# Patient Record
Sex: MALE | Race: WHITE | NOT HISPANIC OR LATINO | Employment: FULL TIME | URBAN - METROPOLITAN AREA
[De-identification: names, ages, dates, MRNs, and addresses within clinical notes are randomized per-mention and may not be internally consistent; named-entity substitution may affect disease eponyms.]

---

## 2017-03-15 DIAGNOSIS — K50.80 REGIONAL ENTERITIS OF SMALL INTESTINE WITH LARGE INTESTINE (H): ICD-10-CM

## 2017-03-15 LAB
ALBUMIN SERPL-MCNC: 4.4 G/DL (ref 3.4–5)
ALP SERPL-CCNC: 43 U/L (ref 40–150)
ALT SERPL W P-5'-P-CCNC: 33 U/L (ref 0–70)
AST SERPL W P-5'-P-CCNC: 19 U/L (ref 0–45)
BASOPHILS # BLD AUTO: 0 10E9/L (ref 0–0.2)
BASOPHILS NFR BLD AUTO: 0.3 %
BILIRUB DIRECT SERPL-MCNC: 0.3 MG/DL (ref 0–0.2)
BILIRUB SERPL-MCNC: 2 MG/DL (ref 0.2–1.3)
DIFFERENTIAL METHOD BLD: NORMAL
EOSINOPHIL # BLD AUTO: 0.2 10E9/L (ref 0–0.7)
EOSINOPHIL NFR BLD AUTO: 3.9 %
ERYTHROCYTE [DISTWIDTH] IN BLOOD BY AUTOMATED COUNT: 13 % (ref 10–15)
HCT VFR BLD AUTO: 48.4 % (ref 40–53)
HGB BLD-MCNC: 16.4 G/DL (ref 13.3–17.7)
IMM GRANULOCYTES # BLD: 0 10E9/L (ref 0–0.4)
IMM GRANULOCYTES NFR BLD: 0.2 %
LYMPHOCYTES # BLD AUTO: 2.2 10E9/L (ref 0.8–5.3)
LYMPHOCYTES NFR BLD AUTO: 36.6 %
MCH RBC QN AUTO: 33 PG (ref 26.5–33)
MCHC RBC AUTO-ENTMCNC: 33.9 G/DL (ref 31.5–36.5)
MCV RBC AUTO: 97 FL (ref 78–100)
MONOCYTES # BLD AUTO: 0.4 10E9/L (ref 0–1.3)
MONOCYTES NFR BLD AUTO: 6.5 %
NEUTROPHILS # BLD AUTO: 3.1 10E9/L (ref 1.6–8.3)
NEUTROPHILS NFR BLD AUTO: 52.5 %
NRBC # BLD AUTO: 0 10*3/UL
NRBC BLD AUTO-RTO: 0 /100
PLATELET # BLD AUTO: 295 10E9/L (ref 150–450)
PROT SERPL-MCNC: 7.8 G/DL (ref 6.8–8.8)
RBC # BLD AUTO: 4.97 10E12/L (ref 4.4–5.9)
WBC # BLD AUTO: 5.9 10E9/L (ref 4–11)

## 2017-03-15 PROCEDURE — 36415 COLL VENOUS BLD VENIPUNCTURE: CPT

## 2017-03-15 PROCEDURE — 80076 HEPATIC FUNCTION PANEL: CPT

## 2017-03-15 PROCEDURE — 85025 COMPLETE CBC W/AUTO DIFF WBC: CPT

## 2017-03-23 ENCOUNTER — TRANSFERRED RECORDS (OUTPATIENT)
Dept: HEALTH INFORMATION MANAGEMENT | Facility: CLINIC | Age: 37
End: 2017-03-23

## 2017-05-18 DIAGNOSIS — K50.80 REGIONAL ENTERITIS OF SMALL INTESTINE WITH LARGE INTESTINE (H): ICD-10-CM

## 2017-05-18 LAB
ALBUMIN SERPL-MCNC: 4.2 G/DL (ref 3.4–5)
ALP SERPL-CCNC: 46 U/L (ref 40–150)
ALT SERPL W P-5'-P-CCNC: 34 U/L (ref 0–70)
AST SERPL W P-5'-P-CCNC: 19 U/L (ref 0–45)
BASOPHILS # BLD AUTO: 0 10E9/L (ref 0–0.2)
BASOPHILS NFR BLD AUTO: 0.2 %
BILIRUB DIRECT SERPL-MCNC: 0.2 MG/DL (ref 0–0.2)
BILIRUB SERPL-MCNC: 3.9 MG/DL (ref 0.2–1.3)
DIFFERENTIAL METHOD BLD: ABNORMAL
EOSINOPHIL # BLD AUTO: 0.1 10E9/L (ref 0–0.7)
EOSINOPHIL NFR BLD AUTO: 2.6 %
ERYTHROCYTE [DISTWIDTH] IN BLOOD BY AUTOMATED COUNT: 13.6 % (ref 10–15)
HCT VFR BLD AUTO: 45.9 % (ref 40–53)
HGB BLD-MCNC: 15.7 G/DL (ref 13.3–17.7)
IMM GRANULOCYTES # BLD: 0 10E9/L (ref 0–0.4)
IMM GRANULOCYTES NFR BLD: 0.2 %
LYMPHOCYTES # BLD AUTO: 1.8 10E9/L (ref 0.8–5.3)
LYMPHOCYTES NFR BLD AUTO: 34.8 %
MCH RBC QN AUTO: 33.1 PG (ref 26.5–33)
MCHC RBC AUTO-ENTMCNC: 34.2 G/DL (ref 31.5–36.5)
MCV RBC AUTO: 97 FL (ref 78–100)
MONOCYTES # BLD AUTO: 0.3 10E9/L (ref 0–1.3)
MONOCYTES NFR BLD AUTO: 6.5 %
NEUTROPHILS # BLD AUTO: 2.8 10E9/L (ref 1.6–8.3)
NEUTROPHILS NFR BLD AUTO: 55.7 %
NRBC # BLD AUTO: 0 10*3/UL
NRBC BLD AUTO-RTO: 0 /100
PLATELET # BLD AUTO: 304 10E9/L (ref 150–450)
PROT SERPL-MCNC: 7.9 G/DL (ref 6.8–8.8)
RBC # BLD AUTO: 4.75 10E12/L (ref 4.4–5.9)
WBC # BLD AUTO: 5.1 10E9/L (ref 4–11)

## 2017-05-18 PROCEDURE — 36415 COLL VENOUS BLD VENIPUNCTURE: CPT | Performed by: INTERNAL MEDICINE

## 2017-05-18 PROCEDURE — 85025 COMPLETE CBC W/AUTO DIFF WBC: CPT | Performed by: INTERNAL MEDICINE

## 2017-05-18 PROCEDURE — 80076 HEPATIC FUNCTION PANEL: CPT | Performed by: INTERNAL MEDICINE

## 2017-06-06 ENCOUNTER — TRANSFERRED RECORDS (OUTPATIENT)
Dept: HEALTH INFORMATION MANAGEMENT | Facility: CLINIC | Age: 37
End: 2017-06-06

## 2017-08-09 ENCOUNTER — TRANSFERRED RECORDS (OUTPATIENT)
Dept: HEALTH INFORMATION MANAGEMENT | Facility: CLINIC | Age: 37
End: 2017-08-09

## 2017-10-23 ENCOUNTER — ALLIED HEALTH/NURSE VISIT (OUTPATIENT)
Dept: NURSING | Facility: CLINIC | Age: 37
End: 2017-10-23
Payer: COMMERCIAL

## 2017-10-23 DIAGNOSIS — Z23 NEED FOR PROPHYLACTIC VACCINATION AND INOCULATION AGAINST INFLUENZA: Primary | ICD-10-CM

## 2017-10-23 PROCEDURE — 90471 IMMUNIZATION ADMIN: CPT

## 2017-10-23 PROCEDURE — 90686 IIV4 VACC NO PRSV 0.5 ML IM: CPT

## 2017-10-23 PROCEDURE — 99207 ZZC NO CHARGE NURSE ONLY: CPT

## 2017-10-23 NOTE — MR AVS SNAPSHOT
After Visit Summary   10/23/2017    Leodan Collins    MRN: [id number]           Patient Information     Date Of Birth          1980        Visit Information        Provider Department      10/23/2017 6:25 PM [facility]        Today's Diagnoses     Need for prophylactic vaccination and inoculation against influenza    -  1       Follow-ups after your visit        Your next 10 appointments already scheduled     Oct 30, 2017  9:30 AM CDT   [facility] with Blane Worrell MD   [facility] ([facility])    [address] [place] [id number] [phone]              Who to contact     If you have questions or need follow up information about today's clinic visit or your schedule please contact [facility] directly at [phone].  Normal or non-critical lab and imaging results will be communicated to you by Shakahart, letter or phone within 4 business days after the clinic has received the results. If you do not hear from us within 7 days, please contact the clinic through Shakahart or phone. If you have a critical or abnormal lab result, we will notify you by phone as soon as possible.  Submit refill requests through 3seventy or call your pharmacy and they will forward the refill request to us. Please allow 3 business days for your refill to be completed.          Additional Information About Your Visit        MyChart Information     3seventy gives you secure access to your electronic health record. If you see a primary care provider, you can also send messages to your care team and make appointments. If you have questions, please call your primary care clinic.  If you do not have a primary care provider, please call [phone] and they will assist you.        Care EveryWhere ID     This is your Care EveryWhere ID. This could be used by other organizations  to access your [facility] medical records  [id number]         Blood Pressure from Last 3 Encounters:   07/08/16 130/81   05/27/16 120/72   05/17/16 108/62    Weight from Last 3 Encounters:   07/08/16 140 lb (63.5 kg)   05/27/16 140 lb (63.5 kg)   05/17/16 138 lb 3.7 oz (62.7 kg)              We Performed the Following     FLU VAC, SPLIT VIRUS IM > 3 YO (QUADRIVALENT) [31002]     Vaccine Administration, Initial [66634]        Primary Care Provider Office Phone # Fax #    Blane Adolfo Worrell [phone]       [address] [facility] [address]  [facility] [place] [address]        Equal Access to Services     DELVIN ANGEL : Hadii kaity cooneyo Soconstantino, waaxda luqadaha, qaybta kaalmada adeegyada, keron mendez. So [facility] [phone].    ATENCIÓN: Si habla español, tiene a santiago disposición servicios gratuitos de asistencia lingüística. Llame al [phone].    We comply with applicable federal civil rights laws and [place] laws. We do not discriminate on the basis of race, color, national origin, age, disability, sex, sexual orientation, or gender identity.            Thank you!     Thank you for choosing [facility]  for your care. Our goal is always to provide you with excellent care. Hearing back from our patients is one way we can continue to improve our services. Please take a few minutes to complete the written survey that you may receive in the mail after your visit with us. Thank you!             Your Updated Medication List - Protect others around you: Learn how to safely use, store and throw away your medicines at www.disposemymeds.org.          This list is accurate as of: 10/23/17  7:03 PM.  Always use your most recent med list.                   Brand Name Dispense Instructions for use Diagnosis    benzonatate 200 MG capsule    TESSALON          * HUMIRA PEN 40 MG/0.8ML pen kit   Generic drug:  adalimumab           * HUMIRA PEN SC      Inject 40 mg  Subcutaneous every 14 days    Calculus of kidney       mercaptopurine 50 MG tablet CHEMO    PURINETHOL     Take 100 mg by mouth daily    Calculus of kidney       MULTI-DAY PLUS IRON PO      Take 1 tablet by mouth daily        ondansetron 4 MG tablet    ZOFRAN    18 tablet    Take 1 tablet (4 mg) by mouth every 8 hours as needed for nausea    Kidney stone       oxybutynin 5 MG tablet    DITROPAN    10 tablet    Take 1 tablet (5 mg) by mouth daily as needed for bladder spasms Only take as needed. May cause dry mouth, dry eyes or urinary retention (inability to pee)    Kidney stone       * oxyCODONE 5 MG IR tablet    ROXICODONE    30 tablet    Take 1-2 tablets (5-10 mg) by mouth every 6 hours as needed for moderate to severe pain Do NOT drive or use additional narcotics while on this medication    Kidney stone       * oxyCODONE 5 MG IR tablet    ROXICODONE    30 tablet    Take 1 tablet (5 mg) by mouth every 4 hours as needed for moderate to severe pain    Pain due to ureteral stent (H)       phenazopyridine 100 MG tablet    PYRIDIUM    20 tablet    Take 2 tablets (200 mg) by mouth 3 times daily as needed for urinary tract discomfort May turn your urine orange    Kidney stone       senna-docusate 8.6-50 MG per tablet    SENOKOT-S;PERICOLACE    60 tablet    Take 2 tablets by mouth 2 times daily to prevent constipation with narcotic pain medication. Hold if you have loose stools.    Kidney stone       tamsulosin 0.4 MG capsule    FLOMAX    30 capsule    Take 1 capsule (0.4 mg) by mouth daily    Kidney stone       * Notice:  This list has 4 medication(s) that are the same as other medications prescribed for you. Read the directions carefully, and ask your doctor or other care provider to review them with you.

## 2017-10-30 ENCOUNTER — OFFICE VISIT (OUTPATIENT)
Dept: FAMILY MEDICINE | Facility: CLINIC | Age: 37
End: 2017-10-30
Payer: COMMERCIAL

## 2017-10-30 VITALS
BODY MASS INDEX: 22.62 KG/M2 | HEIGHT: 67 IN | TEMPERATURE: 97 F | HEART RATE: 52 BPM | DIASTOLIC BLOOD PRESSURE: 66 MMHG | OXYGEN SATURATION: 100 % | WEIGHT: 144.1 LBS | SYSTOLIC BLOOD PRESSURE: 113 MMHG

## 2017-10-30 DIAGNOSIS — Z00.00 ROUTINE GENERAL MEDICAL EXAMINATION AT A HEALTH CARE FACILITY: Primary | ICD-10-CM

## 2017-10-30 DIAGNOSIS — K50.019 CROHN'S DISEASE OF SMALL INTESTINE WITH COMPLICATION (H): ICD-10-CM

## 2017-10-30 DIAGNOSIS — H61.23 BILATERAL IMPACTED CERUMEN: ICD-10-CM

## 2017-10-30 DIAGNOSIS — D22.9 ATYPICAL MOLE: ICD-10-CM

## 2017-10-30 LAB
ALBUMIN SERPL-MCNC: 4.2 G/DL (ref 3.4–5)
ALP SERPL-CCNC: 47 U/L (ref 40–150)
ALT SERPL W P-5'-P-CCNC: 33 U/L (ref 0–70)
ANION GAP SERPL CALCULATED.3IONS-SCNC: 8 MMOL/L (ref 3–14)
AST SERPL W P-5'-P-CCNC: 21 U/L (ref 0–45)
BILIRUB SERPL-MCNC: 1.3 MG/DL (ref 0.2–1.3)
BUN SERPL-MCNC: 13 MG/DL (ref 7–30)
CALCIUM SERPL-MCNC: 9.6 MG/DL (ref 8.5–10.1)
CHLORIDE SERPL-SCNC: 106 MMOL/L (ref 94–109)
CHOLEST SERPL-MCNC: 172 MG/DL
CO2 SERPL-SCNC: 26 MMOL/L (ref 20–32)
CREAT SERPL-MCNC: 0.8 MG/DL (ref 0.66–1.25)
ERYTHROCYTE [DISTWIDTH] IN BLOOD BY AUTOMATED COUNT: 13.8 % (ref 10–15)
GFR SERPL CREATININE-BSD FRML MDRD: >90 ML/MIN/1.7M2
GLUCOSE SERPL-MCNC: 85 MG/DL (ref 70–99)
HCT VFR BLD AUTO: 46 % (ref 40–53)
HDLC SERPL-MCNC: 52 MG/DL
HGB BLD-MCNC: 16 G/DL (ref 13.3–17.7)
LDLC SERPL CALC-MCNC: 91 MG/DL
MCH RBC QN AUTO: 33.4 PG (ref 26.5–33)
MCHC RBC AUTO-ENTMCNC: 34.8 G/DL (ref 31.5–36.5)
MCV RBC AUTO: 96 FL (ref 78–100)
NONHDLC SERPL-MCNC: 120 MG/DL
PLATELET # BLD AUTO: 282 10E9/L (ref 150–450)
POTASSIUM SERPL-SCNC: 3.9 MMOL/L (ref 3.4–5.3)
PROT SERPL-MCNC: 7.9 G/DL (ref 6.8–8.8)
RBC # BLD AUTO: 4.79 10E12/L (ref 4.4–5.9)
SODIUM SERPL-SCNC: 140 MMOL/L (ref 133–144)
TRIGL SERPL-MCNC: 147 MG/DL
TSH SERPL DL<=0.005 MIU/L-ACNC: 0.67 MU/L (ref 0.4–4)
WBC # BLD AUTO: 5.3 10E9/L (ref 4–11)

## 2017-10-30 PROCEDURE — 84443 ASSAY THYROID STIM HORMONE: CPT | Performed by: FAMILY MEDICINE

## 2017-10-30 PROCEDURE — 80061 LIPID PANEL: CPT | Performed by: FAMILY MEDICINE

## 2017-10-30 PROCEDURE — 85027 COMPLETE CBC AUTOMATED: CPT | Performed by: FAMILY MEDICINE

## 2017-10-30 PROCEDURE — 36415 COLL VENOUS BLD VENIPUNCTURE: CPT | Performed by: FAMILY MEDICINE

## 2017-10-30 PROCEDURE — 80053 COMPREHEN METABOLIC PANEL: CPT | Performed by: FAMILY MEDICINE

## 2017-10-30 PROCEDURE — 99395 PREV VISIT EST AGE 18-39: CPT | Performed by: FAMILY MEDICINE

## 2017-10-30 NOTE — MR AVS SNAPSHOT
After Visit Summary   10/30/2017    Leodan Collins    MRN: [id number]           Patient Information     Date Of Birth          1980        Visit Information        Provider Department      10/30/2017 9:30 AM Blane Worrell MD [facility]        Today's Diagnoses     Routine general medical examination at a health care facility    -  1    Atypical mole          Care Instructions      Preventive Health Recommendations  Male Ages 26 - 39    Yearly exam:             See your health care provider every year in order to  o   Review health changes.   o   Discuss preventive care.    o   Review your medicines if your doctor has prescribed any.    You should be tested each year for STDs (sexually transmitted diseases), if you re at risk.     After age 35, talk to your provider about cholesterol testing. If you are at risk for heart disease, have your cholesterol tested at least every 5 years.     If you are at risk for diabetes, you should have a diabetes test (fasting glucose).  Shots: Get a flu shot each year. Get a tetanus shot every 10 years.     Nutrition:    Eat at least 5 servings of fruits and vegetables daily.     Eat whole-grain bread, whole-wheat pasta and brown rice instead of white grains and rice.     Talk to your provider about Calcium and Vitamin D.     Lifestyle    Exercise for at least 150 minutes a week (30 minutes a day, 5 days a week). This will help you control your weight and prevent disease.     Limit alcohol to one drink per day.     No smoking.     Wear sunscreen to prevent skin cancer.     See your dentist every six months for an exam and cleaning.             Follow-ups after your visit        Additional Services     DERMATOLOGY REFERRAL       Your provider has referred you to: [facility] [phone]   [email or web address]    Please be aware that coverage of these services is subject to the terms  "and limitations of your health insurance plan.  Call member services at your health plan with any benefit or coverage questions.      Please bring the following with you to your appointment:    (1) Any X-Rays, CTs or MRIs which have been performed.  Contact the facility where they were done to arrange for  prior to your scheduled appointment.    (2) List of current medications  (3) This referral request   (4) Any documents/labs given to you for this referral                  Who to contact     If you have questions or need follow up information about today's clinic visit or your schedule please contact [facility] directly at [phone].  Normal or non-critical lab and imaging results will be communicated to you by MyChart, letter or phone within 4 business days after the clinic has received the results. If you do not hear from us within 7 days, please contact the clinic through Cloud Engineshart or phone. If you have a critical or abnormal lab result, we will notify you by phone as soon as possible.  Submit refill requests through [employer] or call your pharmacy and they will forward the refill request to us. Please allow 3 business days for your refill to be completed.          Additional Information About Your Visit        Cloud EnginesharFanBoom Information     [employer] gives you secure access to your electronic health record. If you see a primary care provider, you can also send messages to your care team and make appointments. If you have questions, please call your primary care clinic.  If you do not have a primary care provider, please call [phone] and they will assist you.        Care EveryWhere ID     This is your Care EveryWhere ID. This could be used by other organizations to access your [facility] medical records  [id number]        Your Vitals Were     Pulse Temperature Height Pulse Oximetry BMI (Body Mass Index)       52 97  F (36.1  C) (Oral) 5' 7\" (1.702 m) 100% 22.57 kg/m2        Blood Pressure from " Last 3 Encounters:   10/30/17 113/66   07/08/16 130/81   05/27/16 120/72    Weight from Last 3 Encounters:   10/30/17 144 lb 1.6 oz (65.4 kg)   07/08/16 140 lb (63.5 kg)   05/27/16 140 lb (63.5 kg)              We Performed the Following     CBC with platelets     Comprehensive metabolic panel     DERMATOLOGY REFERRAL     Lipid panel reflex to direct LDL Fasting     TSH with free T4 reflex          Today's Medication Changes          These changes are accurate as of: 10/30/17 10:02 AM.  If you have any questions, ask your nurse or doctor.               Stop taking these medicines if you haven't already. Please contact your care team if you have questions.     benzonatate 200 MG capsule   Commonly known as:  TESSALON   Stopped by:  Blane Worrell MD           ondansetron 4 MG tablet   Commonly known as:  ZOFRAN   Stopped by:  Blane Worrell MD           oxybutynin 5 MG tablet   Commonly known as:  DITROPAN   Stopped by:  Blane Worrell MD           oxyCODONE 5 MG IR tablet   Commonly known as:  ROXICODONE   Stopped by:  Blane Worrell MD           phenazopyridine 100 MG tablet   Commonly known as:  PYRIDIUM   Stopped by:  Blane Worrell MD           senna-docusate 8.6-50 MG per tablet   Commonly known as:  SENOKOT-S;PERICOLACE   Stopped by:  Blane Worrell MD           tamsulosin 0.4 MG capsule   Commonly known as:  FLOMAX   Stopped by:  Blane Worrell MD                    Primary Care Provider Office Phone # Fax #    Blane Worrell [phone]       [address] [facility] [address]  [facility] [address]        Equal Access to Services     [facility] Hospital[facility]: Hadii kaity townsend Soconstantino, waaxda luqadaha, qaybta kaalmada adeegyada, keron mendez. So [facility] [phone].    ATENCIÓN: Si habla español, tiene a santiago disposición servicios gratuitos de asistencia lingüística. Llame al [phone].    We  comply with applicable federal civil rights laws and [place] laws. We do not discriminate on the basis of race, color, national origin, age, disability, sex, sexual orientation, or gender identity.            Thank you!     Thank you for choosing [facility]  for your care. Our goal is always to provide you with excellent care. Hearing back from our patients is one way we can continue to improve our services. Please take a few minutes to complete the written survey that you may receive in the mail after your visit with us. Thank you!             Your Updated Medication List - Protect others around you: Learn how to safely use, store and throw away your medicines at www.disposemymeds.org.          This list is accurate as of: 10/30/17 10:02 AM.  Always use your most recent med list.                   Brand Name Dispense Instructions for use Diagnosis    * HUMIRA PEN 40 MG/0.8ML pen kit   Generic drug:  adalimumab           * HUMIRA PEN SC      Inject 40 mg Subcutaneous every 14 days    Calculus of kidney       mercaptopurine 50 MG tablet CHEMO    PURINETHOL     Take 100 mg by mouth daily    Calculus of kidney       MULTI-DAY PLUS IRON PO      Take 1 tablet by mouth daily        * Notice:  This list has 2 medication(s) that are the same as other medications prescribed for you. Read the directions carefully, and ask your doctor or other care provider to review them with you.

## 2017-10-30 NOTE — NURSING NOTE
"Chief Complaint   Patient presents with     Physical       Initial /66 (BP Location: Left arm, Patient Position: Chair, Cuff Size: Adult Regular)  Pulse 52  Temp 97  F (36.1  C) (Oral)  Ht 5' 7\" (1.702 m)  Wt 144 lb 1.6 oz (65.4 kg)  SpO2 100%  BMI 22.57 kg/m2 Estimated body mass index is 22.57 kg/(m^2) as calculated from the following:    Height as of this encounter: 5' 7\" (1.702 m).    Weight as of this encounter: 144 lb 1.6 oz (65.4 kg).  Medication Reconciliation: complete     Kusum Eid CMA    "

## 2017-10-30 NOTE — LETTER
October 31, 2017      Leodan Collins  [address]  [facility] [address]        Dear ,    We are writing to inform you of your test results.    Your test results fall within the expected range(s) or remain unchanged from previous results.  Please continue with current treatment plan.    Resulted Orders   Lipid panel reflex to direct LDL Fasting   Result Value Ref Range    Cholesterol 172 <200 mg/dL    Triglycerides 147 <150 mg/dL      Comment:      Non Fasting    HDL Cholesterol 52 >39 mg/dL    LDL Cholesterol Calculated 91 <100 mg/dL      Comment:      Desirable:       <100 mg/dl    Non HDL Cholesterol 120 <130 mg/dL   Comprehensive metabolic panel   Result Value Ref Range    Sodium 140 133 - 144 mmol/L    Potassium 3.9 3.4 - 5.3 mmol/L    Chloride 106 94 - 109 mmol/L    Carbon Dioxide 26 20 - 32 mmol/L    Anion Gap 8 3 - 14 mmol/L    Glucose 85 70 - 99 mg/dL      Comment:      Non Fasting    Urea Nitrogen 13 7 - 30 mg/dL    Creatinine 0.80 0.66 - 1.25 mg/dL    GFR Estimate >90 >60 mL/min/1.7m2      Comment:      Non  GFR Calc    GFR Estimate If Black >90 >60 mL/min/1.7m2      Comment:       GFR Calc    Calcium 9.6 8.5 - 10.1 mg/dL    Bilirubin Total 1.3 0.2 - 1.3 mg/dL    Albumin 4.2 3.4 - 5.0 g/dL    Protein Total 7.9 6.8 - 8.8 g/dL    Alkaline Phosphatase 47 40 - 150 U/L    ALT 33 0 - 70 U/L    AST 21 0 - 45 U/L   CBC with platelets   Result Value Ref Range    WBC 5.3 4.0 - 11.0 10e9/L    RBC Count 4.79 4.4 - 5.9 10e12/L    Hemoglobin 16.0 13.3 - 17.7 g/dL    Hematocrit 46.0 40.0 - 53.0 %    MCV 96 78 - 100 fl    MCH 33.4 (H) 26.5 - 33.0 pg    MCHC 34.8 31.5 - 36.5 g/dL    RDW 13.8 10.0 - 15.0 %    Platelet Count 282 150 - 450 10e9/L   TSH with free T4 reflex   Result Value Ref Range    TSH 0.67 0.40 - 4.00 mU/L       If you have any questions or concerns, please call the clinic at the number listed above.       Sincerely,        Blane Worrell,  MD

## 2017-10-30 NOTE — PROGRESS NOTES
SUBJECTIVE:   CC: Leodan Collins is an 37 year old male who presents for preventative health visit.     Healthy Habits:    Do you get at least three servings of calcium containing foods daily (dairy, green leafy vegetables, etc.)? yes    Amount of exercise or daily activities, outside of work: 0 day(s) per week    Problems taking medications regularly No    Medication side effects: No    Have you had an eye exam in the past two years? yes    Do you see a dentist twice per year? yes    Do you have sleep apnea, excessive snoring or daytime drowsiness?no          Encounter Diagnoses   Name Primary?     Routine general medical examination at a health care facility Yes     Atypical mole      Bilateral impacted cerumen, recurrent         Today's PHQ-2 Score: PHQ-2 ( 1999 Pf[employer]) 10/30/2017 5/13/2016   Q1: Little interest or pleasure in doing things 0 0   Q2: Feeling down, depressed or hopeless 0 0   PHQ-2 Score 0 0       Abuse: Current or Past(Physical, Sexual or Emotional)- No  Do you feel safe in your environment - Yes    Social History   Substance Use Topics     Smoking status: Never Smoker     Smokeless tobacco: Never Used     Alcohol use 0.0 oz/week     0 Standard drinks or equivalent per week      Comment: x2 a week     The patient does not drink >3 drinks per day nor >7 drinks per week.    Last PSA: No results found for: PSA    Reviewed orders with patient. Reviewed health maintenance and updated orders accordingly - Yes  Labs reviewed in EPIC    Reviewed and updated as needed this visit by clinical staff  Tobacco  Allergies  Meds  Med Hx  Surg Hx  Fam Hx  Soc Hx        Reviewed and updated as needed this visit by Provider              ROS:  C: NEGATIVE for fever, chills, change in weight  I: NEGATIVE for worrisome rashes, moles or lesions  E: NEGATIVE for vision changes or irritation  ENT: NEGATIVE for ear, mouth and throat problems  R: NEGATIVE for significant cough or SOB  CV: NEGATIVE for chest pain,  "palpitations or peripheral edema   male: negative for dysuria, hematuria, decreased urinary stream, erectile dysfunction, urethral discharge  M: NEGATIVE for significant arthralgias or myalgia  N: NEGATIVE for weakness, dizziness or paresthesias  P: NEGATIVE for changes in mood or affect    OBJECTIVE:   /66 (BP Location: Left arm, Patient Position: Chair, Cuff Size: Adult Regular)  Pulse 52  Temp 97  F (36.1  C) (Oral)  Ht 5' 7\" (1.702 m)  Wt 144 lb 1.6 oz (65.4 kg)  SpO2 100%  BMI 22.57 kg/m2  EXAM:  GENERAL: healthy, alert and no distress  EYES: Eyes grossly normal to inspection, PERRL and conjunctivae and sclerae normal  HENT:Cerumenosis is noted.  Wax is removed by syringing   Instructions for home care to prevent wax buildup are given. ear canals and TM's normal, nose and mouth without ulcers or lesions  NECK: no adenopathy, no asymmetry, masses, or scars and thyroid normal to palpation  RESP: lungs clear to auscultation - no rales, rhonchi or wheezes  CV: regular rate and rhythm, normal S1 S2, no S3 or S4, no murmur, click or rub, no peripheral edema and peripheral pulses strong  ABDOMEN: soft, nontender and colostomy noted   (male): normal male genitalia without lesions or urethral discharge, no hernia  MS: no gross musculoskeletal defects noted, no edema  SKIN: several atypical mole - generalized  NEURO: Normal strength and tone, mentation intact and speech normal  PSYCH: mentation appears normal, affect normal/bright  LYMPH: no cervical, supraclavicular, axillary, or inguinal adenopathy    ASSESSMENT/PLAN:   1. Routine general medical examination at a health care facility  Overall in good health  - Lipid panel reflex to direct LDL Fasting  - Comprehensive metabolic panel  - CBC with platelets  - TSH with free T4 reflex    2. Atypical mole  Needs eval  - DERMATOLOGY REFERRAL    3. Bilateral impacted cerumen  resolved  - REMOVE IMPACTED CERUMEN  4. crohns dieseae, stable  Follow up with " "consultant as planned.   COUNSELING:  Reviewed preventive health counseling, as reflected in patient instructions       Regular exercise       Healthy diet/nutrition       Vision screening       Hearing screening       Family planning       Safe sex practices/STD prevention       Prostate cancer screening         reports that he has never smoked. He has never used smokeless tobacco.      Estimated body mass index is 22.57 kg/(m^2) as calculated from the following:    Height as of this encounter: 5' 7\" (1.702 m).    Weight as of this encounter: 144 lb 1.6 oz (65.4 kg).         Counseling Resources:  ATP IV Guidelines  Pooled Cohorts Equation Calculator  FRAX Risk Assessment  ICSI Preventive Guidelines  Dietary Guidelines for Americans, 2010  USDA's MyPlate  ASA Prophylaxis  Lung CA Screening    Blane Worrell MD  [facility]  "

## 2017-11-24 ENCOUNTER — APPOINTMENT (OUTPATIENT)
Dept: LAB | Facility: CLINIC | Age: 37
End: 2017-11-24
Attending: INTERNAL MEDICINE
Payer: COMMERCIAL

## 2017-11-27 ENCOUNTER — TRANSFERRED RECORDS (OUTPATIENT)
Dept: HEALTH INFORMATION MANAGEMENT | Facility: CLINIC | Age: 37
End: 2017-11-27

## 2017-11-27 DIAGNOSIS — K50.80 CROHN'S DISEASE OF BOTH SMALL AND LG INT W/O COMPLICATIONS (H): Primary | ICD-10-CM

## 2017-11-27 LAB
ALBUMIN SERPL-MCNC: 3.9 G/DL (ref 3.4–5)
ALP SERPL-CCNC: 50 U/L (ref 40–150)
ALT SERPL W P-5'-P-CCNC: 33 U/L (ref 0–70)
AST SERPL W P-5'-P-CCNC: 22 U/L (ref 0–45)
BASOPHILS # BLD AUTO: 0 10E9/L (ref 0–0.2)
BASOPHILS NFR BLD AUTO: 0.3 %
BILIRUB DIRECT SERPL-MCNC: 0.3 MG/DL (ref 0–0.2)
BILIRUB SERPL-MCNC: 2.5 MG/DL (ref 0.2–1.3)
DIFFERENTIAL METHOD BLD: NORMAL
EOSINOPHIL # BLD AUTO: 0.2 10E9/L (ref 0–0.7)
EOSINOPHIL NFR BLD AUTO: 2.5 %
ERYTHROCYTE [DISTWIDTH] IN BLOOD BY AUTOMATED COUNT: 13.1 % (ref 10–15)
HCT VFR BLD AUTO: 47.7 % (ref 40–53)
HGB BLD-MCNC: 16.3 G/DL (ref 13.3–17.7)
IMM GRANULOCYTES # BLD: 0 10E9/L (ref 0–0.4)
IMM GRANULOCYTES NFR BLD: 0.2 %
LYMPHOCYTES # BLD AUTO: 2.8 10E9/L (ref 0.8–5.3)
LYMPHOCYTES NFR BLD AUTO: 45.4 %
MCH RBC QN AUTO: 32.8 PG (ref 26.5–33)
MCHC RBC AUTO-ENTMCNC: 34.2 G/DL (ref 31.5–36.5)
MCV RBC AUTO: 96 FL (ref 78–100)
MONOCYTES # BLD AUTO: 0.5 10E9/L (ref 0–1.3)
MONOCYTES NFR BLD AUTO: 8.4 %
NEUTROPHILS # BLD AUTO: 2.6 10E9/L (ref 1.6–8.3)
NEUTROPHILS NFR BLD AUTO: 43.2 %
NRBC # BLD AUTO: 0 10*3/UL
NRBC BLD AUTO-RTO: 0 /100
PLATELET # BLD AUTO: 306 10E9/L (ref 150–450)
PROT SERPL-MCNC: 7.6 G/DL (ref 6.8–8.8)
RBC # BLD AUTO: 4.97 10E12/L (ref 4.4–5.9)
WBC # BLD AUTO: 6.1 10E9/L (ref 4–11)

## 2017-11-27 PROCEDURE — 85025 COMPLETE CBC W/AUTO DIFF WBC: CPT | Performed by: INTERNAL MEDICINE

## 2017-11-27 PROCEDURE — 36415 COLL VENOUS BLD VENIPUNCTURE: CPT | Performed by: INTERNAL MEDICINE

## 2017-11-27 PROCEDURE — 80076 HEPATIC FUNCTION PANEL: CPT | Performed by: INTERNAL MEDICINE

## 2018-02-11 ENCOUNTER — HOSPITAL ENCOUNTER (EMERGENCY)
Facility: CLINIC | Age: 38
Discharge: HOME | End: 2018-02-11
Attending: INTERNAL MEDICINE | Admitting: INTERNAL MEDICINE
Payer: COMMERCIAL

## 2018-02-11 ENCOUNTER — APPOINTMENT (OUTPATIENT)
Dept: CT IMAGING | Facility: CLINIC | Age: 38
End: 2018-02-11
Attending: INTERNAL MEDICINE
Payer: COMMERCIAL

## 2018-02-11 VITALS
RESPIRATION RATE: 18 BRPM | OXYGEN SATURATION: 95 % | DIASTOLIC BLOOD PRESSURE: 71 MMHG | WEIGHT: 145 LBS | HEART RATE: 58 BPM | SYSTOLIC BLOOD PRESSURE: 123 MMHG | BODY MASS INDEX: 22.71 KG/M2 | TEMPERATURE: 97.4 F

## 2018-02-11 DIAGNOSIS — N20.1 URETERAL CALCULUS: ICD-10-CM

## 2018-02-11 LAB
ALBUMIN UR-MCNC: 30 MG/DL
ANION GAP SERPL CALCULATED.3IONS-SCNC: 7 MMOL/L (ref 3–14)
APPEARANCE UR: ABNORMAL
BASOPHILS # BLD AUTO: 0 10E9/L (ref 0–0.2)
BASOPHILS NFR BLD AUTO: 0.2 %
BILIRUB UR QL STRIP: NEGATIVE
BUN SERPL-MCNC: 15 MG/DL (ref 7–30)
CALCIUM SERPL-MCNC: 9.1 MG/DL (ref 8.5–10.1)
CAOX CRY #/AREA URNS HPF: ABNORMAL /HPF
CHLORIDE SERPL-SCNC: 107 MMOL/L (ref 94–109)
CO2 SERPL-SCNC: 28 MMOL/L (ref 20–32)
COLOR UR AUTO: ABNORMAL
CREAT SERPL-MCNC: 0.86 MG/DL (ref 0.66–1.25)
DIFFERENTIAL METHOD BLD: NORMAL
EOSINOPHIL # BLD AUTO: 0.1 10E9/L (ref 0–0.7)
EOSINOPHIL NFR BLD AUTO: 0.6 %
ERYTHROCYTE [DISTWIDTH] IN BLOOD BY AUTOMATED COUNT: 12.9 % (ref 10–15)
GFR SERPL CREATININE-BSD FRML MDRD: >90 ML/MIN/1.7M2
GLUCOSE SERPL-MCNC: 95 MG/DL (ref 70–99)
GLUCOSE UR STRIP-MCNC: NEGATIVE MG/DL
HCT VFR BLD AUTO: 45 % (ref 40–53)
HGB BLD-MCNC: 15.2 G/DL (ref 13.3–17.7)
HGB UR QL STRIP: ABNORMAL
IMM GRANULOCYTES # BLD: 0 10E9/L (ref 0–0.4)
IMM GRANULOCYTES NFR BLD: 0.2 %
KETONES UR STRIP-MCNC: NEGATIVE MG/DL
LEUKOCYTE ESTERASE UR QL STRIP: ABNORMAL
LYMPHOCYTES # BLD AUTO: 1.4 10E9/L (ref 0.8–5.3)
LYMPHOCYTES NFR BLD AUTO: 14.1 %
MCH RBC QN AUTO: 32.5 PG (ref 26.5–33)
MCHC RBC AUTO-ENTMCNC: 33.8 G/DL (ref 31.5–36.5)
MCV RBC AUTO: 96 FL (ref 78–100)
MONOCYTES # BLD AUTO: 0.6 10E9/L (ref 0–1.3)
MONOCYTES NFR BLD AUTO: 6 %
MUCOUS THREADS #/AREA URNS LPF: PRESENT /LPF
NEUTROPHILS # BLD AUTO: 8.1 10E9/L (ref 1.6–8.3)
NEUTROPHILS NFR BLD AUTO: 78.9 %
NITRATE UR QL: NEGATIVE
NRBC # BLD AUTO: 0 10*3/UL
NRBC BLD AUTO-RTO: 0 /100
PH UR STRIP: 5.5 PH (ref 5–7)
PLATELET # BLD AUTO: 274 10E9/L (ref 150–450)
POTASSIUM SERPL-SCNC: 3.8 MMOL/L (ref 3.4–5.3)
RBC # BLD AUTO: 4.68 10E12/L (ref 4.4–5.9)
RBC #/AREA URNS AUTO: >182 /HPF (ref 0–2)
SODIUM SERPL-SCNC: 142 MMOL/L (ref 133–144)
SOURCE: ABNORMAL
SP GR UR STRIP: 1.02 (ref 1–1.03)
SQUAMOUS #/AREA URNS AUTO: <1 /HPF (ref 0–1)
UROBILINOGEN UR STRIP-MCNC: NORMAL MG/DL (ref 0–2)
WBC # BLD AUTO: 10.2 10E9/L (ref 4–11)
WBC #/AREA URNS AUTO: 22 /HPF (ref 0–2)

## 2018-02-11 PROCEDURE — 76775 US EXAM ABDO BACK WALL LIM: CPT | Performed by: INTERNAL MEDICINE

## 2018-02-11 PROCEDURE — 80048 BASIC METABOLIC PNL TOTAL CA: CPT | Performed by: INTERNAL MEDICINE

## 2018-02-11 PROCEDURE — 85025 COMPLETE CBC W/AUTO DIFF WBC: CPT | Performed by: INTERNAL MEDICINE

## 2018-02-11 PROCEDURE — 99285 EMERGENCY DEPT VISIT HI MDM: CPT | Mod: 25 | Performed by: INTERNAL MEDICINE

## 2018-02-11 PROCEDURE — 76775 US EXAM ABDO BACK WALL LIM: CPT | Mod: 26 | Performed by: INTERNAL MEDICINE

## 2018-02-11 PROCEDURE — 96361 HYDRATE IV INFUSION ADD-ON: CPT | Performed by: INTERNAL MEDICINE

## 2018-02-11 PROCEDURE — 81001 URINALYSIS AUTO W/SCOPE: CPT | Performed by: INTERNAL MEDICINE

## 2018-02-11 PROCEDURE — 74176 CT ABD & PELVIS W/O CONTRAST: CPT

## 2018-02-11 PROCEDURE — 96374 THER/PROPH/DIAG INJ IV PUSH: CPT | Performed by: INTERNAL MEDICINE

## 2018-02-11 PROCEDURE — 25000128 H RX IP 250 OP 636: Performed by: INTERNAL MEDICINE

## 2018-02-11 RX ORDER — KETOROLAC TROMETHAMINE 30 MG/ML
30 INJECTION, SOLUTION INTRAMUSCULAR; INTRAVENOUS ONCE
Status: COMPLETED | OUTPATIENT
Start: 2018-02-11 | End: 2018-02-11

## 2018-02-11 RX ORDER — ONDANSETRON 4 MG/1
4 TABLET, ORALLY DISINTEGRATING ORAL EVERY 8 HOURS PRN
Qty: 10 TABLET | Refills: 0 | Status: SHIPPED | OUTPATIENT
Start: 2018-02-11 | End: 2018-02-14

## 2018-02-11 RX ORDER — TAMSULOSIN HYDROCHLORIDE 0.4 MG/1
0.4 CAPSULE ORAL DAILY
Qty: 10 CAPSULE | Refills: 0 | Status: SHIPPED | OUTPATIENT
Start: 2018-02-11 | End: 2018-02-21

## 2018-02-11 RX ORDER — OXYCODONE AND ACETAMINOPHEN 5; 325 MG/1; MG/1
1 TABLET ORAL EVERY 4 HOURS PRN
COMMUNITY
End: 2018-02-11

## 2018-02-11 RX ORDER — OXYCODONE AND ACETAMINOPHEN 5; 325 MG/1; MG/1
1-2 TABLET ORAL EVERY 6 HOURS PRN
Qty: 20 TABLET | Refills: 0 | Status: SHIPPED | OUTPATIENT
Start: 2018-02-11 | End: 2018-06-22

## 2018-02-11 RX ORDER — IBUPROFEN 200 MG
600 TABLET ORAL EVERY 8 HOURS PRN
Qty: 30 TABLET | Refills: 0 | Status: SHIPPED | OUTPATIENT
Start: 2018-02-11 | End: 2018-06-22

## 2018-02-11 RX ADMIN — KETOROLAC TROMETHAMINE 30 MG: 30 INJECTION, SOLUTION INTRAMUSCULAR at 11:11

## 2018-02-11 RX ADMIN — SODIUM CHLORIDE 1000 ML: 9 INJECTION, SOLUTION INTRAVENOUS at 11:12

## 2018-02-11 ASSESSMENT — ENCOUNTER SYMPTOMS
FLANK PAIN: 1
BACK PAIN: 1
HEMATURIA: 0

## 2018-02-11 NOTE — DISCHARGE INSTRUCTIONS
Please make an appointment to follow up with Your Urologist Dr Melvin at [facility] (phone: [phone]) as soon as possible even if entirely better.

## 2018-02-11 NOTE — ED NOTES
Flank pain on left side. testicular discomfort.  The pain feels like when he had kidney stones about two years.   Had a procedure to remove them.   States he did pass a kidney stone one year ago.

## 2018-02-11 NOTE — ED AVS SNAPSHOT
[facility], [place], Emergency Department    [address]    [facility] [address]    Phone:  [phone]    Fax:  [phone]                                       Leodan Collins   MRN: [id number]    Department:  [facility], Emergency Department   Date of Visit:  2/11/2018           After Visit Summary Signature Page     I have received my discharge instructions, and my questions have been answered. I have discussed any challenges I see with this plan with the nurse or doctor.    ..........................................................................................................................................  Patient/Patient Representative Signature      ..........................................................................................................................................  Patient Representative Print Name and Relationship to Patient    ..................................................               ................................................  Date                                            Time    ..........................................................................................................................................  Reviewed by Signature/Title    ...................................................              ..............................................  Date                                                            Time

## 2018-02-11 NOTE — ED AVS SNAPSHOT
[facility], Emergency Department    [address]    [facility] [address]    Phone:  [phone]    Fax:  [phone]                                       Leodan Collins   MRN: [id number]    Department:  [facility], Emergency Department   Date of Visit:  2/11/2018           Patient Information     Date Of Birth          1980        Your diagnoses for this visit were:     Ureteral calculus        You were seen by Muriel Sellers MD.        Discharge Instructions       Please make an appointment to follow up with Your Urologist Dr [facility]vin [facility] (phone: [phone]) as soon as possible even if entirely better.     Discharge References/Attachments     KIDNEY STONES, TREATING: EXPECTANT THERAPY (ENGLISH)      24 Hour Appointment Hotline       To make an appointment at any [facility] clinic, call [phone] ([phone]). If you don't have a family doctor or clinic, we will help you find one. [facility] clinics are conveniently located to serve the needs of you and your family.             Review of your medicines      START taking        Dose / Directions Last dose taken    ibuprofen 200 MG tablet   Commonly known as:  ADVIL/MOTRIN   Dose:  600 mg   Quantity:  30 tablet        Take 3 tablets (600 mg) by mouth every 8 hours as needed for mild pain   Refills:  0        ondansetron 4 MG ODT tab   Commonly known as:  ZOFRAN ODT   Dose:  4 mg   Quantity:  10 tablet        Take 1 tablet (4 mg) by mouth every 8 hours as needed for nausea   Refills:  0        tamsulosin 0.4 MG capsule   Commonly known as:  FLOMAX   Dose:  0.4 mg   Quantity:  10 capsule        Take 1 capsule (0.4 mg) by mouth daily for 10 doses   Refills:  0          CONTINUE these medicines which may have CHANGED, or have new prescriptions. If we are uncertain of the size of tablets/capsules you have at home, strength may be listed as something that might have changed.        Dose / Directions Last dose taken     oxyCODONE-acetaminophen 5-325 MG per tablet   Commonly known as:  PERCOCET   Dose:  1-2 tablet   What changed:    - how much to take  - when to take this  - additional instructions   Quantity:  20 tablet        Take 1-2 tablets by mouth every 6 hours as needed for moderate to severe pain   Refills:  0          Our records show that you are taking the medicines listed below. If these are incorrect, please call your family doctor or clinic.        Dose / Directions Last dose taken    * HUMIRA PEN 40 MG/0.8ML pen kit   Generic drug:  adalimumab        Refills:  0        * HUMIRA PEN SC   Dose:  40 mg        Inject 40 mg Subcutaneous every 14 days   Refills:  0        mercaptopurine 50 MG tablet CHEMO   Commonly known as:  PURINETHOL   Dose:  100 mg        Take 100 mg by mouth daily   Refills:  0        MULTI-DAY PLUS IRON PO   Dose:  1 tablet        Take 1 tablet by mouth daily   Refills:  0        * Notice:  This list has 2 medication(s) that are the same as other medications prescribed for you. Read the directions carefully, and ask your doctor or other care provider to review them with you.            Prescriptions were sent or printed at these locations (4 Prescriptions)                   Other Prescriptions                Printed at Department/Unit printer (4 of 4)         tamsulosin (FLOMAX) 0.4 MG capsule               oxyCODONE-acetaminophen (PERCOCET) 5-325 MG per tablet               ibuprofen (ADVIL/MOTRIN) 200 MG tablet               ondansetron (ZOFRAN ODT) 4 MG ODT tab                Procedures and tests performed during your visit     Basic metabolic panel    CBC with platelets differential    CT Abdomen Pelvis w/o Contrast    POC US RETROPERITONEAL LIMITED    UA with Microscopic      Orders Needing Specimen Collection     None      Pending Results     No orders found from 2/9/2018 to 2/12/2018.            Pending Culture Results     No orders found from 2/9/2018 to 2/12/2018.            Pending Results  Instructions     If you had any lab results that were not finalized at the time of your Discharge, you can call the ED Lab Result RN at [phone]. You will be contacted by this team for any positive Lab results or changes in treatment. The nurses are available 7 days a week from 10A to 6:30P.  You can leave a message 24 hours per day and they will return your call.        Thank you for choosing [facility]       Thank you for choosing [facility] for your care. Our goal is always to provide you with excellent care. Hearing back from our patients is one way we can continue to improve our services. Please take a few minutes to complete the written survey that you may receive in the mail after you visit with us. Thank you!        [employer] Information     [facility] gives you secure access to your electronic health record. If you see a primary care provider, you can also send messages to your care team and make appointments. If you have questions, please call your primary care clinic.  If you do not have a primary care provider, please call [phone] and they will assist you.        Care EveryWhere ID     This is your Care EveryWhere ID. This could be used by other organizations to access your [facility] medical records  [id number]        Equal Access to Services     DELVIN ANGEL : Hadjak Betancourt, martin greene, marty celestin, keron mendez. So [facility] [phone].    ATENCIÓN: Si habla español, tiene a santiago disposición servicios gratuitos de asistencia lingüística. Llame al [phone].    We comply with applicable federal civil rights laws and [place] laws. We do not discriminate on the basis of race, color, national origin, age, disability, sex, sexual orientation, or gender identity.            After Visit Summary       This is your record. Keep this with you and show to your community pharmacist(s) and doctor(s) at your next visit.

## 2018-02-11 NOTE — ED PROVIDER NOTES
[facility] EMERGENCY DEPARTMENT ([facility])    2/11/18       History     Chief Complaint   Patient presents with     Flank Pain     pain on left side. testicular discomfort      HPI  Leodan Collins is a 37 year old male with a medical history significant for kidney stones and Crohn's disease who presents to the Emergency Department for evaluation of left-sided back pain, left flank pain and testicular pain.  Patient reports that the pain started this morning at approximately 8 AM and has been waxing and waning since it started.  He states that the pain currently is somewhat improved, but he notes that at 9 AM this morning he was in significant pain.  He reports that the pain currently is a dull type pain.  He denies any blood in his urine.  Patient reports that this is the same pain that he has had in the past with his kidney stones.    I have reviewed the Medications, Allergies, Past Medical and Surgical History, and Social History in the [facility] system.    Past Medical History:   Diagnosis Date     Asthma     child     Crohn's disease (H)      History of blood transfusion      Kidney stones        Past Surgical History:   Procedure Laterality Date     COLOSTOMY  2007     COMBINED CYSTOSCOPY, RETROGRADES, URETEROSCOPY, INSERT STENT Right 5/3/2016    Procedure: COMBINED CYSTOSCOPY, RETROGRADES, URETEROSCOPY, INSERT STENT;  Surgeon: Azael Melvin MD;  Location: [facility] OR     EXTRACORPOREAL SHOCK WAVE LITHOTRIPSY (ESWL)  6/27/2013    Procedure: EXTRACORPOREAL SHOCK WAVE LITHOTRIPSY (ESWL);  Left Extracorporeal Shock Wave Lithotripsy Kidney And Ureter;  Surgeon: Azael Melvin MD;  Location: [facility] OR     ILEOSTOMY  2009     LAPAROTOMY EXPLORATORY  2009    extensive lysis of adhesions, revision of coloenteric fistula, repair of small bowel fistula, takedown of current colostomy, debridement of chronic abscess, resection of residual left colon, right hemicolectomy, Louise ileostomy     LASER HOLMIUM  LITHOTRIPSY URETER(S), INSERT STENT, COMBINED  4/9/2014    Procedure: COMBINED CYSTOSCOPY, URETEROSCOPY, LASER HOLMIUM LITHOTRIPSY URETER(S), INSERT STENT;  Ureteroscopy, Cystoscopy, holmium laser,Right Uretral stent placement;  Surgeon: Azael Melvin MD;  Location: [facility] OR     LASER HOLMIUM LITHOTRIPSY URETER(S), INSERT STENT, COMBINED Bilateral 5/17/2016    Procedure: COMBINED CYSTOSCOPY, URETEROSCOPY, LASER HOLMIUM LITHOTRIPSY URETER(S), INSERT STENT;  Surgeon: Azael Melvin MD;  Location: [facility] OR       Family History   Problem Relation Age of Onset     CEREBROVASCULAR DISEASE Maternal Grandmother      Connective Tissue Disorder Sister        Social History   Substance Use Topics     Smoking status: Never Smoker     Smokeless tobacco: Never Used     Alcohol use 0.0 oz/week     0 Standard drinks or equivalent per week      Comment: x2 a week       No current facility-administered medications for this encounter.      Current Outpatient Prescriptions   Medication     tamsulosin (FLOMAX) 0.4 MG capsule     oxyCODONE-acetaminophen (PERCOCET) 5-325 MG per tablet     ibuprofen (ADVIL/MOTRIN) 200 MG tablet     ondansetron (ZOFRAN ODT) 4 MG ODT tab     Multiple Vitamins-Iron (MULTI-DAY PLUS IRON PO)     mercaptopurine (PURINETHOL) 50 MG tablet     Adalimumab (HUMIRA PEN SC)     HUMIRA PEN 40 MG/0.8ML pen kit      No Known Allergies      Review of Systems   Genitourinary: Positive for flank pain (left side) and testicular pain. Negative for hematuria.   Musculoskeletal: Positive for back pain (left sided).   All other systems reviewed and are negative.      Physical Exam   BP: 123/71  Pulse: 58  Temp: 97.4  F (36.3  C)  Resp: 18  Weight: 65.8 kg (145 lb)  SpO2: 95 %      Physical Exam   Constitutional: He is oriented to person, place, and time. No distress.   HENT:   Head: Atraumatic.   Mouth/Throat: Oropharynx is clear and moist. No oropharyngeal exudate.   Eyes: Pupils are equal, round, and reactive to light.  No scleral icterus.   Neck: Neck supple. No JVD present.   Cardiovascular: Normal rate, normal heart sounds and intact distal pulses.  Exam reveals no gallop and no friction rub.    No murmur heard.  Pulmonary/Chest: Effort normal and breath sounds normal. No respiratory distress. He has no wheezes. He has no rales. He exhibits no tenderness.   Abdominal: Soft. Bowel sounds are normal. He exhibits no distension and no mass. There is no hepatosplenomegaly. There is no tenderness. There is CVA tenderness (lreft). There is no rebound and no guarding. No hernia.   Musculoskeletal: He exhibits no edema or tenderness.   Neurological: He is alert and oriented to person, place, and time. No cranial nerve deficit. Coordination normal.   Skin: Skin is warm. No rash noted. He is not diaphoretic.       ED Course   10:44 AM  The patient was seen and examined by Muriel Sellers MD in Room ED07.     ED Course     Procedures  Results for orders placed during the hospital encounter of 02/11/18   POC US RETROPERITONEAL LIMITED    Impression Limited Bedside Renal Ultrasound, performed and interpreted by me.    Indication: Flank pain left  Images of the the right and left kidney were acquired in short and long axis, evaluating for hydronephrosis. A short and long axis of the bladder was evaluated for bladder stones. Image quality was satisfactory..     Findings:  Moderate hydronephrosis of the right kidney, left kidney with no hydronephrosis.    No urinary bladder stones seen.         IMPRESSION: Right hydronephrosis moderate as noted above.           Results for orders placed or performed during the hospital encounter of 02/11/18 (from the past 24 hour(s))   POC US RETROPERITONEAL LIMITED     Status: None    Collection Time: 02/11/18 10:47 AM    Impression    Limited Bedside Renal Ultrasound, performed and interpreted by me.    Indication: Flank pain left  Images of the the right and left kidney were acquired in short and long axis,  evaluating for hydronephrosis. A short and long axis of the bladder was evaluated for bladder stones. Image quality was satisfactory..     Findings:  Moderate hydronephrosis of the right kidney, left kidney with no hydronephrosis.    No urinary bladder stones seen.         IMPRESSION: Right hydronephrosis moderate as noted above.      CBC with platelets differential     Status: None    Collection Time: 02/11/18 10:56 AM   Result Value Ref Range    WBC 10.2 4.0 - 11.0 10e9/L    RBC Count 4.68 4.4 - 5.9 10e12/L    Hemoglobin 15.2 13.3 - 17.7 g/dL    Hematocrit 45.0 40.0 - 53.0 %    MCV 96 78 - 100 fl    MCH 32.5 26.5 - 33.0 pg    MCHC 33.8 31.5 - 36.5 g/dL    RDW 12.9 10.0 - 15.0 %    Platelet Count 274 150 - 450 10e9/L    Diff Method Automated Method     % Neutrophils 78.9 %    % Lymphocytes 14.1 %    % Monocytes 6.0 %    % Eosinophils 0.6 %    % Basophils 0.2 %    % Immature Granulocytes 0.2 %    Nucleated RBCs 0 0 /100    Absolute Neutrophil 8.1 1.6 - 8.3 10e9/L    Absolute Lymphocytes 1.4 0.8 - 5.3 10e9/L    Absolute Monocytes 0.6 0.0 - 1.3 10e9/L    Absolute Eosinophils 0.1 0.0 - 0.7 10e9/L    Absolute Basophils 0.0 0.0 - 0.2 10e9/L    Abs Immature Granulocytes 0.0 0 - 0.4 10e9/L    Absolute Nucleated RBC 0.0    Basic metabolic panel     Status: None    Collection Time: 02/11/18 10:56 AM   Result Value Ref Range    Sodium 142 133 - 144 mmol/L    Potassium 3.8 3.4 - 5.3 mmol/L    Chloride 107 94 - 109 mmol/L    Carbon Dioxide 28 20 - 32 mmol/L    Anion Gap 7 3 - 14 mmol/L    Glucose 95 70 - 99 mg/dL    Urea Nitrogen 15 7 - 30 mg/dL    Creatinine 0.86 0.66 - 1.25 mg/dL    GFR Estimate >90 >60 mL/min/1.7m2    GFR Estimate If Black >90 >60 mL/min/1.7m2    Calcium 9.1 8.5 - 10.1 mg/dL   UA with Microscopic     Status: Abnormal    Collection Time: 02/11/18 10:56 AM   Result Value Ref Range    Color Urine Light Red     Appearance Urine Slightly Cloudy     Glucose Urine Negative NEG^Negative mg/dL    Bilirubin Urine  Negative NEG^Negative    Ketones Urine Negative NEG^Negative mg/dL    Specific Gravity Urine 1.017 1.003 - 1.035    Blood Urine Large (A) NEG^Negative    pH Urine 5.5 5.0 - 7.0 pH    Protein Albumin Urine 30 (A) NEG^Negative mg/dL    Urobilinogen mg/dL Normal 0.0 - 2.0 mg/dL    Nitrite Urine Negative NEG^Negative    Leukocyte Esterase Urine Moderate (A) NEG^Negative    Source Midstream Urine     WBC Urine 22 (H) 0 - 2 /HPF    RBC Urine >182 (H) 0 - 2 /HPF    Squamous Epithelial /HPF Urine <1 0 - 1 /HPF    Mucous Urine Present (A) NEG^Negative /LPF    Calcium Oxalate Few (A) NEG^Negative /HPF   CT Abdomen Pelvis w/o Contrast     Status: None    Collection Time: 02/11/18 11:57 AM    Narrative    CT ABDOMEN AND PELVIS WITHOUT CONTRAST 2/11/2018 11:57 AM     HISTORY: Left flank pain.     COMPARISON: 4/15/2016.    TECHNIQUE: Axial CT images of the abdomen and pelvis from the  diaphragm to the symphysis pubis were acquired without IV contrast.  Radiation dose for this scan was reduced using automated exposure  control, adjustment of the mA and/or kV according to patient size, or  iterative reconstruction technique.    FINDINGS: 9 mm stone at the ureteropelvic junction on the right with  moderate proximal hydronephrosis. There is also a 6 mm stone in the  distal right ureter immediately adjacent to the right ureterovesicular  junction. No left ureteral stones. Two nonobstructing stones are seen  on the right at least six nonobstructing stones are seen in the left  kidney, although some are somewhat linear and could be papillary  calcifications. There is mild right perinephric fat stranding. Kidneys  are normal in size and configuration.  Left lower quadrant ostomy  appears unremarkable. Apparent total colectomy changes. No free air or  free fluid. Liver grossly negative for focal lesion.  No splenomegaly.   No definite adrenal nodules.  Pancreas grossly unremarkable. The  remainder of the visualized abdomen is unremarkable  on this  noncontrast scan. Survey of the visualized bony structures  demonstrates no destructive bony lesions.   The visualized lung bases  are unremarkable.       Impression    IMPRESSION:   1. 9 mm proximal ureteral stone at the ureteropelvic junction on the  right.  2. 6 mm distal right ureteral stone near the UVJ.  3. Multiple bilateral nonobstructing stones. Overall stone burden  increased from previous.    WAN DIA MD           Labs Ordered and Resulted from Time of ED Arrival Up to the Time of Departure from the ED   ROUTINE UA WITH MICROSCOPIC - Abnormal; Notable for the following:        Result Value    Blood Urine Large (*)     Protein Albumin Urine 30 (*)     Leukocyte Esterase Urine Moderate (*)     WBC Urine 22 (*)     RBC Urine >182 (*)     Mucous Urine Present (*)     Calcium Oxalate Few (*)     All other components within normal limits   CBC WITH PLATELETS DIFFERENTIAL   BASIC METABOLIC PANEL            Assessments & Plan (with Medical Decision Making)  Ureteral stones, interestingly having left flank and testicular pain but right side moderate hydro and stones 6 and 9 mm on POCUS and CT, UA no UTI but just blood, labs ok, will DC with pain meds, flomax, zofran prn then follow up with his Urology this week.       I have reviewed the nursing notes.    I have reviewed the findings, diagnosis, plan and need for follow up with the patient.    Discharge Medication List as of 2/11/2018  1:19 PM      START taking these medications    Details   tamsulosin (FLOMAX) 0.4 MG capsule Take 1 capsule (0.4 mg) by mouth daily for 10 doses, Disp-10 capsule, R-0, Local Print      ibuprofen (ADVIL/MOTRIN) 200 MG tablet Take 3 tablets (600 mg) by mouth every 8 hours as needed for mild pain, Disp-30 tablet, R-0, Local Print      ondansetron (ZOFRAN ODT) 4 MG ODT tab Take 1 tablet (4 mg) by mouth every 8 hours as needed for nausea, Disp-10 tablet, R-0, Local Print             Final diagnoses:   Ureteral calculus      I, Ruddy Rahsid, am serving as a trained medical scribe to document services personally performed by Muriel Sellers MD, based on the provider's statements to me.   I, Muriel Sellers MD, was physically present and have reviewed and verified the accuracy of this note documented by Ruddy Rashid.    2/11/2018   [facility], [place], EMERGENCY DEPARTMENT     Muriel Sellers MD  02/11/18 [id number]

## 2018-02-26 DIAGNOSIS — K50.80 CROHN'S DISEASE OF BOTH SMALL AND LG INT W/O COMPLICATIONS (H): ICD-10-CM

## 2018-02-26 LAB
ALBUMIN SERPL-MCNC: 4.1 G/DL (ref 3.4–5)
ALP SERPL-CCNC: 44 U/L (ref 40–150)
ALT SERPL W P-5'-P-CCNC: 45 U/L (ref 0–70)
AST SERPL W P-5'-P-CCNC: 25 U/L (ref 0–45)
BASOPHILS # BLD AUTO: 0 10E9/L (ref 0–0.2)
BASOPHILS NFR BLD AUTO: 0.4 %
BILIRUB DIRECT SERPL-MCNC: 0.3 MG/DL (ref 0–0.2)
BILIRUB SERPL-MCNC: 2.2 MG/DL (ref 0.2–1.3)
DIFFERENTIAL METHOD BLD: NORMAL
EOSINOPHIL # BLD AUTO: 0.1 10E9/L (ref 0–0.7)
EOSINOPHIL NFR BLD AUTO: 2.6 %
ERYTHROCYTE [DISTWIDTH] IN BLOOD BY AUTOMATED COUNT: 12.9 % (ref 10–15)
HCT VFR BLD AUTO: 46.8 % (ref 40–53)
HGB BLD-MCNC: 15.6 G/DL (ref 13.3–17.7)
IMM GRANULOCYTES # BLD: 0 10E9/L (ref 0–0.4)
IMM GRANULOCYTES NFR BLD: 0.2 %
LYMPHOCYTES # BLD AUTO: 2.2 10E9/L (ref 0.8–5.3)
LYMPHOCYTES NFR BLD AUTO: 41.8 %
MCH RBC QN AUTO: 32 PG (ref 26.5–33)
MCHC RBC AUTO-ENTMCNC: 33.3 G/DL (ref 31.5–36.5)
MCV RBC AUTO: 96 FL (ref 78–100)
MONOCYTES # BLD AUTO: 0.4 10E9/L (ref 0–1.3)
MONOCYTES NFR BLD AUTO: 7.5 %
NEUTROPHILS # BLD AUTO: 2.6 10E9/L (ref 1.6–8.3)
NEUTROPHILS NFR BLD AUTO: 47.5 %
NRBC # BLD AUTO: 0 10*3/UL
NRBC BLD AUTO-RTO: 0 /100
PLATELET # BLD AUTO: 301 10E9/L (ref 150–450)
PROT SERPL-MCNC: 7.7 G/DL (ref 6.8–8.8)
RBC # BLD AUTO: 4.87 10E12/L (ref 4.4–5.9)
WBC # BLD AUTO: 5.4 10E9/L (ref 4–11)

## 2018-02-26 PROCEDURE — 36415 COLL VENOUS BLD VENIPUNCTURE: CPT | Performed by: INTERNAL MEDICINE

## 2018-02-26 PROCEDURE — 85025 COMPLETE CBC W/AUTO DIFF WBC: CPT | Performed by: INTERNAL MEDICINE

## 2018-02-26 PROCEDURE — 80076 HEPATIC FUNCTION PANEL: CPT | Performed by: INTERNAL MEDICINE

## 2018-03-05 ENCOUNTER — TRANSFERRED RECORDS (OUTPATIENT)
Dept: HEALTH INFORMATION MANAGEMENT | Facility: CLINIC | Age: 38
End: 2018-03-05

## 2018-03-21 ENCOUNTER — APPOINTMENT (OUTPATIENT)
Dept: CT IMAGING | Facility: CLINIC | Age: 38
End: 2018-03-21
Attending: EMERGENCY MEDICINE
Payer: COMMERCIAL

## 2018-03-21 ENCOUNTER — HOSPITAL ENCOUNTER (EMERGENCY)
Facility: CLINIC | Age: 38
Discharge: HOME | End: 2018-03-21
Attending: EMERGENCY MEDICINE | Admitting: EMERGENCY MEDICINE
Payer: COMMERCIAL

## 2018-03-21 VITALS
HEART RATE: 79 BPM | OXYGEN SATURATION: 100 % | TEMPERATURE: 95.8 F | DIASTOLIC BLOOD PRESSURE: 97 MMHG | RESPIRATION RATE: 16 BRPM | SYSTOLIC BLOOD PRESSURE: 143 MMHG

## 2018-03-21 DIAGNOSIS — N20.1 URETERAL CALCULUS: ICD-10-CM

## 2018-03-21 LAB
ALBUMIN UR-MCNC: 100 MG/DL
ANION GAP SERPL CALCULATED.3IONS-SCNC: 8 MMOL/L (ref 3–14)
APPEARANCE UR: ABNORMAL
BASOPHILS # BLD AUTO: 0 10E9/L (ref 0–0.2)
BASOPHILS NFR BLD AUTO: 0.2 %
BILIRUB UR QL STRIP: NEGATIVE
BUN SERPL-MCNC: 18 MG/DL (ref 7–30)
CALCIUM SERPL-MCNC: 9.5 MG/DL (ref 8.5–10.1)
CAOX CRY #/AREA URNS HPF: ABNORMAL /HPF
CHLORIDE SERPL-SCNC: 106 MMOL/L (ref 94–109)
CO2 SERPL-SCNC: 25 MMOL/L (ref 20–32)
COLOR UR AUTO: YELLOW
CREAT SERPL-MCNC: 0.97 MG/DL (ref 0.66–1.25)
DIFFERENTIAL METHOD BLD: NORMAL
EOSINOPHIL # BLD AUTO: 0 10E9/L (ref 0–0.7)
EOSINOPHIL NFR BLD AUTO: 0.2 %
ERYTHROCYTE [DISTWIDTH] IN BLOOD BY AUTOMATED COUNT: 12.5 % (ref 10–15)
GFR SERPL CREATININE-BSD FRML MDRD: 87 ML/MIN/1.7M2
GLUCOSE SERPL-MCNC: 127 MG/DL (ref 70–99)
GLUCOSE UR STRIP-MCNC: NEGATIVE MG/DL
HCT VFR BLD AUTO: 44.1 % (ref 40–53)
HGB BLD-MCNC: 15.5 G/DL (ref 13.3–17.7)
HGB UR QL STRIP: ABNORMAL
IMM GRANULOCYTES # BLD: 0 10E9/L (ref 0–0.4)
IMM GRANULOCYTES NFR BLD: 0.2 %
KETONES UR STRIP-MCNC: 10 MG/DL
LEUKOCYTE ESTERASE UR QL STRIP: ABNORMAL
LYMPHOCYTES # BLD AUTO: 2.9 10E9/L (ref 0.8–5.3)
LYMPHOCYTES NFR BLD AUTO: 26.8 %
MCH RBC QN AUTO: 32.6 PG (ref 26.5–33)
MCHC RBC AUTO-ENTMCNC: 35.1 G/DL (ref 31.5–36.5)
MCV RBC AUTO: 93 FL (ref 78–100)
MONOCYTES # BLD AUTO: 0.6 10E9/L (ref 0–1.3)
MONOCYTES NFR BLD AUTO: 5.4 %
MUCOUS THREADS #/AREA URNS LPF: PRESENT /LPF
NEUTROPHILS # BLD AUTO: 7.3 10E9/L (ref 1.6–8.3)
NEUTROPHILS NFR BLD AUTO: 67.2 %
NITRATE UR QL: NEGATIVE
NRBC # BLD AUTO: 0 10*3/UL
NRBC BLD AUTO-RTO: 0 /100
PH UR STRIP: 6 PH (ref 5–7)
PLATELET # BLD AUTO: 291 10E9/L (ref 150–450)
POTASSIUM SERPL-SCNC: 3.2 MMOL/L (ref 3.4–5.3)
RBC # BLD AUTO: 4.76 10E12/L (ref 4.4–5.9)
RBC #/AREA URNS AUTO: 93 /HPF (ref 0–2)
SODIUM SERPL-SCNC: 139 MMOL/L (ref 133–144)
SOURCE: ABNORMAL
SP GR UR STRIP: 1.02 (ref 1–1.03)
UROBILINOGEN UR STRIP-MCNC: NORMAL MG/DL (ref 0–2)
WBC # BLD AUTO: 10.8 10E9/L (ref 4–11)
WBC #/AREA URNS AUTO: 19 /HPF (ref 0–5)

## 2018-03-21 PROCEDURE — 96375 TX/PRO/DX INJ NEW DRUG ADDON: CPT | Performed by: EMERGENCY MEDICINE

## 2018-03-21 PROCEDURE — 74176 CT ABD & PELVIS W/O CONTRAST: CPT

## 2018-03-21 PROCEDURE — 96374 THER/PROPH/DIAG INJ IV PUSH: CPT | Performed by: EMERGENCY MEDICINE

## 2018-03-21 PROCEDURE — 96376 TX/PRO/DX INJ SAME DRUG ADON: CPT | Performed by: EMERGENCY MEDICINE

## 2018-03-21 PROCEDURE — 85025 COMPLETE CBC W/AUTO DIFF WBC: CPT | Performed by: EMERGENCY MEDICINE

## 2018-03-21 PROCEDURE — 25000128 H RX IP 250 OP 636: Performed by: EMERGENCY MEDICINE

## 2018-03-21 PROCEDURE — 87086 URINE CULTURE/COLONY COUNT: CPT | Performed by: EMERGENCY MEDICINE

## 2018-03-21 PROCEDURE — 80048 BASIC METABOLIC PNL TOTAL CA: CPT | Performed by: EMERGENCY MEDICINE

## 2018-03-21 PROCEDURE — 81001 URINALYSIS AUTO W/SCOPE: CPT | Performed by: EMERGENCY MEDICINE

## 2018-03-21 PROCEDURE — 99285 EMERGENCY DEPT VISIT HI MDM: CPT | Mod: 25 | Performed by: EMERGENCY MEDICINE

## 2018-03-21 PROCEDURE — 25000132 ZZH RX MED GY IP 250 OP 250 PS 637: Performed by: EMERGENCY MEDICINE

## 2018-03-21 PROCEDURE — 99285 EMERGENCY DEPT VISIT HI MDM: CPT | Mod: Z6 | Performed by: EMERGENCY MEDICINE

## 2018-03-21 RX ORDER — ONDANSETRON 2 MG/ML
4 INJECTION INTRAMUSCULAR; INTRAVENOUS ONCE
Status: COMPLETED | OUTPATIENT
Start: 2018-03-21 | End: 2018-03-21

## 2018-03-21 RX ORDER — HYDROMORPHONE HYDROCHLORIDE 1 MG/ML
0.5 INJECTION, SOLUTION INTRAMUSCULAR; INTRAVENOUS; SUBCUTANEOUS ONCE
Status: COMPLETED | OUTPATIENT
Start: 2018-03-21 | End: 2018-03-21

## 2018-03-21 RX ORDER — OXYCODONE HYDROCHLORIDE 5 MG/1
5 TABLET ORAL ONCE
Status: COMPLETED | OUTPATIENT
Start: 2018-03-21 | End: 2018-03-21

## 2018-03-21 RX ORDER — OXYCODONE HYDROCHLORIDE 5 MG/1
TABLET ORAL
Qty: 20 TABLET | Refills: 0 | Status: SHIPPED | OUTPATIENT
Start: 2018-03-21 | End: 2018-06-22

## 2018-03-21 RX ORDER — ONDANSETRON 4 MG/1
4 TABLET, ORALLY DISINTEGRATING ORAL EVERY 8 HOURS PRN
Qty: 5 TABLET | Refills: 0 | Status: SHIPPED | OUTPATIENT
Start: 2018-03-21 | End: 2018-06-22

## 2018-03-21 RX ORDER — KETOROLAC TROMETHAMINE 30 MG/ML
30 INJECTION, SOLUTION INTRAMUSCULAR; INTRAVENOUS ONCE
Status: COMPLETED | OUTPATIENT
Start: 2018-03-21 | End: 2018-03-21

## 2018-03-21 RX ORDER — TAMSULOSIN HYDROCHLORIDE 0.4 MG/1
0.4 CAPSULE ORAL DAILY
Qty: 10 CAPSULE | Refills: 0 | Status: SHIPPED | OUTPATIENT
Start: 2018-03-21 | End: 2018-03-31

## 2018-03-21 RX ORDER — ACETAMINOPHEN 500 MG
1000 TABLET ORAL ONCE
Status: COMPLETED | OUTPATIENT
Start: 2018-03-21 | End: 2018-03-21

## 2018-03-21 RX ADMIN — HYDROMORPHONE HYDROCHLORIDE 0.5 MG: 1 INJECTION, SOLUTION INTRAMUSCULAR; INTRAVENOUS; SUBCUTANEOUS at 18:51

## 2018-03-21 RX ADMIN — ONDANSETRON 4 MG: 2 INJECTION INTRAMUSCULAR; INTRAVENOUS at 16:13

## 2018-03-21 RX ADMIN — ACETAMINOPHEN 1000 MG: 500 TABLET ORAL at 18:51

## 2018-03-21 RX ADMIN — OXYCODONE HYDROCHLORIDE 5 MG: 5 TABLET ORAL at 18:44

## 2018-03-21 RX ADMIN — KETOROLAC TROMETHAMINE 30 MG: 30 INJECTION, SOLUTION INTRAMUSCULAR at 16:14

## 2018-03-21 RX ADMIN — HYDROMORPHONE HYDROCHLORIDE 0.5 MG: 1 INJECTION, SOLUTION INTRAMUSCULAR; INTRAVENOUS; SUBCUTANEOUS at 16:13

## 2018-03-21 NOTE — ED AVS SNAPSHOT
[facility], [place], Emergency Department    [address]    [facility] [address]    Phone:  [phone]    Fax:  [phone]                                       Leodan Collins   MRN: [id number]    Department:  [facility], Emergency Department   Date of Visit:  3/21/2018           After Visit Summary Signature Page     I have received my discharge instructions, and my questions have been answered. I have discussed any challenges I see with this plan with the nurse or doctor.    ..........................................................................................................................................  Patient/Patient Representative Signature      ..........................................................................................................................................  Patient Representative Print Name and Relationship to Patient    ..................................................               ................................................  Date                                            Time    ..........................................................................................................................................  Reviewed by Signature/Title    ...................................................              ..............................................  Date                                                            Time

## 2018-03-21 NOTE — ED NOTES
Pt to d/c home with urine strainer with plans to follow up with urology. Pain medication given and IV removed. Discussed AVS and home rx. All questions answered at this time.

## 2018-03-21 NOTE — ED AVS SNAPSHOT
[facility], Emergency Department    [address]    [facility] [address]    Phone:  [phone]    Fax:  [phone]                                       Leodan Collins   MRN: [id number]    Department:  [facility], Emergency Department   Date of Visit:  3/21/2018           Patient Information     Date Of Birth          1980        Your diagnoses for this visit were:     Ureteral calculus        You were seen by Jim Nugent MD.        Discharge Instructions       Please make an appointment to follow up with Urology Clinic (phone: [phone]) as soon as possible.  400mg Motrin every 8 hrs  1000mg of Tylenol every 8 hrs  1-2 tabs of Roxicodone every 4-6 hrs      Discharge References/Attachments     KIDNEY STONE W/ COLIC (ENGLISH)      24 Hour Appointment Hotline       To make an appointment at any [facility] clinic, call [phone] ([phone]). If you don't have a family doctor or clinic, we will help you find one. [facility] clinics are conveniently located to serve the needs of you and your family.             Review of your medicines      START taking        Dose / Directions Last dose taken    ondansetron 4 MG ODT tab   Commonly known as:  ZOFRAN-ODT   Dose:  4 mg   Quantity:  5 tablet        Take 1 tablet (4 mg) by mouth every 8 hours as needed for nausea   Refills:  0        oxyCODONE IR 5 MG tablet   Commonly known as:  ROXICODONE   Quantity:  20 tablet        1 or 2 tabs every 4-6 hours for pain   Refills:  0        tamsulosin 0.4 MG capsule   Commonly known as:  FLOMAX   Dose:  0.4 mg   Quantity:  10 capsule        Take 1 capsule (0.4 mg) by mouth daily for 10 doses   Refills:  0          Our records show that you are taking the medicines listed below. If these are incorrect, please call your family doctor or clinic.        Dose / Directions Last dose taken    * HUMIRA PEN 40 MG/0.8ML pen kit   Generic drug:  adalimumab        Refills:  0        * HUMIRA PEN SC   Dose:   40 mg        Inject 40 mg Subcutaneous every 14 days   Refills:  0        ibuprofen 200 MG tablet   Commonly known as:  ADVIL/MOTRIN   Dose:  600 mg   Quantity:  30 tablet        Take 3 tablets (600 mg) by mouth every 8 hours as needed for mild pain   Refills:  0        mercaptopurine 50 MG tablet CHEMO   Commonly known as:  PURINETHOL   Dose:  100 mg        Take 100 mg by mouth daily   Refills:  0        MULTI-DAY PLUS IRON PO   Dose:  1 tablet        Take 1 tablet by mouth daily   Refills:  0        oxyCODONE-acetaminophen 5-325 MG per tablet   Commonly known as:  PERCOCET   Dose:  1-2 tablet   Quantity:  20 tablet        Take 1-2 tablets by mouth every 6 hours as needed for moderate to severe pain   Refills:  0        * Notice:  This list has 2 medication(s) that are the same as other medications prescribed for you. Read the directions carefully, and ask your doctor or other care provider to review them with you.            Prescriptions were sent or printed at these locations (3 Prescriptions)                   Other Prescriptions                Printed at Department/Unit printer (3 of 3)         tamsulosin (FLOMAX) 0.4 MG capsule               oxyCODONE IR (ROXICODONE) 5 MG tablet               ondansetron (ZOFRAN-ODT) 4 MG ODT tab                Procedures and tests performed during your visit     Abd/pelvis CT no contrast - Stone Protocol    Basic metabolic panel    CBC with platelets differential    UA with Microscopic reflex to Culture    Urine Culture Aerobic Bacterial      Orders Needing Specimen Collection     None      Pending Results     Date and Time Order Name Status Description    3/21/2018 1756 Urine Culture Aerobic Bacterial In process     3/21/2018 1539 Abd/pelvis CT no contrast - Stone Protocol Preliminary             Pending Culture Results     Date and Time Order Name Status Description    3/21/2018 1756 Urine Culture Aerobic Bacterial In process             Pending Results Instructions     If  you had any lab results that were not finalized at the time of your Discharge, you can call the ED Lab Result RN at [phone]. You will be contacted by this team for any positive Lab results or changes in treatment. The nurses are available 7 days a week from 10A to 6:30P.  You can leave a message 24 hours per day and they will return your call.        Thank you for choosing [facility]       Thank you for choosing [facility] for your care. Our goal is always to provide you with excellent care. Hearing back from our patients is one way we can continue to improve our services. Please take a few minutes to complete the written survey that you may receive in the mail after you visit with us. Thank you!        [employer] Information     [employer] gives you secure access to your electronic health record. If you see a primary care provider, you can also send messages to your care team and make appointments. If you have questions, please call your primary care clinic.  If you do not have a primary care provider, please call [phone] and they will assist you.        Care EveryWhere ID     This is your Care EveryWhere ID. This could be used by other organizations to access your [facility] medical records  [id number]        Equal Access to Services     DELVIN ANGEL : Donte Betancourt, martin greene, keron henry. So [facility] [phone].    ATENCIÓN: Si habla español, tiene a santiago disposición servicios gratuitos de asistencia lingüística. Llame al [phone].    We comply with applicable federal civil rights laws and [place] laws. We do not discriminate on the basis of race, color, national origin, age, disability, sex, sexual orientation, or gender identity.            After Visit Summary       This is your record. Keep this with you and show to your community pharmacist(s) and doctor(s) at your next visit.

## 2018-03-21 NOTE — DISCHARGE INSTRUCTIONS
Please make an appointment to follow up with Urology Clinic (phone: [phone]) as soon as possible.  400mg Motrin every 8 hrs  1000mg of Tylenol every 8 hrs  1-2 tabs of Roxicodone every 4-6 hrs

## 2018-03-21 NOTE — ED PROVIDER NOTES
History     Chief Complaint   Patient presents with     Flank Pain     severe R flank pain that radiates to groin, started 3.5 hours ago; +emesis x1, +hx kidney stones     HPI  Leodan Collins is a 37 year old male who presents emergency department with complaints of severe right flank pain that started about 3/2 hours ago prior to coming to the emergency room.  He states that the pain starts in his right flank and radiates down to his groin.  He has had some emesis due to the discomfort.  He has a history of stones with one being last month.  He has never had any serious complications and has never been septic due to stone.  He denies all other complaints at this time.  He does admit to a history of chronic disease and has a colostomy.    I have reviewed the Medications, Allergies, Past Medical and Surgical History, and Social History in the Epic system.    Review of Systems   All other systems reviewed and are negative.      Physical Exam   BP: 168/90  Pulse: 82  Temp: 95.8  F (35.4  C)  Resp: 20  SpO2: 100 %      Physical Exam   Constitutional: He is oriented to person, place, and time. He appears well-developed and well-nourished. He appears distressed (obvious discomfort).   HENT:   Head: Normocephalic and atraumatic.   Eyes: No scleral icterus.   Neck: Normal range of motion. Neck supple.   Cardiovascular: Normal rate.    Abdominal: Soft. There is no tenderness.   colostomy   Neurological: He is alert and oriented to person, place, and time.   Skin: Skin is warm and dry. No rash noted. He is not diaphoretic. No erythema. No pallor.   Vitals reviewed.      ED Course     ED Course     Procedures             Critical Care time:  none             Labs Ordered and Resulted from Time of ED Arrival Up to the Time of Departure from the ED   BASIC METABOLIC PANEL - Abnormal; Notable for the following:        Result Value    Potassium 3.2 (*)     Glucose 127 (*)     All other components within normal limits   ROUTINE  UA WITH MICROSCOPIC REFLEX TO CULTURE - Abnormal; Notable for the following:     Ketones Urine 10 (*)     Blood Urine Moderate (*)     Protein Albumin Urine 100 (*)     Leukocyte Esterase Urine Moderate (*)     WBC Urine 19 (*)     RBC Urine 93 (*)     Mucous Urine Present (*)     Calcium Oxalate Few (*)     All other components within normal limits   CBC WITH PLATELETS DIFFERENTIAL     Results for orders placed or performed during the hospital encounter of 03/21/18   Abd/pelvis CT no contrast - Stone Protocol    Narrative    CT ABDOMEN AND PELVIS WITHOUT CONTRAST   3/21/2018 4:50 PM     HISTORY: Severe flank pain.    TECHNIQUE: No IV contrast material. Radiation dose for this scan was  reduced using automated exposure control, adjustment of the mA and/or  kV according to patient size, or iterative reconstruction technique.    COMPARISON: 2/11/2018 abdomen and pelvis CT.    FINDINGS: 3 mm lung nodule right middle lobe unchanged. Lung bases are  otherwise clear.    The noncontrast appearance of the liver, gallbladder, spleen, and  pancreas are normal. No adrenal lesions.    There is a 0.5 cm right distal ureteral stone at the ureterovesical  junction resulting in moderate to prominent right hydronephrosis  similar to the prior study. This is likely the same stones seen near  the right UV junction on the comparison study and it is progressed a  few cm down to the UV junction. Several small bilateral kidney stones  identified.    Previous colectomy with right ileostomy.    No free fluid. No free air. No evidence of bowel obstruction or bowel  dilatation.      Impression    IMPRESSION:  1. 0.5 cm right distal ureteral stone has progressed slightly more  distally than on the comparison study. The proximal right ureteral  stone seen on the prior study is no longer visualized.  2. Previous colectomy with right ileostomy.  3. 3 mm nodule right middle lobe unchanged since 2/11/2018.  Recommendations for one or multiple  incidental lung nodules < 6mm :    Low risk patients: No routine follow-up.    High risk patients: Optional follow-up CT at 12 months; if  unchanged, no further follow-up.    *Low Risk: Minimal or absent history of smoking or other known risk  factors.  *Nonsolid (ground glass) or partly solid nodules may require longer  follow-up to exclude indolent adenocarcinoma.  *Recommendations based on Guidelines for the Management of Incidental  Pulmonary Nodules Detected at CT: From the Fleischner Society 2017,  Radiology 2017.    JOSIE WATKINS MD   Basic metabolic panel   Result Value Ref Range    Sodium 139 133 - 144 mmol/L    Potassium 3.2 (L) 3.4 - 5.3 mmol/L    Chloride 106 94 - 109 mmol/L    Carbon Dioxide 25 20 - 32 mmol/L    Anion Gap 8 3 - 14 mmol/L    Glucose 127 (H) 70 - 99 mg/dL    Urea Nitrogen 18 7 - 30 mg/dL    Creatinine 0.97 0.66 - 1.25 mg/dL    GFR Estimate 87 >60 mL/min/1.7m2    GFR Estimate If Black >90 >60 mL/min/1.7m2    Calcium 9.5 8.5 - 10.1 mg/dL   CBC with platelets differential   Result Value Ref Range    WBC 10.8 4.0 - 11.0 10e9/L    RBC Count 4.76 4.4 - 5.9 10e12/L    Hemoglobin 15.5 13.3 - 17.7 g/dL    Hematocrit 44.1 40.0 - 53.0 %    MCV 93 78 - 100 fl    MCH 32.6 26.5 - 33.0 pg    MCHC 35.1 31.5 - 36.5 g/dL    RDW 12.5 10.0 - 15.0 %    Platelet Count 291 150 - 450 10e9/L    Diff Method Automated Method     % Neutrophils 67.2 %    % Lymphocytes 26.8 %    % Monocytes 5.4 %    % Eosinophils 0.2 %    % Basophils 0.2 %    % Immature Granulocytes 0.2 %    Nucleated RBCs 0 0 /100    Absolute Neutrophil 7.3 1.6 - 8.3 10e9/L    Absolute Lymphocytes 2.9 0.8 - 5.3 10e9/L    Absolute Monocytes 0.6 0.0 - 1.3 10e9/L    Absolute Eosinophils 0.0 0.0 - 0.7 10e9/L    Absolute Basophils 0.0 0.0 - 0.2 10e9/L    Abs Immature Granulocytes 0.0 0 - 0.4 10e9/L    Absolute Nucleated RBC 0.0    UA with Microscopic reflex to Culture   Result Value Ref Range    Color Urine Yellow     Appearance Urine Slightly Cloudy      Glucose Urine Negative NEG^Negative mg/dL    Bilirubin Urine Negative NEG^Negative    Ketones Urine 10 (A) NEG^Negative mg/dL    Specific Gravity Urine 1.025 1.003 - 1.035    Blood Urine Moderate (A) NEG^Negative    pH Urine 6.0 5.0 - 7.0 pH    Protein Albumin Urine 100 (A) NEG^Negative mg/dL    Urobilinogen mg/dL Normal 0.0 - 2.0 mg/dL    Nitrite Urine Negative NEG^Negative    Leukocyte Esterase Urine Moderate (A) NEG^Negative    Source Midstream Urine     WBC Urine 19 (H) 0 - 5 /HPF    RBC Urine 93 (H) 0 - 2 /HPF    Mucous Urine Present (A) NEG^Negative /LPF    Calcium Oxalate Few (A) NEG^Negative /HPF   Urine Culture Aerobic Bacterial   Result Value Ref Range    Specimen Description Midstream Urine     Special Requests Specimen received in preservative     Culture Micro <10,000 colonies/mL  urogenital yoshi       Culture Micro Susceptibility testing not routinely done      Medications   ketorolac (TORADOL) injection 30 mg (30 mg Intravenous Given 3/21/18 1614)   HYDROmorphone (PF) (DILAUDID) injection 0.5 mg (0.5 mg Intravenous Given 3/21/18 1613)   ondansetron (ZOFRAN) injection 4 mg (4 mg Intravenous Given 3/21/18 1613)   oxyCODONE IR (ROXICODONE) tablet 5 mg (5 mg Oral Given 3/21/18 1844)   HYDROmorphone (PF) (DILAUDID) injection 0.5 mg (0.5 mg Intravenous Given 3/21/18 1851)   acetaminophen (TYLENOL) tablet 1,000 mg (1,000 mg Oral Given 3/21/18 1851)              Assessments & Plan (with Medical Decision Making)   This is a 37-year-old male coming into emergency room with complaints of severe right flank and groin pain.  Patient has a history of kidney stones.  He does not appear septic at this time but does appear obviously uncomfortable.  He is provided with a dose of Toradol, Dilaudid and Zofran.  CT was performed which shows a 5 mm stone at the right UVJ.  This appears to be a similar stone to his previous imaging done in February.  Here in the emergency room he had good pain control with medications that  were provided.  The imaging results were discussed with him.  At this time I will discharge him with a prescription for Flomax, Roxicodone and Zofran.  I highly recommend that he continue taking Tylenol at home in addition to Roxicodone for discomfort.  He is provided with aftercare instructions for reasons to return to the emergency room.  He is also provided with aftercare instructions to follow-up with urology for further care management.    I have reviewed the nursing notes.    I have reviewed the findings, diagnosis, plan and need for follow up with the patient.    Discharge Medication List as of 3/21/2018  6:51 PM      START taking these medications    Details   tamsulosin (FLOMAX) 0.4 MG capsule Take 1 capsule (0.4 mg) by mouth daily for 10 doses, Disp-10 capsule, R-0, Local Print      oxyCODONE IR (ROXICODONE) 5 MG tablet 1 or 2 tabs every 4-6 hours for pain, Disp-20 tablet, R-0, Local Print      ondansetron (ZOFRAN-ODT) 4 MG ODT tab Take 1 tablet (4 mg) by mouth every 8 hours as needed for nausea, Disp-5 tablet, R-0, Local Print             Final diagnoses:   Ureteral calculus       3/21/2018   [facility], [place], EMERGENCY DEPARTMENT     Jim Nugent MD  03/24/18 2037

## 2018-03-22 LAB
BACTERIA SPEC CULT: NORMAL
BACTERIA SPEC CULT: NORMAL
Lab: NORMAL
SPECIMEN SOURCE: NORMAL

## 2018-03-28 ENCOUNTER — TELEPHONE (OUTPATIENT)
Dept: UROLOGY | Facility: CLINIC | Age: 38
End: 2018-03-28

## 2018-03-28 NOTE — TELEPHONE ENCOUNTER
Offered patient sooner appointment he stated doing fine and passed his stone will wait for June  He also stated he will call us if symptoms come back again. Karolina Bellamy LPN Staff Nurse

## 2018-03-28 NOTE — TELEPHONE ENCOUNTER
----- Message from Lisa Baeza sent at 3/28/2018 12:14 PM CDT -----  Regarding: Pt needing sooner appt per AVS  Contact: [phone]  Pt was seen in ED on 3/21 for kidney stones. Pt states the stone has passed but his AVS states to consult in urology as soon as possible. Pt agreed to take first available appt on 6/22 with Dr. Melvin.  Pt can be reached at [phone] if he can be fit in sooner.    Thank you!  ~Lisa    Please DO NOT send this message and/or reply back to sender. Call Center Representatives DO NOT respond to messages.

## 2018-06-18 DIAGNOSIS — K50.80 CROHN'S DISEASE OF BOTH SMALL AND LG INT W/O COMPLICATIONS (H): ICD-10-CM

## 2018-06-18 LAB
ALBUMIN SERPL-MCNC: 4.1 G/DL (ref 3.4–5)
ALP SERPL-CCNC: 41 U/L (ref 40–150)
ALT SERPL W P-5'-P-CCNC: 25 U/L (ref 0–70)
AST SERPL W P-5'-P-CCNC: 18 U/L (ref 0–45)
BASOPHILS # BLD AUTO: 0 10E9/L (ref 0–0.2)
BASOPHILS NFR BLD AUTO: 0.4 %
BILIRUB DIRECT SERPL-MCNC: 0.3 MG/DL (ref 0–0.2)
BILIRUB SERPL-MCNC: 1.7 MG/DL (ref 0.2–1.3)
DIFFERENTIAL METHOD BLD: NORMAL
EOSINOPHIL # BLD AUTO: 0.1 10E9/L (ref 0–0.7)
EOSINOPHIL NFR BLD AUTO: 2.3 %
ERYTHROCYTE [DISTWIDTH] IN BLOOD BY AUTOMATED COUNT: 12.5 % (ref 10–15)
HCT VFR BLD AUTO: 44.7 % (ref 40–53)
HGB BLD-MCNC: 15.4 G/DL (ref 13.3–17.7)
IMM GRANULOCYTES # BLD: 0 10E9/L (ref 0–0.4)
IMM GRANULOCYTES NFR BLD: 0.2 %
LYMPHOCYTES # BLD AUTO: 1.7 10E9/L (ref 0.8–5.3)
LYMPHOCYTES NFR BLD AUTO: 29.7 %
MCH RBC QN AUTO: 31.9 PG (ref 26.5–33)
MCHC RBC AUTO-ENTMCNC: 34.5 G/DL (ref 31.5–36.5)
MCV RBC AUTO: 93 FL (ref 78–100)
MONOCYTES # BLD AUTO: 0.5 10E9/L (ref 0–1.3)
MONOCYTES NFR BLD AUTO: 8.3 %
NEUTROPHILS # BLD AUTO: 3.3 10E9/L (ref 1.6–8.3)
NEUTROPHILS NFR BLD AUTO: 59.1 %
NRBC # BLD AUTO: 0 10*3/UL
NRBC BLD AUTO-RTO: 0 /100
PLATELET # BLD AUTO: 276 10E9/L (ref 150–450)
PROT SERPL-MCNC: 7.7 G/DL (ref 6.8–8.8)
RBC # BLD AUTO: 4.83 10E12/L (ref 4.4–5.9)
WBC # BLD AUTO: 5.7 10E9/L (ref 4–11)

## 2018-06-18 PROCEDURE — 36415 COLL VENOUS BLD VENIPUNCTURE: CPT | Performed by: INTERNAL MEDICINE

## 2018-06-18 PROCEDURE — 80076 HEPATIC FUNCTION PANEL: CPT | Performed by: INTERNAL MEDICINE

## 2018-06-18 PROCEDURE — 85025 COMPLETE CBC W/AUTO DIFF WBC: CPT | Performed by: INTERNAL MEDICINE

## 2018-06-19 ENCOUNTER — TRANSFERRED RECORDS (OUTPATIENT)
Dept: HEALTH INFORMATION MANAGEMENT | Facility: CLINIC | Age: 38
End: 2018-06-19

## 2018-06-19 ENCOUNTER — PRE VISIT (OUTPATIENT)
Dept: UROLOGY | Facility: CLINIC | Age: 38
End: 2018-06-19

## 2018-06-22 ENCOUNTER — OFFICE VISIT (OUTPATIENT)
Dept: UROLOGY | Facility: CLINIC | Age: 38
End: 2018-06-22
Payer: COMMERCIAL

## 2018-06-22 VITALS
HEART RATE: 57 BPM | DIASTOLIC BLOOD PRESSURE: 72 MMHG | HEIGHT: 67 IN | SYSTOLIC BLOOD PRESSURE: 120 MMHG | WEIGHT: 140 LBS | BODY MASS INDEX: 21.97 KG/M2

## 2018-06-22 DIAGNOSIS — N20.1 URETERAL CALCULUS: Primary | ICD-10-CM

## 2018-06-22 ASSESSMENT — PAIN SCALES - GENERAL: PAINLEVEL: NO PAIN (0)

## 2018-06-22 NOTE — LETTER
6/22/2018       RE: Leodan Collins  [address]  [facility] [address]     Dear Colleague,    Thank you for referring your patient, Leodan Collins, to the [facility] UROLOGY AND INST FOR PROSTATE AND UROLOGIC CANCERS at [facility]. Please see a copy of my visit note below.    ASSESSMENT AND PLAN  Recurrent calcium oxalate kidney stone in the setting of Crohns Disease.  March 2018 ct showed a left ureteral stone.    -Renal ultrasound in the next few weeks  -24 hr urine and follow-up with kidney stone clinic.  -follow-up with me in 1 year with a kub.    3mm lung nodule.  No risk factors.  -He will follow-up with his primary care physician for this.  ________________________________________________________________________    CHIEF COMPLAINT  It was my pleasure to see Leodan Collins who is a 38 year old male who is here for follow-up for kidney stones.    HPI  He is doing today and denies flank pain since he passed his 5mm stone in March.    RADIOLOGIC IMAGING  CT ABDOMEN AND PELVIS WITHOUT CONTRAST   3/21/2018 4:50 PM      HISTORY: Severe flank pain.     TECHNIQUE: No IV contrast material. Radiation dose for this scan was  reduced using automated exposure control, adjustment of the mA and/or  kV according to patient size, or iterative reconstruction technique.     COMPARISON: 2/11/2018 abdomen and pelvis CT.     FINDINGS: 3 mm lung nodule right middle lobe unchanged. Lung bases are  otherwise clear.     The noncontrast appearance of the liver, gallbladder, spleen, and  pancreas are normal. No adrenal lesions.     There is a 0.5 cm right distal ureteral stone at the ureterovesical  junction resulting in moderate to prominent right hydronephrosis  similar to the prior study. This is likely the same stones seen near  the right UV junction on the comparison study and it is progressed a  few cm down to the UV junction. Several small bilateral kidney stones  identified.     Previous  colectomy with right ileostomy.     No free fluid. No free air. No evidence of bowel obstruction or bowel  dilatation.         IMPRESSION:  1. 0.5 cm right distal ureteral stone has progressed slightly more  distally than on the comparison study. The proximal right ureteral  stone seen on the prior study is no longer visualized.  2. Previous colectomy with right ileostomy.  3. 3 mm nodule right middle lobe unchanged since 2/11/2018.  Recommendations for one or multiple incidental lung nodules < 6mm :    Low risk patients: No routine follow-up.    High risk patients: Optional follow-up CT at 12 months; if  unchanged, no further follow-up.    I reviewed the recent radiologic imaging and reports described above.    Patient Active Problem List    Diagnosis Date Noted     Ureteral calculus 05/03/2016     Priority: Medium     Hydronephrosis with renal and ureteral calculous obstruction 04/16/2016     Priority: Medium     CARDIOVASCULAR SCREENING; LDL GOAL LESS THAN 160 02/08/2013     Priority: Medium     Kidney stones      Priority: Medium     STONE SURGERY HISTORY  6/30/13 Left ESWL.  Dr Je Melvin, [facility].  Stone analysis: No stones collected.  4/10/14 Right Ureteroscopy.  Dr Je Melvin, [facility].  Stone analysis: 90% calcium oxalate dihydrate, and 10% calcium phosphate  5/17/16 Bilateral ureteroscopy with laser lithotripsy. Dr Je Melvin, [facility].       Crohn's disease (H)      Priority: Medium     Past Medical History:   Diagnosis Date     Asthma     child     Crohn's disease (H)      History of blood transfusion      Kidney stones      Past Surgical History:   Procedure Laterality Date     COLOSTOMY  2007     COMBINED CYSTOSCOPY, RETROGRADES, URETEROSCOPY, INSERT STENT Right 5/3/2016    Procedure: COMBINED CYSTOSCOPY, RETROGRADES, URETEROSCOPY, INSERT STENT;  Surgeon: Azael Melvin MD;  Location: [facility] OR      "EXTRACORPOREAL SHOCK WAVE LITHOTRIPSY (ESWL)  6/27/2013    Procedure: EXTRACORPOREAL SHOCK WAVE LITHOTRIPSY (ESWL);  Left Extracorporeal Shock Wave Lithotripsy Kidney And Ureter;  Surgeon: Azael Melvin MD;  Location: [facility] OR     ILEOSTOMY  2009     LAPAROTOMY EXPLORATORY  2009    extensive lysis of adhesions, revision of coloenteric fistula, repair of small bowel fistula, takedown of current colostomy, debridement of chronic abscess, resection of residual left colon, right hemicolectomy, Louise ileostomy     LASER HOLMIUM LITHOTRIPSY URETER(S), INSERT STENT, COMBINED  4/9/2014    Procedure: COMBINED CYSTOSCOPY, URETEROSCOPY, LASER HOLMIUM LITHOTRIPSY URETER(S), INSERT STENT;  Ureteroscopy, Cystoscopy, holmium laser,Right Uretral stent placement;  Surgeon: Azael Melvin MD;  Location: [facility] OR     LASER HOLMIUM LITHOTRIPSY URETER(S), INSERT STENT, COMBINED Bilateral 5/17/2016    Procedure: COMBINED CYSTOSCOPY, URETEROSCOPY, LASER HOLMIUM LITHOTRIPSY URETER(S), INSERT STENT;  Surgeon: Azael Melvin MD;  Location: [facility] OR     Current Outpatient Prescriptions   Medication Sig Dispense Refill     Adalimumab (HUMIRA PEN SC) Inject 40 mg Subcutaneous every 14 days        HUMIRA PEN 40 MG/0.8ML pen kit        mercaptopurine (PURINETHOL) 50 MG tablet Take 100 mg by mouth daily        Multiple Vitamins-Iron (MULTI-DAY PLUS IRON PO) Take 1 tablet by mouth daily        SOCIAL HISTORY: He  reports that he has never smoked. He has never used smokeless tobacco. He reports that he drinks alcohol. He reports that he does not use illicit drugs.    PHYSICAL EXAM  /72  Pulse 57  Ht 1.702 m (5' 7\")  Wt 63.5 kg (140 lb)  BMI 21.93 kg/m2  Constitutional: Alert, no acute distress  Psychiatric: Normal mood and affect    LAURA      9.8   5/3/2016  LAURA      9.5   4/17/2016  LAURA      8.3   4/16/2016    Recent Labs   Lab Test  05/17/16   0743  05/03/16   0651  04/17/16   1106  04/16/16   0755  04/15/16   1041   NA   -- "   140  141  142  140   POTASSIUM   --   3.6  3.4  4.1  3.6   CHLORIDE   --   106  108  112*  105   CO2   --   24  22  21  24   ANIONGAP   --   9  10  8  11   GLC   --   93  95  82  116*   BUN   --   16  11  11  16   CR  0.86  0.83  0.92  0.76  0.93   GFRESTIMATED  >90  Non  GFR Calc    >90  Non  GFR Calc    >90  Non  GFR Calc    >90  Non  GFR Calc    >90  Non  GFR Calc     GFRESTBLACK  >90   GFR Calc    >90   GFR Calc    >90   GFR Calc    >90   GFR Calc    >90   GFR Calc     LAURA   --   9.8  9.5  8.3*  10.4*        UA RESULTS:   Recent Labs   Lab Test  05/03/16   1522  05/03/16   0816  04/29/16   1328   SG  1.017  1.022  1.005   URINEPH  5.5  5.5  6.0   NITRITE  Negative  Negative  Negative   RBCU  >182*  65*  1   WBCU  12*  25*  2       I spent over 15 minutes with the patient.  Over half this time was spent on counseling on kidney stones.    Again, thank you for allowing me to participate in the care of your patient.      Sincerely,    Azael Melvin MD      CC  Patient Care Team:  Blane Worrell MD as PCP - General (Family Practice)  Amy Wills, RN as Registered Nurse (Urology)    Copy to patient  GEOVANNA LEONARD  [address]  [facility] [address]

## 2018-06-22 NOTE — PROGRESS NOTES
ASSESSMENT AND PLAN  Recurrent calcium oxalate kidney stone in the setting of Crohns Disease.  March 2018 ct showed a left ureteral stone.    -Renal ultrasound in the next few weeks  -24 hr urine and follow-up with kidney stone clinic.  -follow-up with me in 1 year with a kub.    3mm lung nodule.  No risk factors.  -He will follow-up with his primary care physician for this.  ________________________________________________________________________    CHIEF COMPLAINT  It was my pleasure to see Leodan Collins who is a 38 year old male who is here for follow-up for kidney stones.    HPI  He is doing today and denies flank pain since he passed his 5mm stone in March.    RADIOLOGIC IMAGING  CT ABDOMEN AND PELVIS WITHOUT CONTRAST   3/21/2018 4:50 PM      HISTORY: Severe flank pain.     TECHNIQUE: No IV contrast material. Radiation dose for this scan was  reduced using automated exposure control, adjustment of the mA and/or  kV according to patient size, or iterative reconstruction technique.     COMPARISON: 2/11/2018 abdomen and pelvis CT.     FINDINGS: 3 mm lung nodule right middle lobe unchanged. Lung bases are  otherwise clear.     The noncontrast appearance of the liver, gallbladder, spleen, and  pancreas are normal. No adrenal lesions.     There is a 0.5 cm right distal ureteral stone at the ureterovesical  junction resulting in moderate to prominent right hydronephrosis  similar to the prior study. This is likely the same stones seen near  the right UV junction on the comparison study and it is progressed a  few cm down to the UV junction. Several small bilateral kidney stones  identified.     Previous colectomy with right ileostomy.     No free fluid. No free air. No evidence of bowel obstruction or bowel  dilatation.         IMPRESSION:  1. 0.5 cm right distal ureteral stone has progressed slightly more  distally than on the comparison study. The proximal right ureteral  stone seen on the prior study is no longer  visualized.  2. Previous colectomy with right ileostomy.  3. 3 mm nodule right middle lobe unchanged since 2/11/2018.  Recommendations for one or multiple incidental lung nodules < 6mm :    Low risk patients: No routine follow-up.    High risk patients: Optional follow-up CT at 12 months; if  unchanged, no further follow-up.    I reviewed the recent radiologic imaging and reports described above.    Patient Active Problem List    Diagnosis Date Noted     Ureteral calculus 05/03/2016     Priority: Medium     Hydronephrosis with renal and ureteral calculous obstruction 04/16/2016     Priority: Medium     CARDIOVASCULAR SCREENING; LDL GOAL LESS THAN 160 02/08/2013     Priority: Medium     Kidney stones      Priority: Medium     STONE SURGERY HISTORY  6/30/13 Left ESWL.  Dr Je Melvin, [facility].  Stone analysis: No stones collected.  4/10/14 Right Ureteroscopy.  Dr Je Melvin, [facility].  Stone analysis: 90% calcium oxalate dihydrate, and 10% calcium phosphate  5/17/16 Bilateral ureteroscopy with laser lithotripsy. Dr Je Melvin, [facility].       Crohn's disease (H)      Priority: Medium     Past Medical History:   Diagnosis Date     Asthma     child     Crohn's disease (H)      History of blood transfusion      Kidney stones      Past Surgical History:   Procedure Laterality Date     COLOSTOMY  2007     COMBINED CYSTOSCOPY, RETROGRADES, URETEROSCOPY, INSERT STENT Right 5/3/2016    Procedure: COMBINED CYSTOSCOPY, RETROGRADES, URETEROSCOPY, INSERT STENT;  Surgeon: Azael Melvin MD;  [facility]     EXTRACORPOREAL SHOCK WAVE LITHOTRIPSY (ESWL)  6/27/2013    Procedure: EXTRACORPOREAL SHOCK WAVE LITHOTRIPSY (ESWL);  Left Extracorporeal Shock Wave Lithotripsy Kidney And Ureter;  Surgeon: Azael Melvin MD;  [facility]     ILEOSTOMY  2009     LAPAROTOMY EXPLORATORY  2009    extensive lysis of adhesions,  "revision of coloenteric fistula, repair of small bowel fistula, takedown of current colostomy, debridement of chronic abscess, resection of residual left colon, right hemicolectomy, Louise ileostomy     LASER HOLMIUM LITHOTRIPSY URETER(S), INSERT STENT, COMBINED  4/9/2014    Procedure: COMBINED CYSTOSCOPY, URETEROSCOPY, LASER HOLMIUM LITHOTRIPSY URETER(S), INSERT STENT;  Ureteroscopy, Cystoscopy, holmium laser,Right Uretral stent placement;  Surgeon: Azael Melvin MD;  [facility]     LASER HOLMIUM LITHOTRIPSY URETER(S), INSERT STENT, COMBINED Bilateral 5/17/2016    Procedure: COMBINED CYSTOSCOPY, URETEROSCOPY, LASER HOLMIUM LITHOTRIPSY URETER(S), INSERT STENT;  Surgeon: Azael Melvin MD;  [facility]     Current Outpatient Prescriptions   Medication Sig Dispense Refill     Adalimumab (HUMIRA PEN SC) Inject 40 mg Subcutaneous every 14 days        HUMIRA PEN 40 MG/0.8ML pen kit        mercaptopurine (PURINETHOL) 50 MG tablet Take 100 mg by mouth daily        Multiple Vitamins-Iron (MULTI-DAY PLUS IRON PO) Take 1 tablet by mouth daily        SOCIAL HISTORY: He  reports that he has never smoked. He has never used smokeless tobacco. He reports that he drinks alcohol. He reports that he does not use illicit drugs.    PHYSICAL EXAM  /72  Pulse 57  Ht 1.702 m (5' 7\")  Wt 63.5 kg (140 lb)  BMI 21.93 kg/m2  Constitutional: Alert, no acute distress  Psychiatric: Normal mood and affect    LAURA      9.8   5/3/2016  LAURA      9.5   4/17/2016  LAURA      8.3   4/16/2016    Recent Labs   Lab Test  05/17/16   0743  05/03/16   0651  04/17/16   1106  04/16/16   0755  04/15/16   1041   NA   --   140  141  142  140   POTASSIUM   --   3.6  3.4  4.1  3.6   CHLORIDE   --   106  108  112*  105   CO2   --   24  22  21  24   ANIONGAP   --   9  10  8  11   GLC   --   93  95  82  116*   BUN   --   16  11  11  16   CR  0.86  0.83  0.92  0.76  0.93   GFRESTIMATED  >90  Non  GFR Calc    >90  Non "  GFR Calc    >90  Non  GFR Calc    >90  Non  GFR Calc    >90  Non  GFR Calc     GFRESTBLACK  >90   GFR Calc    >90   GFR Calc    >90   GFR Calc    >90   GFR Calc    >90   GFR Calc     LAURA   --   9.8  9.5  8.3*  10.4*        UA RESULTS:   Recent Labs   Lab Test  05/03/16   1522  05/03/16   0816  04/29/16   1328   SG  1.017  1.022  1.005   URINEPH  5.5  5.5  6.0   NITRITE  Negative  Negative  Negative   RBCU  >182*  65*  1   WBCU  12*  25*  2       I spent over 15 minutes with the patient.  Over half this time was spent on counseling on kidney stones.    CC  Patient Care Team:  Blane Worrell MD as PCP - General (Family Practice)  Blane Worrell MD as Referring Physician (Family Practice)  Azael Melvin MD as MD (Urology)  Amy Wills, RN as Registered Nurse (Urology)  SELF, REFERRED    Copy to patient  GEOVANNA YO AISHA  [address]  [facility] [address]

## 2018-06-22 NOTE — MR AVS SNAPSHOT
After Visit Summary   6/22/2018    Leodan Collins    MRN: [id number]           Patient Information     Date Of Birth          1980        Visit Information        Provider Department      6/22/2018 3:40 PM Azael Melvin MD [facility]        Today's Diagnoses     Ureteral calculus    -  1      Care Instructions    Schedule imaging appointment in one week.    Schedule appointment with Dr. Ghotra in the Stone [facility].  Make sure you complete the 24 hour urine test PRIOR to this appointment.    Follow up with Dr. Melvin in one year with KUB x-ray.    It was a pleasure meeting with you today.  Thank you for allowing me and my team the privilege of caring for you today.  YOU are the reason we are here, and I truly hope we provided you with the excellent service you deserve.  Please let us know if there is anything else we can do for you so that we can be sure you are leaving completely satisfied with your care experience.      MIKE Elizondo          Follow-ups after your visit        Your next 10 appointments already scheduled     Jun 27, 2018 10:30 AM CDT   US RENAL COMPLETE with UCUS2   [facility] ([facility])    [address] [place] [id number] [phone]           Please bring a list of your medicines (including vitamins, minerals and over-the-counter drugs). Also, tell your doctor about any allergies you may have. Wear comfortable clothes and leave your valuables at home.  You do not need to do anything special to prepare for your exam.  Please call the Imaging Department at your exam site with any questions.            Nov 13, 2018  1:20 PM CST   (Arrive by 1:05 PM)   New Patient Visit with Kayla Ghotra MD   [facility] ([facility])    [address] [place] [address]  [id number] [phone]              Future tests that were ordered for you today     Open Future Orders        Priority Expected Expires Ordered    US Renal Complete Routine 6/23/2018 6/21/2019 6/22/2018    Alyssa: Clinic Lab Order Routine  6/21/2019 6/22/2018    XR KUB Routine 6/22/2018 6/21/2019 6/22/2018            Who to contact     Please call your clinic at [phone] to:    Ask questions about your health    Make or cancel appointments    Discuss your medicines    Learn about your test results    Speak to your doctor            Additional Information About Your Visit        edulioharSensee Information     SnapDash gives you secure access to your electronic health record. If you see a primary care provider, you can also send messages to your care team and make appointments. If you have questions, please call your primary care clinic.  If you do not have a primary care provider, please call [phone] and they will assist you.      SnapDash is an electronic gateway that provides easy, online access to your medical records. With [employer], you can request a clinic appointment, read your test results, renew a prescription or communicate with your care team.     To access your existing account, please contact your [facility] or call [phone] for assistance.        Care EveryWhere ID     This is your Care EveryWhere ID. This could be used by other organizations to access your [facility] medical records  [id number]        Your Vitals Were     Pulse Height BMI (Body Mass Index)             57 1.702 m (5' 7\") 21.93 kg/m2          Blood Pressure from Last 3 Encounters:   06/22/18 120/72   03/21/18 (!) 143/97   02/11/18 123/71    Weight from Last 3 Encounters:   06/22/18 63.5 kg (140 lb)   02/11/18 65.8 kg (145 lb)   10/30/17 65.4 kg (144 lb 1.6 oz)               Primary Care Provider Office Phone # Fax #    Blane Worrell [phone]       [address] [place] [address]  [facility] [address]        Equal Access to Services     IBIS JEANNE : Hadii aad ku hadserenagayathri Elianeconstantino, wafaraztorey greene, melanyelba alvaradomakeron tony. So [facility] [phone].    ATENCIÓN: Si habla español, tiene a santiago disposición servicios gratuitos de asistencia lingüística. Viniame al [phone].    We comply with applicable federal civil rights laws and [place] laws. We do not discriminate on the basis of race, color, national origin, age, disability, sex, sexual orientation, or gender identity.            Thank you!     Thank you for choosing [facility] AND [facility]  for your care. Our goal is always to provide you with excellent care. Hearing back from our patients is one way we can continue to improve our services. Please take a few minutes to complete the written survey that you may receive in the mail after your visit with us. Thank you!             Your Updated Medication List - Protect others around you: Learn how to safely use, store and throw away your medicines at www.disposemymeds.org.          This list is accurate as of 6/22/18  5:09 PM.  Always use your most recent med list.                   Brand Name Dispense Instructions for use Diagnosis    * HUMIRA PEN 40 MG/0.8ML pen kit   Generic drug:  adalimumab           * HUMIRA PEN SC      Inject 40 mg Subcutaneous every 14 days    Calculus of kidney       mercaptopurine 50 MG tablet CHEMO    PURINETHOL     Take 100 mg by mouth daily    Calculus of kidney       MULTI-DAY PLUS IRON PO      Take 1 tablet by mouth daily        * Notice:  This list has 2 medication(s) that are the same as other medications prescribed for you. Read the directions carefully, and ask your doctor or other care provider to review them with you.

## 2018-06-22 NOTE — PATIENT INSTRUCTIONS
Schedule imaging appointment in one week.    Schedule appointment with Dr. Ghotra in the [facility] Clinic.  Make sure you complete the 24 hour urine test PRIOR to this appointment.    Follow up with Dr. Melvin in one year with KUB x-ray.    It was a pleasure meeting with you today.  Thank you for allowing me and my team the privilege of caring for you today.  YOU are the reason we are here, and I truly hope we provided you with the excellent service you deserve.  Please let us know if there is anything else we can do for you so that we can be sure you are leaving completely satisfied with your care experience.      MIKE Elizondo

## 2018-06-22 NOTE — NURSING NOTE
"Chief Complaint   Patient presents with     Follow Up For     kidney stones       Blood pressure 120/72, pulse 57, height 1.702 m (5' 7\"), weight 63.5 kg (140 lb). Body mass index is 21.93 kg/(m^2).    Patient Active Problem List   Diagnosis     Kidney stones     Crohn's disease (H)     CARDIOVASCULAR SCREENING; LDL GOAL LESS THAN 160     Hydronephrosis with renal and ureteral calculous obstruction     Ureteral calculus       No Known Allergies    Current Outpatient Prescriptions   Medication Sig Dispense Refill     Adalimumab (HUMIRA PEN SC) Inject 40 mg Subcutaneous every 14 days        HUMIRA PEN 40 MG/0.8ML pen kit        mercaptopurine (PURINETHOL) 50 MG tablet Take 100 mg by mouth daily        Multiple Vitamins-Iron (MULTI-DAY PLUS IRON PO) Take 1 tablet by mouth daily         Social History   Substance Use Topics     Smoking status: Never Smoker     Smokeless tobacco: Never Used     Alcohol use 0.0 oz/week     0 Standard drinks or equivalent per week      Comment: x2 a week       Judith Barajas LPN  6/22/2018  4:14 PM    "

## 2018-06-24 ENCOUNTER — MYC MEDICAL ADVICE (OUTPATIENT)
Dept: FAMILY MEDICINE | Facility: CLINIC | Age: 38
End: 2018-06-24

## 2018-06-25 NOTE — TELEPHONE ENCOUNTER
Dr. Worrell    See pt mychart    Advise    Barbara Johnson, RN   [facility]

## 2018-06-27 ENCOUNTER — RADIANT APPOINTMENT (OUTPATIENT)
Dept: ULTRASOUND IMAGING | Facility: CLINIC | Age: 38
End: 2018-06-27
Attending: UROLOGY
Payer: COMMERCIAL

## 2018-06-27 DIAGNOSIS — N20.1 URETERAL CALCULUS: ICD-10-CM

## 2018-07-06 NOTE — PROGRESS NOTES
MrPetra Collins,  Your renal ultrasound shows that the dilation of your kidneys has resolved.  There are likely very small stones in the bottom of both kidneys.  Thank you, Je Melvin.

## 2018-08-11 ENCOUNTER — APPOINTMENT (OUTPATIENT)
Dept: GENERAL RADIOLOGY | Facility: CLINIC | Age: 38
End: 2018-08-11
Attending: FAMILY MEDICINE
Payer: COMMERCIAL

## 2018-08-11 ENCOUNTER — APPOINTMENT (OUTPATIENT)
Dept: CT IMAGING | Facility: CLINIC | Age: 38
End: 2018-08-11
Attending: FAMILY MEDICINE
Payer: COMMERCIAL

## 2018-08-11 ENCOUNTER — HOSPITAL ENCOUNTER (EMERGENCY)
Facility: CLINIC | Age: 38
Discharge: HOME | End: 2018-08-11
Attending: FAMILY MEDICINE | Admitting: FAMILY MEDICINE
Payer: COMMERCIAL

## 2018-08-11 VITALS
TEMPERATURE: 98.3 F | RESPIRATION RATE: 18 BRPM | DIASTOLIC BLOOD PRESSURE: 75 MMHG | OXYGEN SATURATION: 95 % | HEART RATE: 83 BPM | SYSTOLIC BLOOD PRESSURE: 136 MMHG

## 2018-08-11 DIAGNOSIS — N20.2 CALCULUS OF KIDNEY WITH CALCULUS OF URETER: ICD-10-CM

## 2018-08-11 LAB
ALBUMIN SERPL-MCNC: 4.3 G/DL (ref 3.4–5)
ALBUMIN UR-MCNC: 30 MG/DL
ALP SERPL-CCNC: 40 U/L (ref 40–150)
ALT SERPL W P-5'-P-CCNC: 41 U/L (ref 0–70)
ANION GAP SERPL CALCULATED.3IONS-SCNC: 15 MMOL/L (ref 3–14)
APPEARANCE UR: CLEAR
AST SERPL W P-5'-P-CCNC: 36 U/L (ref 0–45)
BASOPHILS # BLD AUTO: 0 10E9/L (ref 0–0.2)
BASOPHILS NFR BLD AUTO: 0.2 %
BILIRUB SERPL-MCNC: 3 MG/DL (ref 0.2–1.3)
BILIRUB UR QL STRIP: NEGATIVE
BUN SERPL-MCNC: 18 MG/DL (ref 7–30)
CALCIUM SERPL-MCNC: 9.2 MG/DL (ref 8.5–10.1)
CAOX CRY #/AREA URNS HPF: ABNORMAL /HPF
CHLORIDE SERPL-SCNC: 107 MMOL/L (ref 94–109)
CO2 SERPL-SCNC: 18 MMOL/L (ref 20–32)
COLOR UR AUTO: YELLOW
CREAT SERPL-MCNC: 0.84 MG/DL (ref 0.66–1.25)
DIFFERENTIAL METHOD BLD: NORMAL
EOSINOPHIL # BLD AUTO: 0.2 10E9/L (ref 0–0.7)
EOSINOPHIL NFR BLD AUTO: 1.4 %
ERYTHROCYTE [DISTWIDTH] IN BLOOD BY AUTOMATED COUNT: 12.8 % (ref 10–15)
GFR SERPL CREATININE-BSD FRML MDRD: >90 ML/MIN/1.7M2
GLUCOSE SERPL-MCNC: 118 MG/DL (ref 70–99)
GLUCOSE UR STRIP-MCNC: NEGATIVE MG/DL
HCT VFR BLD AUTO: 46 % (ref 40–53)
HGB BLD-MCNC: 15.9 G/DL (ref 13.3–17.7)
HGB UR QL STRIP: ABNORMAL
IMM GRANULOCYTES # BLD: 0 10E9/L (ref 0–0.4)
IMM GRANULOCYTES NFR BLD: 0.3 %
KETONES UR STRIP-MCNC: 10 MG/DL
LEUKOCYTE ESTERASE UR QL STRIP: ABNORMAL
LYMPHOCYTES # BLD AUTO: 3.2 10E9/L (ref 0.8–5.3)
LYMPHOCYTES NFR BLD AUTO: 30.5 %
MCH RBC QN AUTO: 31.9 PG (ref 26.5–33)
MCHC RBC AUTO-ENTMCNC: 34.6 G/DL (ref 31.5–36.5)
MCV RBC AUTO: 92 FL (ref 78–100)
MONOCYTES # BLD AUTO: 0.6 10E9/L (ref 0–1.3)
MONOCYTES NFR BLD AUTO: 5.4 %
MUCOUS THREADS #/AREA URNS LPF: PRESENT /LPF
NEUTROPHILS # BLD AUTO: 6.6 10E9/L (ref 1.6–8.3)
NEUTROPHILS NFR BLD AUTO: 62.2 %
NITRATE UR QL: NEGATIVE
NRBC # BLD AUTO: 0 10*3/UL
NRBC BLD AUTO-RTO: 0 /100
PH UR STRIP: 6.5 PH (ref 5–7)
PLATELET # BLD AUTO: 342 10E9/L (ref 150–450)
POTASSIUM SERPL-SCNC: 4.6 MMOL/L (ref 3.4–5.3)
PROT SERPL-MCNC: 8.2 G/DL (ref 6.8–8.8)
RBC # BLD AUTO: 4.99 10E12/L (ref 4.4–5.9)
RBC #/AREA URNS AUTO: 34 /HPF (ref 0–2)
SODIUM SERPL-SCNC: 140 MMOL/L (ref 133–144)
SOURCE: ABNORMAL
SP GR UR STRIP: 1.01 (ref 1–1.03)
UROBILINOGEN UR STRIP-MCNC: NORMAL MG/DL (ref 0–2)
WBC # BLD AUTO: 10.6 10E9/L (ref 4–11)
WBC #/AREA URNS AUTO: 11 /HPF (ref 0–5)

## 2018-08-11 PROCEDURE — 96374 THER/PROPH/DIAG INJ IV PUSH: CPT | Performed by: FAMILY MEDICINE

## 2018-08-11 PROCEDURE — 87086 URINE CULTURE/COLONY COUNT: CPT | Performed by: FAMILY MEDICINE

## 2018-08-11 PROCEDURE — 85025 COMPLETE CBC W/AUTO DIFF WBC: CPT | Performed by: FAMILY MEDICINE

## 2018-08-11 PROCEDURE — 80053 COMPREHEN METABOLIC PANEL: CPT | Performed by: FAMILY MEDICINE

## 2018-08-11 PROCEDURE — 74019 RADEX ABDOMEN 2 VIEWS: CPT

## 2018-08-11 PROCEDURE — 74176 CT ABD & PELVIS W/O CONTRAST: CPT

## 2018-08-11 PROCEDURE — 81001 URINALYSIS AUTO W/SCOPE: CPT | Performed by: FAMILY MEDICINE

## 2018-08-11 PROCEDURE — 96361 HYDRATE IV INFUSION ADD-ON: CPT | Performed by: FAMILY MEDICINE

## 2018-08-11 PROCEDURE — 25000128 H RX IP 250 OP 636: Performed by: FAMILY MEDICINE

## 2018-08-11 PROCEDURE — 99285 EMERGENCY DEPT VISIT HI MDM: CPT | Mod: 25 | Performed by: FAMILY MEDICINE

## 2018-08-11 PROCEDURE — 96376 TX/PRO/DX INJ SAME DRUG ADON: CPT | Performed by: FAMILY MEDICINE

## 2018-08-11 PROCEDURE — 99285 EMERGENCY DEPT VISIT HI MDM: CPT | Mod: Z6 | Performed by: FAMILY MEDICINE

## 2018-08-11 PROCEDURE — 96375 TX/PRO/DX INJ NEW DRUG ADDON: CPT | Performed by: FAMILY MEDICINE

## 2018-08-11 RX ORDER — OXYCODONE AND ACETAMINOPHEN 5; 325 MG/1; MG/1
1 TABLET ORAL EVERY 6 HOURS PRN
COMMUNITY
End: 2018-09-20

## 2018-08-11 RX ORDER — HYDROMORPHONE HYDROCHLORIDE 1 MG/ML
0.5 INJECTION, SOLUTION INTRAMUSCULAR; INTRAVENOUS; SUBCUTANEOUS ONCE
Status: COMPLETED | OUTPATIENT
Start: 2018-08-11 | End: 2018-08-11

## 2018-08-11 RX ORDER — SODIUM CHLORIDE 9 MG/ML
INJECTION, SOLUTION INTRAVENOUS CONTINUOUS
Status: DISCONTINUED | OUTPATIENT
Start: 2018-08-11 | End: 2018-08-11 | Stop reason: HOSPADM

## 2018-08-11 RX ORDER — ONDANSETRON 2 MG/ML
8 INJECTION INTRAMUSCULAR; INTRAVENOUS ONCE
Status: COMPLETED | OUTPATIENT
Start: 2018-08-11 | End: 2018-08-11

## 2018-08-11 RX ORDER — HYDROMORPHONE HYDROCHLORIDE 1 MG/ML
0.5 INJECTION, SOLUTION INTRAMUSCULAR; INTRAVENOUS; SUBCUTANEOUS
Status: DISCONTINUED | OUTPATIENT
Start: 2018-08-11 | End: 2018-08-11 | Stop reason: HOSPADM

## 2018-08-11 RX ORDER — KETOROLAC TROMETHAMINE 15 MG/ML
15 INJECTION, SOLUTION INTRAMUSCULAR; INTRAVENOUS ONCE
Status: COMPLETED | OUTPATIENT
Start: 2018-08-11 | End: 2018-08-11

## 2018-08-11 RX ADMIN — Medication 0.5 MG: at 14:01

## 2018-08-11 RX ADMIN — SODIUM CHLORIDE 1000 ML: 9 INJECTION, SOLUTION INTRAVENOUS at 14:07

## 2018-08-11 RX ADMIN — KETOROLAC TROMETHAMINE 15 MG: 15 INJECTION, SOLUTION INTRAMUSCULAR; INTRAVENOUS at 14:07

## 2018-08-11 RX ADMIN — ONDANSETRON 8 MG: 2 INJECTION INTRAMUSCULAR; INTRAVENOUS at 14:02

## 2018-08-11 RX ADMIN — Medication 0.5 MG: at 17:41

## 2018-08-11 RX ADMIN — Medication 0.5 MG: at 15:49

## 2018-08-11 NOTE — ED NOTES
Pt is sleeping, easily arrouseable. Pt desating when sleeping on RA, O2 placed on pt via NC. O2 sat 100% NC 1.5L

## 2018-08-11 NOTE — ED PROVIDER NOTES
History     Chief Complaint   Patient presents with     Rule out Kidney Stone     sudden onset severe R side flank pain that radiates to groin; vomiting. Hx mx kidney stones, stents, etc.     HPI  Leodan Collins is a 38-year-old male who presents to the Emergency Department due to right-sided lower back pain. Patient reports that he had a sudden onset of severe right-sided lower back pain that radiates to the right groin. He states that he has had many kidney stones as a result of his Crohn's disease and notes that his current symptoms are consistent with this. Patient denies hematuria, fevers or chills. He does not have any known allergies.      I have reviewed the Medications, Allergies, Past Medical and Surgical History, and Social History in the Epic system.    No current facility-administered medications for this encounter.      Current Outpatient Prescriptions   Medication     Adalimumab (HUMIRA PEN SC)     mercaptopurine (PURINETHOL) 50 MG tablet     Multiple Vitamins-Iron (MULTI-DAY PLUS IRON PO)     oxyCODONE-acetaminophen (PERCOCET) 5-325 MG per tablet     HUMIRA PEN 40 MG/0.8ML pen kit     Past Medical History:   Diagnosis Date     Asthma     child     Crohn's disease (H)      History of blood transfusion      Kidney stones        Past Surgical History:   Procedure Laterality Date     COLOSTOMY  2007     COMBINED CYSTOSCOPY, RETROGRADES, URETEROSCOPY, INSERT STENT Right 5/3/2016    Procedure: COMBINED CYSTOSCOPY, RETROGRADES, URETEROSCOPY, INSERT STENT;  Surgeon: Azael Melvin MD;  [facility]     EXTRACORPOREAL SHOCK WAVE LITHOTRIPSY (ESWL)  6/27/2013    Procedure: EXTRACORPOREAL SHOCK WAVE LITHOTRIPSY (ESWL);  Left Extracorporeal Shock Wave Lithotripsy Kidney And Ureter;  Surgeon: Azael Melvin MD;  [facility]     ILEOSTOMY  2009     LAPAROTOMY EXPLORATORY  2009    extensive lysis of adhesions, revision of coloenteric fistula, repair of small bowel fistula, takedown of current  colostomy, debridement of chronic abscess, resection of residual left colon, right hemicolectomy, Louise ileostomy     LASER HOLMIUM LITHOTRIPSY URETER(S), INSERT STENT, COMBINED  4/9/2014    Procedure: COMBINED CYSTOSCOPY, URETEROSCOPY, LASER HOLMIUM LITHOTRIPSY URETER(S), INSERT STENT;  Ureteroscopy, Cystoscopy, holmium laser,Right Uretral stent placement;  Surgeon: Azael Melvin MD;  [facility]     LASER HOLMIUM LITHOTRIPSY URETER(S), INSERT STENT, COMBINED Bilateral 5/17/2016    Procedure: COMBINED CYSTOSCOPY, URETEROSCOPY, LASER HOLMIUM LITHOTRIPSY URETER(S), INSERT STENT;  Surgeon: Azael Melvin MD;  [facility]       Family History   Problem Relation Age of Onset     Cerebrovascular Disease Maternal Grandmother      Connective Tissue Disorder Sister        Social History   Substance Use Topics     Smoking status: Never Smoker     Smokeless tobacco: Never Used     Alcohol use 0.0 oz/week     0 Standard drinks or equivalent per week      Comment: x2 a week     No Known Allergies    Review of Systems  ROS: 14 point ROS neg other than the symptoms noted above in the HPI.    Physical Exam   BP: 132/84  Pulse: 84  Temp: 98.1  F (36.7  C)  Resp: 20  SpO2: 100 %      Physical Exam   Constitutional: He is oriented to person, place, and time. He appears well-developed and well-nourished. He appears distressed (Uncomfortable, writhing on the cart).   HENT:   Head: Normocephalic and atraumatic.   Mouth/Throat: Oropharynx is clear and moist.   Eyes: EOM are normal. Pupils are equal, round, and reactive to light.   Neck: Normal range of motion. Neck supple. No tracheal deviation present. No thyromegaly present.   Cardiovascular: Normal rate, regular rhythm, normal heart sounds and intact distal pulses.  Exam reveals no gallop and no friction rub.    No murmur heard.  Pulmonary/Chest: Effort normal and breath sounds normal. He exhibits no tenderness.   Abdominal: Soft. Bowel sounds are normal. He  exhibits no distension and no mass. There is no tenderness. There is CVA tenderness (Right).   Ileostomy site appears intact and normal   Genitourinary: Testes normal and penis normal. Circumcised.   Musculoskeletal: He exhibits no edema or tenderness.   Neurological: He is alert and oriented to person, place, and time. No cranial nerve deficit. Coordination normal.   Skin: Skin is warm and dry. No rash noted.   Psychiatric: He has a normal mood and affect. His behavior is normal.   Nursing note and vitals reviewed.      ED Course   1:49 PM  The patient was seen and examined by Nathen Field MD in Room 4.   ED Course     Procedures             Critical Care time:  none             Labs Ordered and Resulted from Time of ED Arrival Up to the Time of Departure from the ED   ROUTINE UA WITH MICROSCOPIC REFLEX TO CULTURE - Abnormal; Notable for the following:        Result Value    Ketones Urine 10 (*)     Blood Urine Moderate (*)     Protein Albumin Urine 30 (*)     Leukocyte Esterase Urine Trace (*)     WBC Urine 11 (*)     RBC Urine 34 (*)     Mucous Urine Present (*)     Calcium Oxalate Few (*)     All other components within normal limits   COMPREHENSIVE METABOLIC PANEL - Abnormal; Notable for the following:     Carbon Dioxide 18 (*)     Anion Gap 15 (*)     Glucose 118 (*)     Bilirubin Total 3.0 (*)     All other components within normal limits   CBC WITH PLATELETS DIFFERENTIAL            Assessments & Plan (with Medical Decision Making)   Patient with a history of Crohn's disease and recurrent ureterolithiasis, presenting with the sudden onset of severe crampy sharp right flank pain approximately 1 hour prior to presentation.  Initial differential diagnosis included but was not restricted to exacerbation of Crohn's disease, AAA, pancreatitis, ischemic bowel, diverticulitis, ureterolithiasis,small bowel obstruction, pyelonephritis, abdominal compartment syndrome, referred pain,  ovarian torsion, ovarian cyst,  tubal ovarian abscess, PID, ectopic pregnancy, and other life threatening as well as nonlife threatening causes of right lower abdominal pain.  On exam he is pale, uncomfortable, writhing, but his vital signs are unremarkable.  He has an intact ileostomy bag, and right CVA tenderness but otherwise benign abdominal exam.  The remainder of his physical exam including genitourinary exam unremarkable.  Labs reveal some signs of mild metabolic acidosis which is likely due to his underlying Crohn's disease and nausea and vomiting.  His white blood cell count is normal.  His urinalysis does have both red and white cells trace of leukocyte esterase calcium oxalate stones.  CT confirms a 1 cm stone at the right UV junction with hydronephrosis.  His pain and nausea have been difficult to manage he is now more comfortable after several doses of Dilaudid.  Creatinine normal.  Urology was consulted and a reviewing the case.  Patient is n.p.o. since sometime before 11 AM.  Patient reportedly passed the stone into the bladder.  He is now asymptomatic.  Based on the clinical findings and the entire clinical scenario, the patient appears stable at this time to be treated symptomatically, and to follow-up as an outpatient for any further evaluation and treatment.  Discussed expected course, need for follow up, and indications for return with the patient.  See discharge instructions.      I have reviewed the nursing notes.    I have reviewed the findings, diagnosis, plan and need for follow up with the patient.    Discharge Medication List as of 8/11/2018  7:53 PM          Final diagnoses:   Calculus of kidney with calculus of ureter   IElise, am serving as a trained medical scribe to document services personally performed by Nathen Field MD, based on the provider's statements to me.   INathen MD, was physically present and have reviewed and verified the accuracy of this note documented by Elise MYERS  Nikolay.    8/11/2018   [facility], [place], EMERGENCY DEPARTMENT     Nathen Field MD  08/12/18 0347

## 2018-08-11 NOTE — ED AVS SNAPSHOT
[facility], [place], Emergency Department    [address]    [facility] [address]    Phone:  [phone]    Fax:  [phone]                                       Leodan Collins   MRN: [id number]    Department:  [facility], Emergency Department   Date of Visit:  8/11/2018           After Visit Summary Signature Page     I have received my discharge instructions, and my questions have been answered. I have discussed any challenges I see with this plan with the nurse or doctor.    ..........................................................................................................................................  Patient/Patient Representative Signature      ..........................................................................................................................................  Patient Representative Print Name and Relationship to Patient    ..................................................               ................................................  Date                                            Time    ..........................................................................................................................................  Reviewed by Signature/Title    ...................................................              ..............................................  Date                                                            Time

## 2018-08-11 NOTE — ED AVS SNAPSHOT
[facility], Emergency Department    [address]    [facility] [address]    Phone:  [phone]    Fax:  [phone]                                       Leodan Collins   MRN: [id number]    Department:  [facility], Emergency Department   Date of Visit:  8/11/2018           Patient Information     Date Of Birth          1980        Your diagnoses for this visit were:     Calculus of kidney with calculus of ureter        You were seen by Nathen Field MD.      Follow-up Information     Follow up with Azael Melvin MD.    Specialty:  Urology    Why:  As needed    Contact information:    [facility] [place] [address] [phone]          Discharge Instructions         Kidney Stone, Passed    A kidney stone (nephrolithiasis) starts as tiny crystals that form inside the kidney where urine is made. Most kidney stones enlarge to about 1/8 to 1/4 inch in size before leaving the kidney and moving toward the bladder. The sharp, cramping pain and nausea/vomiting you had was the stone moving through the ureter (the narrow tube joining the kidney to the bladder). Once the stone reaches your bladder, the pain stops. Pain may start again as the stone passes through the bladder and out through the urethra. There are 4 types of kidney stones. Eighty percent are calcium stones--mostly calcium oxalate but also some with calcium phosphate. The other 3 types include uric acid stones, struvite stones (from a preceding infection), and rarely, cystine stones.  Home care  The following guidelines will help you care for yourself at home:    Drink plenty of fluids. This increases urine flow and reduces the chance that a new stone will form. Healthy adults (no heart/liver/kidney disease) who have had a kidney stone should drink 12, 8-ounce glasses of fluids per day. Most of this should be water. The goal is to produce 1.5 to 2 quarts of almost colorless urine per 24 hours.    Unless another NSAID  (non-steroidal anti-inflammatory drug) was given, you may take ibuprofen or naproxen in addition to any narcotic pain medicine your healthcare provider prescribes. If you have chronic liver or kidney disease or ever had a stomach ulcer or GI bleeding, talk with your healthcare provider before using these medicines.    Collecting the stone. If you were given a strainer, urinate into a jar then pour the urine through the strainer and into the toilet. Keep doing this for 24 hours after your pain stops. Save any stone that you find in the strainer and bring it to your healthcare provider for analysis. If you do not collect a stone, a 24-hour urine specimen can be done at a later time by your healthcare provider to figure out the cause of your stone.  Prevention  Each year, there is a chance that a new stone will form (50% chance over the next 5 to 7 years). The risk is highest if you have a family history of kidney stones or have certain chronic illnesses such as hypertension, obesity, or diabetes. However, there are lifestyle and dietary changes that you can make to reduce the risk of getting another kidney stone.  Most kidney stones are made of calcium. The following advice can help you prevent calcium stones. If you don t know the type of stone you have, follow this advice until the healthcare provider determines the cause of your stone.  Things that help:    The most important thing you can do is to drink plenty of fluids each day, as described above.    Certain foods, such as wheat, rice, rye, barley, and beans, contain phytate. Phytate is a compound that may lower the risk of recurrence of any type of stone.    Eat more fruits and vegetables, especially those high in potassium.    Eat foods high in natural citrate like fruit and fruit juices (using low sugar).    Low calcium contributes to calcium type kidney stones. Eat a normal calcium diet and talk with your healthcare provider if you are taking calcium  supplements. It may be detrimental to lower your calcium intake. New research shows that eating calcium-rich and oxalate-rich foods together lowers your risk of stones. This is because eating these foods together binds the minerals in the stomach and intestines before they can reach the kidneys.    Limit salt intake to 2 grams (1 teaspoon) per day. Use limited amounts when cooking, and don t add salt at the table. Processed and canned foods are usually high in salt.    Spinach, rhubarb, peanuts, cashews, almonds, grapefruit, and grapefruit juice are all high oxalate foods and should be reduced, or eaten with calcium-rich foods. These foods include dairy, dark leafy greens, soy products, calcium-enriched foods, and others.    Reduce the amount of animal meat and high protein foods in your diet. This may lower your risk of uric acid stones.    Don't have excess sugar (sucrose) and fructose (sweetener in many soft drinks) in your diet.    If you take vitamin C as a supplement, don't take more than 1,000 milligrams (mg) per day.    A dietitian or your healthcare provider can give you ideas on how to change your diet to prevent more kidney stones.  Follow-up care  Follow up with your healthcare provider, or as advised. Even if you don't collect the kidney stone, it is possible to analyze a 24-hour urine collection for the cause of this stone. Discuss this with your healthcare provider.  If you had an X-ray, CT scan, or other diagnostic test, you will be told of any findings that may affect your care.  Call 911  Call 911 if you have any of these:    Weakness, dizziness, or fainting  When to seek medical advice  Call your healthcare provider right away if any of the these occur:    Severe pain that returns and not relieved by pain medicines    Repeated vomiting or unable to keep down fluids    Fever of 100.4 F (38 C) or higher, or as directed by your healthcare provider    Blood clots in urine    Foul smelling or cloudy  urine    Unable to pass urine for 8 hours or increasing bladder pressure  Date Last Reviewed: 10/1/2016    9983-4887 [employer]. [address] [place] [address], [place] [address]. All rights reserved. This information is not intended as a substitute for professional medical care. Always follow your healthcare professional's instructions.          Your next 10 appointments already scheduled     Nov 13, 2018  1:20 PM CST   (Arrive by 1:05 PM)   New Patient Visit with Kayla Ghotra MD   [facility] ([facility])    [address] [place] [address]  4th [facility] [address] [phone]              24 Hour Appointment Hotline       To make an appointment at any [facility], call [phone] ([phone]). If you don't have a family doctor or clinic, we will help you find one. [facility] clinics are conveniently located to serve the needs of you and your family.             Review of your medicines      Our records show that you are taking the medicines listed below. If these are incorrect, please call your family doctor or clinic.        Dose / Directions Last dose taken    * HUMIRA PEN 40 MG/0.8ML pen kit   Generic drug:  adalimumab        Refills:  0        * HUMIRA PEN SC   Dose:  40 mg        Inject 40 mg Subcutaneous every 14 days   Refills:  0        mercaptopurine 50 MG tablet CHEMO   Commonly known as:  PURINETHOL   Dose:  100 mg        Take 100 mg by mouth daily   Refills:  0        MULTI-DAY PLUS IRON PO   Dose:  1 tablet        Take 1 tablet by mouth daily   Refills:  0        oxyCODONE-acetaminophen 5-325 MG per tablet   Commonly known as:  PERCOCET   Dose:  1 tablet        Take 1 tablet by mouth every 6 hours as needed for pain   Refills:  0        * Notice:  This list has 2 medication(s) that are the same as other medications prescribed for you. Read the directions carefully, and ask your doctor or other care  provider to review them with you.            Information about OPIOIDS     PRESCRIPTION OPIOIDS: WHAT YOU NEED TO KNOW   We gave you an opioid (narcotic) pain medicine. It is important to manage your pain, but opioids are not always the best choice. You should first try all the other options your care team gave you. Take this medicine for as short a time (and as few doses) as possible.    Some activities can increase your pain, such as bandage changes or therapy sessions. It may help to take your pain medicine 30 to 60 minutes before these activities. Reduce your stress by getting enough sleep, working on hobbies you enjoy and practicing relaxation or meditation. Talk to your care team about ways to manage your pain beyond prescription opioids.    These medicines have risks:    DO NOT drive when on new or higher doses of pain medicine. These medicines can affect your alertness and reaction times, and you could be arrested for driving under the influence (DUI). If you need to use opioids long-term, talk to your care team about driving.    DO NOT operate heavy machinery    DO NOT do any other dangerous activities while taking these medicines.    DO NOT drink any alcohol while taking these medicines.     If the opioid prescribed includes acetaminophen, DO NOT take with any other medicines that contain acetaminophen. Read all labels carefully. Look for the word  acetaminophen  or  Tylenol.  Ask your pharmacist if you have questions or are unsure.    You can get addicted to pain medicines, especially if you have a history of addiction (chemical, alcohol or substance dependence). Talk to your care team about ways to reduce this risk.    All opioids tend to cause constipation. Drink plenty of water and eat foods that have a lot of fiber, such as fruits, vegetables, prune juice, apple juice and high-fiber cereal. Take a laxative (Miralax, milk of magnesia, Colace, Senna) if you don t move your bowels at least every other  day. Other side effects include upset stomach, sleepiness, dizziness, throwing up, tolerance (needing more of the medicine to have the same effect), physical dependence and slowed breathing.    Store your pills in a secure place, locked if possible. We will not replace any lost or stolen medicine. If you don t finish your medicine, please throw away (dispose) as directed by your pharmacist. The [place] [employer] has more information about safe disposal: https://www.pca.[facility].us/living-green/managing-unwanted-medications        Procedures and tests performed during your visit     Abd/pelvis CT no contrast - Stone Protocol    CBC with platelets differential    Comprehensive metabolic panel    KUB XR    UA with Microscopic reflex to Culture    Urine Culture Aerobic Bacterial      Orders Needing Specimen Collection     None      Pending Results     Date and Time Order Name Status Description    8/11/2018 1859 KUB XR Preliminary     8/11/2018 1553 Urine Culture Aerobic Bacterial In process             Pending Culture Results     Date and Time Order Name Status Description    8/11/2018 1553 Urine Culture Aerobic Bacterial In process             Pending Results Instructions     If you had any lab results that were not finalized at the time of your Discharge, you can call the ED Lab Result RN at [phone]. You will be contacted by this team for any positive Lab results or changes in treatment. The nurses are available 7 days a week from 10A to 6:30P.  You can leave a message 24 hours per day and they will return your call.        Thank you for choosing [facility]       Thank you for choosing [facility] for your care. Our goal is always to provide you with excellent care. Hearing back from our patients is one way we can continue to improve our services. Please take a few minutes to complete the written survey that you may receive in the mail after you visit with us. Thank you!        MyChart Information      [facility] gives you secure access to your electronic health record. If you see a primary care provider, you can also send messages to your care team and make appointments. If you have questions, please call your primary care clinic.  If you do not have a primary care provider, please call [phone] and they will assist you.        Care EveryWhere ID     This is your Care EveryWhere ID. This could be used by other organizations to access your [facility] medical records  [id number]        Equal Access to Services     DELVIN ANGEL : Hadjak cooneyo Soconstantino, waaxda luqadaha, qaybta kaalmada sabi, keron mendez. So [facility] [phone].    ATENCIÓN: Si habla español, tiene a santiago disposición servicios gratuitos de asistencia lingüística. Viniame al [phone].    We comply with applicable federal civil rights laws and [place] laws. We do not discriminate on the basis of race, color, national origin, age, disability, sex, sexual orientation, or gender identity.            After Visit Summary       This is your record. Keep this with you and show to your community pharmacist(s) and doctor(s) at your next visit.

## 2018-08-12 LAB
BACTERIA SPEC CULT: NO GROWTH
Lab: NORMAL
SPECIMEN SOURCE: NORMAL

## 2018-08-12 NOTE — DISCHARGE INSTRUCTIONS
Kidney Stone, Passed    A kidney stone (nephrolithiasis) starts as tiny crystals that form inside the kidney where urine is made. Most kidney stones enlarge to about 1/8 to 1/4 inch in size before leaving the kidney and moving toward the bladder. The sharp, cramping pain and nausea/vomiting you had was the stone moving through the ureter (the narrow tube joining the kidney to the bladder). Once the stone reaches your bladder, the pain stops. Pain may start again as the stone passes through the bladder and out through the urethra. There are 4 types of kidney stones. Eighty percent are calcium stones--mostly calcium oxalate but also some with calcium phosphate. The other 3 types include uric acid stones, struvite stones (from a preceding infection), and rarely, cystine stones.  Home care  The following guidelines will help you care for yourself at home:    Drink plenty of fluids. This increases urine flow and reduces the chance that a new stone will form. Healthy adults (no heart/liver/kidney disease) who have had a kidney stone should drink 12, 8-ounce glasses of fluids per day. Most of this should be water. The goal is to produce 1.5 to 2 quarts of almost colorless urine per 24 hours.    Unless another NSAID (non-steroidal anti-inflammatory drug) was given, you may take ibuprofen or naproxen in addition to any narcotic pain medicine your healthcare provider prescribes. If you have chronic liver or kidney disease or ever had a stomach ulcer or GI bleeding, talk with your healthcare provider before using these medicines.    Collecting the stone. If you were given a strainer, urinate into a jar then pour the urine through the strainer and into the toilet. Keep doing this for 24 hours after your pain stops. Save any stone that you find in the strainer and bring it to your healthcare provider for analysis. If you do not collect a stone, a 24-hour urine specimen can be done at a later time by your healthcare provider to  figure out the cause of your stone.  Prevention  Each year, there is a chance that a new stone will form (50% chance over the next 5 to 7 years). The risk is highest if you have a family history of kidney stones or have certain chronic illnesses such as hypertension, obesity, or diabetes. However, there are lifestyle and dietary changes that you can make to reduce the risk of getting another kidney stone.  Most kidney stones are made of calcium. The following advice can help you prevent calcium stones. If you don t know the type of stone you have, follow this advice until the healthcare provider determines the cause of your stone.  Things that help:    The most important thing you can do is to drink plenty of fluids each day, as described above.    Certain foods, such as wheat, rice, rye, barley, and beans, contain phytate. Phytate is a compound that may lower the risk of recurrence of any type of stone.    Eat more fruits and vegetables, especially those high in potassium.    Eat foods high in natural citrate like fruit and fruit juices (using low sugar).    Low calcium contributes to calcium type kidney stones. Eat a normal calcium diet and talk with your healthcare provider if you are taking calcium supplements. It may be detrimental to lower your calcium intake. New research shows that eating calcium-rich and oxalate-rich foods together lowers your risk of stones. This is because eating these foods together binds the minerals in the stomach and intestines before they can reach the kidneys.    Limit salt intake to 2 grams (1 teaspoon) per day. Use limited amounts when cooking, and don t add salt at the table. Processed and canned foods are usually high in salt.    Spinach, rhubarb, peanuts, cashews, almonds, grapefruit, and grapefruit juice are all high oxalate foods and should be reduced, or eaten with calcium-rich foods. These foods include dairy, dark leafy greens, soy products, calcium-enriched foods, and  others.    Reduce the amount of animal meat and high protein foods in your diet. This may lower your risk of uric acid stones.    Don't have excess sugar (sucrose) and fructose (sweetener in many soft drinks) in your diet.    If you take vitamin C as a supplement, don't take more than 1,000 milligrams (mg) per day.    A dietitian or your healthcare provider can give you ideas on how to change your diet to prevent more kidney stones.  Follow-up care  Follow up with your healthcare provider, or as advised. Even if you don't collect the kidney stone, it is possible to analyze a 24-hour urine collection for the cause of this stone. Discuss this with your healthcare provider.  If you had an X-ray, CT scan, or other diagnostic test, you will be told of any findings that may affect your care.  Call 911  Call 911 if you have any of these:    Weakness, dizziness, or fainting  When to seek medical advice  Call your healthcare provider right away if any of the these occur:    Severe pain that returns and not relieved by pain medicines    Repeated vomiting or unable to keep down fluids    Fever of 100.4 F (38 C) or higher, or as directed by your healthcare provider    Blood clots in urine    Foul smelling or cloudy urine    Unable to pass urine for 8 hours or increasing bladder pressure  Date Last Reviewed: 10/1/2016    6350-1506 [employer]. [address] [place] [address], [place] [address]. All rights reserved. This information is not intended as a substitute for professional medical care. Always follow your healthcare professional's instructions.

## 2018-09-20 ENCOUNTER — OFFICE VISIT (OUTPATIENT)
Dept: FAMILY MEDICINE | Facility: CLINIC | Age: 38
End: 2018-09-20
Payer: COMMERCIAL

## 2018-09-20 ENCOUNTER — TELEPHONE (OUTPATIENT)
Dept: FAMILY MEDICINE | Facility: CLINIC | Age: 38
End: 2018-09-20

## 2018-09-20 VITALS
DIASTOLIC BLOOD PRESSURE: 78 MMHG | SYSTOLIC BLOOD PRESSURE: 138 MMHG | BODY MASS INDEX: 22.85 KG/M2 | HEART RATE: 55 BPM | OXYGEN SATURATION: 99 % | WEIGHT: 145.9 LBS | TEMPERATURE: 98.6 F

## 2018-09-20 DIAGNOSIS — Z23 NEED FOR PROPHYLACTIC VACCINATION AND INOCULATION AGAINST INFLUENZA: ICD-10-CM

## 2018-09-20 DIAGNOSIS — R07.89 CHEST DISCOMFORT: Primary | ICD-10-CM

## 2018-09-20 DIAGNOSIS — R23.9 SKIN CHANGE: ICD-10-CM

## 2018-09-20 LAB — TROPONIN I SERPL-MCNC: <0.015 UG/L (ref 0–0.04)

## 2018-09-20 PROCEDURE — 36415 COLL VENOUS BLD VENIPUNCTURE: CPT | Performed by: FAMILY MEDICINE

## 2018-09-20 PROCEDURE — 90686 IIV4 VACC NO PRSV 0.5 ML IM: CPT | Performed by: FAMILY MEDICINE

## 2018-09-20 PROCEDURE — 99213 OFFICE O/P EST LOW 20 MIN: CPT | Mod: 25 | Performed by: FAMILY MEDICINE

## 2018-09-20 PROCEDURE — 90471 IMMUNIZATION ADMIN: CPT | Performed by: FAMILY MEDICINE

## 2018-09-20 PROCEDURE — 84484 ASSAY OF TROPONIN QUANT: CPT | Performed by: FAMILY MEDICINE

## 2018-09-20 PROCEDURE — 93000 ELECTROCARDIOGRAM COMPLETE: CPT | Performed by: FAMILY MEDICINE

## 2018-09-20 NOTE — MR AVS SNAPSHOT
After Visit Summary   9/20/2018    Leodan Collins    MRN: [id number]           Patient Information     Date Of Birth          1980        Visit Information        Provider Department      9/20/2018 10:30 AM Blane Worrell MD [facility] [place], [place]        Today's Diagnoses     Chest discomfort    -  1    Skin change           Follow-ups after your visit        Additional Services     DERMATOLOGY REFERRAL       Your provider has referred you to: N: [facility]  [place] [phone]   [email or web address]    Please be aware that coverage of these services is subject to the terms and limitations of your health insurance plan.  Call member services at your health plan with any benefit or coverage questions.      Please bring the following to your appointment:  Any x-rays, CTs or MRIs which have been performed.  Contact the facility where they were done to arrange for  prior to your scheduled appointment.  Any new CT, MRI or other procedures ordered by your specialist must be performed at a [facility] facility or coordinated by your clinic's referral office.    List of current medications   This referral request   Any documents/labs given to you for this referral                  Your next 10 appointments already scheduled     Nov 13, 2018  1:20 PM CST   (Arrive by 1:05 PM)   New Patient Visit with Kayla Ghotra MD   [facility] ([facility])    [address] [place] [id number] [phone]              Who to contact     If you have questions or need follow up information about today's clinic visit or your schedule please contact [facility] [place] at [phone].  Normal or non-critical lab and imaging results will be communicated to you by MyChart, letter or phone within 4 business days after the clinic has received the results. If  you do not hear from us within 7 days, please contact the clinic through [employer] or phone. If you have a critical or abnormal lab result, we will notify you by phone as soon as possible.  Submit refill requests through [employer] or call your pharmacy and they will forward the refill request to us. Please allow 3 business days for your refill to be completed.          Additional Information About Your Visit        [employer] Information     [facility] [employer] [facility] [employer] gives you secure access to your electronic health record. If you see a primary care provider, you can also send messages to your care team and make appointments. If you have questions, please call your primary care clinic.  If you do not have a primary care provider, please call [phone] and they will assist you.        Care EveryWhere ID     This is your Care EveryWhere ID. This could be used by other organizations to access your [facility] medical records  [id number]        Your Vitals Were     Pulse Temperature Pulse Oximetry BMI (Body Mass Index)          55 98.6  F (37  C) (Oral) 99% 22.85 kg/m2         Blood Pressure from Last 3 Encounters:   09/20/18 138/78   08/11/18 136/75   06/22/18 120/72    Weight from Last 3 Encounters:   09/20/18 145 lb 14.4 oz (66.2 kg)   06/22/18 140 lb (63.5 kg)   02/11/18 145 lb (65.8 kg)              We Performed the Following     DERMATOLOGY REFERRAL     EKG 12-lead complete w/read - Clinics     Troponin I          Today's Medication Changes          These changes are accurate as of 9/20/18 10:55 AM.  If you have any questions, ask your nurse or doctor.               These medicines have changed or have updated prescriptions.        Dose/Directions    HUMIRA PEN SC   This may have changed:  Another medication with the same name was removed. Continue taking this medication, and follow the directions you see here.   Used for:  Calculus of kidney   Changed by:  Blane Worrell MD        Dose:  40 mg   Inject 40 mg Subcutaneous  every 14 days   Refills:  0         Stop taking these medicines if you haven't already. Please contact your care team if you have questions.     oxyCODONE-acetaminophen 5-325 MG per tablet   Commonly known as:  PERCOCET   Stopped by:  Blane Worrell MD                    Primary Care Provider Office Phone # Fax #    Blane Worrell [phone]       [address] [facility] [address]  [facility] [address]        Equal Access to Services     [facility]: Hadii aad ku hadasho Soomaali, waaxda luqadaha, qaybta kaalmada adeegyada, waxay idiin hayaan adeeg kharash la'aan . So [facility] [phone].    ATENCIÓN: Si habla español, tiene a santiago disposición servicios gratuitos de asistencia lingüística. [facility] [phone].    We comply with applicable federal civil rights laws and [place] laws. We do not discriminate on the basis of race, color, national origin, age, disability, sex, sexual orientation, or gender identity.            Thank you!     Thank you for choosing [facility] [place] [place]  for your care. Our goal is always to provide you with excellent care. Hearing back from our patients is one way we can continue to improve our services. Please take a few minutes to complete the written survey that you may receive in the mail after your visit with us. Thank you!             Your Updated Medication List - Protect others around you: Learn how to safely use, store and throw away your medicines at www.disposemymeds.org.          This list is accurate as of 9/20/18 10:55 AM.  Always use your most recent med list.                   Brand Name Dispense Instructions for use Diagnosis    HUMIRA PEN SC      Inject 40 mg Subcutaneous every 14 days    Calculus of kidney       mercaptopurine 50 MG tablet CHEMO    PURINETHOL     Take 100 mg by mouth daily    Calculus of kidney       MULTI-DAY PLUS IRON PO      Take 1 tablet by mouth daily

## 2018-09-20 NOTE — PROGRESS NOTES
SUBJECTIVE:   Leodan Collins is a 38 year old male who presents to clinic today for the following health issues:    CHEST PAIN     Onset: Monday or Tuesday     Description:   Location:  left side  Character: achey and tight  Radiation: Left shoulder/arm soreness   Duration: Has been going on for an hour so far today     Intensity: mild    Progression of Symptoms:  worsening    Accompanying Signs & Symptoms:  Shortness of breath: no  Sweating: no  Nausea/vomiting: no  Lightheadedness: no  Palpitations: no  Fever/Chills: no  Cough: YES- has a cold   Heartburn: no    History:   Family history of heart disease no  Tobacco use: no    Precipitating factors:   Worse with exertion: unknown   Worse with deep breaths :  no  Related to food: no    Alleviating factors:  Nothing        Therapies Tried and outcome: Nothing tried       No cough, SHORTNESS OF BREATH, night time awakening or other symptoms   Had a URI recently, resolving    Problem list and histories reviewed & adjusted, as indicated.  Additional history: as documented    Labs reviewed in EPIC    Reviewed and updated as needed this visit by clinical staff       Reviewed and updated as needed this visit by Provider         ROS:  Constitutional, HEENT, cardiovascular, pulmonary, gi and gu systems are negative, except as otherwise noted.    OBJECTIVE:     /78 (BP Location: Left arm, Patient Position: Sitting, Cuff Size: Adult Large)  Pulse 55  Temp 98.6  F (37  C) (Oral)  Wt 145 lb 14.4 oz (66.2 kg)  SpO2 99%  BMI 22.85 kg/m2  Body mass index is 22.85 kg/(m^2).  GENERAL: healthy, alert and no distress  EYES: Eyes grossly normal to inspection, PERRL and conjunctivae and sclerae normal  HENT: ear canals and TM's normal, nose and mouth without ulcers or lesions  NECK: no adenopathy, no asymmetry, masses, or scars and thyroid normal to palpation  RESP: lungs clear to auscultation - no rales, rhonchi or wheezes  CV: regular rate and rhythm, normal S1 S2, no S3  or S4, no murmur, click or rub, no peripheral edema and peripheral pulses strong  ABDOMEN: soft, nontender, no hepatosplenomegaly, no masses and bowel sounds normal  MS: no gross musculoskeletal defects noted, no edema  SKIN: no suspicious lesions or rashes  NEURO: Normal strength and tone, mentation intact and speech normal  PSYCH: mentation appears normal, affect normal/bright  LYMPH: no cervical, supraclavicular, axillary, or inguinal adenopathy   EKG normal   troponin sent    ASSESSMENT/PLAN:             1. Chest discomfort  Likely MS   try ibuprofen  - EKG 12-lead complete w/read - Clinics  - Troponin I    2. Skin change  Wants derm refferal  - DERMATOLOGY REFERRAL    3. Need for prophylactic vaccination and inoculation against influenza  done  - FLU VACCINE, SPLIT VIRUS, IM (QUADRIVALENT) [55231]- >3 YRS  - Vaccine Administration, Initial [45293]    Regular exercise  See Patient Instructions    Blane Worrell MD  [facility]      Injectable Influenza Immunization Documentation    1.  Is the person to be vaccinated sick today?   No    2. Does the person to be vaccinated have an allergy to a component   of the vaccine?   No  Egg Allergy Algorithm Link    3. Has the person to be vaccinated ever had a serious reaction   to influenza vaccine in the past?   No    4. Has the person to be vaccinated ever had Guillain-Barré syndrome?   No    Form completed by Kusum Eid CMA

## 2018-09-20 NOTE — TELEPHONE ENCOUNTER
Triage call to patient: Pt schedule appt with provider via [employer] and reported tightness/discomfort in chest area since Monday 09/17/18.    Spoke with patient who stated that the tightness started on Monday. Feels like tightness on left side upper chest area. It comes and goes. Feels like a pulled muscle feeling-- has not been working out recently though. Right now he has soreness in upper arm and it goes away for hours during day, then he feels like the soreness is present mostly in the morning and at night. Soreness is located on left side back of his arm. Does not feel like pressure. Soreness is more of an achy feeling.     SOB: no, he currently has a cold right now, no breathing difficulty.  No N/V, no pain in neck, jaw, back, down arms. Denies heart palpitations/fast/racing heartbeat. Denies hx of heartburn. Reports that he does have some anxiety, mainly related to everyday life/stressors/kids. Pt does not sound to be in distress.     Advised that if he has persistent unrelieved chest pain/pressure, SOB, N/V, pain in jaw, neck, back or shoulder radiating down arm, or racing heart then he needs to go to the ER to be seen immediately.    Magnolia Christopher RN  [facility]

## 2018-10-22 DIAGNOSIS — K50.80 CROHN'S DISEASE OF BOTH SMALL AND LG INT W/O COMPLICATIONS (H): ICD-10-CM

## 2018-10-22 LAB
ALBUMIN SERPL-MCNC: 4.2 G/DL (ref 3.4–5)
ALP SERPL-CCNC: 56 U/L (ref 40–150)
ALT SERPL W P-5'-P-CCNC: 36 U/L (ref 0–70)
AST SERPL W P-5'-P-CCNC: 19 U/L (ref 0–45)
BASOPHILS # BLD AUTO: 0 10E9/L (ref 0–0.2)
BASOPHILS NFR BLD AUTO: 0.6 %
BILIRUB DIRECT SERPL-MCNC: 0.3 MG/DL (ref 0–0.2)
BILIRUB SERPL-MCNC: 1.7 MG/DL (ref 0.2–1.3)
DIFFERENTIAL METHOD BLD: NORMAL
EOSINOPHIL # BLD AUTO: 0.2 10E9/L (ref 0–0.7)
EOSINOPHIL NFR BLD AUTO: 2.9 %
ERYTHROCYTE [DISTWIDTH] IN BLOOD BY AUTOMATED COUNT: 12.6 % (ref 10–15)
HCT VFR BLD AUTO: 48.2 % (ref 40–53)
HGB BLD-MCNC: 16.1 G/DL (ref 13.3–17.7)
IMM GRANULOCYTES # BLD: 0 10E9/L (ref 0–0.4)
IMM GRANULOCYTES NFR BLD: 0.2 %
LYMPHOCYTES # BLD AUTO: 2 10E9/L (ref 0.8–5.3)
LYMPHOCYTES NFR BLD AUTO: 38.7 %
MCH RBC QN AUTO: 31.8 PG (ref 26.5–33)
MCHC RBC AUTO-ENTMCNC: 33.4 G/DL (ref 31.5–36.5)
MCV RBC AUTO: 95 FL (ref 78–100)
MONOCYTES # BLD AUTO: 0.5 10E9/L (ref 0–1.3)
MONOCYTES NFR BLD AUTO: 9.8 %
NEUTROPHILS # BLD AUTO: 2.5 10E9/L (ref 1.6–8.3)
NEUTROPHILS NFR BLD AUTO: 47.8 %
NRBC # BLD AUTO: 0 10*3/UL
NRBC BLD AUTO-RTO: 0 /100
PLATELET # BLD AUTO: 288 10E9/L (ref 150–450)
PROT SERPL-MCNC: 8.1 G/DL (ref 6.8–8.8)
RBC # BLD AUTO: 5.06 10E12/L (ref 4.4–5.9)
WBC # BLD AUTO: 5.1 10E9/L (ref 4–11)

## 2018-10-22 PROCEDURE — 80076 HEPATIC FUNCTION PANEL: CPT | Performed by: INTERNAL MEDICINE

## 2018-10-22 PROCEDURE — 85025 COMPLETE CBC W/AUTO DIFF WBC: CPT | Performed by: INTERNAL MEDICINE

## 2018-10-22 PROCEDURE — 36415 COLL VENOUS BLD VENIPUNCTURE: CPT | Performed by: INTERNAL MEDICINE

## 2018-10-23 ENCOUNTER — TRANSFERRED RECORDS (OUTPATIENT)
Dept: HEALTH INFORMATION MANAGEMENT | Facility: CLINIC | Age: 38
End: 2018-10-23

## 2018-11-07 ENCOUNTER — PRE VISIT (OUTPATIENT)
Dept: UROLOGY | Facility: CLINIC | Age: 38
End: 2018-11-07

## 2018-11-07 NOTE — TELEPHONE ENCOUNTER
Patient with history of kidney stones coming in for kidney stone prevention discussion with Dr. Ghotra. Alyssa available. Patient chart reviewed, no need for call, all records available and ready for appointment.

## 2018-11-13 ENCOUNTER — OFFICE VISIT (OUTPATIENT)
Dept: NEPHROLOGY | Facility: CLINIC | Age: 38
End: 2018-11-13
Payer: COMMERCIAL

## 2018-11-13 VITALS
HEART RATE: 63 BPM | HEIGHT: 67 IN | SYSTOLIC BLOOD PRESSURE: 127 MMHG | DIASTOLIC BLOOD PRESSURE: 78 MMHG | BODY MASS INDEX: 23.01 KG/M2 | WEIGHT: 146.6 LBS

## 2018-11-13 DIAGNOSIS — N20.0 RECURRENT KIDNEY STONES: Primary | ICD-10-CM

## 2018-11-13 DIAGNOSIS — E87.8 LOW BICARBONATE: ICD-10-CM

## 2018-11-13 ASSESSMENT — PAIN SCALES - GENERAL: PAINLEVEL: NO PAIN (0)

## 2018-11-13 NOTE — PATIENT INSTRUCTIONS
Dear Leodan Mack were seen in the [facility].  Basic advice to avoid kidney stones  - Drink 100 ounces of fluid daily (urine volume should be 80 ounces per day)  - low sodium diet (less than 2400 mg sodium per day- 500-700 mg per meal)  - minimize processed foods  - three servings daily of food/beverage high in calcium.  Please have one high calcium food three times a day with meals - can be either 8 oz milk, 6 oz yogurt or 2 oz low sodium cheese or 8 oz calcium fortified OJ/dairy alternative)   ---- Total should be at least 1000 mg calcium a day from food  - Protein (meat) in moderation  - add lemon or lime to foods and beverages.  Consider 2-4 oz lemon or lime juice diluted in water.  I advise against lemonade since it is high in sugar and low in actual lemon juice.     [email or web address]    Today we discussed   1 - increase fluid intake to 100 ounces per day or more (goal of 2.5 liters of urine per day)  2 - increase dietary calcium - 8 ounces milk or 6 oz yogurt or 2 oz cheese with meals  3 - increase citrate by adding 1/2 cup (4 oz) of lemon juice per day      Please get the following tests done:  Blood test at your convenience  24 hour urine May 2019    Please set up appointment with:  Kayla Ghotra MD in June 2019    It was a pleasure meeting with you today. Thank you for allowing me and my team the privilege of caring for you today. Please let us know if there is anything else we can do for you.    Take care!  Kayla Ghotra MD  Department of Medicine  Division of Renal Diseases and Hypertension  [facility]    Email: [email or web address]  You may reach a nurse by calling the urology clinic at [phone]

## 2018-11-13 NOTE — LETTER
11/13/2018       RE: Leodan Collins  [address]  [facility] [address]     Dear Colleague,    Thank you for referring your patient, Leodan Collins, to the [facility] UROLOGY AND INST FOR PROSTATE AND UROLOGIC CANCERS at [facility]. Please see a copy of my visit note below.        AgAttestation:  This patient has been seen and evaluated by me, Kayla Ghotra MD on 11/13/18 .  Discussed with the fellow or resident and agree with the findings and plan in this note.  I have reviewed Medications, Vital Signs and Labs as well as provider notes.    Kayla Ghotra MD  [facility]  Department of Medicine  Division of Renal Disease and Hypertension  [phone]       [facility]  11/13/2018     Leodan Collins MRN:[id number] YOB: 1980  Primary care provider: Blane Worrell  Requesting physician: Azael Melvin     ASSESSMENT AND RECOMMENDATIONS:   Leodan Collins is a 38 year old male presenting for nephrolithiasis.  1. Recurrent kidney stones: Bilateral, 90% calcium oxalate, 10% calcium phosphate. His recent 24hr urine show low urine volume, low urine citrate. Risk factors include: Crohn's disease causing low citrate levels, low fluid intake. Will plan to minimize stone formation by increasing dietary citrate levels and calcium in diet. Given history of Crohn's will hold off potassium citrate as this can cause diarrhea.       -recommend increase fluid intake      -recommend increase dietary citrate in form of lemon/lime juice (1/2 cup)      -recommend increase dietary calcium in theform of milk, yoghurt or cheese to be consumed with foods.     2. AG Metabolic acidosis (likely resolved): Labs from 8/11/2018 showed low bicarb, high AG with ketones on UA. Likely secondary to passing the kidney stone then.        -will repeat renal panel labs      RTC in June, 2019 with litholink in May,  2019 and no imaging    Seen and discussed with Dr. Ghotra.    VILLA Foster  Internal Medicine, PGY-1  P: [phone]     Attestation:  This patient has been seen and evaluated by me, Kayla Ghotra MD on 11/13/18 .  Discussed with the fellow or resident and agree with the findings and plan in this note.  I have reviewed  Medications, Vital Signs and Labs as well as provider notes.    Kayla Ghotra MD  [facility]  Division of Renal Disease and Hypertension  [phone]       REASON FOR CONSULT: nephrolithiasis    HISTORY OF PRESENT ILLNESS:  Leodan Collins is a 38 year old with a history of Crohn's disease s/p total colectomy with ileostomy, recurrent kidney stones who is presenting to the stone clinic for nephrolithiasis management.     He has a long history of recurrent bilateral kidney stones and has been followed by urology. Last seen by Dr. Wills in April, 2014. Last stone surgery was in May, 2018 with right-sided stents and removal of left kidney stones. In March, 2018, CT showed a 5mm right-sided stone which he passed. Between March-August, he formed a 1.0cm stone which he passed subsequently and was visualized in the bladder via KUB. He has remained asymptomatic since then. He denies hematuria, abdomina pain/flank pain, urinary/bladder symptoms.     Last imaging: CT 8/11/2018 and KUB 8/11/2018.   Last Surgery: May, 2016 (I reviewed op report)  Stone Free (yes or no) Right no, date 8/11/2018   Left no, date 8/11/2018    Leodan Collins's diet consists of 3 meals per day.    1st meal orange juice glass, toast peanut butter  2nd meal turkey sandwich (cheese/tomotoes), apple/banana (fruits), chips/pretzel, yogurt  3rd meal 80-90% with meat (salt w/ chicken), rice w/ chicken/beef valero.   Snacks apples/fruits     Fluid intake includes ~32 ounces water + 8-10 ounces juice, 8-10 ounces juices/lemonade    Sodium intake is moderate    Calcium intake:  Multi-vitamin, but no other supplements  I reviewed 24 hour urine chemstries:   Date 10/18/16 Date 10/23/18    Volume 2.54 0.66    Calcium mg/d 202 155    Sodium mmol/d 68 35    Protein catabolic rate 1.0 0.8    Oxalate mg/d 24 15    Citrate mg/d 206 137    pH 5.6 5.4    Uric acid mg/d 469 357    Potassium mmol/d 57 33    Magnesium mg/d 52 51      Super-saturation of calcium oxalate 10.70   Super-saturation calcium phosphate 0.85  Super-saturation uric acid 2.79    Family history is postive for kidney stones in father with celiac dz.     PAST MEDICAL HISTORY:  Reviewed  Past Medical History:   Diagnosis Date     Asthma     child     Crohn's disease (H)      History of blood transfusion      Kidney stones        PSH:  Reviewed  Past Surgical History:   Procedure Laterality Date     COLOSTOMY  2007     COMBINED CYSTOSCOPY, RETROGRADES, URETEROSCOPY, INSERT STENT Right 5/3/2016    Procedure: COMBINED CYSTOSCOPY, RETROGRADES, URETEROSCOPY, INSERT STENT;  Surgeon: Azael Melvin MD;  [facility]     EXTRACORPOREAL SHOCK WAVE LITHOTRIPSY (ESWL)  6/27/2013    Procedure: EXTRACORPOREAL SHOCK WAVE LITHOTRIPSY (ESWL);  Left Extracorporeal Shock Wave Lithotripsy Kidney And Ureter;  Surgeon: Azael Melvin MD;  Location: [facility] OR     ILEOSTOMY  2009     LAPAROTOMY EXPLORATORY  2009    extensive lysis of adhesions, revision of coloenteric fistula, repair of small bowel fistula, takedown of current colostomy, debridement of chronic abscess, resection of residual left colon, right hemicolectomy, Louise ileostomy     LASER HOLMIUM LITHOTRIPSY URETER(S), INSERT STENT, COMBINED  4/9/2014    Procedure: COMBINED CYSTOSCOPY, URETEROSCOPY, LASER HOLMIUM LITHOTRIPSY URETER(S), INSERT STENT;  Ureteroscopy, Cystoscopy, holmium laser,Right Uretral stent placement;  Surgeon: Azael Melvin MD;  Location: [facility] OR     LASER HOLMIUM LITHOTRIPSY URETER(S), INSERT STENT, COMBINED Bilateral 5/17/2016    Procedure: COMBINED  "CYSTOSCOPY, URETEROSCOPY, LASER HOLMIUM LITHOTRIPSY URETER(S), INSERT STENT;  Surgeon: Azael Melvin MD;  Location: [facility] OR        MEDICATIONS:  Current Outpatient Prescriptions   Medication Sig Dispense Refill     Adalimumab (HUMIRA PEN SC) Inject 40 mg Subcutaneous every 14 days        mercaptopurine (PURINETHOL) 50 MG tablet Take 100 mg by mouth daily        Multiple Vitamins-Iron (MULTI-DAY PLUS IRON PO) Take 1 tablet by mouth daily          ALLERGIES:    No Known Allergies    REVIEW OF SYSTEMS:  A 10 point review of systems was negative except as noted above.    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     Social History     Social History     Marital status:      Spouse name: N/A     Number of children: N/A     Years of education: N/A     Occupational History     Not on file.     Social History Main Topics     Smoking status: Never Smoker     Smokeless tobacco: Never Used     Alcohol use 0.0 oz/week      Comment: x2 a week     Drug use: No     Sexual activity: Yes     Partners: Female     Birth control/ protection: Pull-out method, Condom, IUD     Other Topics Concern     Parent/Sibling W/ Cabg, Mi Or Angioplasty Before 65f 55m? No     Social History Narrative       FAMILY MEDICAL HISTORY:   Family History   Problem Relation Age of Onset     Cerebrovascular Disease Maternal Grandmother      Connective Tissue Disorder Sister      Nephrolithiasis Father        PHYSICAL EXAM:   /78 (BP Location: Right arm, Patient Position: Sitting, Cuff Size: Adult Regular)  Pulse 63  Ht 1.702 m (5' 7\")  Wt 66.5 kg (146 lb 9.6 oz)  BMI 22.96 kg/m2     GENERAL APPEARANCE: alert and no distress  EYES: no scleral icterus, gaze conjugate  HENT: mouth without ulcers or lesions  Endocrine: no moon facies, no goiter  RESP: normal work of breathing, no cyanosis or clubbing   CV: no edema, warm and well perfused  : no Stevenson, not done-CVA tenderness  SKIN: no rash, warm, " dry  NEURO: face symmetric, mentation intact and speech normal  Psych: well groomed, affect full, pleasant, normal mental status  Musculoskeletal: grossly normal ROM of arms and legs without obvious joint abnormalities    LABS:   I reviewed:  Electrolytes/Renal -   Recent Labs   Lab Test  08/11/18   1358  03/21/18   1625  02/11/18   1056   04/02/14   1438   06/19/11   1415   NA  140  139  142   < >  142   < >  137   POTASSIUM  4.6  3.2*  3.8   < >  3.7   < >  5.1   CHLORIDE  107  106  107   < >  101   < >  94   CO2  18*  25  28   < >  30   < >  20   BUN  18  18  15   < >  17   < >  42*   CR  0.84  0.97  0.86   < >  0.95   < >  5.23*   GLC  118*  127*  95   < >  76   < >  134*   LAURA  9.2  9.5  9.1   < >  10.0   < >  11.1*   MAG   --    --    --    --    --    --   2.4*   PHOS   --    --    --    --   4.1   --    --     < > = values in this interval not displayed.       CBC -   Recent Labs   Lab Test  10/22/18   1030  08/11/18   1358  06/18/18   1642   WBC  5.1  10.6  5.7   HGB  16.1  15.9  15.4   PLT  288  342  276       LFTs -   Recent Labs   Lab Test  10/22/18   1030  08/11/18   1358  06/18/18   1642   ALKPHOS  56  40  41   BILITOTAL  1.7*  3.0*  1.7*   ALT  36  41  25   AST  19  36  18   PROTTOTAL  8.1  8.2  7.7   ALBUMIN  4.2  4.3  4.1       Coags -   Recent Labs   Lab Test  06/19/11   1415   INR  1.05   PTT  34       Iron Panel - No lab results found.    Endocrine -   Recent Labs   Lab Test  10/30/17   1013   TSH  0.67       IMAGING:  CT Abd/Pel on 8/11/2018:   IMPRESSION:  1. 1.0 cm stone at the right ureterovesical junction with moderate  right hydronephrosis and ureterectasis.  2. Additional punctate nonobstructing subcentimeter stones in the  bilateral kidneys.    VILLA Foster, thank you for allowing me to participate in the care of your patient.      Sincerely,    Kayla Ghotra MD

## 2018-11-13 NOTE — NURSING NOTE
"Chief Complaint   Patient presents with     Consult     kidney stone prevention discussion       Blood pressure 127/78, pulse 63, height 1.702 m (5' 7\"), weight 66.5 kg (146 lb 9.6 oz). Body mass index is 22.96 kg/(m^2).    Patient Active Problem List   Diagnosis     Kidney stones     Crohn's disease (H)     CARDIOVASCULAR SCREENING; LDL GOAL LESS THAN 160     Hydronephrosis with renal and ureteral calculous obstruction     Ureteral calculus       No Known Allergies    Current Outpatient Prescriptions   Medication Sig Dispense Refill     Adalimumab (HUMIRA PEN SC) Inject 40 mg Subcutaneous every 14 days        mercaptopurine (PURINETHOL) 50 MG tablet Take 100 mg by mouth daily        Multiple Vitamins-Iron (MULTI-DAY PLUS IRON PO) Take 1 tablet by mouth daily         Social History   Substance Use Topics     Smoking status: Never Smoker     Smokeless tobacco: Never Used     Alcohol use 0.0 oz/week      Comment: x2 a week       Judith Barajas LPN  11/13/2018  1:53 PM    "

## 2018-11-13 NOTE — PROGRESS NOTES
Attestation:  This patient has been seen and evaluated by me, Kayla Ghotra MD on 11/13/18 .  Discussed with the fellow or resident and agree with the findings and plan in this note.  I have reviewed Medications, Vital Signs and Labs as well as provider notes.    Kayla Ghotra MD  [facility]  Department of Medicine  Division of Renal Disease and Hypertension  [phone]

## 2018-11-13 NOTE — MR AVS SNAPSHOT
After Visit Summary   11/13/2018    Leodan Collins    MRN: [id number]           Patient Information     Date Of Birth          1980        Visit Information        Provider Department      11/13/2018 1:20 PM Kayla Ghotra MD [facility] and [facility] for Prostate and Urologic Cancers        Today's Diagnoses     Recurrent kidney stones    -  1    Low bicarbonate          Care Instructions    Dear Leodan Collins      Your were seen in the [facility].  Basic advice to avoid kidney stones  - Drink 100 ounces of fluid daily (urine volume should be 80 ounces per day)  - low sodium diet (less than 2400 mg sodium per day- 500-700 mg per meal)  - minimize processed foods  - three servings daily of food/beverage high in calcium.  Please have one high calcium food three times a day with meals - can be either 8 oz milk, 6 oz yogurt or 2 oz low sodium cheese or 8 oz calcium fortified OJ/dairy alternative)   ---- Total should be at least 1000 mg calcium a day from food  - Protein (meat) in moderation  - add lemon or lime to foods and beverages.  Consider 2-4 oz lemon or lime juice diluted in water.  I advise against lemonade since it is high in sugar and low in actual lemon juice.     [email or web address]    Today we discussed   1 - increase fluid intake to 100 ounces per day or more (goal of 2.5 liters of urine per day)  2 - increase dietary calcium - 8 ounces milk or 6 oz yogurt or 2 oz cheese with meals  3 - increase citrate by adding 1/2 cup (4 oz) of lemon juice per day      Please get the following tests done:  Blood test at your convenience  24 hour urine May 2019    Please set up appointment with:  Kayla Ghotra MD in June 2019    It was a pleasure meeting with you today. Thank you for allowing me and my team the privilege of caring for you today. Please let us know if there is anything else we can do for you.    Take care!  Kayla  "Brandin FISHER  Department of Medicine  Division of Renal Diseases and Hypertension  [facility]    Email: [email or web address]  You may reach a nurse by calling the urology clinic at [phone]                Follow-ups after your visit        Follow-up notes from your care team     Return in about 6 months (around 5/13/2019).      Future tests that were ordered for you today     Open Future Orders        Priority Expected Expires Ordered    Renal panel Routine  11/13/2019 11/13/2018            Who to contact     Please call your clinic at [phone] to:    Ask questions about your health    Make or cancel appointments    Discuss your medicines    Learn about your test results    Speak to your doctor            Additional Information About Your Visit        [employer] Information     [employer] gives you secure access to your electronic health record. If you see a primary care provider, you can also send messages to your care team and make appointments. If you have questions, please call your primary care clinic.  If you do not have a primary care provider, please call [phone] and they will assist you.      [employer] is an electronic gateway that provides easy, online access to your medical records. With [employer], you can request a clinic appointment, read your test results, renew a prescription or communicate with your care team.     To access your existing account, please contact your [facility] or call [phone] for assistance.        Care EveryWhere ID     This is your Care EveryWhere ID. This could be used by other organizations to access your [place] medical records  [id number]        Your Vitals Were     Pulse Height BMI (Body Mass Index)             63 1.702 m (5' 7\") 22.96 kg/m2          Blood Pressure from Last 3 Encounters:   11/13/18 127/78   09/20/18 138/78   08/11/18 136/75    Weight from Last 3 Encounters:   11/13/18 66.5 kg (146 lb 9.6 oz)   09/20/18 66.2 kg " (145 lb 14.4 oz)   06/22/18 63.5 kg (140 lb)               Primary Care Provider Office Phone # Fax #    Blane Worrell [phone]       [address] [facility] [address]  [facility] [address]        Equal Access to Services     [facility] : Hadii aad ku hadasho Soomaali, waaxda luqadaha, qaybta kaalmada adeegyada, waxay idiin hayaan adeeg kharash la'aan ah. So [facility] [phone].    ATENCIÓN: Si habla español, tiene a santiago disposición servicios gratuitos de asistencia lingüística. [facility] [phone].    We comply with applicable federal civil rights laws and [place] laws. We do not discriminate on the basis of race, color, national origin, age, disability, sex, sexual orientation, or gender identity.            Thank you!     Thank you for choosing [facility] AND [facility] FOR PROSTATE AND UROLOGIC CANCERS  for your care. Our goal is always to provide you with excellent care. Hearing back from our patients is one way we can continue to improve our services. Please take a few minutes to complete the written survey that you may receive in the mail after your visit with us. Thank you!             Your Updated Medication List - Protect others around you: Learn how to safely use, store and throw away your medicines at www.disposemymeds.org.          This list is accurate as of 11/13/18  2:46 PM.  Always use your most recent med list.                   Brand Name Dispense Instructions for use Diagnosis    HUMIRA PEN SC      Inject 40 mg Subcutaneous every 14 days    Calculus of kidney       mercaptopurine 50 MG tablet CHEMO    PURINETHOL     Take 100 mg by mouth daily    Calculus of kidney       MULTI-DAY PLUS IRON PO      Take 1 tablet by mouth daily

## 2018-11-28 ENCOUNTER — TRANSFERRED RECORDS (OUTPATIENT)
Dept: HEALTH INFORMATION MANAGEMENT | Facility: CLINIC | Age: 38
End: 2018-11-28

## 2018-11-28 ENCOUNTER — MEDICAL CORRESPONDENCE (OUTPATIENT)
Dept: HEALTH INFORMATION MANAGEMENT | Facility: CLINIC | Age: 38
End: 2018-11-28

## 2018-12-10 DIAGNOSIS — K50.80 REGIONAL ENTERITIS OF SMALL INTESTINE WITH LARGE INTESTINE (H): Primary | ICD-10-CM

## 2018-12-12 ENCOUNTER — TRANSFERRED RECORDS (OUTPATIENT)
Dept: HEALTH INFORMATION MANAGEMENT | Facility: CLINIC | Age: 38
End: 2018-12-12

## 2018-12-12 LAB
GAMMA INTERFERON BACKGROUND BLD IA-ACNC: 0.16 IU/ML
M TB IFN-G BLD-IMP: NEGATIVE
M TB IFN-G CD4+ BCKGRND COR BLD-ACNC: >10 IU/ML
MITOGEN IGNF BCKGRD COR BLD-ACNC: 0.01 IU/ML
MITOGEN IGNF BCKGRD COR BLD-ACNC: 0.02 IU/ML

## 2019-01-09 ENCOUNTER — OFFICE VISIT (OUTPATIENT)
Dept: FAMILY MEDICINE | Facility: CLINIC | Age: 39
End: 2019-01-09
Payer: COMMERCIAL

## 2019-01-09 VITALS
TEMPERATURE: 99.1 F | DIASTOLIC BLOOD PRESSURE: 84 MMHG | WEIGHT: 152 LBS | OXYGEN SATURATION: 97 % | HEART RATE: 80 BPM | BODY MASS INDEX: 23.81 KG/M2 | RESPIRATION RATE: 18 BRPM | SYSTOLIC BLOOD PRESSURE: 120 MMHG

## 2019-01-09 DIAGNOSIS — R07.9 CHEST PAIN, UNSPECIFIED TYPE: Primary | ICD-10-CM

## 2019-01-09 PROCEDURE — 99213 OFFICE O/P EST LOW 20 MIN: CPT | Performed by: NURSE PRACTITIONER

## 2019-01-09 NOTE — PATIENT INSTRUCTIONS
If not improving in 1-2 weeks please call and I would put in the order for the Chest Xray.     Here is more info on pleuricy   Patient Education     Pleurisy    If you have pleurisy, the lining around your lungs is inflamed. This is most often due to a viral infection or pneumonia. It usually lasts for 10 to 14 days. It may cause sharp pain with breathing, coughing, sneezing, and movement. Antibiotics are usually not prescribed for this condition unless bacterial pneumonia is also present.  The following tips will help you care for your condition at home:    If symptoms are severe, rest at home for the first 2 to 3 days. When you resume activity, don't let yourself get too tired.    Don't smoke. Also stay away from secondhand smoke.    You may use over-the-counter medicines to control pain, unless another pain medicine was prescribed. (Note: If you have chronic liver or kidney disease or have ever had a stomach ulcer or gastrointestinal bleeding, talk with your healthcare provider before using these medicines. Also talk to your provider if you are taking medicine to prevent blood clots.) Aspirin should never be given to anyone younger than 18 years of age who is ill with a viral infection or fever. It may cause severe liver or brain damage.  Follow-up care  Follow up with your healthcare provider, or as advised.  When to seek medical advice  Call your healthcare provider right away if any of these occur:    Fever of 100.4 F (38 C) or higher, or as directed by your healthcare provider    Coughing up lots of colored sputum (mucus) or light, blood-tinged sputum    Redness, pain, or swelling of the leg  Call 911  Call 911 if any of these occur:    Increasing shortness of breath    Increasing chest pain, or pain that spreads to the neck, arm, or back    Coughing up blood   Date Last Reviewed: 6/1/2018 2000-2018 [employer]. [address] [place] [address], [place] [address]. All rights reserved. This information  is not intended as a substitute for professional medical care. Always follow your healthcare professional's instructions.

## 2019-01-09 NOTE — PROGRESS NOTES
SUBJECTIVE:   Leodan Collins is a 38 year old male who presents to clinic today for the following health issues:    Musculoskeletal problem/pain      Duration: Since November    Description  Location: Left chest muscles wrapping around to the side ribs, does not go into the back but can go up into the collar bone    Intensity:  moderate    Accompanying signs and symptoms: none    History  Previous similar problem: no   Previous evaluation:  none    Precipitating or alleviating factors:  Trauma or overuse: no   Aggravating factors include: Nothing seems to make it happen, it comes out of the blue often times.     Therapies tried and outcome: acetaminophen    Sharp pain intermittent no related to movement or activity, no strong emotions  But has been stressed with little kids ages 3 and 1, very little sleep as was caring for sick kids and maybe self care lacking  Last time this did happen with an illness but this time doesn't feel ill   No cough or illness symptoms, everyone in family has colds   Nonsmoker   History of asthma as a child , no CAD or HNT  Renal stones, chrons has been different kind of pain and those have been stable   EKG last Sept was normal, jacey     Problem list and histories reviewed & adjusted, as indicated.  Additional history: as documented    Patient Active Problem List   Diagnosis     Kidney stones     Crohn's disease (H)     CARDIOVASCULAR SCREENING; LDL GOAL LESS THAN 160     Hydronephrosis with renal and ureteral calculous obstruction     Ureteral calculus     Past Surgical History:   Procedure Laterality Date     COLOSTOMY  2007     COMBINED CYSTOSCOPY, RETROGRADES, URETEROSCOPY, INSERT STENT Right 5/3/2016    Procedure: COMBINED CYSTOSCOPY, RETROGRADES, URETEROSCOPY, INSERT STENT;  Surgeon: Azael Melvin MD;  Location: [facility] OR     EXTRACORPOREAL SHOCK WAVE LITHOTRIPSY (ESWL)  6/27/2013    Procedure: EXTRACORPOREAL SHOCK WAVE LITHOTRIPSY (ESWL);  Left Extracorporeal Shock Wave  Lithotripsy Kidney And Ureter;  Surgeon: Azael Melvin MD;  Location: [facility] OR     ILEOSTOMY  2009     LAPAROTOMY EXPLORATORY  2009    extensive lysis of adhesions, revision of coloenteric fistula, repair of small bowel fistula, takedown of current colostomy, debridement of chronic abscess, resection of residual left colon, right hemicolectomy, Louise ileostomy     LASER HOLMIUM LITHOTRIPSY URETER(S), INSERT STENT, COMBINED  4/9/2014    Procedure: COMBINED CYSTOSCOPY, URETEROSCOPY, LASER HOLMIUM LITHOTRIPSY URETER(S), INSERT STENT;  Ureteroscopy, Cystoscopy, holmium laser,Right Uretral stent placement;  Surgeon: Azael Melvin MD;  Location: [facility] OR     LASER HOLMIUM LITHOTRIPSY URETER(S), INSERT STENT, COMBINED Bilateral 5/17/2016    Procedure: COMBINED CYSTOSCOPY, URETEROSCOPY, LASER HOLMIUM LITHOTRIPSY URETER(S), INSERT STENT;  Surgeon: Azael Melvin MD;  Location: [facility] OR       Social History     Tobacco Use     Smoking status: Never Smoker     Smokeless tobacco: Never Used   Substance Use Topics     Alcohol use: Yes     Alcohol/week: 0.0 oz     Comment: x2 a week     Family History   Problem Relation Age of Onset     Cerebrovascular Disease Maternal Grandmother      Connective Tissue Disorder Sister      Nephrolithiasis Father          Current Outpatient Medications   Medication Sig Dispense Refill     Adalimumab (HUMIRA PEN SC) Inject 40 mg Subcutaneous every 14 days        mercaptopurine (PURINETHOL) 50 MG tablet Take 100 mg by mouth daily        Multiple Vitamins-Iron (MULTI-DAY PLUS IRON PO) Take 1 tablet by mouth daily       No Known Allergies  Recent Labs   Lab Test 10/22/18  1030 08/11/18  1358 06/18/18  1642 03/21/18  1625  10/30/17  1013   LDL  --   --   --   --   --  91   HDL  --   --   --   --   --  52   TRIG  --   --   --   --   --  147   ALT 36 41 25  --    < > 33   CR  --  0.84  --  0.97   < > 0.80   GFRESTIMATED  --  >90  --  87   < > >90   GFRESTBLACK  --  >90  --  >90   < > >90    POTASSIUM  --  4.6  --  3.2*   < > 3.9   TSH  --   --   --   --   --  0.67    < > = values in this interval not displayed.      BP Readings from Last 3 Encounters:   01/09/19 120/84   11/13/18 127/78   09/20/18 138/78    Wt Readings from Last 3 Encounters:   01/09/19 68.9 kg (152 lb)   11/13/18 66.5 kg (146 lb 9.6 oz)   09/20/18 66.2 kg (145 lb 14.4 oz)                  Labs reviewed in EPIC    Reviewed and updated as needed this visit by clinical staff       Reviewed and updated as needed this visit by Provider         ROS:  Constitutional, HEENT, cardiovascular, pulmonary, GI, , musculoskeletal, neuro, skin, endocrine and psych systems are negative, except as otherwise noted.    OBJECTIVE:     /84 (BP Location: Right arm, Patient Position: Sitting, Cuff Size: Adult Regular)   Pulse 80   Temp 99.1  F (37.3  C) (Temporal)   Resp 18   Wt 68.9 kg (152 lb)   SpO2 97%   BMI 23.81 kg/m    Body mass index is 23.81 kg/m .  GENERAL: healthy, alert and no distress  EYES: Eyes grossly normal to inspection, PERRL and conjunctivae and sclerae normal  HENT: ear canals and TM's normal, nose and mouth without ulcers or lesions  NECK: bilateral anterior cervical adenopathy, no asymmetry, masses, or scars and thyroid normal to palpation  RESP: lungs clear to auscultation - no rales, rhonchi or wheezes  CV: regular rate and rhythm, normal S1 S2, no S3 or S4, no murmur, click or rub, no peripheral edema and peripheral pulses strong  ABDOMEN: soft, nontender, no hepatosplenomegaly, no masses and bowel sounds normal  MS: no gross musculoskeletal defects noted, no edema, no chest pain on palpation   SKIN: no suspicious lesions or rashes  NEURO: Normal strength and tone, mentation intact and speech normal  PSYCH: mentation appears normal, affect normal/bright and mildly anxious appearing at times    Diagnostic Test Results:  none     ASSESSMENT/PLAN:         ICD-10-CM    1. Chest pain, unspecified type R07.9    no  associated symptoms, no dyspnea or palpitations, and exam normal other than adenopathy and pt does report this type of pain with an illness in the past, family sick and sleep deprived from caring for his two toddlers who are ill. Had EKG last fall was normal. Likely viral illness, perhaps anxiety or musculoskeletal, advised monitor symptoms and if any severe or new symptoms/red flags 911/ ER in off hours.   Push fluids and gargle salt water, if pleurisy not resolving recommend CXR to rule out cancer or pneumonia     Patient Instructions     If not improving in 1-2 weeks please call and I would put in the order for the Chest Xray.     Here is more info on pleuricy   Patient Education     Pleurisy    If you have pleurisy, the lining around your lungs is inflamed. This is most often due to a viral infection or pneumonia. It usually lasts for 10 to 14 days. It may cause sharp pain with breathing, coughing, sneezing, and movement. Antibiotics are usually not prescribed for this condition unless bacterial pneumonia is also present.  The following tips will help you care for your condition at home:    If symptoms are severe, rest at home for the first 2 to 3 days. When you resume activity, don't let yourself get too tired.    Don't smoke. Also stay away from secondhand smoke.    You may use over-the-counter medicines to control pain, unless another pain medicine was prescribed. (Note: If you have chronic liver or kidney disease or have ever had a stomach ulcer or gastrointestinal bleeding, talk with your healthcare provider before using these medicines. Also talk to your provider if you are taking medicine to prevent blood clots.) Aspirin should never be given to anyone younger than 18 years of age who is ill with a viral infection or fever. It may cause severe liver or brain damage.  Follow-up care  Follow up with your healthcare provider, or as advised.  When to seek medical advice  Call your healthcare provider right  away if any of these occur:    Fever of 100.4 F (38 C) or higher, or as directed by your healthcare provider    Coughing up lots of colored sputum (mucus) or light, blood-tinged sputum    Redness, pain, or swelling of the leg  Call 911  Call 911 if any of these occur:    Increasing shortness of breath    Increasing chest pain, or pain that spreads to the neck, arm, or back    Coughing up blood   Date Last Reviewed: 6/1/2018 2000-2018 [employer]. [address] [place] [address]. All rights reserved. This information is not intended as a substitute for professional medical care. Always follow your healthcare professional's instructions.               ARNIE Maria [facility]

## 2019-01-23 ENCOUNTER — TELEPHONE (OUTPATIENT)
Dept: UROLOGY | Facility: CLINIC | Age: 39
End: 2019-01-23

## 2019-01-24 ENCOUNTER — HOSPITAL ENCOUNTER (EMERGENCY)
Facility: CLINIC | Age: 39
Discharge: HOME | End: 2019-01-24
Attending: EMERGENCY MEDICINE | Admitting: EMERGENCY MEDICINE
Payer: COMMERCIAL

## 2019-01-24 ENCOUNTER — APPOINTMENT (OUTPATIENT)
Dept: CT IMAGING | Facility: CLINIC | Age: 39
End: 2019-01-24
Payer: COMMERCIAL

## 2019-01-24 VITALS
HEART RATE: 94 BPM | WEIGHT: 145.5 LBS | DIASTOLIC BLOOD PRESSURE: 89 MMHG | RESPIRATION RATE: 18 BRPM | TEMPERATURE: 98.1 F | OXYGEN SATURATION: 99 % | BODY MASS INDEX: 22.79 KG/M2 | SYSTOLIC BLOOD PRESSURE: 134 MMHG

## 2019-01-24 DIAGNOSIS — N20.1 URETEROLITHIASIS: ICD-10-CM

## 2019-01-24 LAB
ALBUMIN SERPL-MCNC: 4.2 G/DL (ref 3.4–5)
ALBUMIN UR-MCNC: 100 MG/DL
ALP SERPL-CCNC: 44 U/L (ref 40–150)
ALT SERPL W P-5'-P-CCNC: 29 U/L (ref 0–70)
ANION GAP SERPL CALCULATED.3IONS-SCNC: 11 MMOL/L (ref 3–14)
APPEARANCE UR: CLEAR
AST SERPL W P-5'-P-CCNC: 23 U/L (ref 0–45)
BACTERIA #/AREA URNS HPF: ABNORMAL /HPF
BASOPHILS # BLD AUTO: 0 10E9/L (ref 0–0.2)
BASOPHILS NFR BLD AUTO: 0.1 %
BILIRUB SERPL-MCNC: 4.1 MG/DL (ref 0.2–1.3)
BILIRUB UR QL STRIP: NEGATIVE
BUN SERPL-MCNC: 16 MG/DL (ref 7–30)
CALCIUM SERPL-MCNC: 9.5 MG/DL (ref 8.5–10.1)
CAOX CRY #/AREA URNS HPF: ABNORMAL /HPF
CHLORIDE SERPL-SCNC: 105 MMOL/L (ref 94–109)
CO2 SERPL-SCNC: 22 MMOL/L (ref 20–32)
COLOR UR AUTO: YELLOW
CREAT SERPL-MCNC: 1.05 MG/DL (ref 0.66–1.25)
DIFFERENTIAL METHOD BLD: NORMAL
EOSINOPHIL # BLD AUTO: 0 10E9/L (ref 0–0.7)
EOSINOPHIL NFR BLD AUTO: 0.2 %
ERYTHROCYTE [DISTWIDTH] IN BLOOD BY AUTOMATED COUNT: 13.1 % (ref 10–15)
GFR SERPL CREATININE-BSD FRML MDRD: 90 ML/MIN/{1.73_M2}
GLUCOSE SERPL-MCNC: 113 MG/DL (ref 70–99)
GLUCOSE UR STRIP-MCNC: NEGATIVE MG/DL
HCT VFR BLD AUTO: 46.4 % (ref 40–53)
HGB BLD-MCNC: 15.9 G/DL (ref 13.3–17.7)
HGB UR QL STRIP: ABNORMAL
IMM GRANULOCYTES # BLD: 0 10E9/L (ref 0–0.4)
IMM GRANULOCYTES NFR BLD: 0.2 %
KETONES UR STRIP-MCNC: 40 MG/DL
LACTATE BLD-SCNC: 1.9 MMOL/L (ref 0.7–2)
LEUKOCYTE ESTERASE UR QL STRIP: ABNORMAL
LIPASE SERPL-CCNC: 125 U/L (ref 73–393)
LYMPHOCYTES # BLD AUTO: 2.3 10E9/L (ref 0.8–5.3)
LYMPHOCYTES NFR BLD AUTO: 21.2 %
MCH RBC QN AUTO: 31.9 PG (ref 26.5–33)
MCHC RBC AUTO-ENTMCNC: 34.3 G/DL (ref 31.5–36.5)
MCV RBC AUTO: 93 FL (ref 78–100)
MONOCYTES # BLD AUTO: 0.5 10E9/L (ref 0–1.3)
MONOCYTES NFR BLD AUTO: 4.6 %
MUCOUS THREADS #/AREA URNS LPF: PRESENT /LPF
NEUTROPHILS # BLD AUTO: 8.1 10E9/L (ref 1.6–8.3)
NEUTROPHILS NFR BLD AUTO: 73.7 %
NITRATE UR QL: NEGATIVE
NRBC # BLD AUTO: 0 10*3/UL
NRBC BLD AUTO-RTO: 0 /100
PH UR STRIP: 6 PH (ref 5–7)
PLATELET # BLD AUTO: 348 10E9/L (ref 150–450)
POTASSIUM SERPL-SCNC: 3.7 MMOL/L (ref 3.4–5.3)
PROT SERPL-MCNC: 7.7 G/DL (ref 6.8–8.8)
RBC # BLD AUTO: 4.98 10E12/L (ref 4.4–5.9)
RBC #/AREA URNS AUTO: >182 /HPF (ref 0–2)
SODIUM SERPL-SCNC: 138 MMOL/L (ref 133–144)
SOURCE: ABNORMAL
SP GR UR STRIP: 1.02 (ref 1–1.03)
UROBILINOGEN UR STRIP-MCNC: NORMAL MG/DL (ref 0–2)
WBC # BLD AUTO: 11 10E9/L (ref 4–11)
WBC #/AREA URNS AUTO: 11 /HPF (ref 0–5)

## 2019-01-24 PROCEDURE — 99285 EMERGENCY DEPT VISIT HI MDM: CPT | Mod: 25 | Performed by: EMERGENCY MEDICINE

## 2019-01-24 PROCEDURE — 87086 URINE CULTURE/COLONY COUNT: CPT | Performed by: EMERGENCY MEDICINE

## 2019-01-24 PROCEDURE — 99285 EMERGENCY DEPT VISIT HI MDM: CPT | Mod: Z6 | Performed by: EMERGENCY MEDICINE

## 2019-01-24 PROCEDURE — 96374 THER/PROPH/DIAG INJ IV PUSH: CPT | Performed by: EMERGENCY MEDICINE

## 2019-01-24 PROCEDURE — 80053 COMPREHEN METABOLIC PANEL: CPT | Performed by: EMERGENCY MEDICINE

## 2019-01-24 PROCEDURE — 74176 CT ABD & PELVIS W/O CONTRAST: CPT

## 2019-01-24 PROCEDURE — 83690 ASSAY OF LIPASE: CPT | Performed by: EMERGENCY MEDICINE

## 2019-01-24 PROCEDURE — 96376 TX/PRO/DX INJ SAME DRUG ADON: CPT | Performed by: EMERGENCY MEDICINE

## 2019-01-24 PROCEDURE — 83605 ASSAY OF LACTIC ACID: CPT | Performed by: EMERGENCY MEDICINE

## 2019-01-24 PROCEDURE — 96375 TX/PRO/DX INJ NEW DRUG ADDON: CPT | Performed by: EMERGENCY MEDICINE

## 2019-01-24 PROCEDURE — 25000128 H RX IP 250 OP 636: Performed by: EMERGENCY MEDICINE

## 2019-01-24 PROCEDURE — 81001 URINALYSIS AUTO W/SCOPE: CPT | Performed by: EMERGENCY MEDICINE

## 2019-01-24 PROCEDURE — 85025 COMPLETE CBC W/AUTO DIFF WBC: CPT | Performed by: EMERGENCY MEDICINE

## 2019-01-24 PROCEDURE — 96361 HYDRATE IV INFUSION ADD-ON: CPT | Performed by: EMERGENCY MEDICINE

## 2019-01-24 RX ORDER — HYDROMORPHONE HYDROCHLORIDE 1 MG/ML
0.5 INJECTION, SOLUTION INTRAMUSCULAR; INTRAVENOUS; SUBCUTANEOUS
Status: DISCONTINUED | OUTPATIENT
Start: 2019-01-24 | End: 2019-01-24 | Stop reason: HOSPADM

## 2019-01-24 RX ORDER — ONDANSETRON 2 MG/ML
4 INJECTION INTRAMUSCULAR; INTRAVENOUS ONCE
Status: COMPLETED | OUTPATIENT
Start: 2019-01-24 | End: 2019-01-24

## 2019-01-24 RX ORDER — ONDANSETRON 4 MG/1
4 TABLET, ORALLY DISINTEGRATING ORAL EVERY 8 HOURS PRN
Qty: 10 TABLET | Refills: 0 | Status: SHIPPED | OUTPATIENT
Start: 2019-01-24 | End: 2019-06-20

## 2019-01-24 RX ORDER — KETOROLAC TROMETHAMINE 15 MG/ML
15 INJECTION, SOLUTION INTRAMUSCULAR; INTRAVENOUS ONCE
Status: COMPLETED | OUTPATIENT
Start: 2019-01-24 | End: 2019-01-24

## 2019-01-24 RX ORDER — TAMSULOSIN HYDROCHLORIDE 0.4 MG/1
0.4 CAPSULE ORAL DAILY
Qty: 10 CAPSULE | Refills: 0 | Status: SHIPPED | OUTPATIENT
Start: 2019-01-24 | End: 2019-06-20

## 2019-01-24 RX ORDER — OXYCODONE HYDROCHLORIDE 5 MG/1
5-10 TABLET ORAL EVERY 6 HOURS PRN
Qty: 24 TABLET | Refills: 0 | Status: SHIPPED | OUTPATIENT
Start: 2019-01-24 | End: 2019-06-20

## 2019-01-24 RX ADMIN — SODIUM CHLORIDE 1000 ML: 9 INJECTION, SOLUTION INTRAVENOUS at 09:01

## 2019-01-24 RX ADMIN — Medication 0.5 MG: at 09:00

## 2019-01-24 RX ADMIN — Medication 0.5 MG: at 10:49

## 2019-01-24 RX ADMIN — KETOROLAC TROMETHAMINE 15 MG: 15 INJECTION, SOLUTION INTRAMUSCULAR; INTRAVENOUS at 09:37

## 2019-01-24 RX ADMIN — ONDANSETRON 4 MG: 2 INJECTION INTRAMUSCULAR; INTRAVENOUS at 09:00

## 2019-01-24 ASSESSMENT — ENCOUNTER SYMPTOMS
NECK STIFFNESS: 0
DIFFICULTY URINATING: 0
ARTHRALGIAS: 0
HEADACHES: 0
COLOR CHANGE: 0
FEVER: 0
CONFUSION: 0
EYE REDNESS: 0
ABDOMINAL PAIN: 1
SHORTNESS OF BREATH: 0

## 2019-01-24 NOTE — CONSULTS
Urology Consult    Name: Leodan Collins    MRN: [id number]   YOB: 1980               Chief Complaint:   Right flank pain    History is obtained from the patient          History of Present Illness:   Leodan Collins is a 38 year old male with a past medical history notable for Crohn's disease s/p total colectomy and ileostomy creation, currently managed with Humira, along with a urologic history of recurrent urolithiasis in recent years who presented to the ER with one day of right flank pain.  The patient reports initial onset of abdominal discomfort yesterday afternoon.  As pain persisted the focus of discomfort shifted to the right flank and was accompanied by an episodes of emesis.  Mr Collins denies associated fevers, chills, dysuria and changes in urinary frequency and urgency.  In light of the severe and persistent nature of his flank pain Mr Collins presented to the ER for further evaluation.  On presentation he was found to be afebrile and HDS without leukocytosis or TRANG.  UA was found to be essentially unremarkable aside from the presence of 11 WBC.  A CT was obtained revealing a 9 mm right UPJ stone.  Urology is now consulted for further management.      On bedside evaluation Mr Collins reports near complete resolution of pain since receiving medication in the ER.  He denies ongoing nausea and vomiting.      Of note, Mr Collins has undergone multiple stone procedures in the past and has also passed stones independently.  Review of imaging reveals passage of a nearly 1 cm stone this fall.          Past Medical History:     Past Medical History:   Diagnosis Date     Asthma     child     Crohn's disease (H)      History of blood transfusion      Kidney stones             Past Surgical History:     Past Surgical History:   Procedure Laterality Date     COLOSTOMY  2007     COMBINED CYSTOSCOPY, RETROGRADES, URETEROSCOPY, INSERT STENT Right 5/3/2016    Procedure: COMBINED CYSTOSCOPY, RETROGRADES, URETEROSCOPY,  INSERT STENT;  Surgeon: Azael Melvin MD;  Location: [facility]     EXTRACORPOREAL SHOCK WAVE LITHOTRIPSY (ESWL)  6/27/2013    Procedure: EXTRACORPOREAL SHOCK WAVE LITHOTRIPSY (ESWL);  Left Extracorporeal Shock Wave Lithotripsy Kidney And Ureter;  Surgeon: Azael Melvin MD;  Location: [facility] OR     ILEOSTOMY  2009     LAPAROTOMY EXPLORATORY  2009    extensive lysis of adhesions, revision of coloenteric fistula, repair of small bowel fistula, takedown of current colostomy, debridement of chronic abscess, resection of residual left colon, right hemicolectomy, Louise ileostomy     LASER HOLMIUM LITHOTRIPSY URETER(S), INSERT STENT, COMBINED  4/9/2014    Procedure: COMBINED CYSTOSCOPY, URETEROSCOPY, LASER HOLMIUM LITHOTRIPSY URETER(S), INSERT STENT;  Ureteroscopy, Cystoscopy, holmium laser,Right Uretral stent placement;  Surgeon: Azael Melvin MD;  Location: [facility]     LASER HOLMIUM LITHOTRIPSY URETER(S), INSERT STENT, COMBINED Bilateral 5/17/2016    Procedure: COMBINED CYSTOSCOPY, URETEROSCOPY, LASER HOLMIUM LITHOTRIPSY URETER(S), INSERT STENT;  Surgeon: Azael Melvin MD;  Location: [facility] OR            Social History:     Social History     Tobacco Use     Smoking status: Never Smoker     Smokeless tobacco: Never Used   Substance Use Topics     Alcohol use: Yes     Alcohol/week: 0.0 oz     Comment: x2 a week            Family History:     Family History   Problem Relation Age of Onset     Cerebrovascular Disease Maternal Grandmother      Connective Tissue Disorder Sister      Nephrolithiasis Father             Allergies:   No Known Allergies         Medications:     Current Facility-Administered Medications   Medication     HYDROmorphone (PF) (DILAUDID) injection 0.5 mg     Current Outpatient Medications   Medication Sig     Adalimumab (HUMIRA PEN SC) Inject 40 mg Subcutaneous every 14 days      mercaptopurine (PURINETHOL) 50 MG tablet Take 100 mg by mouth daily      Multiple Vitamins-Iron (MULTI-DAY  PLUS IRON PO) Take 1 tablet by mouth daily     OXYCODONE HCL PO              Review of Systems:    ROS: 10 point ROS neg other than the symptoms noted above in the HPI           Physical Exam:   VS:  T: 97.1    HR: 71    BP: 136/98    RR: 18   GEN:  AOx3.  NAD.    CV:  RRR  LUNGS: Non-labored breathing.   BACK:  No midline or CVA tenderness.  ABD:  Soft.  NT.  ND.  No rebound or guarding. RLQ ileostomy.    EXT:  Warm, well perfused.   SKIN:  Warm.  Dry.  No rashes.  NEURO:  CN grossly intact.            Data:   All laboratory data reviewed:    Recent Labs   Lab 01/24/19  0857   WBC 11.0   HGB 15.9        Recent Labs   Lab 01/24/19  0857      POTASSIUM 3.7   CHLORIDE 105   CO2 22   BUN 16   CR 1.05   *   LAURA 9.5     Recent Labs   Lab 01/24/19  1043   COLOR Yellow   APPEARANCE Clear   URINEGLC Negative   URINEBILI Negative   URINEKETONE 40*   SG 1.020   URINEPH 6.0   PROTEIN 100*   NITRITE Negative   LEUKEST Trace*   RBCU >182*   WBCU 11*       All pertinent imaging reviewed:    Results for orders placed or performed during the hospital encounter of 01/24/19   CT Abdomen Pelvis w/o Contrast    Narrative    CT ABDOMEN AND PELVIS WITHOUT CONTRAST 1/24/2019 9:22 AM     HISTORY: Flank pain, stone disease suspected.     COMPARISON: CT abdomen and pelvis 11/20/2018.    TECHNIQUE: Axial images are obtained from the lung bases to the  symphysis without oral or IV contrast. Coronal reformatted images are  also generated. Radiation dose for this scan was reduced using  automated exposure control, adjustment of the mA and/or kV according  to patient size, or iterative reconstruction technique.    FINDINGS: The lung bases are clear.    Abdomen: Three nonobstructing right renal collecting system stones are  present measuring up to 0.5 cm. There is marked right hydronephrosis  with a large right UPJ stone measuring 0.9 cm on series 3, image 30.  No other right ureteral calculi or bladder stones. Two  nonobstructing  left renal collecting system stones are present measuring up to 0.5 cm  on image 27. These appear slightly larger than on the prior exam. No  left hydronephrosis or hydroureter.    Upper abdominal organs are otherwise within normal limits allowing for  the noncontrast technique including the liver, spleen, gallbladder,  pancreas and adrenal glands. No renal cyst or mass. No enlarged  abdominal lymph nodes. The bowel is normal in caliber without  obstruction. Right lower quadrant ileostomy is noted. Patient is  status post colectomy.    Pelvis: The bladder and prostate are unremarkable. Rectum has been  surgically removed. No enlarged pelvic lymph nodes or free fluid. Bone  window examination is unremarkable.      Impression    IMPRESSION:  1. Obstructing right UPJ stone measuring 0.9 cm. Follow-up with  urology as needed for further assessment and possible stent placement.  Significant right hydronephrosis is present.  2. Nonobstructing renal collecting system stones are noted  bilaterally.  3. Postop changes from colectomy and right lower quadrant ileostomy.    ELISA OSUNA MD            Impression and Plan:   Impression:   Leodan Collins is a 38 year old male who presents with a large proximal right ureteral stone.  He remains afebrile without leukocytosis, TRANG or evidence of UTI.  While his immunosuppressed status encourages close surveillance, there is no indication for emergent intervention at this time, especially in light of his poor tolerance of stents in the past.  Considering prior passage of very large stones, MET may allow the patient to avoid surgical intervention altogether.  After an in depth conversation the patient felt comfortable with discharge to home with a plan for follow up with Dr Felix.      Plan:  -Discharge to home on MET (encourage fluids, urinary strainer, tamsulosin, scheduled acetaminophen and ibuprofen, oxycodone for breakthrough pain)  -Follow up with   Elvira      This patient's exam findings, labs, and imaging discussed with urology staff surgeon Dr. Felix, who developed the treatment plan.    Sonido Fox MD  Urology Resident

## 2019-01-24 NOTE — DISCHARGE INSTRUCTIONS
Take acetaminophen and ibuprofen as needed for pain.  Take Flomax as directed.  Take ondansetron as needed for pain.  Take oxycodone as needed for pain not controlled by acetaminophen and ibuprofen.  Urology will contact you to arrange outpatient follow-up.    Please make an appointment to follow up with [facility] (phone: [phone]).    Return to emergency department if fever, vomiting, worsening symptoms, or other concerns.

## 2019-01-24 NOTE — ED PROVIDER NOTES
History     Chief Complaint   Patient presents with     Abdominal Pain     pain across lower abd with radiation into his back, started last night     HPI  Leodan Collins is a 38 year old male who since the emergency department for evaluation of lower abdominal pain.  Patient states he developed lower abdominal pain last evening that is worsened overnight and this morning.  He states that the pain is crampy and radiates to his right testicle.  He has had associated nausea and did vomit once.  He reports normal ostomy output.  He denies any fever.  Patient denies any chest pain or dyspnea.  He denies any dysuria, urgency, or frequency.  No hematuria.  The patient has a history of ureterolithiasis and reports that his symptoms are similar although typically he has more flank pain.    I have reviewed the Medications, Allergies, Past Medical and Surgical History, and Social History in the Epic system.    Review of Systems   Constitutional: Negative for fever.   HENT: Negative for congestion.    Eyes: Negative for redness.   Respiratory: Negative for shortness of breath.    Cardiovascular: Negative for chest pain.   Gastrointestinal: Positive for abdominal pain.   Genitourinary: Positive for testicular pain. Negative for difficulty urinating and scrotal swelling.   Musculoskeletal: Negative for arthralgias and neck stiffness.   Skin: Negative for color change.   Neurological: Negative for headaches.   Psychiatric/Behavioral: Negative for confusion.   All other systems reviewed and are negative.      Physical Exam   BP: (!) 136/98  Pulse: 71  Temp: 97.1  F (36.2  C)  Resp: 18  Weight: 66 kg (145 lb 8 oz)  SpO2: 100 %      Physical Exam   Constitutional: He appears well-developed and well-nourished. He appears distressed.   HENT:   Head: Normocephalic and atraumatic.   Mouth/Throat: Oropharynx is clear and moist.   Eyes: Pupils are equal, round, and reactive to light. No scleral icterus.   Cardiovascular: Normal rate,  regular rhythm, normal heart sounds and intact distal pulses.   Pulmonary/Chest: Effort normal and breath sounds normal. No respiratory distress.   Abdominal: Soft. Bowel sounds are normal. There is tenderness.       Musculoskeletal: Normal range of motion. He exhibits no edema or tenderness.   Skin: Skin is warm. No rash noted. He is not diaphoretic.   Nursing note and vitals reviewed.      ED Course        Procedures            Critical Care time:    Results for orders placed or performed during the hospital encounter of 01/24/19   CT Abdomen Pelvis w/o Contrast    Narrative    CT ABDOMEN AND PELVIS WITHOUT CONTRAST 1/24/2019 9:22 AM     HISTORY: Flank pain, stone disease suspected.     COMPARISON: CT abdomen and pelvis 11/20/2018.    TECHNIQUE: Axial images are obtained from the lung bases to the  symphysis without oral or IV contrast. Coronal reformatted images are  also generated. Radiation dose for this scan was reduced using  automated exposure control, adjustment of the mA and/or kV according  to patient size, or iterative reconstruction technique.    FINDINGS: The lung bases are clear.    Abdomen: Three nonobstructing right renal collecting system stones are  present measuring up to 0.5 cm. There is marked right hydronephrosis  with a large right UPJ stone measuring 0.9 cm on series 3, image 30.  No other right ureteral calculi or bladder stones. Two nonobstructing  left renal collecting system stones are present measuring up to 0.5 cm  on image 27. These appear slightly larger than on the prior exam. No  left hydronephrosis or hydroureter.    Upper abdominal organs are otherwise within normal limits allowing for  the noncontrast technique including the liver, spleen, gallbladder,  pancreas and adrenal glands. No renal cyst or mass. No enlarged  abdominal lymph nodes. The bowel is normal in caliber without  obstruction. Right lower quadrant ileostomy is noted. Patient is  status post colectomy.    Pelvis:  The bladder and prostate are unremarkable. Rectum has been  surgically removed. No enlarged pelvic lymph nodes or free fluid. Bone  window examination is unremarkable.      Impression    IMPRESSION:  1. Obstructing right UPJ stone measuring 0.9 cm. Follow-up with  urology as needed for further assessment and possible stent placement.  Significant right hydronephrosis is present.  2. Nonobstructing renal collecting system stones are noted  bilaterally.  3. Postop changes from colectomy and right lower quadrant ileostomy.    ELISA OSUNA MD   CBC with platelets differential   Result Value Ref Range    WBC 11.0 4.0 - 11.0 10e9/L    RBC Count 4.98 4.4 - 5.9 10e12/L    Hemoglobin 15.9 13.3 - 17.7 g/dL    Hematocrit 46.4 40.0 - 53.0 %    MCV 93 78 - 100 fl    MCH 31.9 26.5 - 33.0 pg    MCHC 34.3 31.5 - 36.5 g/dL    RDW 13.1 10.0 - 15.0 %    Platelet Count 348 150 - 450 10e9/L    Diff Method Automated Method     % Neutrophils 73.7 %    % Lymphocytes 21.2 %    % Monocytes 4.6 %    % Eosinophils 0.2 %    % Basophils 0.1 %    % Immature Granulocytes 0.2 %    Nucleated RBCs 0 0 /100    Absolute Neutrophil 8.1 1.6 - 8.3 10e9/L    Absolute Lymphocytes 2.3 0.8 - 5.3 10e9/L    Absolute Monocytes 0.5 0.0 - 1.3 10e9/L    Absolute Eosinophils 0.0 0.0 - 0.7 10e9/L    Absolute Basophils 0.0 0.0 - 0.2 10e9/L    Abs Immature Granulocytes 0.0 0 - 0.4 10e9/L    Absolute Nucleated RBC 0.0    Comprehensive metabolic panel   Result Value Ref Range    Sodium 138 133 - 144 mmol/L    Potassium 3.7 3.4 - 5.3 mmol/L    Chloride 105 94 - 109 mmol/L    Carbon Dioxide 22 20 - 32 mmol/L    Anion Gap 11 3 - 14 mmol/L    Glucose 113 (H) 70 - 99 mg/dL    Urea Nitrogen 16 7 - 30 mg/dL    Creatinine 1.05 0.66 - 1.25 mg/dL    GFR Estimate 90 >60 mL/min/[1.73_m2]    GFR Estimate If Black >90 >60 mL/min/[1.73_m2]    Calcium 9.5 8.5 - 10.1 mg/dL    Bilirubin Total 4.1 (H) 0.2 - 1.3 mg/dL    Albumin 4.2 3.4 - 5.0 g/dL    Protein Total 7.7 6.8 - 8.8 g/dL     Alkaline Phosphatase 44 40 - 150 U/L    ALT 29 0 - 70 U/L    AST 23 0 - 45 U/L   Lipase   Result Value Ref Range    Lipase 125 73 - 393 U/L   Lactic acid whole blood   Result Value Ref Range    Lactic Acid 1.9 0.7 - 2.0 mmol/L   UA with Microscopic   Result Value Ref Range    Color Urine Yellow     Appearance Urine Clear     Glucose Urine Negative NEG^Negative mg/dL    Bilirubin Urine Negative NEG^Negative    Ketones Urine 40 (A) NEG^Negative mg/dL    Specific Gravity Urine 1.020 1.003 - 1.035    Blood Urine Moderate (A) NEG^Negative    pH Urine 6.0 5.0 - 7.0 pH    Protein Albumin Urine 100 (A) NEG^Negative mg/dL    Urobilinogen mg/dL Normal 0.0 - 2.0 mg/dL    Nitrite Urine Negative NEG^Negative    Leukocyte Esterase Urine Trace (A) NEG^Negative    Source Midstream Urine     WBC Urine 11 (H) 0 - 5 /HPF    RBC Urine >182 (H) 0 - 2 /HPF    Bacteria Urine Few (A) NEG^Negative /HPF    Mucous Urine Present (A) NEG^Negative /LPF    Calcium Oxalate Few (A) NEG^Negative /HPF   Urine Culture   Result Value Ref Range    Specimen Description Midstream Urine     Special Requests Specimen received in preservative     Culture Micro PENDING       Medications   HYDROmorphone (PF) (DILAUDID) injection 0.5 mg (0.5 mg Intravenous Given 1/24/19 1049)   0.9% sodium chloride BOLUS (0 mLs Intravenous Stopped 1/24/19 0938)   ondansetron (ZOFRAN) injection 4 mg (4 mg Intravenous Given 1/24/19 0900)   ketorolac (TORADOL) injection 15 mg (15 mg Intravenous Given 1/24/19 0937)                   Assessments & Plan (with Medical Decision Making)   38 year old male to the emergency part the right-sided abdominal pain radiating to the right testicle.  He has a 9 mm ureteral stone on exam at the UPJ level with hydronephrosis.  His renal function is mildly elevated above baseline but still within the normal limits.  He does not have leukocytosis or fever.  He does have a few white blood cells and bacteria in his urine but has never had a positive  urine culture with similar appearing urinalyses in the past.  He does not have any signs or symptoms concerning for urinary tract infection.  His symptoms were well controlled with the above medications.  He was reluctant to go home due to the size of the stone.  Urology did see him here in the emergency department and he ultimately did agree to discharged home for attempt at trial of passage.  He was discharged home with Flomax, ondansetron, and oxycodone.  He will utilize acetaminophen and ibuprofen as well for pain.  The urology clinic will contact him to arrange close follow-up.  Patient was asked to return emergency department if pain is uncontrolled, fever, vomiting, or other concerns.    I have reviewed the nursing notes.    I have reviewed the findings, diagnosis, plan and need for follow up with the patient.       Medication List      Started    ondansetron 4 MG ODT tab  Commonly known as:  ZOFRAN ODT  4 mg, Oral, EVERY 8 HOURS PRN     tamsulosin 0.4 MG capsule  Commonly known as:  FLOMAX  0.4 mg, Oral, DAILY        Modified    oxyCODONE 5 MG tablet  Commonly known as:  ROXICODONE  5-10 mg, Oral, EVERY 6 HOURS PRN  What changed:      medication strength    how much to take    when to take this    reasons to take this            Final diagnoses:   Ureterolithiasis       1/24/2019   [facility], [place], EMERGENCY DEPARTMENT     Hong Nolasco MD  01/24/19 1526

## 2019-01-24 NOTE — ED AVS SNAPSHOT
[facility], [place], Emergency Department  [address]  [facility] [address]  Phone:  [phone]  Fax:  [phone]                                    Leodan Collins   MRN: [id number]    Department:  [facility], Emergency Department   Date of Visit:  1/24/2019           After Visit Summary Signature Page    I have received my discharge instructions, and my questions have been answered. I have discussed any challenges I see with this plan with the nurse or doctor.    ..........................................................................................................................................  Patient/Patient Representative Signature      ..........................................................................................................................................  Patient Representative Print Name and Relationship to Patient    ..................................................               ................................................  Date                                   Time    ..........................................................................................................................................  Reviewed by Signature/Title    ...................................................              ..............................................  Date                                               Time          22EPIC Rev 08/18

## 2019-01-25 LAB
BACTERIA SPEC CULT: NO GROWTH
Lab: NORMAL
SPECIMEN SOURCE: NORMAL

## 2019-03-18 DIAGNOSIS — N20.0 RECURRENT KIDNEY STONES: ICD-10-CM

## 2019-03-18 DIAGNOSIS — E87.8 LOW BICARBONATE: ICD-10-CM

## 2019-03-18 DIAGNOSIS — K50.80 CROHN'S DISEASE OF BOTH SMALL AND LG INT W/O COMPLICATIONS (H): ICD-10-CM

## 2019-03-18 LAB
ALBUMIN SERPL-MCNC: 4.1 G/DL (ref 3.4–5)
ALP SERPL-CCNC: 44 U/L (ref 40–150)
ALT SERPL W P-5'-P-CCNC: 28 U/L (ref 0–70)
ANION GAP SERPL CALCULATED.3IONS-SCNC: 7 MMOL/L (ref 3–14)
AST SERPL W P-5'-P-CCNC: 19 U/L (ref 0–45)
BASOPHILS # BLD AUTO: 0 10E9/L (ref 0–0.2)
BASOPHILS NFR BLD AUTO: 0.3 %
BILIRUB DIRECT SERPL-MCNC: 0.3 MG/DL (ref 0–0.2)
BILIRUB SERPL-MCNC: 1.9 MG/DL (ref 0.2–1.3)
BUN SERPL-MCNC: 17 MG/DL (ref 7–30)
CALCIUM SERPL-MCNC: 9.1 MG/DL (ref 8.5–10.1)
CHLORIDE SERPL-SCNC: 106 MMOL/L (ref 94–109)
CO2 SERPL-SCNC: 27 MMOL/L (ref 20–32)
CREAT SERPL-MCNC: 0.91 MG/DL (ref 0.66–1.25)
DIFFERENTIAL METHOD BLD: NORMAL
EOSINOPHIL # BLD AUTO: 0.2 10E9/L (ref 0–0.7)
EOSINOPHIL NFR BLD AUTO: 2.8 %
ERYTHROCYTE [DISTWIDTH] IN BLOOD BY AUTOMATED COUNT: 12.9 % (ref 10–15)
GFR SERPL CREATININE-BSD FRML MDRD: >90 ML/MIN/{1.73_M2}
GLUCOSE SERPL-MCNC: 97 MG/DL (ref 70–99)
HCT VFR BLD AUTO: 46.9 % (ref 40–53)
HGB BLD-MCNC: 15.6 G/DL (ref 13.3–17.7)
IMM GRANULOCYTES # BLD: 0 10E9/L (ref 0–0.4)
IMM GRANULOCYTES NFR BLD: 0.2 %
LYMPHOCYTES # BLD AUTO: 2.1 10E9/L (ref 0.8–5.3)
LYMPHOCYTES NFR BLD AUTO: 33.8 %
MCH RBC QN AUTO: 31.6 PG (ref 26.5–33)
MCHC RBC AUTO-ENTMCNC: 33.3 G/DL (ref 31.5–36.5)
MCV RBC AUTO: 95 FL (ref 78–100)
MONOCYTES # BLD AUTO: 0.6 10E9/L (ref 0–1.3)
MONOCYTES NFR BLD AUTO: 10.2 %
NEUTROPHILS # BLD AUTO: 3.2 10E9/L (ref 1.6–8.3)
NEUTROPHILS NFR BLD AUTO: 52.7 %
NRBC # BLD AUTO: 0 10*3/UL
NRBC BLD AUTO-RTO: 0 /100
PHOSPHATE SERPL-MCNC: 3.8 MG/DL (ref 2.5–4.5)
PLATELET # BLD AUTO: 307 10E9/L (ref 150–450)
POTASSIUM SERPL-SCNC: 4 MMOL/L (ref 3.4–5.3)
PROT SERPL-MCNC: 7.7 G/DL (ref 6.8–8.8)
RBC # BLD AUTO: 4.94 10E12/L (ref 4.4–5.9)
SODIUM SERPL-SCNC: 140 MMOL/L (ref 133–144)
WBC # BLD AUTO: 6.1 10E9/L (ref 4–11)

## 2019-03-18 PROCEDURE — 85025 COMPLETE CBC W/AUTO DIFF WBC: CPT | Performed by: INTERNAL MEDICINE

## 2019-03-18 PROCEDURE — 84460 ALANINE AMINO (ALT) (SGPT): CPT | Performed by: INTERNAL MEDICINE

## 2019-03-18 PROCEDURE — 84450 TRANSFERASE (AST) (SGOT): CPT | Performed by: INTERNAL MEDICINE

## 2019-03-18 PROCEDURE — 36415 COLL VENOUS BLD VENIPUNCTURE: CPT | Performed by: INTERNAL MEDICINE

## 2019-03-18 PROCEDURE — 84155 ASSAY OF PROTEIN SERUM: CPT | Performed by: INTERNAL MEDICINE

## 2019-03-18 PROCEDURE — 84075 ASSAY ALKALINE PHOSPHATASE: CPT | Performed by: INTERNAL MEDICINE

## 2019-03-18 PROCEDURE — 80069 RENAL FUNCTION PANEL: CPT | Performed by: INTERNAL MEDICINE

## 2019-03-18 PROCEDURE — 82248 BILIRUBIN DIRECT: CPT | Performed by: INTERNAL MEDICINE

## 2019-03-18 PROCEDURE — 82247 BILIRUBIN TOTAL: CPT | Performed by: INTERNAL MEDICINE

## 2019-04-04 ENCOUNTER — TRANSFERRED RECORDS (OUTPATIENT)
Dept: HEALTH INFORMATION MANAGEMENT | Facility: CLINIC | Age: 39
End: 2019-04-04

## 2019-06-20 ENCOUNTER — HOSPITAL ENCOUNTER (OUTPATIENT)
Dept: GENERAL RADIOLOGY | Facility: CLINIC | Age: 39
Discharge: HOME | End: 2019-06-20
Attending: FAMILY MEDICINE | Admitting: FAMILY MEDICINE
Payer: COMMERCIAL

## 2019-06-20 ENCOUNTER — OFFICE VISIT (OUTPATIENT)
Dept: FAMILY MEDICINE | Facility: CLINIC | Age: 39
End: 2019-06-20
Payer: COMMERCIAL

## 2019-06-20 VITALS
HEART RATE: 71 BPM | RESPIRATION RATE: 16 BRPM | HEIGHT: 67 IN | OXYGEN SATURATION: 99 % | SYSTOLIC BLOOD PRESSURE: 114 MMHG | WEIGHT: 143.4 LBS | DIASTOLIC BLOOD PRESSURE: 72 MMHG | BODY MASS INDEX: 22.51 KG/M2 | TEMPERATURE: 97.7 F

## 2019-06-20 DIAGNOSIS — K29.00 OTHER ACUTE GASTRITIS WITHOUT HEMORRHAGE: Primary | ICD-10-CM

## 2019-06-20 DIAGNOSIS — R10.13 ABDOMINAL PAIN, EPIGASTRIC: ICD-10-CM

## 2019-06-20 LAB
ALBUMIN SERPL-MCNC: 4.3 G/DL (ref 3.4–5)
ALBUMIN UR-MCNC: NEGATIVE MG/DL
ALP SERPL-CCNC: 56 U/L (ref 40–150)
ALT SERPL W P-5'-P-CCNC: 30 U/L (ref 0–70)
ANION GAP SERPL CALCULATED.3IONS-SCNC: 8 MMOL/L (ref 3–14)
APPEARANCE UR: CLEAR
AST SERPL W P-5'-P-CCNC: 20 U/L (ref 0–45)
BACTERIA #/AREA URNS HPF: ABNORMAL /HPF
BILIRUB SERPL-MCNC: 1.6 MG/DL (ref 0.2–1.3)
BILIRUB UR QL STRIP: NEGATIVE
BUN SERPL-MCNC: 18 MG/DL (ref 7–30)
CALCIUM SERPL-MCNC: 9.4 MG/DL (ref 8.5–10.1)
CHLORIDE SERPL-SCNC: 107 MMOL/L (ref 94–109)
CO2 SERPL-SCNC: 26 MMOL/L (ref 20–32)
COLOR UR AUTO: YELLOW
CREAT SERPL-MCNC: 0.92 MG/DL (ref 0.66–1.25)
GFR SERPL CREATININE-BSD FRML MDRD: >90 ML/MIN/{1.73_M2}
GLUCOSE SERPL-MCNC: 78 MG/DL (ref 70–99)
GLUCOSE UR STRIP-MCNC: NEGATIVE MG/DL
HGB UR QL STRIP: ABNORMAL
KETONES UR STRIP-MCNC: NEGATIVE MG/DL
LEUKOCYTE ESTERASE UR QL STRIP: ABNORMAL
NITRATE UR QL: NEGATIVE
NON-SQ EPI CELLS #/AREA URNS LPF: ABNORMAL /LPF
PH UR STRIP: 5 PH (ref 5–7)
POTASSIUM SERPL-SCNC: 3.7 MMOL/L (ref 3.4–5.3)
PROT SERPL-MCNC: 8.1 G/DL (ref 6.8–8.8)
RBC #/AREA URNS AUTO: ABNORMAL /HPF
SODIUM SERPL-SCNC: 141 MMOL/L (ref 133–144)
SOURCE: ABNORMAL
SP GR UR STRIP: >1.03 (ref 1–1.03)
UROBILINOGEN UR STRIP-ACNC: 0.2 EU/DL (ref 0.2–1)
WBC #/AREA URNS AUTO: ABNORMAL /HPF

## 2019-06-20 PROCEDURE — 99214 OFFICE O/P EST MOD 30 MIN: CPT | Performed by: FAMILY MEDICINE

## 2019-06-20 PROCEDURE — 87086 URINE CULTURE/COLONY COUNT: CPT | Performed by: FAMILY MEDICINE

## 2019-06-20 PROCEDURE — 74019 RADEX ABDOMEN 2 VIEWS: CPT

## 2019-06-20 PROCEDURE — 80053 COMPREHEN METABOLIC PANEL: CPT | Performed by: FAMILY MEDICINE

## 2019-06-20 PROCEDURE — 36415 COLL VENOUS BLD VENIPUNCTURE: CPT | Performed by: FAMILY MEDICINE

## 2019-06-20 PROCEDURE — 81001 URINALYSIS AUTO W/SCOPE: CPT | Performed by: FAMILY MEDICINE

## 2019-06-20 ASSESSMENT — MIFFLIN-ST. JEOR: SCORE: 1529.09

## 2019-06-20 NOTE — PROGRESS NOTES
Subjective     Leodan Collins is a 39 year old male who presents to clinic today for the following health issues:    HPI   ABDOMINAL   PAIN     Onset: 2 days ago    Description:   Character: Dull ache  Location: middle of abdomen, below rib cage  Radiation: Sometimes to the left    Intensity: moderate    Progression of Symptoms:  intermittent    Accompanying Signs & Symptoms:  Fever/Chills?: no   Gas/Bloating: no   Nausea: no   Vomitting: no   Diarrhea?: no   Constipation: Possibly, things are coming out but not at a good rate  Dysuria or Hematuria: no            History:   Trauma: no   Previous similar pain: Has an illiostomy   Previous tests done: not recently, but yes in the past     Precipitating factors:   Does the pain change with:     Food: no      BM: Mildly better, hard to say    Urination: no     Alleviating factors:  nothing    Therapies Tried and outcome: probiotics         Patient Active Problem List   Diagnosis     Kidney stones     Crohn's disease (H)     CARDIOVASCULAR SCREENING; LDL GOAL LESS THAN 160     Hydronephrosis with renal and ureteral calculous obstruction     Ureteral calculus     Anal fistula     Past Surgical History:   Procedure Laterality Date     COLOSTOMY  2007     COMBINED CYSTOSCOPY, RETROGRADES, URETEROSCOPY, INSERT STENT Right 5/3/2016    Procedure: COMBINED CYSTOSCOPY, RETROGRADES, URETEROSCOPY, INSERT STENT;  Surgeon: Azael Melvin MD;  Location: [facility]     EXTRACORPOREAL SHOCK WAVE LITHOTRIPSY (ESWL)  6/27/2013    Procedure: EXTRACORPOREAL SHOCK WAVE LITHOTRIPSY (ESWL);  Left Extracorporeal Shock Wave Lithotripsy Kidney And Ureter;  Surgeon: Azael Melvin MD;  Location: [facility]     ILEOSTOMY  2009     LAPAROTOMY EXPLORATORY  2009    extensive lysis of adhesions, revision of coloenteric fistula, repair of small bowel fistula, takedown of current colostomy, debridement of chronic abscess, resection of residual left colon, right hemicolectomy, Louise ileostomy      "LASER HOLMIUM LITHOTRIPSY URETER(S), INSERT STENT, COMBINED  4/9/2014    Procedure: COMBINED CYSTOSCOPY, URETEROSCOPY, LASER HOLMIUM LITHOTRIPSY URETER(S), INSERT STENT;  Ureteroscopy, Cystoscopy, holmium laser,Right Uretral stent placement;  Surgeon: Azael Melvin MD;  Location: [facility]     LASER HOLMIUM LITHOTRIPSY URETER(S), INSERT STENT, COMBINED Bilateral 5/17/2016    Procedure: COMBINED CYSTOSCOPY, URETEROSCOPY, LASER HOLMIUM LITHOTRIPSY URETER(S), INSERT STENT;  Surgeon: Azael Melvin MD;  Location: [facility]       Social History     Tobacco Use     Smoking status: Never Smoker     Smokeless tobacco: Never Used   Substance Use Topics     Alcohol use: Yes     Alcohol/week: 0.0 oz     Comment: x2 a week     Family History   Problem Relation Age of Onset     Cerebrovascular Disease Maternal Grandmother      Connective Tissue Disorder Sister      Nephrolithiasis Father            No black tarry stools, normal appetite, no GERD  Reviewed and updated as needed this visit by Provider         Review of Systems   ROS COMP: Constitutional, HEENT, cardiovascular, pulmonary, gi and gu systems are negative, except as otherwise noted.      Objective    /72   Pulse 71   Temp 97.7  F (36.5  C) (Oral)   Resp 16   Ht 1.702 m (5' 7\")   Wt 65 kg (143 lb 6.4 oz)   SpO2 99%   BMI 22.46 kg/m    Body mass index is 22.46 kg/m .  Physical Exam   GENERAL: alert and no distress  NECK: no adenopathy, no asymmetry, masses, or scars and thyroid normal to palpation  RESP: lungs clear to auscultation - no rales, rhonchi or wheezes  CV: regular rate and rhythm, normal S1 S2, no S3 or S4, no murmur, click or rub, no peripheral edema and peripheral pulses strong  ABDOMEN: soft,   without hepatosplenomegaly or masses, tenderness epigastric and bowel sounds normal, colostomy site looks good  MS: no gross musculoskeletal defects noted, no edema  SKIN: no suspicious lesions or rashes  NEURO: Normal strength and tone, " mentation intact and speech normal    Diagnostic Test Results:  Labs reviewed in Epic  Results for orders placed or performed in visit on 06/20/19 (from the past 24 hour(s))   *UA reflex to Microscopic   Result Value Ref Range    Color Urine Yellow     Appearance Urine Clear     Glucose Urine Negative NEG^Negative mg/dL    Bilirubin Urine Negative NEG^Negative    Ketones Urine Negative NEG^Negative mg/dL    Specific Gravity Urine >1.030 1.003 - 1.035    Blood Urine Moderate (A) NEG^Negative    pH Urine 5.0 5.0 - 7.0 pH    Protein Albumin Urine Negative NEG^Negative mg/dL    Urobilinogen Urine 0.2 0.2 - 1.0 EU/dL    Nitrite Urine Negative NEG^Negative    Leukocyte Esterase Urine Small (A) NEG^Negative    Source Midstream Urine    Urine Microscopic   Result Value Ref Range    WBC Urine 5-10 (A) OTO5^0 - 5 /HPF    RBC Urine 5-10 (A) OTO2^O - 2 /HPF    Squamous Epithelial /LPF Urine Few FEW^Few /LPF    Bacteria Urine Few (A) NEG^Negative /HPF           Assessment & Plan     1. Other acute gastritis without hemorrhage  Call if worse  - Comprehensive metabolic panel  - *UA reflex to Microscopic  - ranitidine (ZANTAC) 300 MG tablet; Take 1 tablet (300 mg) by mouth At Bedtime  Dispense: 30 tablet; Refill: 1  - Urine Microscopic    2. Abdominal pain, epigastric  If worse see GI  - XR Abdomen 2 Views; Future  - Comprehensive metabolic panel  - *UA reflex to Microscopic       Regular exercise  See Patient Instructions    No follow-ups on file.    Blane Worrell MD  [facility] [place]

## 2019-06-21 LAB
BACTERIA SPEC CULT: NO GROWTH
SPECIMEN SOURCE: NORMAL

## 2019-07-15 DIAGNOSIS — K50.80 CROHN'S DISEASE OF BOTH SMALL AND LG INT W/O COMPLICATIONS (H): ICD-10-CM

## 2019-07-15 LAB
ALBUMIN SERPL-MCNC: 4.2 G/DL (ref 3.4–5)
ALP SERPL-CCNC: 54 U/L (ref 40–150)
ALT SERPL W P-5'-P-CCNC: 36 U/L (ref 0–70)
AST SERPL W P-5'-P-CCNC: 19 U/L (ref 0–45)
BASOPHILS # BLD AUTO: 0 10E9/L (ref 0–0.2)
BASOPHILS NFR BLD AUTO: 0.3 %
BILIRUB DIRECT SERPL-MCNC: 0.3 MG/DL (ref 0–0.2)
BILIRUB SERPL-MCNC: 3 MG/DL (ref 0.2–1.3)
DIFFERENTIAL METHOD BLD: NORMAL
EOSINOPHIL # BLD AUTO: 0.2 10E9/L (ref 0–0.7)
EOSINOPHIL NFR BLD AUTO: 2.3 %
ERYTHROCYTE [DISTWIDTH] IN BLOOD BY AUTOMATED COUNT: 13.4 % (ref 10–15)
HCT VFR BLD AUTO: 44.3 % (ref 40–53)
HGB BLD-MCNC: 14.8 G/DL (ref 13.3–17.7)
IMM GRANULOCYTES # BLD: 0 10E9/L (ref 0–0.4)
IMM GRANULOCYTES NFR BLD: 0.2 %
LYMPHOCYTES # BLD AUTO: 2.1 10E9/L (ref 0.8–5.3)
LYMPHOCYTES NFR BLD AUTO: 23.7 %
MCH RBC QN AUTO: 31.6 PG (ref 26.5–33)
MCHC RBC AUTO-ENTMCNC: 33.4 G/DL (ref 31.5–36.5)
MCV RBC AUTO: 95 FL (ref 78–100)
MONOCYTES # BLD AUTO: 0.6 10E9/L (ref 0–1.3)
MONOCYTES NFR BLD AUTO: 6.5 %
NEUTROPHILS # BLD AUTO: 5.9 10E9/L (ref 1.6–8.3)
NEUTROPHILS NFR BLD AUTO: 67 %
NRBC # BLD AUTO: 0 10*3/UL
NRBC BLD AUTO-RTO: 0 /100
PLATELET # BLD AUTO: 290 10E9/L (ref 150–450)
PROT SERPL-MCNC: 8.3 G/DL (ref 6.8–8.8)
RBC # BLD AUTO: 4.68 10E12/L (ref 4.4–5.9)
WBC # BLD AUTO: 8.9 10E9/L (ref 4–11)

## 2019-07-15 PROCEDURE — 36415 COLL VENOUS BLD VENIPUNCTURE: CPT | Performed by: INTERNAL MEDICINE

## 2019-07-15 PROCEDURE — 80076 HEPATIC FUNCTION PANEL: CPT | Performed by: INTERNAL MEDICINE

## 2019-07-15 PROCEDURE — 85025 COMPLETE CBC W/AUTO DIFF WBC: CPT | Performed by: INTERNAL MEDICINE

## 2019-08-08 ENCOUNTER — TRANSFERRED RECORDS (OUTPATIENT)
Dept: HEALTH INFORMATION MANAGEMENT | Facility: CLINIC | Age: 39
End: 2019-08-08

## 2019-09-28 ENCOUNTER — HEALTH MAINTENANCE LETTER (OUTPATIENT)
Age: 39
End: 2019-09-28

## 2019-11-04 DIAGNOSIS — K50.80 CROHN'S DISEASE OF BOTH SMALL AND LG INT W/O COMPLICATIONS (H): ICD-10-CM

## 2019-11-04 LAB
ALBUMIN SERPL-MCNC: 4.1 G/DL (ref 3.4–5)
ALP SERPL-CCNC: 57 U/L (ref 40–150)
ALT SERPL W P-5'-P-CCNC: 35 U/L (ref 0–70)
AST SERPL W P-5'-P-CCNC: 19 U/L (ref 0–45)
BASOPHILS # BLD AUTO: 0 10E9/L (ref 0–0.2)
BASOPHILS NFR BLD AUTO: 0.5 %
BILIRUB DIRECT SERPL-MCNC: 0.2 MG/DL (ref 0–0.2)
BILIRUB SERPL-MCNC: 1 MG/DL (ref 0.2–1.3)
DIFFERENTIAL METHOD BLD: NORMAL
EOSINOPHIL # BLD AUTO: 0.2 10E9/L (ref 0–0.7)
EOSINOPHIL NFR BLD AUTO: 2.9 %
ERYTHROCYTE [DISTWIDTH] IN BLOOD BY AUTOMATED COUNT: 13.5 % (ref 10–15)
HCT VFR BLD AUTO: 47.6 % (ref 40–53)
HGB BLD-MCNC: 16 G/DL (ref 13.3–17.7)
IMM GRANULOCYTES # BLD: 0 10E9/L (ref 0–0.4)
IMM GRANULOCYTES NFR BLD: 0.1 %
LYMPHOCYTES # BLD AUTO: 2.1 10E9/L (ref 0.8–5.3)
LYMPHOCYTES NFR BLD AUTO: 28 %
MCH RBC QN AUTO: 32.3 PG (ref 26.5–33)
MCHC RBC AUTO-ENTMCNC: 33.6 G/DL (ref 31.5–36.5)
MCV RBC AUTO: 96 FL (ref 78–100)
MONOCYTES # BLD AUTO: 0.6 10E9/L (ref 0–1.3)
MONOCYTES NFR BLD AUTO: 8.3 %
NEUTROPHILS # BLD AUTO: 4.6 10E9/L (ref 1.6–8.3)
NEUTROPHILS NFR BLD AUTO: 60.2 %
NRBC # BLD AUTO: 0 10*3/UL
NRBC BLD AUTO-RTO: 0 /100
PLATELET # BLD AUTO: 312 10E9/L (ref 150–450)
PROT SERPL-MCNC: 7.9 G/DL (ref 6.8–8.8)
RBC # BLD AUTO: 4.96 10E12/L (ref 4.4–5.9)
WBC # BLD AUTO: 7.6 10E9/L (ref 4–11)

## 2019-11-04 PROCEDURE — 36415 COLL VENOUS BLD VENIPUNCTURE: CPT | Performed by: INTERNAL MEDICINE

## 2019-11-04 PROCEDURE — 85025 COMPLETE CBC W/AUTO DIFF WBC: CPT | Performed by: INTERNAL MEDICINE

## 2019-11-04 PROCEDURE — 80076 HEPATIC FUNCTION PANEL: CPT | Performed by: INTERNAL MEDICINE

## 2019-11-11 ENCOUNTER — OFFICE VISIT (OUTPATIENT)
Dept: FAMILY MEDICINE | Facility: CLINIC | Age: 39
End: 2019-11-11
Payer: COMMERCIAL

## 2019-11-11 VITALS
WEIGHT: 146.6 LBS | HEART RATE: 74 BPM | SYSTOLIC BLOOD PRESSURE: 132 MMHG | HEIGHT: 67 IN | OXYGEN SATURATION: 94 % | BODY MASS INDEX: 23.01 KG/M2 | DIASTOLIC BLOOD PRESSURE: 82 MMHG | TEMPERATURE: 97.2 F

## 2019-11-11 DIAGNOSIS — K50.019 CROHN'S DISEASE OF SMALL INTESTINE WITH COMPLICATION (H): ICD-10-CM

## 2019-11-11 DIAGNOSIS — F43.0 ACUTE REACTION TO STRESS: ICD-10-CM

## 2019-11-11 DIAGNOSIS — Z00.00 ROUTINE GENERAL MEDICAL EXAMINATION AT A HEALTH CARE FACILITY: Primary | ICD-10-CM

## 2019-11-11 DIAGNOSIS — Z13.6 CARDIOVASCULAR SCREENING; LDL GOAL LESS THAN 160: ICD-10-CM

## 2019-11-11 PROCEDURE — 99395 PREV VISIT EST AGE 18-39: CPT | Mod: 25 | Performed by: FAMILY MEDICINE

## 2019-11-11 PROCEDURE — 84443 ASSAY THYROID STIM HORMONE: CPT | Performed by: FAMILY MEDICINE

## 2019-11-11 PROCEDURE — 80061 LIPID PANEL: CPT | Performed by: FAMILY MEDICINE

## 2019-11-11 PROCEDURE — 90471 IMMUNIZATION ADMIN: CPT | Performed by: FAMILY MEDICINE

## 2019-11-11 PROCEDURE — 90686 IIV4 VACC NO PRSV 0.5 ML IM: CPT | Performed by: FAMILY MEDICINE

## 2019-11-11 PROCEDURE — 80048 BASIC METABOLIC PNL TOTAL CA: CPT | Performed by: FAMILY MEDICINE

## 2019-11-11 PROCEDURE — 36415 COLL VENOUS BLD VENIPUNCTURE: CPT | Performed by: FAMILY MEDICINE

## 2019-11-11 ASSESSMENT — PATIENT HEALTH QUESTIONNAIRE - PHQ9
SUM OF ALL RESPONSES TO PHQ QUESTIONS 1-9: 5
5. POOR APPETITE OR OVEREATING: MORE THAN HALF THE DAYS

## 2019-11-11 ASSESSMENT — ANXIETY QUESTIONNAIRES
2. NOT BEING ABLE TO STOP OR CONTROL WORRYING: SEVERAL DAYS
1. FEELING NERVOUS, ANXIOUS, OR ON EDGE: NEARLY EVERY DAY
7. FEELING AFRAID AS IF SOMETHING AWFUL MIGHT HAPPEN: NOT AT ALL
6. BECOMING EASILY ANNOYED OR IRRITABLE: MORE THAN HALF THE DAYS
IF YOU CHECKED OFF ANY PROBLEMS ON THIS QUESTIONNAIRE, HOW DIFFICULT HAVE THESE PROBLEMS MADE IT FOR YOU TO DO YOUR WORK, TAKE CARE OF THINGS AT HOME, OR GET ALONG WITH OTHER PEOPLE: NOT DIFFICULT AT ALL
3. WORRYING TOO MUCH ABOUT DIFFERENT THINGS: MORE THAN HALF THE DAYS
5. BEING SO RESTLESS THAT IT IS HARD TO SIT STILL: NOT AT ALL
GAD7 TOTAL SCORE: 10

## 2019-11-11 ASSESSMENT — MIFFLIN-ST. JEOR: SCORE: 1543.6

## 2019-11-11 NOTE — PROGRESS NOTES
3  SUBJECTIVE:   CC: Leodan Collins is an 39 year old male who presents for preventive health visit.     Healthy Habits:    Do you get at least three servings of calcium containing foods daily (dairy, green leafy vegetables, etc.)? yes    Amount of exercise or daily activities, outside of work: 0 day(s) per week    Problems taking medications regularly No    Medication side effects: No    Have you had an eye exam in the past two years? yes    Do you see a dentist twice per year? yes    Do you have sleep apnea, excessive snoring or daytime drowsiness?no      Encounter Diagnoses   Name Primary?     Routine general medical examination at a health care facility Yes     Acute reaction to stress with poor sleep/ exercise due to childrens sleep issues  Little time to exercise      Crohn's disease of small intestine with complication (H)      CARDIOVASCULAR SCREENING; LDL GOAL LESS THAN 160          Today's PHQ-2 Score:   PHQ-2 ( 1999 [employer]) 6/20/2019 10/30/2017   Q1: Little interest or pleasure in doing things 0 0   Q2: Feeling down, depressed or hopeless 0 0   PHQ-2 Score 0 0       Abuse: Current or Past(Physical, Sexual or Emotional)- no  Do you feel safe in your environment? Yes        Social History     Tobacco Use     Smoking status: Never Smoker     Smokeless tobacco: Never Used   Substance Use Topics     Alcohol use: Yes     Alcohol/week: 0.0 standard drinks     Comment: x2 a week     If you drink alcohol do you typically have >3 drinks per day or >7 drinks per week? No                      Last PSA: No results found for: PSA    Reviewed orders with patient. Reviewed health maintenance and updated orders accordingly - Yes  Lab work is in process    Reviewed and updated as needed this visit by clinical staff  Tobacco  Allergies  Meds         Reviewed and updated as needed this visit by Provider        Past Medical History:   Diagnosis Date     Asthma     child     Crohn's disease (H)      History of blood  "transfusion      Kidney stones         ROS:  CONSTITUTIONAL: NEGATIVE for fever, chills, change in weight  INTEGUMENTARY/SKIN: NEGATIVE for worrisome rashes, moles or lesions  EYES: NEGATIVE for vision changes or irritation  ENT: NEGATIVE for ear, mouth and throat problems  RESP: NEGATIVE for significant cough or SOB  CV: NEGATIVE for chest pain, palpitations or peripheral edema  GI: NEGATIVE for nausea, abdominal pain, heartburn, or change in bowel habits   male: negative for dysuria, hematuria, decreased urinary stream, erectile dysfunction, urethral discharge  MUSCULOSKELETAL: NEGATIVE for significant arthralgias or myalgia  NEURO: NEGATIVE for weakness, dizziness or paresthesias  PSYCHIATRIC: POSITIVE foranxiety and depressed mood and NEGATIVE foragitation, alcohol abuse, anhedonia, hallucinations, hopelessness, hypersomnia, drug usage, impaired memory and marital problems    OBJECTIVE:   /82 (BP Location: Right arm, Patient Position: Sitting, Cuff Size: Adult Regular)   Pulse 74   Temp 97.2  F (36.2  C) (Oral)   Ht 1.702 m (5' 7\")   Wt 66.5 kg (146 lb 9.6 oz)   SpO2 94%   BMI 22.96 kg/m    EXAM:  GENERAL: healthy, alert and no distress  EYES: Eyes grossly normal to inspection, PERRL and conjunctivae and sclerae normal  HENT: ear canals and TM's normal, nose and mouth without ulcers or lesions  NECK: no adenopathy, no asymmetry, masses, or scars and thyroid normal to palpation  RESP: lungs clear to auscultation - no rales, rhonchi or wheezes  BREAST: normal without masses, tenderness or nipple discharge and no palpable axillary masses or adenopathy  CV: regular rate and rhythm, normal S1 S2, no S3 or S4, no murmur, click or rub, no peripheral edema and peripheral pulses strong  ABDOMEN: soft, nontender, without hepatosplenomegaly or masses, bowel sounds normal and well-healed incision with colostomy bag   (male): normal male genitalia without lesions or urethral discharge, no hernia  MS: no gross " "musculoskeletal defects noted, no edema  SKIN: no suspicious lesions or rashes  NEURO: Normal strength and tone, mentation intact and speech normal  PSYCH: mentation appears normal, affect normal/bright  LYMPH: no cervical, supraclavicular, axillary, or inguinal adenopathy    Diagnostic Test Results:  Labs reviewed in Epic    ASSESSMENT/PLAN:   1. Routine general medical examination at a health care facility  Healthy but not fit, needs to exercise/ see therapist    2. Acute reaction to stress  see  - MENTAL HEALTH REFERRAL  - Adult; Outpatient Treatment; Individual/Couples/Family/Group Therapy/Health Psychology; [facility] - [facility] [phone]; Patient call to schedule    3. Crohn's disease of small intestine with complication (H)  Well controlled   - TSH with free T4 reflex  - Basic metabolic panel    4. CARDIOVASCULAR SCREENING; LDL GOAL LESS THAN 160  recheck  - Lipid Profile    COUNSELING:  Reviewed preventive health counseling, as reflected in patient instructions       Regular exercise       Healthy diet/nutrition       Vision screening       Hearing screening       Immunizations    Vaccinated for: Influenza             Safe sex practices/STD prevention    Estimated body mass index is 22.96 kg/m  as calculated from the following:    Height as of this encounter: 1.702 m (5' 7\").    Weight as of this encounter: 66.5 kg (146 lb 9.6 oz).         reports that he has never smoked. He has never used smokeless tobacco.      Counseling Resources:  ATP IV Guidelines  Pooled Cohorts Equation Calculator  FRAX Risk Assessment  ICSI Preventive Guidelines  Dietary Guidelines for Americans, 2010  USDA's MyPlate  ASA Prophylaxis  Lung CA Screening    Blane Worrell MD  [facility] [place]  "

## 2019-11-12 LAB
ANION GAP SERPL CALCULATED.3IONS-SCNC: 7 MMOL/L (ref 3–14)
BUN SERPL-MCNC: 20 MG/DL (ref 7–30)
CALCIUM SERPL-MCNC: 9.6 MG/DL (ref 8.5–10.1)
CHLORIDE SERPL-SCNC: 106 MMOL/L (ref 94–109)
CHOLEST SERPL-MCNC: 191 MG/DL
CO2 SERPL-SCNC: 28 MMOL/L (ref 20–32)
CREAT SERPL-MCNC: 0.98 MG/DL (ref 0.66–1.25)
GFR SERPL CREATININE-BSD FRML MDRD: >90 ML/MIN/{1.73_M2}
GLUCOSE SERPL-MCNC: 83 MG/DL (ref 70–99)
HDLC SERPL-MCNC: 52 MG/DL
LDLC SERPL CALC-MCNC: 119 MG/DL
NONHDLC SERPL-MCNC: 139 MG/DL
POTASSIUM SERPL-SCNC: 3.8 MMOL/L (ref 3.4–5.3)
SODIUM SERPL-SCNC: 141 MMOL/L (ref 133–144)
TRIGL SERPL-MCNC: 101 MG/DL
TSH SERPL DL<=0.005 MIU/L-ACNC: 0.71 MU/L (ref 0.4–4)

## 2019-11-12 ASSESSMENT — ANXIETY QUESTIONNAIRES: GAD7 TOTAL SCORE: 10

## 2019-12-02 ENCOUNTER — TRANSFERRED RECORDS (OUTPATIENT)
Dept: HEALTH INFORMATION MANAGEMENT | Facility: CLINIC | Age: 39
End: 2019-12-02

## 2020-01-08 ENCOUNTER — TRANSFERRED RECORDS (OUTPATIENT)
Dept: HEALTH INFORMATION MANAGEMENT | Facility: CLINIC | Age: 40
End: 2020-01-08

## 2020-01-29 ENCOUNTER — APPOINTMENT (OUTPATIENT)
Dept: CT IMAGING | Facility: CLINIC | Age: 40
End: 2020-01-29
Attending: EMERGENCY MEDICINE
Payer: COMMERCIAL

## 2020-01-29 ENCOUNTER — HOSPITAL ENCOUNTER (EMERGENCY)
Facility: CLINIC | Age: 40
Discharge: HOME | End: 2020-01-29
Attending: EMERGENCY MEDICINE | Admitting: EMERGENCY MEDICINE
Payer: COMMERCIAL

## 2020-01-29 VITALS
WEIGHT: 148 LBS | HEART RATE: 77 BPM | RESPIRATION RATE: 20 BRPM | BODY MASS INDEX: 23.18 KG/M2 | TEMPERATURE: 98.3 F | SYSTOLIC BLOOD PRESSURE: 141 MMHG | OXYGEN SATURATION: 97 % | DIASTOLIC BLOOD PRESSURE: 83 MMHG

## 2020-01-29 DIAGNOSIS — Z87.442 PERSONAL HISTORY OF URINARY CALCULI: ICD-10-CM

## 2020-01-29 DIAGNOSIS — N13.2 HYDRONEPHROSIS WITH RENAL AND URETERAL CALCULUS OBSTRUCTION: ICD-10-CM

## 2020-01-29 DIAGNOSIS — N20.0 KIDNEY STONE: ICD-10-CM

## 2020-01-29 LAB
ALBUMIN UR-MCNC: 30 MG/DL
ANION GAP SERPL CALCULATED.3IONS-SCNC: 5 MMOL/L (ref 3–14)
APPEARANCE UR: ABNORMAL
BASOPHILS # BLD AUTO: 0 10E9/L (ref 0–0.2)
BASOPHILS NFR BLD AUTO: 0.2 %
BILIRUB UR QL STRIP: NEGATIVE
BUN SERPL-MCNC: 23 MG/DL (ref 7–30)
CALCIUM SERPL-MCNC: 9.2 MG/DL (ref 8.5–10.1)
CAOX CRY #/AREA URNS HPF: ABNORMAL /HPF
CHLORIDE SERPL-SCNC: 107 MMOL/L (ref 94–109)
CO2 SERPL-SCNC: 28 MMOL/L (ref 20–32)
COLOR UR AUTO: YELLOW
CREAT SERPL-MCNC: 0.99 MG/DL (ref 0.66–1.25)
DIFFERENTIAL METHOD BLD: ABNORMAL
EOSINOPHIL # BLD AUTO: 0.2 10E9/L (ref 0–0.7)
EOSINOPHIL NFR BLD AUTO: 2.3 %
ERYTHROCYTE [DISTWIDTH] IN BLOOD BY AUTOMATED COUNT: 14 % (ref 10–15)
GFR SERPL CREATININE-BSD FRML MDRD: >90 ML/MIN/{1.73_M2}
GLUCOSE SERPL-MCNC: 101 MG/DL (ref 70–99)
GLUCOSE UR STRIP-MCNC: NEGATIVE MG/DL
HCT VFR BLD AUTO: 46.5 % (ref 40–53)
HGB BLD-MCNC: 16 G/DL (ref 13.3–17.7)
HGB UR QL STRIP: ABNORMAL
IMM GRANULOCYTES # BLD: 0 10E9/L (ref 0–0.4)
IMM GRANULOCYTES NFR BLD: 0.3 %
KETONES UR STRIP-MCNC: NEGATIVE MG/DL
LEUKOCYTE ESTERASE UR QL STRIP: ABNORMAL
LYMPHOCYTES # BLD AUTO: 2.5 10E9/L (ref 0.8–5.3)
LYMPHOCYTES NFR BLD AUTO: 23.7 %
MCH RBC QN AUTO: 33.2 PG (ref 26.5–33)
MCHC RBC AUTO-ENTMCNC: 34.4 G/DL (ref 31.5–36.5)
MCV RBC AUTO: 97 FL (ref 78–100)
MONOCYTES # BLD AUTO: 0.9 10E9/L (ref 0–1.3)
MONOCYTES NFR BLD AUTO: 8.7 %
MUCOUS THREADS #/AREA URNS LPF: PRESENT /LPF
NEUTROPHILS # BLD AUTO: 6.8 10E9/L (ref 1.6–8.3)
NEUTROPHILS NFR BLD AUTO: 64.8 %
NITRATE UR QL: NEGATIVE
NRBC # BLD AUTO: 0 10*3/UL
NRBC BLD AUTO-RTO: 0 /100
PH UR STRIP: 5.5 PH (ref 5–7)
PLATELET # BLD AUTO: 316 10E9/L (ref 150–450)
POTASSIUM SERPL-SCNC: 4.1 MMOL/L (ref 3.4–5.3)
RBC # BLD AUTO: 4.82 10E12/L (ref 4.4–5.9)
RBC #/AREA URNS AUTO: >182 /HPF (ref 0–2)
SODIUM SERPL-SCNC: 140 MMOL/L (ref 133–144)
SOURCE: ABNORMAL
SP GR UR STRIP: 1.02 (ref 1–1.03)
SQUAMOUS #/AREA URNS AUTO: 1 /HPF (ref 0–1)
UROBILINOGEN UR STRIP-MCNC: NORMAL MG/DL (ref 0–2)
WBC # BLD AUTO: 10.4 10E9/L (ref 4–11)
WBC #/AREA URNS AUTO: 11 /HPF (ref 0–5)
YEAST #/AREA URNS HPF: ABNORMAL /HPF

## 2020-01-29 PROCEDURE — 96374 THER/PROPH/DIAG INJ IV PUSH: CPT

## 2020-01-29 PROCEDURE — 25000128 H RX IP 250 OP 636: Performed by: EMERGENCY MEDICINE

## 2020-01-29 PROCEDURE — 80048 BASIC METABOLIC PNL TOTAL CA: CPT | Performed by: FAMILY MEDICINE

## 2020-01-29 PROCEDURE — 87086 URINE CULTURE/COLONY COUNT: CPT | Performed by: FAMILY MEDICINE

## 2020-01-29 PROCEDURE — 74176 CT ABD & PELVIS W/O CONTRAST: CPT

## 2020-01-29 PROCEDURE — 81001 URINALYSIS AUTO W/SCOPE: CPT | Performed by: FAMILY MEDICINE

## 2020-01-29 PROCEDURE — 96375 TX/PRO/DX INJ NEW DRUG ADDON: CPT

## 2020-01-29 PROCEDURE — 85025 COMPLETE CBC W/AUTO DIFF WBC: CPT | Performed by: FAMILY MEDICINE

## 2020-01-29 PROCEDURE — 99284 EMERGENCY DEPT VISIT MOD MDM: CPT | Mod: 25

## 2020-01-29 PROCEDURE — 99284 EMERGENCY DEPT VISIT MOD MDM: CPT | Mod: Z6 | Performed by: EMERGENCY MEDICINE

## 2020-01-29 RX ORDER — HYDROCODONE BITARTRATE AND ACETAMINOPHEN 5; 325 MG/1; MG/1
1 TABLET ORAL EVERY 6 HOURS PRN
Qty: 10 TABLET | Refills: 0 | Status: SHIPPED | OUTPATIENT
Start: 2020-01-29 | End: 2020-01-31

## 2020-01-29 RX ORDER — KETOROLAC TROMETHAMINE 15 MG/ML
15 INJECTION, SOLUTION INTRAMUSCULAR; INTRAVENOUS ONCE
Status: COMPLETED | OUTPATIENT
Start: 2020-01-29 | End: 2020-01-29

## 2020-01-29 RX ORDER — TAMSULOSIN HYDROCHLORIDE 0.4 MG/1
0.4 CAPSULE ORAL DAILY
Qty: 10 CAPSULE | Refills: 0 | Status: SHIPPED | OUTPATIENT
Start: 2020-01-29 | End: 2020-01-31

## 2020-01-29 RX ORDER — HYDROMORPHONE HYDROCHLORIDE 1 MG/ML
0.5 INJECTION, SOLUTION INTRAMUSCULAR; INTRAVENOUS; SUBCUTANEOUS
Status: DISCONTINUED | OUTPATIENT
Start: 2020-01-29 | End: 2020-01-30 | Stop reason: HOSPADM

## 2020-01-29 RX ORDER — CEFAZOLIN SODIUM 2 G/100ML
2 INJECTION, SOLUTION INTRAVENOUS
Status: CANCELLED | OUTPATIENT
Start: 2020-01-29

## 2020-01-29 RX ORDER — ONDANSETRON 2 MG/ML
4 INJECTION INTRAMUSCULAR; INTRAVENOUS EVERY 30 MIN PRN
Status: DISCONTINUED | OUTPATIENT
Start: 2020-01-29 | End: 2020-01-30 | Stop reason: HOSPADM

## 2020-01-29 RX ORDER — ONDANSETRON 4 MG/1
4 TABLET, ORALLY DISINTEGRATING ORAL EVERY 8 HOURS PRN
Qty: 15 TABLET | Refills: 0 | Status: SHIPPED | OUTPATIENT
Start: 2020-01-29 | End: 2021-12-10

## 2020-01-29 RX ORDER — CEFAZOLIN SODIUM 1 G/3ML
1 INJECTION, POWDER, FOR SOLUTION INTRAMUSCULAR; INTRAVENOUS SEE ADMIN INSTRUCTIONS
Status: CANCELLED | OUTPATIENT
Start: 2020-01-29

## 2020-01-29 RX ORDER — IBUPROFEN 600 MG/1
600 TABLET, FILM COATED ORAL EVERY 6 HOURS PRN
Qty: 30 TABLET | Refills: 0 | Status: SHIPPED | OUTPATIENT
Start: 2020-01-29 | End: 2023-08-31

## 2020-01-29 RX ADMIN — KETOROLAC TROMETHAMINE 15 MG: 15 INJECTION, SOLUTION INTRAMUSCULAR; INTRAVENOUS at 19:15

## 2020-01-29 RX ADMIN — ONDANSETRON 4 MG: 2 INJECTION INTRAMUSCULAR; INTRAVENOUS at 19:19

## 2020-01-29 ASSESSMENT — ENCOUNTER SYMPTOMS
DIFFICULTY URINATING: 0
HEADACHES: 0
APPETITE CHANGE: 0
COLOR CHANGE: 0
FEVER: 0
ARTHRALGIAS: 0
NECK STIFFNESS: 0
ABDOMINAL PAIN: 0
CHILLS: 0
EYE REDNESS: 0
ACTIVITY CHANGE: 0
SHORTNESS OF BREATH: 0
CONFUSION: 0
DYSURIA: 1
FLANK PAIN: 1

## 2020-01-29 NOTE — ED AVS SNAPSHOT
[facility], [place], Emergency Department  [address]  [facility] [address]  Phone:  [phone]  Fax:  [phone]                                    Leodan Collins   MRN: [id number]    Department:  [facility], Emergency Department   Date of Visit:  1/29/2020           After Visit Summary Signature Page    I have received my discharge instructions, and my questions have been answered. I have discussed any challenges I see with this plan with the nurse or doctor.    ..........................................................................................................................................  Patient/Patient Representative Signature      ..........................................................................................................................................  Patient Representative Print Name and Relationship to Patient    ..................................................               ................................................  Date                                   Time    ..........................................................................................................................................  Reviewed by Signature/Title    ...................................................              ..............................................  Date                                               Time          22EPIC Rev 08/18

## 2020-01-30 ENCOUNTER — PREP FOR PROCEDURE (OUTPATIENT)
Dept: UROLOGY | Facility: CLINIC | Age: 40
End: 2020-01-30

## 2020-01-30 ENCOUNTER — TELEPHONE (OUTPATIENT)
Dept: UROLOGY | Facility: CLINIC | Age: 40
End: 2020-01-30

## 2020-01-30 DIAGNOSIS — N20.1 URETERAL STONE: Primary | ICD-10-CM

## 2020-01-30 LAB
BACTERIA SPEC CULT: NORMAL
Lab: NORMAL
SPECIMEN SOURCE: NORMAL

## 2020-01-30 RX ORDER — CEFAZOLIN SODIUM 2 G/50ML
2 SOLUTION INTRAVENOUS
Status: CANCELLED | OUTPATIENT
Start: 2020-01-30

## 2020-01-30 RX ORDER — CEFAZOLIN SODIUM 1 G/50ML
1 INJECTION, SOLUTION INTRAVENOUS SEE ADMIN INSTRUCTIONS
Status: CANCELLED | OUTPATIENT
Start: 2020-01-30

## 2020-01-30 NOTE — PROGRESS NOTES
Urology Consult, History and Physical    Name: Leodan Collins    MRN: [id number]   YOB: 1980               Chief Complaint:   Right flank pain    History is obtained from the patient and chart review          History of Present Illness:   Leodan Collins is a 39 year old male who presented with Right flank pain for the past 48 hours. He has history of recurrent urolithiasis. The patient endorses associated nausea/vomiting, frequency. He denies any gross hematuria, subjective fevers/chills, or dysuria. The patient is leaving this upcoming Sunday on a weeklong work trip to [facility]. Currently, patient's pain is moderately well controlled after receiving toradol. In the ER his workup was notable for the following lab results:    Recent Labs   Lab 01/29/20  1845   WBC 10.4     Recent Labs   Lab 01/29/20  1845   CR 0.99     Urinalysis  Recent Labs   Lab Test 01/29/20  1845 06/20/19  0924 01/24/19  1043   UBLD Large* Moderate* Moderate*   NITRITE Negative Negative Negative   LEUKEST Small* Small* Trace*   RBCU >182* 5-10* >182*   WBCU 11* 5-10* 11*              Past Medical History:     Past Medical History:   Diagnosis Date     Asthma     child     Crohn's disease (H)      History of blood transfusion      Kidney stones             Past Surgical History:     Past Surgical History:   Procedure Laterality Date     COLOSTOMY  2007     COMBINED CYSTOSCOPY, RETROGRADES, URETEROSCOPY, INSERT STENT Right 5/3/2016    Procedure: COMBINED CYSTOSCOPY, RETROGRADES, URETEROSCOPY, INSERT STENT;  Surgeon: Azael Melvin MD;  Location: [facility]     EXTRACORPOREAL SHOCK WAVE LITHOTRIPSY (ESWL)  6/27/2013    Procedure: EXTRACORPOREAL SHOCK WAVE LITHOTRIPSY (ESWL);  Left Extracorporeal Shock Wave Lithotripsy Kidney And Ureter;  Surgeon: Azael Melvin MD;  Location: [facility]     ILEOSTOMY  2009     LAPAROTOMY EXPLORATORY  2009    extensive lysis of adhesions, revision of coloenteric fistula, repair of small bowel  fistula, takedown of current colostomy, debridement of chronic abscess, resection of residual left colon, right hemicolectomy, Louise ileostomy     LASER HOLMIUM LITHOTRIPSY URETER(S), INSERT STENT, COMBINED  4/9/2014    Procedure: COMBINED CYSTOSCOPY, URETEROSCOPY, LASER HOLMIUM LITHOTRIPSY URETER(S), INSERT STENT;  Ureteroscopy, Cystoscopy, holmium laser,Right Uretral stent placement;  Surgeon: Azael Melvin MD;  Location: [facility]     LASER HOLMIUM LITHOTRIPSY URETER(S), INSERT STENT, COMBINED Bilateral 5/17/2016    Procedure: COMBINED CYSTOSCOPY, URETEROSCOPY, LASER HOLMIUM LITHOTRIPSY URETER(S), INSERT STENT;  Surgeon: Azael Melvin MD;  Location: [facility]            Social History:     Social History     Tobacco Use     Smoking status: Never Smoker     Smokeless tobacco: Never Used   Substance Use Topics     Alcohol use: Yes     Alcohol/week: 0.0 standard drinks     Comment: x1 a week            Family History:     Family History   Problem Relation Age of Onset     Cerebrovascular Disease Maternal Grandmother      Connective Tissue Disorder Sister      Nephrolithiasis Father       no family history of stone disease         Allergies:   No Known Allergies         Medications:     Current Facility-Administered Medications   Medication     HYDROmorphone (PF) (DILAUDID) injection 0.5 mg     ondansetron (ZOFRAN) injection 4 mg     Current Outpatient Medications   Medication Sig     mercaptopurine (PURINETHOL) 50 MG tablet Take 100 mg by mouth daily      VITAMIN D PO      Adalimumab (HUMIRA PEN SC) Inject 40 mg Subcutaneous every 14 days              Review of Systems:   Review Of Systems  Skin: negative  Eyes: negative  Ears/Nose/Throat: negative  Respiratory: No shortness of breath, dyspnea on exertion, cough, or hemoptysis  Cardiovascular: negative  Gastrointestinal: negative  Genitourinary: as above  Musculoskeletal: negative  Neurologic: negative  Psychiatric:  negative  Hematologic/Lymphatic/Immunologic: negative  Endocrine: negative            Physical Exam:   VS:  T: 95.5    HR: 67    BP: 158/92    RR: 18   GEN:  AOx3.  NAD.  Pleasant.  HEENT:  Sclerae anicteric.  Conjunctivae pink.  MMM.  NECK:  Supple.  No LAD.  CV:  RRR  LUNGS: Non-labored breathing.  BACK:  Mild R CVAT.  ABD:  Soft.  NT.  ND.  No rebound or guarding.  No masses.  EXT:  Warm, well perfused.  SKIN:  Warm.  Dry.  No rashes.  NEURO:  CN grossly intact.  KAE..          Data:   All laboratory data reviewed and highlighted above:    All pertinent imaging reviewed:  CT scan of the abdomen:   8mm right obstructing distal ureteral stones and bilateral nonobstructing renal stones          Impression and Plan:   Impression:   Leodan Collins is a 39 year old male with a urologic history of urolithiasis who presented with 48 hours of flank pain and was found to have a right obstructing 8mm distal ureteral stone and multiple bilateral nonobstructing renal stones. He has no emergent indication for intervention, but is traveling on Sunday out of the country for one week.       Plan:   -Will schedule for R URS/HLL at  OR this Friday. Surgeon will either be Dr. Sierra or Dr. Felix pending availability.   -Discharge with medical expulsive therapy (pain meds, tamsulosin, strain urine). Pt to call urology clinic if he passes the stone in the interim  -Return precautions reviewed with the patient.     We covered the natural history of kidney stones, the risk of progression to symptomatic pain/infection, and the possibility of renal failure/kidney damage. We covered treatment options including observation, ureteroscopy, extracorporeal shock wave lithotripsy (ESWL), and percutaneous nephrolithotomy (PCNL). We covered surgical risks which include but are not limited to heart attack, stroke, blood clot in the legs or lungs, death, injury to surrounding organs and tissues, low risk of bleeding. Additional procedures  may be necessary in the perioperative period.    Rik Land MD  Urology Resident    Discussed with Dr. Sierra

## 2020-01-30 NOTE — ED PROVIDER NOTES
[facility] [place] ([facility])    1/29/20       History     Chief Complaint   Patient presents with     Flank Pain     right flank and scrotum pain.     HPI  Leodan Collins is a 39 year old male who has a PMH of Crohn's disease status post total colectomy and ileostomy (currently on Humira), with recurrent ureteral stones status post repeat stent placements who presents to the Emergency Department for right flank and scrotum pain.  Patient is accompanied by a family member who contributes to medical history.  Patient states that over the last 2 days he has had urinary urgency which for him is a typical precursor of kidney stones for him which he has had in the past (5 or 6 episodes).  He states that he started having pain in his right flank and scrotum today which worsened around lunch and then became quite excruciating at approximately 4 PM.  He states that he does not always have pain associated with the kidney stones but that the character of the pain today is identical to the past times that he has had kidney/ureteral stone related pain.  He states that while he has had urinary urgency/frequency the volume has been normal and he has not had hematuria.  He has not had penile discharge, rash, or other pelvic symptoms.  He has not had nausea, vomiting, fever, chills or other systemic symptoms.  Patient reports that the outcomes of his prior kidney stones is included 2 stent placements on that the rest passed on their own.  Leodan states that he is 10 years status post ileostomy and he is missing almost all of his colon.  He states that he is currently managed with his Crohn's disease on Humira which he takes every 2 weeks and mercaptopurine.    I have reviewed the Medications, Allergies, Past Medical and Surgical History, and Social History in the [place] system.    Past Medical History:   Diagnosis Date     Asthma     child     Crohn's disease (H)      History of blood transfusion      Kidney  stones        Past Surgical History:   Procedure Laterality Date     COLOSTOMY  2007     COMBINED CYSTOSCOPY, RETROGRADES, URETEROSCOPY, INSERT STENT Right 5/3/2016    Procedure: COMBINED CYSTOSCOPY, RETROGRADES, URETEROSCOPY, INSERT STENT;  Surgeon: Azael Melvin MD;  [facility]     EXTRACORPOREAL SHOCK WAVE LITHOTRIPSY (ESWL)  6/27/2013    Procedure: EXTRACORPOREAL SHOCK WAVE LITHOTRIPSY (ESWL);  Left Extracorporeal Shock Wave Lithotripsy Kidney And Ureter;  Surgeon: Azael Melvin MD;  [facility]: [facility] OR     ILEOSTOMY  2009     LAPAROTOMY EXPLORATORY  2009    extensive lysis of adhesions, revision of coloenteric fistula, repair of small bowel fistula, takedown of current colostomy, debridement of chronic abscess, resection of residual left colon, right hemicolectomy, Louise ileostomy     LASER HOLMIUM LITHOTRIPSY URETER(S), INSERT STENT, COMBINED  4/9/2014    Procedure: COMBINED CYSTOSCOPY, URETEROSCOPY, LASER HOLMIUM LITHOTRIPSY URETER(S), INSERT STENT;  Ureteroscopy, Cystoscopy, holmium laser,Right Uretral stent placement;  Surgeon: Azael Melvin MD;  Location[facility] OR     LASER HOLMIUM LITHOTRIPSY URETER(S), INSERT STENT, COMBINED Bilateral 5/17/2016    Procedure: COMBINED CYSTOSCOPY, URETEROSCOPY, LASER HOLMIUM LITHOTRIPSY URETER(S), INSERT STENT;  Surgeon: Azael Melvin MD;  Location: [facility] OR       Family History   Problem Relation Age of Onset     Cerebrovascular Disease Maternal Grandmother      Connective Tissue Disorder Sister      Nephrolithiasis Father        Social History     Tobacco Use     Smoking status: Never Smoker     Smokeless tobacco: Never Used   Substance Use Topics     Alcohol use: Yes     Alcohol/week: 0.0 standard drinks     Comment: x1 a week       No current facility-administered medications for this encounter.      Current Outpatient Medications   Medication     HYDROcodone-acetaminophen (NORCO) 5-325 MG tablet     ibuprofen (ADVIL/MOTRIN) 600 MG tablet      mercaptopurine (PURINETHOL) 50 MG tablet     ondansetron (ZOFRAN-ODT) 4 MG ODT tab     tamsulosin (FLOMAX) 0.4 MG capsule     VITAMIN D PO     Adalimumab (HUMIRA PEN SC)      No Known Allergies     Review of Systems   Constitutional: Negative for activity change, appetite change, chills and fever.   HENT: Negative for congestion.    Eyes: Negative for redness.   Respiratory: Negative for shortness of breath.    Cardiovascular: Negative for chest pain.   Gastrointestinal: Negative for abdominal pain.   Genitourinary: Positive for dysuria and flank pain. Negative for difficulty urinating, penile swelling and scrotal swelling.   Musculoskeletal: Negative for arthralgias and neck stiffness.   Skin: Negative for color change.   Neurological: Negative for headaches.   Psychiatric/Behavioral: Negative for confusion.       Physical Exam   BP: (!) 158/92  Pulse: 67  Temp: 95.5  F (35.3  C)  Resp: 18  Weight: 67.1 kg (148 lb)  SpO2: 98 %      Physical Exam  Constitutional:       General: He is not in acute distress.     Appearance: Normal appearance. He is not ill-appearing or diaphoretic.      Comments: In pain   HENT:      Head: Atraumatic.   Eyes:      General: No scleral icterus.     Pupils: Pupils are equal, round, and reactive to light.   Cardiovascular:      Rate and Rhythm: Normal rate and regular rhythm.      Heart sounds: Normal heart sounds.   Pulmonary:      Effort: No respiratory distress.      Breath sounds: Normal breath sounds.   Abdominal:      General: Bowel sounds are normal.      Palpations: Abdomen is soft.      Tenderness: There is no abdominal tenderness. There is right CVA tenderness.   Musculoskeletal:         General: No tenderness.   Skin:     General: Skin is warm.      Findings: No rash.   Neurological:      Mental Status: He is alert.   Psychiatric:         Mood and Affect: Mood normal.         ED Course     Results for orders placed or performed during the hospital encounter of 01/29/20  (from the past 24 hour(s))   Routine UA with microscopic   Result Value Ref Range    Color Urine Yellow     Appearance Urine Slightly Cloudy     Glucose Urine Negative NEG^Negative mg/dL    Bilirubin Urine Negative NEG^Negative    Ketones Urine Negative NEG^Negative mg/dL    Specific Gravity Urine 1.023 1.003 - 1.035    Blood Urine Large (A) NEG^Negative    pH Urine 5.5 5.0 - 7.0 pH    Protein Albumin Urine 30 (A) NEG^Negative mg/dL    Urobilinogen mg/dL Normal 0.0 - 2.0 mg/dL    Nitrite Urine Negative NEG^Negative    Leukocyte Esterase Urine Small (A) NEG^Negative    Source Midstream Urine     WBC Urine 11 (H) 0 - 5 /HPF    RBC Urine >182 (H) 0 - 2 /HPF    Yeast Urine Few (A) NEG^Negative /HPF    Squamous Epithelial /HPF Urine 1 0 - 1 /HPF    Mucous Urine Present (A) NEG^Negative /LPF    Calcium Oxalate Few (A) NEG^Negative /HPF   Urine Culture Aerobic Bacterial   Result Value Ref Range    Specimen Description Midstream Urine     Special Requests Specimen received in preservative     Culture Micro PENDING    CBC with platelets differential   Result Value Ref Range    WBC 10.4 4.0 - 11.0 10e9/L    RBC Count 4.82 4.4 - 5.9 10e12/L    Hemoglobin 16.0 13.3 - 17.7 g/dL    Hematocrit 46.5 40.0 - 53.0 %    MCV 97 78 - 100 fl    MCH 33.2 (H) 26.5 - 33.0 pg    MCHC 34.4 31.5 - 36.5 g/dL    RDW 14.0 10.0 - 15.0 %    Platelet Count 316 150 - 450 10e9/L    Diff Method Automated Method     % Neutrophils 64.8 %    % Lymphocytes 23.7 %    % Monocytes 8.7 %    % Eosinophils 2.3 %    % Basophils 0.2 %    % Immature Granulocytes 0.3 %    Nucleated RBCs 0 0 /100    Absolute Neutrophil 6.8 1.6 - 8.3 10e9/L    Absolute Lymphocytes 2.5 0.8 - 5.3 10e9/L    Absolute Monocytes 0.9 0.0 - 1.3 10e9/L    Absolute Eosinophils 0.2 0.0 - 0.7 10e9/L    Absolute Basophils 0.0 0.0 - 0.2 10e9/L    Abs Immature Granulocytes 0.0 0 - 0.4 10e9/L    Absolute Nucleated RBC 0.0    Basic metabolic panel   Result Value Ref Range    Sodium 140 133 - 144 mmol/L     Potassium 4.1 3.4 - 5.3 mmol/L    Chloride 107 94 - 109 mmol/L    Carbon Dioxide 28 20 - 32 mmol/L    Anion Gap 5 3 - 14 mmol/L    Glucose 101 (H) 70 - 99 mg/dL    Urea Nitrogen 23 7 - 30 mg/dL    Creatinine 0.99 0.66 - 1.25 mg/dL    GFR Estimate >90 >60 mL/min/[1.73_m2]    GFR Estimate If Black >90 >60 mL/min/[1.73_m2]    Calcium 9.2 8.5 - 10.1 mg/dL   CT Abdomen Pelvis w/o Contrast    Narrative    CT ABDOMEN AND PELVIS WITHOUT CONTRAST   1/29/2020 8:06 PM     HISTORY: Right flank pain.    TECHNIQUE: Volumetric helical sections were acquired from the lung  bases through the ischial tuberosities without IV contrast. Coronal  images were also reconstructed. Radiation dose for this scan was  reduced using automated exposure control, adjustment of the mA and/or  kV according to patient size, or iterative reconstruction technique.    COMPARISON: 1/24/2019.    FINDINGS: A 0.8 cm obstructing stone at the right ureterovesical  junction causes moderate right hydronephrosis. There are two  nonobstructing stones in the right kidney, with the largest in the  lower pole measuring 0.5 cm. There are at least five nonobstructing  stones in the left kidney, with the largest in the lower pole  measuring 0.6 cm. No ureteral calculi or hydronephrosis on the left.    The liver, gallbladder, spleen, adrenal glands, and pancreas have  unremarkable noncontrast appearances. Postoperative changes of  colectomy and right lower quadrant ileostomy are noted. No bowel  obstruction. No free fluid in the pelvis. No free intraperitoneal air.  A 0.5 cm groundglass nodule in the left lower lobe laterally (series 2  image 6) is unchanged. A 0.3 cm pulmonary nodule in the left lower  lobe anteriorly and laterally is also unchanged. The visualized lung  bases are otherwise clear.      Impression    IMPRESSION:   1. Obstructing 0.8 cm stone at the right ureterovesical junction  causes moderate right hydronephrosis. This obstructing stone was  likely  previously present at the right ureteropelvic junction.  2. Multiple nonobstructing stones are again noted in both kidneys.               NABOR WATTS MD       Medications   ketorolac (TORADOL) injection 15 mg (15 mg Intravenous Given 1/29/20 1915)             Procedures               Labs Ordered and Resulted from Time of ED Arrival Up to the Time of Departure from the ED   ROUTINE UA WITH MICROSCOPIC - Abnormal; Notable for the following components:       Result Value    Blood Urine Large (*)     Protein Albumin Urine 30 (*)     Leukocyte Esterase Urine Small (*)     WBC Urine 11 (*)     RBC Urine >182 (*)     Yeast Urine Few (*)     Mucous Urine Present (*)     Calcium Oxalate Few (*)     All other components within normal limits   CBC WITH PLATELETS DIFFERENTIAL - Abnormal; Notable for the following components:    MCH 33.2 (*)     All other components within normal limits   BASIC METABOLIC PANEL - Abnormal; Notable for the following components:    Glucose 101 (*)     All other components within normal limits   PERIPHERAL IV CATHETER   URINE CULTURE AEROBIC BACTERIAL            Assessments & Plan (with Medical Decision Making)   Patient with history of recurrent kidney stones presents to the emergency department complaint of right-sided flank pain.  Differential includes pyelonephritis, kidney stone, UTI, Crohn's flare, less likely abscess, hepatitis, cholecystitis.  Plan for abdominal labs and CT scan.    CT shows an 8 mm stone at the UVJ with moderate hydronephrosis.  Urine appears noninfected.  There is no white count.  Creatinine is within the normal range.  Case was discussed with the urology resident on-call.  He evaluated the patient in the emergency department.  Patient will be scheduled for a outpatient stone removal/stent placement on Friday of this week.  Will be discharged with Flomax, pain medication and nausea medication and urine strainer.  Educated to return if he develops any difficulties  urinating, fever/chills, worsening pain, or any other symptoms that are concerning to him.        I have reviewed the nursing notes.    I have reviewed the findings, diagnosis, plan and need for follow up with the patient.    Discharge Medication List as of 1/29/2020 10:22 PM      START taking these medications    Details   HYDROcodone-acetaminophen (NORCO) 5-325 MG tablet Take 1 tablet by mouth every 6 hours as needed for severe pain, Disp-10 tablet, R-0, Local Print      ibuprofen (ADVIL/MOTRIN) 600 MG tablet Take 1 tablet (600 mg) by mouth every 6 hours as needed for moderate pain, Disp-30 tablet, R-0, Local Print      ondansetron (ZOFRAN-ODT) 4 MG ODT tab Take 1 tablet (4 mg) by mouth every 8 hours as needed for nausea, Disp-15 tablet, R-0, Local Print      tamsulosin (FLOMAX) 0.4 MG capsule Take 1 capsule (0.4 mg) by mouth daily for 10 doses, Disp-10 capsule, R-0, Local Print             Final diagnoses:   Kidney stone     I, Ludin Petty, am serving as a trained medical scribe to document services personally performed by Terrell Zavala DO, based on the provider's statements to me.     ITerrell DO, was physically present and have reviewed and verified the accuracy of this note documented by Ludin Petty.     1/29/2020   [facility], [place], EMERGENCY DEPARTMENT     Terrell Zvaala DO  01/30/20 0204       Terrell Zavala DO  02/06/20 0111

## 2020-01-30 NOTE — DISCHARGE INSTRUCTIONS
You have been scheduled for outpatient stone removal on Friday of this week.  The  from the urology office will contact you with details of this appointment.  You will be discharged with pain, nausea medication as well as Flomax and a urine strainer to help expedite stone passage.  If you pass the stone or have any questions you can call the urology Clinic (phone: [phone]).  Turn to the emergency department if you develop any fever/chills, inability to urinate, or any other symptoms that are concerning to you.

## 2020-01-30 NOTE — TELEPHONE ENCOUNTER
Patient is scheduled for surgery with Dr. Felix      Spoke or left message with: Leodan    Date of Surgery: 1/31/20    Location: [facility] OR    Informed patient they will need an adult  yes    Pre-op with surgeon (if applicable): n/a    H&P: Scheduled with n/a    Additional imaging/appointments: n/a    Surgery packet: n/a     Additional comments: n/a

## 2020-01-31 ENCOUNTER — ANESTHESIA (OUTPATIENT)
Dept: SURGERY | Facility: AMBULATORY SURGERY CENTER | Age: 40
End: 2020-01-31

## 2020-01-31 ENCOUNTER — ANESTHESIA EVENT (OUTPATIENT)
Dept: SURGERY | Facility: AMBULATORY SURGERY CENTER | Age: 40
End: 2020-01-31

## 2020-01-31 ENCOUNTER — ANCILLARY PROCEDURE (OUTPATIENT)
Dept: RADIOLOGY | Facility: AMBULATORY SURGERY CENTER | Age: 40
End: 2020-01-31
Attending: UROLOGY
Payer: COMMERCIAL

## 2020-01-31 ENCOUNTER — HOSPITAL ENCOUNTER (OUTPATIENT)
Facility: AMBULATORY SURGERY CENTER | Age: 40
End: 2020-01-31
Attending: UROLOGY
Payer: COMMERCIAL

## 2020-01-31 VITALS
HEART RATE: 64 BPM | OXYGEN SATURATION: 98 % | DIASTOLIC BLOOD PRESSURE: 59 MMHG | HEIGHT: 67 IN | SYSTOLIC BLOOD PRESSURE: 103 MMHG | BODY MASS INDEX: 22.76 KG/M2 | RESPIRATION RATE: 14 BRPM | WEIGHT: 145 LBS | TEMPERATURE: 96.8 F

## 2020-01-31 DIAGNOSIS — R32 URINARY INCONTINENCE, UNSPECIFIED TYPE: ICD-10-CM

## 2020-01-31 DIAGNOSIS — N20.1 URETERAL STONE: ICD-10-CM

## 2020-01-31 DEVICE — STENT URETERAL PERCUFLEX PLUS 6FRX26CM M0061752630: Type: IMPLANTABLE DEVICE | Site: URETER | Status: FUNCTIONAL

## 2020-01-31 RX ORDER — ONDANSETRON 4 MG/1
4 TABLET, ORALLY DISINTEGRATING ORAL EVERY 30 MIN PRN
Status: DISCONTINUED | OUTPATIENT
Start: 2020-01-31 | End: 2020-02-01 | Stop reason: HOSPADM

## 2020-01-31 RX ORDER — SODIUM CHLORIDE, SODIUM LACTATE, POTASSIUM CHLORIDE, CALCIUM CHLORIDE 600; 310; 30; 20 MG/100ML; MG/100ML; MG/100ML; MG/100ML
INJECTION, SOLUTION INTRAVENOUS CONTINUOUS
Status: DISCONTINUED | OUTPATIENT
Start: 2020-01-31 | End: 2020-02-01 | Stop reason: HOSPADM

## 2020-01-31 RX ORDER — PROPOFOL 10 MG/ML
INJECTION, EMULSION INTRAVENOUS PRN
Status: DISCONTINUED | OUTPATIENT
Start: 2020-01-31 | End: 2020-01-31

## 2020-01-31 RX ORDER — NITROFURANTOIN 25; 75 MG/1; MG/1
100 CAPSULE ORAL 2 TIMES DAILY
Qty: 14 CAPSULE | Refills: 0 | Status: SHIPPED | OUTPATIENT
Start: 2020-01-31 | End: 2023-08-31

## 2020-01-31 RX ORDER — SODIUM CHLORIDE, SODIUM LACTATE, POTASSIUM CHLORIDE, CALCIUM CHLORIDE 600; 310; 30; 20 MG/100ML; MG/100ML; MG/100ML; MG/100ML
INJECTION, SOLUTION INTRAVENOUS CONTINUOUS
Status: DISCONTINUED | OUTPATIENT
Start: 2020-01-31 | End: 2020-01-31 | Stop reason: HOSPADM

## 2020-01-31 RX ORDER — KETOROLAC TROMETHAMINE 30 MG/ML
INJECTION, SOLUTION INTRAMUSCULAR; INTRAVENOUS PRN
Status: DISCONTINUED | OUTPATIENT
Start: 2020-01-31 | End: 2020-01-31

## 2020-01-31 RX ORDER — PROPOFOL 10 MG/ML
INJECTION, EMULSION INTRAVENOUS CONTINUOUS PRN
Status: DISCONTINUED | OUTPATIENT
Start: 2020-01-31 | End: 2020-01-31

## 2020-01-31 RX ORDER — NALOXONE HYDROCHLORIDE 0.4 MG/ML
.1-.4 INJECTION, SOLUTION INTRAMUSCULAR; INTRAVENOUS; SUBCUTANEOUS
Status: DISCONTINUED | OUTPATIENT
Start: 2020-01-31 | End: 2020-02-01 | Stop reason: HOSPADM

## 2020-01-31 RX ORDER — FENTANYL CITRATE 50 UG/ML
25-50 INJECTION, SOLUTION INTRAMUSCULAR; INTRAVENOUS
Status: DISCONTINUED | OUTPATIENT
Start: 2020-01-31 | End: 2020-01-31 | Stop reason: HOSPADM

## 2020-01-31 RX ORDER — LIDOCAINE HYDROCHLORIDE 20 MG/ML
INJECTION, SOLUTION INFILTRATION; PERINEURAL PRN
Status: DISCONTINUED | OUTPATIENT
Start: 2020-01-31 | End: 2020-01-31

## 2020-01-31 RX ORDER — DEXAMETHASONE SODIUM PHOSPHATE 4 MG/ML
INJECTION, SOLUTION INTRA-ARTICULAR; INTRALESIONAL; INTRAMUSCULAR; INTRAVENOUS; SOFT TISSUE PRN
Status: DISCONTINUED | OUTPATIENT
Start: 2020-01-31 | End: 2020-01-31

## 2020-01-31 RX ORDER — LIDOCAINE 40 MG/G
CREAM TOPICAL
Status: DISCONTINUED | OUTPATIENT
Start: 2020-01-31 | End: 2020-01-31 | Stop reason: HOSPADM

## 2020-01-31 RX ORDER — CEFAZOLIN SODIUM 1 G/50ML
1 SOLUTION INTRAVENOUS SEE ADMIN INSTRUCTIONS
Status: DISCONTINUED | OUTPATIENT
Start: 2020-01-31 | End: 2020-01-31 | Stop reason: HOSPADM

## 2020-01-31 RX ORDER — FENTANYL CITRATE 50 UG/ML
INJECTION, SOLUTION INTRAMUSCULAR; INTRAVENOUS PRN
Status: DISCONTINUED | OUTPATIENT
Start: 2020-01-31 | End: 2020-01-31

## 2020-01-31 RX ORDER — CEFAZOLIN SODIUM 2 G/50ML
2 SOLUTION INTRAVENOUS
Status: COMPLETED | OUTPATIENT
Start: 2020-01-31 | End: 2020-01-31

## 2020-01-31 RX ORDER — MEPERIDINE HYDROCHLORIDE 25 MG/ML
12.5 INJECTION INTRAMUSCULAR; INTRAVENOUS; SUBCUTANEOUS
Status: DISCONTINUED | OUTPATIENT
Start: 2020-01-31 | End: 2020-02-01 | Stop reason: HOSPADM

## 2020-01-31 RX ORDER — TAMSULOSIN HYDROCHLORIDE 0.4 MG/1
0.4 CAPSULE ORAL DAILY
Qty: 10 CAPSULE | Refills: 0 | Status: SHIPPED | OUTPATIENT
Start: 2020-01-31 | End: 2021-12-10

## 2020-01-31 RX ORDER — ONDANSETRON 2 MG/ML
INJECTION INTRAMUSCULAR; INTRAVENOUS PRN
Status: DISCONTINUED | OUTPATIENT
Start: 2020-01-31 | End: 2020-01-31

## 2020-01-31 RX ORDER — ONDANSETRON 2 MG/ML
4 INJECTION INTRAMUSCULAR; INTRAVENOUS EVERY 30 MIN PRN
Status: DISCONTINUED | OUTPATIENT
Start: 2020-01-31 | End: 2020-02-01 | Stop reason: HOSPADM

## 2020-01-31 RX ORDER — HYDROCODONE BITARTRATE AND ACETAMINOPHEN 5; 325 MG/1; MG/1
1 TABLET ORAL EVERY 6 HOURS PRN
Qty: 10 TABLET | Refills: 0 | Status: SHIPPED | OUTPATIENT
Start: 2020-01-31 | End: 2021-12-10

## 2020-01-31 RX ADMIN — LIDOCAINE HYDROCHLORIDE 100 MG: 20 INJECTION, SOLUTION INFILTRATION; PERINEURAL at 07:32

## 2020-01-31 RX ADMIN — PROPOFOL 150 MCG/KG/MIN: 10 INJECTION, EMULSION INTRAVENOUS at 07:32

## 2020-01-31 RX ADMIN — FENTANYL CITRATE 50 MCG: 50 INJECTION, SOLUTION INTRAMUSCULAR; INTRAVENOUS at 07:31

## 2020-01-31 RX ADMIN — CEFAZOLIN SODIUM 2 G: 2 SOLUTION INTRAVENOUS at 07:40

## 2020-01-31 RX ADMIN — SODIUM CHLORIDE, SODIUM LACTATE, POTASSIUM CHLORIDE, CALCIUM CHLORIDE: 600; 310; 30; 20 INJECTION, SOLUTION INTRAVENOUS at 07:19

## 2020-01-31 RX ADMIN — ONDANSETRON 4 MG: 2 INJECTION INTRAMUSCULAR; INTRAVENOUS at 08:18

## 2020-01-31 RX ADMIN — KETOROLAC TROMETHAMINE 30 MG: 30 INJECTION, SOLUTION INTRAMUSCULAR; INTRAVENOUS at 08:18

## 2020-01-31 RX ADMIN — DEXAMETHASONE SODIUM PHOSPHATE 4 MG: 4 INJECTION, SOLUTION INTRA-ARTICULAR; INTRALESIONAL; INTRAMUSCULAR; INTRAVENOUS; SOFT TISSUE at 07:32

## 2020-01-31 RX ADMIN — PROPOFOL 200 MG: 10 INJECTION, EMULSION INTRAVENOUS at 07:32

## 2020-01-31 ASSESSMENT — MIFFLIN-ST. JEOR: SCORE: 1536.35

## 2020-01-31 ASSESSMENT — COPD QUESTIONNAIRES: COPD: 1

## 2020-01-31 NOTE — ANESTHESIA PREPROCEDURE EVALUATION
Anesthesia Evaluation     . Pt has had prior anesthetic. Type: General    No history of anesthetic complications          ROS/MED HX    ENT/Pulmonary:     (+)Intermittent asthma Last exacerbation: Childhood,COPD, , . .    Neurologic:  - neg neurologic ROS     Cardiovascular:  - neg cardiovascular ROS       METS/Exercise Tolerance:     Hematologic: Comments: Transfusion, 2009    (+) Anemia, History of Transfusion no previous transfusion reaction -      Musculoskeletal:  - neg musculoskeletal ROS       GI/Hepatic: Comment: Crohn's Disease, S/P Right Hemicolectomy, Takedown of Enterocolonic Fistula, Lysis of Adhesions, Takedown of Colostomy, 2009    (+) Inflammatory bowel disease,       Renal/Genitourinary: Comment: S/P Cystoscopy, ESWL on 6/30/2013.    (+) Nephrolithiasis , Pt has no history of transplant,       Endo:  - neg endo ROS       Psychiatric:  - neg psychiatric ROS       Infectious Disease:  - neg infectious disease ROS       Malignancy:      - no malignancy   Other:    - neg other ROS               Procedure: Procedure(s):  CYSTOURETEROSCOPY, WITH RETROGRADE PYELOGRAM, HOLMIUM LASER LITHOTRIPSY OF URETERAL CALCULUS, AND STENT placement    HPI: 36 year old male with h/o nephrolithiasis requires anesthesia for Procedure(s):  CYSTOURETEROSCOPY, WITH RETROGRADE PYELOGRAM, HOLMIUM LASER LITHOTRIPSY OF URETERAL CALCULUS, AND STENT placement. PMH significant for Chrons disease s/p right hemicolectomy, intermittent asthma. No prior complications related to anesthesia.     HPI based on chart review.    PMH/PSH:  Past Medical History:   Diagnosis Date     Asthma     child     Crohn's disease (H)      History of blood transfusion      Kidney stones        Past Surgical History:   Procedure Laterality Date     COLOSTOMY  2007     COMBINED CYSTOSCOPY, RETROGRADES, URETEROSCOPY, INSERT STENT Right 5/3/2016    Procedure: COMBINED CYSTOSCOPY, RETROGRADES, URETEROSCOPY, INSERT STENT;  Surgeon: Azael Melvin MD;   [facility]     EXTRACORPOREAL SHOCK WAVE LITHOTRIPSY (ESWL)  6/27/2013    Procedure: EXTRACORPOREAL SHOCK WAVE LITHOTRIPSY (ESWL);  Left Extracorporeal Shock Wave Lithotripsy Kidney And Ureter;  Surgeon: Azael Melvin MD;  [facility] OR     ILEOSTOMY  2009     LAPAROTOMY EXPLORATORY  2009    extensive lysis of adhesions, revision of coloenteric fistula, repair of small bowel fistula, takedown of current colostomy, debridement of chronic abscess, resection of residual left colon, right hemicolectomy, Louise ileostomy     LASER HOLMIUM LITHOTRIPSY URETER(S), INSERT STENT, COMBINED  4/9/2014    Procedure: COMBINED CYSTOSCOPY, URETEROSCOPY, LASER HOLMIUM LITHOTRIPSY URETER(S), INSERT STENT;  Ureteroscopy, Cystoscopy, holmium laser,Right Uretral stent placement;  Surgeon: Azael Melvin MD;  [facility]     LASER HOLMIUM LITHOTRIPSY URETER(S), INSERT STENT, COMBINED Bilateral 5/17/2016    Procedure: COMBINED CYSTOSCOPY, URETEROSCOPY, LASER HOLMIUM LITHOTRIPSY URETER(S), INSERT STENT;  Surgeon: Azael Melvin MD;  Location[facility] OR       Adalimumab (HUMIRA PEN SC), Inject 40 mg Subcutaneous every 14 days   HYDROcodone-acetaminophen (NORCO) 5-325 MG tablet, Take 1 tablet by mouth every 6 hours as needed for severe pain  ibuprofen (ADVIL/MOTRIN) 600 MG tablet, Take 1 tablet (600 mg) by mouth every 6 hours as needed for moderate pain  mercaptopurine (PURINETHOL) 50 MG tablet, Take 100 mg by mouth daily   ondansetron (ZOFRAN-ODT) 4 MG ODT tab, Take 1 tablet (4 mg) by mouth every 8 hours as needed for nausea  tamsulosin (FLOMAX) 0.4 MG capsule, Take 1 capsule (0.4 mg) by mouth daily for 10 doses  VITAMIN D PO,     ceFAZolin (ANCEF) intermittent infusion 1 g (pre-mix)  ceFAZolin (ANCEF) intermittent infusion 2 g in 50 mL dextrose PREMIX  lactated ringers infusion  lidocaine (LMX4) kit  lidocaine 1 % 0.1-1 mL  sodium chloride (PF) 0.9% PF flush 3 mL  sodium chloride (PF) 0.9% PF flush 3  mL        SH:   Social History     Tobacco Use     Smoking status: Never Smoker     Smokeless tobacco: Never Used   Substance Use Topics     Alcohol use: Yes     Alcohol/week: 0.0 standard drinks     Comment: x1 a week       Allergies: No Known Allergies    NPO Status: Per ASA Guidelines  Physical Exam  Normal systems: pulmonary and dental    Airway   Mallampati: I  TM distance: >3 FB  Neck ROM: full    Dental     Cardiovascular   Rhythm and rate: regular and normal      Pulmonary                     Anesthesia Plan      History & Physical Review  History and physical reviewed and following examination; no interval change.    ASA Status:  2 .        Plan for General with Intravenous and Propofol induction. Maintenance will be Balanced.      Assessment/Plan:  - ASA 2  - GETA with standard ASA monitors, IV induction, balanced anesthetic  - PIV  - Antibiotics per surgery  - PONV prophylaxis        Postoperative Care      Consents  Anesthetic plan, risks, benefits and alternatives discussed with:  Patient or representative..                            .    THELMA FV AN PHYSICAL EXAM    Assessment:   ASA SCORE: 2    H&P: History and physical reviewed and following examination; no interval change.   Smoking Status:  Non-Smoker/Unknown   NPO Status: NPO Appropriate     Plan:   Anes. Type:  General   Pre-Medication: None   Induction:  IV (Standard)   Airway: LMA   Access/Monitoring: PIV   Maintenance: Balanced     Postop Plan:   Postop Pain: Opioids  Postop Sedation/Airway: Not planned  Disposition: Outpatient     PONV Management:   Adult Risk Factors:, Non-Smoker, Postop Opioids   Prevention: Ondansetron, Dexamethasone     CONSENT: Direct conversation   Plan and risks discussed with: Patient   Blood Products: Consent Deferred (Minimal Blood Loss)

## 2020-01-31 NOTE — OP NOTE
OPERATIVE REPORT    PREOPERATIVE DIAGNOSIS:  Right ureteral stone(s) and Right renal stones(s)    POSTOPERATIVE DIAGNOSIS: Same    PROCEDURES PERFORMED:   -Cystourethroscopy  -Right retrograde pyelogram with intraoperative interpretation of images  -Right ureteroscopy  -Right holmium laser lithotripsy  -Right basket stone retrieval  -Right ureteral stent placement    STAFF SURGEON: Bob Felix MD  RESIDENT(S) None    ANESTHESIA: General  ESTIMATED BLOOD LOSS: 1 ml  COMPLICATIONS: None.   SPECIMEN: Stone for analysis   URETERAL STENT(S):  - Right 6 x 26 cm double-J ureteral stent.  Reason for stenting: Other (travelling out of country tomorrow).      SIGNIFICANT FINDINGS:   -Stone free with no fragments on the right  -Right RPG notable for hydronephrosis      Target stone location:Both    Approximate target stone size: 5-10 mm    Laser used: Yes    Multiple stones treated: Yes      BRIEF OPERATIVE INDICATIONS: Leodan Collins is a(n) 39 year old male who presented with a 9 mm distal right UVJ stone.  After a discussion of all risks, benefits, and alternatives, the patient elected to proceed with definitive stone management. The patient understands the potential need for more than one procedure to eliminate all stone burden.      DESCRIPTION OF PROCEDURE:  After informed consent was obtained, the patient was transported to the operating room & placed supine on the table. After adequate anesthesia was induced, the patient was placed in lithotomy and prepped and draped in the usual sterile fashion. A timeout was taken to confirm correct patient, procedure and laterality. Pre-operative IV antibiotics were administered.     A 22-Romanian rigid cystoscope was inserted into a well-lubricated urethra. The urethra was unremarkable without stricture.     The right ureteral orifice was identified and cannulated with a sensor wire with the aid of a 5F open-ended catheter. The wire passed without resistance into the upper  pole.  The 5F open-ended catheter was advanced, the wire was removed and a retrograde pyelogram was performed revealing marked hydronephrosis.  The sensor wire was reintroduced and the 5F catheter was removed.    A semi-rigid ureteroscope was introduced and advanced up the ureter along side the sensor wire.  An approximately 9 mm stone was identified in the distal ureter.  A 200 micron laser fiber was used at a setting of 0.8 J and 8 Hz and lithotripsy was performed.  A tipless basket was used to remove all fragments greater than 2 mm.  Pullback ureteroscopy showed no residual stone fragments or significant- ureteral injury.    At this point we switched to the flexible ureteroscope which we passed adjacent to the wire and used to clear the remainder of the ureter.  We inspected the entirety of the kidney but did not see any stones.  We could see two areas of high density on fluoroscopy in a mid and lower pole but could not find these endoscopically even with meticulous mapping of each calyx.  We presume these represent nephrocalcinosis and could be present on post-op imaging.  Pullback ureteroscopy was unremarkable.,    A 6 x 26 cm double-J stent was advanced over the Sensor wire, and a good proximal curl was seen in the renal pelvis on fluoroscopy and the distal curl was seen in the bladder. A string was not left attached to the stent.  The bladder was drained.            The patient tolerated the procedure well and there were no apparent complications. The patient  was transported to the postanesthesia care unit in stable condition.        POSTOP PLAN:  Stent removal in one week when back from travelling    Disposable items opened at case start:  - 5F open ended catheter  - Sensor wire

## 2020-01-31 NOTE — ANESTHESIA POSTPROCEDURE EVALUATION
Anesthesia POST Procedure Evaluation    Patient: Leodan Collins   MRN:     [id number] Gender:   male   Age:    39 year old :      1980        Preoperative Diagnosis: Ureteral stone [N20.1]   Procedure(s):  CYSTOURETEROSCOPY, WITH RETROGRADE PYELOGRAM, HOLMIUM LASER LITHOTRIPSY OF URETERAL CALCULUS, AND STENT placement   Postop Comments: No value filed.       Anesthesia Type:  Not documented  General    Reportable Event: NO     PAIN: Uncomplicated   Sign Out status: Comfortable, Well controlled pain     PONV: No PONV   Sign Out status:  No Nausea or Vomiting     Neuro/Psych: Uneventful perioperative course   Sign Out Status: Preoperative baseline; Age appropriate mentation     Airway/Resp.: Uneventful perioperative course   Sign Out Status: Non labored breathing, age appropriate RR; Resp. Status within EXPECTED Parameters     CV: Uneventful perioperative course   Sign Out status: Appropriate BP and perfusion indices; Appropriate HR/Rhythm     Disposition:   Sign Out in:  Phase II  Disposition:  Home  Recovery Course: Uneventful  Follow-Up: Not required           Last Anesthesia Record Vitals:  CRNA VITALS  2020 0758 - 2020 0858      2020             Pulse:  (!) 49    SpO2:  97 %    Resp Rate (observed):  (!) 1          Last PACU Vitals:  Vitals Value Taken Time   /76 2020  9:00 AM   Temp 36  C (96.8  F) 2020  9:00 AM   Pulse 72 2020  9:00 AM   Resp 11 2020  9:01 AM   SpO2 100 % 2020  9:02 AM   Temp src     NIBP     Pulse     SpO2     Resp     Temp     Ht Rate     Temp 2     Vitals shown include unvalidated device data.      Electronically Signed By: Geoff Hardin MD, 2020, 12:41 PM

## 2020-01-31 NOTE — DISCHARGE INSTRUCTIONS
[facility]  Home Care Following Anesthesia  For 24 hours after surgery:  1. Get plenty of rest.  A responsible adult must stay with you for at least 24 hours after you leave the surgery center.  2. Do not drive or use heavy equipment.  If you have weakness or tingling, don't drive or use heavy equipment until this feeling goes away.   3. Do not drink alcohol.   4. Avoid strenuous or risky activities.  Ask for help when climbing stairs.  5. You may feel lightheaded.  IF so, sit for a few minutes before standing.  Have someone help you get up.   6. If you have nausea (feel sick to your stomach): Drink only clear liquids such as apple juice, ginger ale, broth or 7-Up.  Rest may also help.  Be sure to drink enough fluids.  Move to a regular diet as you feel able.   7. You may have a slight fever.  Call the doctor if your fever is over 100 F (37.7 C) (taken under the tongue) or lasts longer than 24 hours.  8. You may have a dry mouth, a sore throat, muscle aches or trouble sleeping. These should go away after 24 hours.  9. Do not make important or legal decisions.               Tips for taking pain medications  To get the best pain relief possible, remember these points:    Take pain medications as directed, before pain becomes severe.    Pain medication can upset your stomach: taking it with food may help.    Constipation is a common side effect of pain medication. Drink plenty of  fluids.    Eat foods high in fiber. Take a stool softener if recommended by your doctor or pharmacist.    Do not drink alcohol, drive or operate machinery while taking pain medications.    Ask about other ways to control pain, such as with heat, ice or relaxation.    Tylenol/Acetaminophen Consumption  To help encourage the safe use of acetaminophen, the makers of TYLENOL  have lowered the maximum daily dose for single-ingredient Extra Strength TYLENOL  (acetaminophen) products sold in the [place] from 8  pills per day (4,000 mg) to 6 pills per day (3,000 mg). The dosing interval has also changed from 2 pills every 4-6 hours to 2 pills every 6 hours.    If you feel your pain relief is insufficient, you may take Tylenol/Acetaminophen in addition to your narcotic pain medication.     Be careful not to exceed 3,000 mg of Tylenol/Acetaminophen in a 24 hour period from all sources.    If you are taking extra strength Tylenol/acetaminophen (500 mg), the maximum dose is 6 tablets in 24 hours.    If you are taking regular strength acetaminophen (325 mg), the maximum dose is 9 tablets in 24 hours.    Call a doctor for any of the followin. Signs of infection (fever, growing tenderness at the surgery site, a large amount of drainage or bleeding, severe pain, foul-smelling drainage, redness, swelling).  2. It has been over 8 to 10 hours since surgery and you are still not able to urinate (pass water).  3. Headache for over 24 hours.    Your doctor is:  Dr. Bob Felix, Prostate and Urology: [phone]                Or dial [phone] and ask for the resident on call for:  Prostate Urology  For emergency care, call the:  [facility]:  [phone] (TTY for hearing impaired: [phone])

## 2020-01-31 NOTE — PROGRESS NOTES
Met with patient and his spouse preop.  Still symptoamtic from right ureteral stone.  Reviewed iamging together.  WIll plan for right ureteroscopic stone treatmen.  Reviewed risks, benefits, alternatives.  Discussed possibility of needing staged treatment, residual stone, stent. Questions answered will work on stone prevention post-op given high risk for recurrence.  Not addressing left side at this time due to concern of travellling with 2 stents.

## 2020-01-31 NOTE — ANESTHESIA CARE TRANSFER NOTE
Patient: Leodan Collins    Procedure(s):  CYSTOURETEROSCOPY, WITH RETROGRADE PYELOGRAM, HOLMIUM LASER LITHOTRIPSY OF URETERAL CALCULUS, AND STENT placement    Diagnosis: Ureteral stone [N20.1]  Diagnosis Additional Information: No value filed.    Anesthesia Type:   General     Note:  Airway :Face Mask and Oral Airway  Patient transferred to:PACU  Comments: VSS/WNL. Resp adeq. with OAW in place.Handoff Report: Identifed the Patient, Identified the Reponsible Provider, Reviewed the pertinent medical history, Discussed the surgical course, Reviewed Intra-OP anesthesia mangement and issues during anesthesia, Set expectations for post-procedure period and Allowed opportunity for questions and acknowledgement of understanding      Vitals: (Last set prior to Anesthesia Care Transfer)    CRNA VITALS  1/31/2020 0758 - 1/31/2020 0833      1/31/2020             Pulse:  (!) 49    SpO2:  97 %    Resp Rate (observed):  (!) 1                Electronically Signed By: ARNIE Del Valle CRNA  January 31, 2020  8:33 AM

## 2020-01-31 NOTE — RESULT ENCOUNTER NOTE
Final urine culture report is NEGATIVE per [facility] Lab Result protocol.    If NEGATIVE result, no change in treatment, per [facility] ED Lab Result protocol.

## 2020-02-03 ENCOUNTER — PRE VISIT (OUTPATIENT)
Dept: UROLOGY | Facility: CLINIC | Age: 40
End: 2020-02-03

## 2020-02-03 NOTE — TELEPHONE ENCOUNTER
Reason for Visit: Stent removal, procedure 1/31/2020    Diagnosis: Right ureteral stone(s) and right renal stone(s)    Orders/Procedures/Records: Records available    Contact Patient: N/A    Rooming Requirements: Cystoscopy - stent removal      Magnolia Matamoros  02/03/20  11:35 AM

## 2020-02-04 ENCOUNTER — PATIENT OUTREACH (OUTPATIENT)
Dept: UROLOGY | Facility: CLINIC | Age: 40
End: 2020-02-04

## 2020-02-05 LAB
APPEARANCE STONE: NORMAL
COMPN STONE: NORMAL
NUMBER STONE: 1
SIZE STONE: NORMAL MM
WT STONE: 9 MG

## 2020-02-06 NOTE — PROGRESS NOTES
Urology Clinic    Bob Felix MD  Date of Service: 2020     Name: Leodan Collins  MRN: [id number]  Age: 39 year old  : 1980  Referring provider: Blane Worrell     Assessment and Plan: 38 yo M with rapidly recurrent stones secondary to ileostomy    Plan:  -Potassium citrate 10 meq TID (will try liquid version given crohns disease)  -Update litholink  -HIgh fluid diet  -Renal US at next visit  ______________________________________________________________________    HPI  Leodan Collins is a 39 year old male with a history of chron's disease s/p colectomy and ileostomy in , kidney stones and recurrent ureteral stones s/p multiple surgeries by Dr. Melvin who managed well without surgery between 7616-7478, until he was seen in the ED in  2018 with flank pain. Imaging at that time was significant for right 6 mm ureteral stone, at least 2 right kidney stones and 6 nonobstructing left kidney stones. He was discharged with a conservative management plan and stayed on that until he presented to the ED on 2020 with 8 mm obstructing right UVJ stone. He underwent right cystoureteroscopy with lithotripsy on 2020 and presents today for follow-up.     He is doing well with his stent, anticipated symptoms but overall minimal bother.     After obtaining informed consent and administering preoperative antibiotics the patient was prepped and draped in the standard sterile fashion.    The 15F flexible cystoscope was placed through the urethra and into the bladder.    The distal curl of the previously placed stent was identified and grasped with a flexible grasping forceps.     It was pulled from the kidney and out the bladder fully intact.    The patient tolerated the procedure well.     Stone History is as Follows:  First stone: Per chart review 10/2010 UVJ stone   Number of stone surgeries: L ESWL with Dr. Melvin in 2013, right ureteroscopy with lithotripsy by Dr. Melvin in  04/2014, bilateral ureteroscopy with lithotripsy by Dr. Melvin in 05/2016,   Number of stones passed spontaneously: multiple   History of UTI: no  Prior Stone Analysis: calcium oxalate dihydrate, and calcium phosphate   Family History Nephrolithasis: no  Stone Risk Factors: chron's disease, multiple abdominal surgeries   Prior Metabolic Workup: 10 years ago    Review of Systems:   Pertinent items are noted in HPI or as below, remainder of complete ROS is negative.      Physical Exam:   Patient is a 39 year old  male   Vitals: There were no vitals taken for this visit.  Notable Findings on Exam: Well-nourished male in no apparent distress     Laboratory:   I personally reviewed all applicable laboratory data and went over findings with patient  Significant for:    CBC RESULTS:  Recent Labs   Lab Test 01/29/20  1845 11/04/19  1553 07/15/19  1634 03/18/19  1546   WBC 10.4 7.6 8.9 6.1   HGB 16.0 16.0 14.8 15.6    312 290 307        BMP RESULTS:  Recent Labs   Lab Test 01/29/20  1845 11/11/19  1530 06/20/19  0909 03/18/19  1546    141 141 140   POTASSIUM 4.1 3.8 3.7 4.0   CHLORIDE 107 106 107 106   CO2 28 28 26 27   ANIONGAP 5 7 8 7   * 83 78 97   BUN 23 20 18 17   CR 0.99 0.98 0.92 0.91   GFRESTIMATED >90 >90 >90 >90   GFRESTBLACK >90 >90 >90 >90   LAURA 9.2 9.6 9.4 9.1       UA RESULTS:   Recent Labs   Lab Test 01/29/20  1845 06/20/19  0924 01/24/19  1043   SG 1.023 >1.030 1.020   URINEPH 5.5 5.0 6.0   NITRITE Negative Negative Negative   RBCU >182* 5-10* >182*   WBCU 11* 5-10* 11*       CALCIUM RESULTS  Lab Results   Component Value Date    LAURA 9.2 01/29/2020    LAURA 9.6 11/11/2019    LAURA 9.4 06/20/2019     Imaging:   I personally reviewed all applicable imaging and went over the below findings with patient.    CT ABDOMEN AND PELVIS WITHOUT CONTRAST   1/29/2020 8:06 PM      HISTORY: Right flank pain.     TECHNIQUE: Volumetric helical sections were acquired from the lung  bases through the ischial  tuberosities without IV contrast. Coronal  images were also reconstructed. Radiation dose for this scan was  reduced using automated exposure control, adjustment of the mA and/or  kV according to patient size, or iterative reconstruction technique.     COMPARISON: 1/24/2019.     FINDINGS: A 0.8 cm obstructing stone at the right ureterovesical  junction causes moderate right hydronephrosis. There are two  nonobstructing stones in the right kidney, with the largest in the  lower pole measuring 0.5 cm. There are at least five nonobstructing  stones in the left kidney, with the largest in the lower pole  measuring 0.6 cm. No ureteral calculi or hydronephrosis on the left.     The liver, gallbladder, spleen, adrenal glands, and pancreas have  unremarkable noncontrast appearances. Postoperative changes of  colectomy and right lower quadrant ileostomy are noted. No bowel  obstruction. No free fluid in the pelvis. No free intraperitoneal air.  A 0.5 cm groundglass nodule in the left lower lobe laterally (series 2  image 6) is unchanged. A 0.3 cm pulmonary nodule in the left lower  lobe anteriorly and laterally is also unchanged. The visualized lung  bases are otherwise clear.                                                                      IMPRESSION:   1. Obstructing 0.8 cm stone at the right ureterovesical junction  causes moderate right hydronephrosis. This obstructing stone was  likely previously present at the right ureteropelvic junction.  2. Multiple nonobstructing stones are again noted in both kidneys.             NABOR WATTS MD    Scribe Disclosure:  Sofie SILVA, am serving as a scribe to document services personally performed by Nabor Felix MD at this visit, based upon the provider's statements to me. All documentation has been reviewed by the aforementioned provider prior to being entered into the official medical record.     Sofie SILVA, a scribe, prepared the chart for  today's encounter.

## 2020-02-11 ENCOUNTER — OFFICE VISIT (OUTPATIENT)
Dept: UROLOGY | Facility: CLINIC | Age: 40
End: 2020-02-11
Payer: COMMERCIAL

## 2020-02-11 VITALS
SYSTOLIC BLOOD PRESSURE: 169 MMHG | HEART RATE: 90 BPM | BODY MASS INDEX: 22.76 KG/M2 | DIASTOLIC BLOOD PRESSURE: 95 MMHG | WEIGHT: 145 LBS | HEIGHT: 67 IN

## 2020-02-11 DIAGNOSIS — N20.1 URETERAL STONE: Primary | ICD-10-CM

## 2020-02-11 DIAGNOSIS — N20.0 KIDNEY STONE: ICD-10-CM

## 2020-02-11 RX ORDER — CIPROFLOXACIN 500 MG/1
500 TABLET, FILM COATED ORAL ONCE
Status: COMPLETED | OUTPATIENT
Start: 2020-02-11 | End: 2020-02-11

## 2020-02-11 RX ORDER — LIDOCAINE HYDROCHLORIDE 20 MG/ML
JELLY TOPICAL ONCE
Status: COMPLETED | OUTPATIENT
Start: 2020-02-11 | End: 2020-02-11

## 2020-02-11 RX ADMIN — CIPROFLOXACIN 500 MG: 500 TABLET, FILM COATED ORAL at 11:01

## 2020-02-11 RX ADMIN — LIDOCAINE HYDROCHLORIDE: 20 JELLY TOPICAL at 11:01

## 2020-02-11 ASSESSMENT — PAIN SCALES - GENERAL
PAINLEVEL: MILD PAIN (2)
PAINLEVEL: MILD PAIN (2)

## 2020-02-11 ASSESSMENT — MIFFLIN-ST. JEOR: SCORE: 1536.35

## 2020-02-11 NOTE — PATIENT INSTRUCTIONS
Follow up with Dr. Felix in 1 month with renal US prior.      It was a pleasure meeting with you today.  Thank you for allowing me and my team the privilege of caring for you today.  YOU are the reason we are here, and I truly hope we provided you with the excellent service you deserve.  Please let us know if there is anything else we can do for you so that we can be sure you are leaving completely satisfied with your care experience.

## 2020-02-11 NOTE — NURSING NOTE
Chief Complaint   Patient presents with     Cystoscopy     Stent removal       There were no vitals taken for this visit. There is no height or weight on file to calculate BMI.    Patient Active Problem List   Diagnosis     Kidney stones     Crohn's disease (H)     CARDIOVASCULAR SCREENING; LDL GOAL LESS THAN 160     Hydronephrosis with renal and ureteral calculous obstruction     Ureteral calculus     Anal fistula     Ureteral stone       No Known Allergies    Current Outpatient Medications   Medication Sig Dispense Refill     Adalimumab (HUMIRA PEN SC) Inject 40 mg Subcutaneous every 14 days        HYDROcodone-acetaminophen (NORCO) 5-325 MG tablet Take 1 tablet by mouth every 6 hours as needed for severe pain 10 tablet 0     ibuprofen (ADVIL/MOTRIN) 600 MG tablet Take 1 tablet (600 mg) by mouth every 6 hours as needed for moderate pain 30 tablet 0     mercaptopurine (PURINETHOL) 50 MG tablet Take 100 mg by mouth daily        nitroFURantoin macrocrystal-monohydrate (MACROBID) 100 MG capsule Take 1 capsule (100 mg) by mouth 2 times daily 14 capsule 0     ondansetron (ZOFRAN-ODT) 4 MG ODT tab Take 1 tablet (4 mg) by mouth every 8 hours as needed for nausea 15 tablet 0     tamsulosin (FLOMAX) 0.4 MG capsule Take 1 capsule (0.4 mg) by mouth daily 10 capsule 0     VITAMIN D PO          Social History     Tobacco Use     Smoking status: Never Smoker     Smokeless tobacco: Never Used   Substance Use Topics     Alcohol use: Yes     Alcohol/week: 0.0 standard drinks     Comment: x1 a week     Drug use: No       Invasive Procedure Safety Checklist:    Procedure: Cystoscopy - stent removal    Action: Complete sections and checkboxes as appropriate.    Pre-procedure:  1. Patient ID Verified with 2 identifiers (Perla and  or MRN) : YES    2. Procedure and site verified with patient/designee (when able) : YES    3. Accurate consent documentation in medical record : YES    4. H&P (or appropriate assessment) documented in  medical record : N/A  H&P must be up to 30 days prior to procedure an updated within 24 hours of                 Procedure as applicable.     5. Relevant diagnostic and radiology test results appropriately labeled and displayed as applicable : YES    6. Blood products, implants, devices, and/or special equipment available for the procedure as applicable : YES    7. Procedure site(s) marked with provider initials [Exclusions: none] : NO    8. Marking not required. Reason : Yes  Procedure does not require site marking    Time Out:     Time-Out performed immediately prior to starting procedure, including verbal and active participation of all team members addressing: YES    1. Correct patient identity.  2. Confirmed that the correct side and site are marked.  3. An accurate procedure to be done.  4. Agreement on the procedure to be done.  5. Correct patient position.  6. Relevant images and results are properly labeled and appropriately displayed.  7. The need to administer antibiotics or fluids for irrigation purposes during the procedure as applicable.  8. Safety precautions based on patient history or medication use.    During Procedure: Verification of correct person, site, and procedure occurs any time the responsibility for care of the patient is transferred to another member of the care team.    The following medication was given:     MEDICATION:  Lidocaine without epinephrine 2% jelly  ROUTE: Urethral  SITE: Urethra via the meatus  DOSE: 10 mL  LOT #: [id number]  : [employer]  EXPIRATION DATE:   NDC#: [address]-86   Was there drug waste? No    Prior to med admin, verified patient identity using patient's name and date of birth.  Due to med administration, patient instructed to remain in clinic for 15 minutes  afterwards, and to report any adverse reaction to me immediately.    Drug Amount Wasted:  None.  Vial/Syringe: Syringe     The following medication was given:      MEDICATION:  Ciprofloxacin  ROUTE: PO  SITE: Medication was given orally   DOSE: 500 mg  LOT #: [id number]  : [employer]  EXPIRATION DATE: 08/2021  NDC#: 75052 0701 12   Was there drug waste? No    Prior to med admin, verified patient identity using patient's name and date of birth.  Due to med administration, patient instructed to remain in clinic for 15 minutes  afterwards, and to report any adverse reaction to me immediately.    Drug Amount Wasted:  None.        Magnolia Matamoros  2/11/2020  10:39 AM

## 2020-02-11 NOTE — LETTER
2020       RE: Leodan Collins  [address]  [facility] [address]     Dear Colleague,    Thank you for referring your patient, Leodan Collins, to the [facility] UROLOGIC [facility] at [facility]. Please see a copy of my visit note below.      Urology Clinic    Bob Felix MD  Date of Service: 2020     Name: Leodan Collins  MRN: [id number]  Age: 39 year old  : 1980  Referring provider: Blane Worrell     Assessment and Plan: 40 yo M with rapidly recurrent stones secondary to ileostomy    Plan:  -Potassium citrate 10 meq TID (will try liquid version given crohns disease)  -Update litholink  -HIgh fluid diet  -Renal US at next visit  ______________________________________________________________________    HPI  Leodan Collins is a 39 year old male with a history of chron's disease s/p colectomy and ileostomy in , kidney stones and recurrent ureteral stones s/p multiple surgeries by Dr. Melvin who managed well without surgery between 2422-6204, until he was seen in the ED in  2018 with flank pain. Imaging at that time was significant for right 6 mm ureteral stone, at least 2 right kidney stones and 6 nonobstructing left kidney stones. He was discharged with a conservative management plan and stayed on that until he presented to the ED on 2020 with 8 mm obstructing right UVJ stone. He underwent right cystoureteroscopy with lithotripsy on 2020 and presents today for follow-up.     He is doing well with his stent, anticipated symptoms but overall minimal bother.     After obtaining informed consent and administering preoperative antibiotics the patient was prepped and draped in the standard sterile fashion.    The 15F flexible cystoscope was placed through the urethra and into the bladder.    The distal curl of the previously placed stent was identified and grasped with a flexible grasping  forceps.     It was pulled from the kidney and out the bladder fully intact.    The patient tolerated the procedure well.     Stone History is as Follows:  First stone: Per chart review 10/2010 UVJ stone   Number of stone surgeries: L ESWL with Dr. Melvin in 06/2013, right ureteroscopy with lithotripsy by Dr. Melvin in 04/2014, bilateral ureteroscopy with lithotripsy by Dr. Melvin in 05/2016,   Number of stones passed spontaneously: multiple   History of UTI: no  Prior Stone Analysis: calcium oxalate dihydrate, and calcium phosphate   Family History Nephrolithasis: no  Stone Risk Factors: chron's disease, multiple abdominal surgeries   Prior Metabolic Workup: 10 years ago    Review of Systems:   Pertinent items are noted in HPI or as below, remainder of complete ROS is negative.      Physical Exam:   Patient is a 39 year old  male   Vitals: There were no vitals taken for this visit.  Notable Findings on Exam: Well-nourished male in no apparent distress     Laboratory:   I personally reviewed all applicable laboratory data and went over findings with patient  Significant for:    CBC RESULTS:  Recent Labs   Lab Test 01/29/20  1845 11/04/19  1553 07/15/19  1634 03/18/19  1546   WBC 10.4 7.6 8.9 6.1   HGB 16.0 16.0 14.8 15.6    312 290 307        BMP RESULTS:  Recent Labs   Lab Test 01/29/20  1845 11/11/19  1530 06/20/19  0909 03/18/19  1546    141 141 140   POTASSIUM 4.1 3.8 3.7 4.0   CHLORIDE 107 106 107 106   CO2 28 28 26 27   ANIONGAP 5 7 8 7   * 83 78 97   BUN 23 20 18 17   CR 0.99 0.98 0.92 0.91   GFRESTIMATED >90 >90 >90 >90   GFRESTBLACK >90 >90 >90 >90   LAURA 9.2 9.6 9.4 9.1       UA RESULTS:   Recent Labs   Lab Test 01/29/20  1845 06/20/19  0924 01/24/19  1043   SG 1.023 >1.030 1.020   URINEPH 5.5 5.0 6.0   NITRITE Negative Negative Negative   RBCU >182* 5-10* >182*   WBCU 11* 5-10* 11*       CALCIUM RESULTS  Lab Results   Component Value Date    LAURA 9.2 01/29/2020    LAURA 9.6  11/11/2019    LAURA 9.4 06/20/2019     Imaging:   I personally reviewed all applicable imaging and went over the below findings with patient.    CT ABDOMEN AND PELVIS WITHOUT CONTRAST   1/29/2020 8:06 PM      HISTORY: Right flank pain.     TECHNIQUE: Volumetric helical sections were acquired from the lung  bases through the ischial tuberosities without IV contrast. Coronal  images were also reconstructed. Radiation dose for this scan was  reduced using automated exposure control, adjustment of the mA and/or  kV according to patient size, or iterative reconstruction technique.     COMPARISON: 1/24/2019.     FINDINGS: A 0.8 cm obstructing stone at the right ureterovesical  junction causes moderate right hydronephrosis. There are two  nonobstructing stones in the right kidney, with the largest in the  lower pole measuring 0.5 cm. There are at least five nonobstructing  stones in the left kidney, with the largest in the lower pole  measuring 0.6 cm. No ureteral calculi or hydronephrosis on the left.     The liver, gallbladder, spleen, adrenal glands, and pancreas have  unremarkable noncontrast appearances. Postoperative changes of  colectomy and right lower quadrant ileostomy are noted. No bowel  obstruction. No free fluid in the pelvis. No free intraperitoneal air.  A 0.5 cm groundglass nodule in the left lower lobe laterally (series 2  image 6) is unchanged. A 0.3 cm pulmonary nodule in the left lower  lobe anteriorly and laterally is also unchanged. The visualized lung  bases are otherwise clear.                                                                      IMPRESSION:   1. Obstructing 0.8 cm stone at the right ureterovesical junction  causes moderate right hydronephrosis. This obstructing stone was  likely previously present at the right ureteropelvic junction.  2. Multiple nonobstructing stones are again noted in both kidneys.             NABOR WATTS MD    Scribe Disclosure:  ISofie am  serving as a scribe to document services personally performed by Bob Felix MD at this visit, based upon the provider's statements to me. All documentation has been reviewed by the aforementioned provider prior to being entered into the official medical record.     I, Sofie Mcmullen, a scribe, prepared the chart for today's encounter.       Again, thank you for allowing me to participate in the care of your patient.      Sincerely,    Bob Felix MD

## 2020-02-17 ENCOUNTER — TELEPHONE (OUTPATIENT)
Dept: UROLOGY | Facility: CLINIC | Age: 40
End: 2020-02-17

## 2020-02-17 ENCOUNTER — PRE VISIT (OUTPATIENT)
Dept: UROLOGY | Facility: CLINIC | Age: 40
End: 2020-02-17

## 2020-02-17 DIAGNOSIS — N20.0 KIDNEY STONE: Primary | ICD-10-CM

## 2020-02-17 NOTE — TELEPHONE ENCOUNTER
Patient would like for us to fax his potassium prescription to [facility] pharmacy to see if they have this medication in stock. Since [facility] is out of the medication.          Ryley Valiente MA

## 2020-02-17 NOTE — TELEPHONE ENCOUNTER
Reason for Visit: Review imaging    Diagnosis: Ureteral stone    Orders/Procedures/Records: Records available    Contact Patient: N/A    Rooming Requirements: Normal      Magnolia Matamoros  02/17/20  2:50 PM

## 2020-02-17 NOTE — TELEPHONE ENCOUNTER
[facility] Call Center    Phone Message    May a detailed message be left on voicemail: yes     Reason for Call: Medication Question or concern regarding medication   Prescription Clarification  Name of Medication: potassium citrate-citric acid (POLYCITRA-K/CYTRA K) 0289-9489 MG Packet    Prescribing Provider: Bob Felix MD   Pharmacy: [id number] - [place], [place] - [address]   What on the order needs clarification? The Pharmacy was unable to supply the medication requested.  They would like to know what substitute medication the Doctor would recommend.  Please resend with substitute          Action Taken: Message routed to:  [facility] urology    Travel Screening: Not Applicable

## 2020-02-19 ENCOUNTER — TELEPHONE (OUTPATIENT)
Dept: UROLOGY | Facility: CLINIC | Age: 40
End: 2020-02-19

## 2020-02-19 NOTE — TELEPHONE ENCOUNTER
[facility] pharmacy also unable to obtain Cytra-k Packets. Would the liquid be acceptable? Please send new Rx if appropriate.    Thank you,  Kim Banks, PharmD  [facility]  [phone]

## 2020-02-20 DIAGNOSIS — N20.0 KIDNEY STONE: Primary | ICD-10-CM

## 2020-02-20 RX ORDER — POTASSIUM CITRATE 10 MEQ/1
10 TABLET, EXTENDED RELEASE ORAL
Qty: 90 TABLET | Refills: 3 | Status: SHIPPED | OUTPATIENT
Start: 2020-02-20 | End: 2020-04-23

## 2020-02-20 NOTE — TELEPHONE ENCOUNTER
[facility] Call Center    Phone Message    May a detailed message be left on voicemail: no     Reason for Call: Other: Brittney called again regarding initial message below, please contact or send new script.      Action Taken: Message routed to:  [facility] Urology     Travel Screening: Not Applicable

## 2020-02-25 ENCOUNTER — TRANSFERRED RECORDS (OUTPATIENT)
Dept: HEALTH INFORMATION MANAGEMENT | Facility: CLINIC | Age: 40
End: 2020-02-25

## 2020-03-02 DIAGNOSIS — K50.80 CROHN'S DISEASE OF BOTH SMALL AND LG INT W/O COMPLICATIONS (H): ICD-10-CM

## 2020-03-02 LAB
ALBUMIN SERPL-MCNC: 3.9 G/DL (ref 3.4–5)
ALP SERPL-CCNC: 41 U/L (ref 40–150)
ALT SERPL W P-5'-P-CCNC: 43 U/L (ref 0–70)
AST SERPL W P-5'-P-CCNC: 25 U/L (ref 0–45)
BASOPHILS # BLD AUTO: 0 10E9/L (ref 0–0.2)
BASOPHILS NFR BLD AUTO: 0.4 %
BILIRUB DIRECT SERPL-MCNC: 0.3 MG/DL (ref 0–0.2)
BILIRUB SERPL-MCNC: 2.2 MG/DL (ref 0.2–1.3)
DIFFERENTIAL METHOD BLD: NORMAL
EOSINOPHIL # BLD AUTO: 0.2 10E9/L (ref 0–0.7)
EOSINOPHIL NFR BLD AUTO: 3.3 %
ERYTHROCYTE [DISTWIDTH] IN BLOOD BY AUTOMATED COUNT: 14 % (ref 10–15)
HCT VFR BLD AUTO: 44.2 % (ref 40–53)
HGB BLD-MCNC: 14.6 G/DL (ref 13.3–17.7)
IMM GRANULOCYTES # BLD: 0 10E9/L (ref 0–0.4)
IMM GRANULOCYTES NFR BLD: 0.2 %
LYMPHOCYTES # BLD AUTO: 1.9 10E9/L (ref 0.8–5.3)
LYMPHOCYTES NFR BLD AUTO: 34.6 %
MCH RBC QN AUTO: 32.4 PG (ref 26.5–33)
MCHC RBC AUTO-ENTMCNC: 33 G/DL (ref 31.5–36.5)
MCV RBC AUTO: 98 FL (ref 78–100)
MONOCYTES # BLD AUTO: 0.4 10E9/L (ref 0–1.3)
MONOCYTES NFR BLD AUTO: 6.6 %
NEUTROPHILS # BLD AUTO: 3 10E9/L (ref 1.6–8.3)
NEUTROPHILS NFR BLD AUTO: 54.9 %
NRBC # BLD AUTO: 0 10*3/UL
NRBC BLD AUTO-RTO: 0 /100
PLATELET # BLD AUTO: 302 10E9/L (ref 150–450)
PROT SERPL-MCNC: 7.5 G/DL (ref 6.8–8.8)
RBC # BLD AUTO: 4.5 10E12/L (ref 4.4–5.9)
WBC # BLD AUTO: 5.5 10E9/L (ref 4–11)

## 2020-03-02 PROCEDURE — 36415 COLL VENOUS BLD VENIPUNCTURE: CPT | Performed by: INTERNAL MEDICINE

## 2020-03-02 PROCEDURE — 85025 COMPLETE CBC W/AUTO DIFF WBC: CPT | Performed by: INTERNAL MEDICINE

## 2020-03-02 PROCEDURE — 80076 HEPATIC FUNCTION PANEL: CPT | Performed by: INTERNAL MEDICINE

## 2020-03-10 ENCOUNTER — OFFICE VISIT (OUTPATIENT)
Dept: UROLOGY | Facility: CLINIC | Age: 40
End: 2020-03-10
Payer: COMMERCIAL

## 2020-03-10 ENCOUNTER — ANCILLARY PROCEDURE (OUTPATIENT)
Dept: ULTRASOUND IMAGING | Facility: CLINIC | Age: 40
End: 2020-03-10
Attending: UROLOGY
Payer: COMMERCIAL

## 2020-03-10 VITALS
BODY MASS INDEX: 22.76 KG/M2 | WEIGHT: 145 LBS | HEART RATE: 61 BPM | HEIGHT: 67 IN | DIASTOLIC BLOOD PRESSURE: 85 MMHG | SYSTOLIC BLOOD PRESSURE: 151 MMHG

## 2020-03-10 DIAGNOSIS — N20.0 KIDNEY STONE: ICD-10-CM

## 2020-03-10 DIAGNOSIS — N20.0 KIDNEY STONE: Primary | ICD-10-CM

## 2020-03-10 RX ORDER — POTASSIUM CITRATE 10 MEQ/1
20 TABLET, EXTENDED RELEASE ORAL 2 TIMES DAILY
Qty: 120 TABLET | Refills: 11 | Status: SHIPPED | OUTPATIENT
Start: 2020-03-10 | End: 2021-10-01

## 2020-03-10 RX ORDER — CHLORTHALIDONE 25 MG/1
25 TABLET ORAL DAILY
Qty: 30 TABLET | Refills: 11 | Status: SHIPPED | OUTPATIENT
Start: 2020-03-10 | End: 2021-12-10

## 2020-03-10 ASSESSMENT — PAIN SCALES - GENERAL: PAINLEVEL: NO PAIN (0)

## 2020-03-10 ASSESSMENT — MIFFLIN-ST. JEOR: SCORE: 1536.35

## 2020-03-10 NOTE — PROGRESS NOTES
Urology Clinic    Bob Felix MD  Date of Service: 03/10/2020     Name: Leodan Collins  MRN: [id number]  Age: 39 year old  : 1980  Referring provider: Blane Worrell     Assessment:  Leodan Collins  is a 39 year old male with a history of chron's disease s/p colectomy and ileostomy in , kidney stones and recurrent ureteral stones s/p multiple surgeries who presents for follow-up. Personally reviewed and discussed renal US which is assuring for no new stones. Reviewed results from Litholink as discussed below.     Plan:  -There was also evidence of high urine calcium, though low dietary salt level. Start on chlorthalidone.Discussed possible side effects. Will check potassium levels in a few weeks.   -Evidence of low urine volume, likely secondary to ileostomy. Encouraged him to increase his fluid intake to about 3L daily.   -Evidence of low urine citrate. Will increase his potassium citrate to 20 mEq BID.   -Repeat Litholink after about a week after starting new techniques stated above.   ______________________________________________________________________    HPI  Leodan Collins is a 39 year old male with a history of chron's disease s/p colectomy and ileostomy in , kidney stones and recurrent ureteral stones s/p multiple surgeries by Dr. Melvin who managed well without surgery between 9431-0571, until he was seen in the ED in 2018 with flank pain. Imaging at that time was significant for right 6 mm ureteral stone, at least 2 right kidney stones and 6 nonobstructing left kidney stones. He was discharged with a conservative management plan and stayed on that until he presented to the ED on 2020 with 8 mm obstructing right UVJ stone. He underwent right cystoureteroscopy with lithotripsy on 2020 and presents today for follow-up.     He started the potassium citrate is tolerating well. He was on these for 3 days prior to Litholink.     Stone History is as Follows:  First  "stone: Per chart review 10/2010 UVJ stone   Number of stone surgeries: L ESWL with Dr. Melvin in 06/2013, right ureteroscopy with lithotripsy by Dr. Melvin in 04/2014, bilateral ureteroscopy with lithotripsy by Dr. Melvin in 05/2016,   Number of stones passed spontaneously: multiple   History of UTI: no  Prior Stone Analysis: calcium oxalate dihydrate, and calcium phosphate   Family History Nephrolithasis: no  Stone Risk Factors: chron's disease, multiple abdominal surgeries   Prior Metabolic Workup: 02/2019; borderline low urine volume (1.53 L/day), increased CR excretion, high urine calcium(266 mg/day), low urine citrate (328 mg/day).     Review of Systems:   Pertinent items are noted in HPI or as below, remainder of complete ROS is negative.      Physical Exam:   Patient is a 39 year old  male   Vitals: Blood pressure (!) 151/85, pulse 61, height 1.702 m (5' 7\"), weight 65.8 kg (145 lb).  Notable Findings on Exam: Well-nourished male in no apparent distress     Laboratory:   I personally reviewed all applicable laboratory data and went over findings with patient  Significant for:    CBC RESULTS:  Recent Labs   Lab Test 03/02/20  0952 01/29/20  1845 11/04/19  1553 07/15/19  1634   WBC 5.5 10.4 7.6 8.9   HGB 14.6 16.0 16.0 14.8    316 312 290        BMP RESULTS:  Recent Labs   Lab Test 01/29/20  1845 11/11/19  1530 06/20/19  0909 03/18/19  1546    141 141 140   POTASSIUM 4.1 3.8 3.7 4.0   CHLORIDE 107 106 107 106   CO2 28 28 26 27   ANIONGAP 5 7 8 7   * 83 78 97   BUN 23 20 18 17   CR 0.99 0.98 0.92 0.91   GFRESTIMATED >90 >90 >90 >90   GFRESTBLACK >90 >90 >90 >90   LARUA 9.2 9.6 9.4 9.1       UA RESULTS:   Recent Labs   Lab Test 01/29/20  1845 06/20/19  0924 01/24/19  1043   SG 1.023 >1.030 1.020   URINEPH 5.5 5.0 6.0   NITRITE Negative Negative Negative   RBCU >182* 5-10* >182*   WBCU 11* 5-10* 11*       CALCIUM RESULTS  Lab Results   Component Value Date    LAURA 9.2 01/29/2020    LAURA 9.6 " 11/11/2019    LAURA 9.4 06/20/2019     Imaging:   I personally reviewed the following imaging studies, interpreted them, and discussed findings with the patient.     Results for orders placed or performed in visit on 03/10/20   US Renal Complete    Narrative    EXAMINATION: US RENAL COMPLETE, 3/10/2020 2:25 PM     COMPARISON: CT 1/29/2020.    HISTORY: Kidney stone    TECHNIQUE: The kidneys and bladder were scanned in the standard  fashion with specialized ultrasound transducer(s) using both gray  scale and limited color/spectral Doppler techniques.    FINDINGS:    Right kidney: Measures 11.8 cm in length. Parenchyma is of normal  thickness and echogenicity. No focal mass. No hydronephrosis. 5 mm  echogenic focus in the right kidney upper pole, most likely  representing a nonobstructive minor calyceal stone.    Left kidney: Measures 11 cm in length. Parenchyma is of normal  thickness and echogenicity. 6 x 4 x 2 mm stone with twinkling artifact  in the left kidney lower pole without any obstruction. No focal mass.  No hydronephrosis.     Bladder: Partially distended. Grossly unremarkable..      Impression    IMPRESSION: Bilateral nephrolithiasis without hydronephrosis.    I have personally reviewed the examination and initial interpretation  and I agree with the findings.    TONI GREENBERG MD     Scribe Disclosure:  I, Sofie Mcmullen, am serving as a scribe to document services personally performed by Bob Felix MD at this visit, based upon the provider's statements to me. All documentation has been reviewed by the aforementioned provider prior to being entered into the official medical record.

## 2020-03-10 NOTE — PATIENT INSTRUCTIONS
Please complete Litholink test and then follow up with Dr. Felix in 6 weeks.     It was a pleasure meeting with you today.  Thank you for allowing me and my team the privilege of caring for you today.  YOU are the reason we are here, and I truly hope we provided you with the excellent service you deserve.  Please let us know if there is anything else we can do for you so that we can be sure you are leaving completely satisfied with your care experience.      Mike Olivia, EMT

## 2020-03-10 NOTE — NURSING NOTE
"Chief Complaint   Patient presents with     Follow Up     Review imaging       Blood pressure (!) 151/85, pulse 61, height 1.702 m (5' 7\"), weight 65.8 kg (145 lb). Body mass index is 22.71 kg/m .    Patient Active Problem List   Diagnosis     Kidney stones     Crohn's disease (H)     CARDIOVASCULAR SCREENING; LDL GOAL LESS THAN 160     Hydronephrosis with renal and ureteral calculous obstruction     Ureteral calculus     Anal fistula     Ureteral stone       No Known Allergies    Current Outpatient Medications   Medication Sig Dispense Refill     Adalimumab (HUMIRA PEN SC) Inject 40 mg Subcutaneous every 14 days        HYDROcodone-acetaminophen (NORCO) 5-325 MG tablet Take 1 tablet by mouth every 6 hours as needed for severe pain 10 tablet 0     ibuprofen (ADVIL/MOTRIN) 600 MG tablet Take 1 tablet (600 mg) by mouth every 6 hours as needed for moderate pain 30 tablet 0     mercaptopurine (PURINETHOL) 50 MG tablet Take 100 mg by mouth daily        ondansetron (ZOFRAN-ODT) 4 MG ODT tab Take 1 tablet (4 mg) by mouth every 8 hours as needed for nausea 15 tablet 0     potassium citrate 10 MEQ (1080 MG) PO CR tablet Take 1 tablet (10 mEq) by mouth 3 times daily (with meals) 90 tablet 3     potassium citrate-citric acid (CYTRA K) 4319-1791 MG Packet Sig-Route: take 1 packet by mouth daily-Oral 100 packet 3     potassium citrate-citric acid (POLYCITRA-K/CYTRA K) 0641-2932 MG Packet Take 1 packet by mouth daily 100 packet 3     tamsulosin (FLOMAX) 0.4 MG capsule Take 1 capsule (0.4 mg) by mouth daily 10 capsule 0     VITAMIN D PO        nitroFURantoin macrocrystal-monohydrate (MACROBID) 100 MG capsule Take 1 capsule (100 mg) by mouth 2 times daily (Patient not taking: Reported on 3/10/2020) 14 capsule 0       Social History     Tobacco Use     Smoking status: Never Smoker     Smokeless tobacco: Never Used   Substance Use Topics     Alcohol use: Yes     Alcohol/week: 0.0 standard drinks     Comment: x1 a week     Drug use: No "       Magnolia Matamoros, EMT  3/10/2020  3:08 PM

## 2020-03-10 NOTE — LETTER
3/10/2020       RE: Leodan Collins  [address]  [facility] [address]     Dear Colleague,    Thank you for referring your patient, Leodan Collins, to the [facility] FOR PROSTATE AND UROLOGIC CANCERS at [facility]. Please see a copy of my visit note below.      Urology Clinic    Bob Felix MD  Date of Service: 03/10/2020     Name: Leodan Collins  MRN: [id number]  Age: 39 year old  : 1980  Referring provider: Blane Worrell     Assessment:  Leodan Collins  is a 39 year old male with a history of chron's disease s/p colectomy and ileostomy in , kidney stones and recurrent ureteral stones s/p multiple surgeries who presents for follow-up. Personally reviewed and discussed renal US which is assuring for no new stones. Reviewed results from Litholink as discussed below.     Plan:  -There was also evidence of high urine calcium, though low dietary salt level. Start on chlorthalidone.Discussed possible side effects. Will check potassium levels in a few weeks.   -Evidence of low urine volume, likely secondary to ileostomy. Encouraged him to increase his fluid intake to about 3L daily.   -Evidence of low urine citrate. Will increase his potassium citrate to 20 mEq BID.   -Repeat Litholink after about a week after starting new techniques stated above.   ______________________________________________________________________    HPI  Leodan Collins is a 39 year old male with a history of chron's disease s/p colectomy and ileostomy in , kidney stones and recurrent ureteral stones s/p multiple surgeries by Dr. Melvin who managed well without surgery between 6572-4352, until he was seen in the ED in 2018 with flank pain. Imaging at that time was significant for right 6 mm ureteral stone, at least 2 right kidney stones and 6 nonobstructing left kidney stones. He was discharged with a conservative management plan and stayed on that  "until he presented to the ED on 01/2020 with 8 mm obstructing right UVJ stone. He underwent right cystoureteroscopy with lithotripsy on 01/31/2020 and presents today for follow-up.     He started the potassium citrate is tolerating well. He was on these for 3 days prior to Litholink.     Stone History is as Follows:  First stone: Per chart review 10/2010 UVJ stone   Number of stone surgeries: L ESWL with Dr. Melvin in 06/2013, right ureteroscopy with lithotripsy by Dr. Melvin in 04/2014, bilateral ureteroscopy with lithotripsy by Dr. Melvin in 05/2016,   Number of stones passed spontaneously: multiple   History of UTI: no  Prior Stone Analysis: calcium oxalate dihydrate, and calcium phosphate   Family History Nephrolithasis: no  Stone Risk Factors: chron's disease, multiple abdominal surgeries   Prior Metabolic Workup: 02/2019; borderline low urine volume (1.53 L/day), increased CR excretion, high urine calcium(266 mg/day), low urine citrate (328 mg/day).     Review of Systems:   Pertinent items are noted in HPI or as below, remainder of complete ROS is negative.      Physical Exam:   Patient is a 39 year old  male   Vitals: Blood pressure (!) 151/85, pulse 61, height 1.702 m (5' 7\"), weight 65.8 kg (145 lb).  Notable Findings on Exam: Well-nourished male in no apparent distress     Laboratory:   I personally reviewed all applicable laboratory data and went over findings with patient  Significant for:    CBC RESULTS:  Recent Labs   Lab Test 03/02/20  0952 01/29/20  1845 11/04/19  1553 07/15/19  1634   WBC 5.5 10.4 7.6 8.9   HGB 14.6 16.0 16.0 14.8    316 312 290        BMP RESULTS:  Recent Labs   Lab Test 01/29/20  1845 11/11/19  1530 06/20/19  0909 03/18/19  1546    141 141 140   POTASSIUM 4.1 3.8 3.7 4.0   CHLORIDE 107 106 107 106   CO2 28 28 26 27   ANIONGAP 5 7 8 7   * 83 78 97   BUN 23 20 18 17   CR 0.99 0.98 0.92 0.91   GFRESTIMATED >90 >90 >90 >90   GFRESTBLACK >90 >90 >90 >90   LAURA " 9.2 9.6 9.4 9.1       UA RESULTS:   Recent Labs   Lab Test 01/29/20  1845 06/20/19  0924 01/24/19  1043   SG 1.023 >1.030 1.020   URINEPH 5.5 5.0 6.0   NITRITE Negative Negative Negative   RBCU >182* 5-10* >182*   WBCU 11* 5-10* 11*       CALCIUM RESULTS  Lab Results   Component Value Date    LAURA 9.2 01/29/2020    LAURA 9.6 11/11/2019    LAURA 9.4 06/20/2019     Imaging:   I personally reviewed the following imaging studies, interpreted them, and discussed findings with the patient.     Results for orders placed or performed in visit on 03/10/20   US Renal Complete    Narrative    EXAMINATION: US RENAL COMPLETE, 3/10/2020 2:25 PM     COMPARISON: CT 1/29/2020.    HISTORY: Kidney stone    TECHNIQUE: The kidneys and bladder were scanned in the standard  fashion with specialized ultrasound transducer(s) using both gray  scale and limited color/spectral Doppler techniques.    FINDINGS:    Right kidney: Measures 11.8 cm in length. Parenchyma is of normal  thickness and echogenicity. No focal mass. No hydronephrosis. 5 mm  echogenic focus in the right kidney upper pole, most likely  representing a nonobstructive minor calyceal stone.    Left kidney: Measures 11 cm in length. Parenchyma is of normal  thickness and echogenicity. 6 x 4 x 2 mm stone with twinkling artifact  in the left kidney lower pole without any obstruction. No focal mass.  No hydronephrosis.     Bladder: Partially distended. Grossly unremarkable..      Impression    IMPRESSION: Bilateral nephrolithiasis without hydronephrosis.    I have personally reviewed the examination and initial interpretation  and I agree with the findings.    TONI GREENBERG MD     Scribe Disclosure:  I, Sofie Mcmullen, am serving as a scribe to document services personally performed by Bob Felix MD at this visit, based upon the provider's statements to me. All documentation has been reviewed by the aforementioned provider prior to being entered into the official medical  record.       Again, thank you for allowing me to participate in the care of your patient.      Sincerely,    Bob Felix MD

## 2020-03-11 ENCOUNTER — MYC MEDICAL ADVICE (OUTPATIENT)
Dept: FAMILY MEDICINE | Facility: CLINIC | Age: 40
End: 2020-03-11

## 2020-03-12 NOTE — TELEPHONE ENCOUNTER
Dr Worrell,    Due to Humira use, I expect he is at higher risk of complications if he gets Covid19 similar to if he caught the regular seasonal flu. Correct?    Sonam Hernandez, RN, BSN

## 2020-04-22 ENCOUNTER — TRANSFERRED RECORDS (OUTPATIENT)
Dept: HEALTH INFORMATION MANAGEMENT | Facility: CLINIC | Age: 40
End: 2020-04-22

## 2020-04-22 DIAGNOSIS — N20.0 KIDNEY STONE: ICD-10-CM

## 2020-04-22 LAB
ANION GAP SERPL CALCULATED.3IONS-SCNC: 6 MMOL/L (ref 3–14)
BUN SERPL-MCNC: 17 MG/DL (ref 7–30)
CALCIUM SERPL-MCNC: 9.3 MG/DL (ref 8.5–10.1)
CHLORIDE SERPL-SCNC: 103 MMOL/L (ref 94–109)
CO2 SERPL-SCNC: 29 MMOL/L (ref 20–32)
CREAT SERPL-MCNC: 0.76 MG/DL (ref 0.66–1.25)
GFR SERPL CREATININE-BSD FRML MDRD: >90 ML/MIN/{1.73_M2}
GLUCOSE SERPL-MCNC: 89 MG/DL (ref 70–99)
POTASSIUM SERPL-SCNC: 3.1 MMOL/L (ref 3.4–5.3)
SODIUM SERPL-SCNC: 138 MMOL/L (ref 133–144)

## 2020-04-22 PROCEDURE — 80048 BASIC METABOLIC PNL TOTAL CA: CPT | Performed by: UROLOGY

## 2020-04-22 PROCEDURE — 36415 COLL VENOUS BLD VENIPUNCTURE: CPT | Performed by: UROLOGY

## 2020-04-23 ENCOUNTER — TELEPHONE (OUTPATIENT)
Dept: UROLOGY | Facility: CLINIC | Age: 40
End: 2020-04-23

## 2020-04-23 ENCOUNTER — PRE VISIT (OUTPATIENT)
Dept: UROLOGY | Facility: CLINIC | Age: 40
End: 2020-04-23

## 2020-04-23 DIAGNOSIS — N20.0 KIDNEY STONE: ICD-10-CM

## 2020-04-23 RX ORDER — POTASSIUM CITRATE 10 MEQ/1
20 TABLET, EXTENDED RELEASE ORAL
Qty: 90 TABLET | Refills: 3 | Status: SHIPPED | OUTPATIENT
Start: 2020-04-23 | End: 2023-08-11

## 2020-04-23 NOTE — TELEPHONE ENCOUNTER
Reason for Visit: Review Litholink - Video Visit    Diagnosis: Kidney stone    Orders/Procedures/Records: Litholink not received as of 4/23/2020    Contact Patient: N/A    Rooming Requirements: Virtual check in      Magnolia Matamoros  04/23/20  1:25 PM

## 2020-04-23 NOTE — TELEPHONE ENCOUNTER
----- Message from Bob Felix MD sent at 4/23/2020 11:34 AM CDT -----  Hi - Can you let this patient know his potassium level is too low on his recent BMP?  I would like him to increase his potassium citrate to 20 TID starting now and update a serum BMP Monday.    Thanks  Jodee

## 2020-04-24 ENCOUNTER — MEDICAL CORRESPONDENCE (OUTPATIENT)
Dept: HEALTH INFORMATION MANAGEMENT | Facility: CLINIC | Age: 40
End: 2020-04-24

## 2020-04-24 DIAGNOSIS — K50.80 CROHN'S DISEASE, SMALL AND LARGE INTESTINE (H): ICD-10-CM

## 2020-04-27 DIAGNOSIS — K50.80 CROHN'S DISEASE, SMALL AND LARGE INTESTINE (H): ICD-10-CM

## 2020-04-27 PROCEDURE — 85025 COMPLETE CBC W/AUTO DIFF WBC: CPT

## 2020-04-27 PROCEDURE — 36415 COLL VENOUS BLD VENIPUNCTURE: CPT

## 2020-04-27 PROCEDURE — 80076 HEPATIC FUNCTION PANEL: CPT

## 2020-04-28 ENCOUNTER — VIRTUAL VISIT (OUTPATIENT)
Dept: UROLOGY | Facility: CLINIC | Age: 40
End: 2020-04-28
Payer: COMMERCIAL

## 2020-04-28 DIAGNOSIS — N20.0 KIDNEY STONE: Primary | ICD-10-CM

## 2020-04-28 ASSESSMENT — PAIN SCALES - GENERAL: PAINLEVEL: NO PAIN (0)

## 2020-04-28 NOTE — PROGRESS NOTES
"Leodan Collins is a 40 year old male who is being evaluated via a billable telephone visit.     He has a history of chron's disease s/p colectomy and ileostomy in 2007, kidney stones and recurrent ureteral stones s/p multiple surgeries who presents for metabolic follow-up after starting chlorthalidone treatment as well as potassium citrate.    Overall he has been feeling very well.  Denies side effects from medication.  Denies flank pain, hematuria, new stone events.    A BMP was checked last week and his serum K was low at 3.1.  He has subsequently increased his dose of potassium citrate to 60 meq per day.      Review of 24 hour urine from 2 weeks ago shows significant improvement in urine volume as well as increased urine pH.  Overall improvement in SS CaOx which went from 8.4 to 5.5.  Improvemebt in SS UA which went from 0.93 to 0.11.    He will plan to repeat BMP to ensure serum K is wnl next week.  Will plan to update imaging in 3-4 months for known left sided renal stones.  Will call prior with any new concerns or stone related issues.        The patient has been notified of following:     \"This telephone visit will be conducted via a call between you and your physician/provider. We have found that certain health care needs can be provided without the need for a physical exam.  This service lets us provide the care you need with a short phone conversation.  If a prescription is necessary we can send it directly to your pharmacy.  If lab work is needed we can place an order for that and you can then stop by our lab to have the test done at a later time.    Telephone visits are billed at different rates depending on your insurance coverage. During this emergency period, for some insurers they may be billed the same as an in-person visit.  Please reach out to your insurance provider with any questions.    If during the course of the call the physician/provider feels a telephone visit is not appropriate, you will " "not be charged for this service.\"    Patient has given verbal consent for Telephone visit?  Yes    How would you like to obtain your AVS? MyChart    Phone call duration: 8 minutes    Bob Felix MD        "

## 2020-04-29 LAB
ALBUMIN SERPL-MCNC: 4.2 G/DL (ref 3.4–5)
ALP SERPL-CCNC: 45 U/L (ref 40–150)
ALT SERPL W P-5'-P-CCNC: 43 U/L (ref 0–70)
AST SERPL W P-5'-P-CCNC: 24 U/L (ref 0–45)
BASOPHILS # BLD AUTO: 0 10E9/L (ref 0–0.2)
BASOPHILS NFR BLD AUTO: 0.2 %
BILIRUB DIRECT SERPL-MCNC: 0.3 MG/DL (ref 0–0.2)
BILIRUB SERPL-MCNC: 2.4 MG/DL (ref 0.2–1.3)
DIFFERENTIAL METHOD BLD: ABNORMAL
EOSINOPHIL # BLD AUTO: 0.1 10E9/L (ref 0–0.7)
EOSINOPHIL NFR BLD AUTO: 1.6 %
ERYTHROCYTE [DISTWIDTH] IN BLOOD BY AUTOMATED COUNT: 13.8 % (ref 10–15)
HCT VFR BLD AUTO: 43 % (ref 40–53)
HGB BLD-MCNC: 15 G/DL (ref 13.3–17.7)
IMM GRANULOCYTES # BLD: 0 10E9/L (ref 0–0.4)
IMM GRANULOCYTES NFR BLD: 0.2 %
LYMPHOCYTES # BLD AUTO: 1.6 10E9/L (ref 0.8–5.3)
LYMPHOCYTES NFR BLD AUTO: 18.2 %
MCH RBC QN AUTO: 33.4 PG (ref 26.5–33)
MCHC RBC AUTO-ENTMCNC: 34.9 G/DL (ref 31.5–36.5)
MCV RBC AUTO: 96 FL (ref 78–100)
MONOCYTES # BLD AUTO: 0.6 10E9/L (ref 0–1.3)
MONOCYTES NFR BLD AUTO: 6.7 %
NEUTROPHILS # BLD AUTO: 6.6 10E9/L (ref 1.6–8.3)
NEUTROPHILS NFR BLD AUTO: 73.1 %
NRBC # BLD AUTO: 0 10*3/UL
NRBC BLD AUTO-RTO: 0 /100
PLATELET # BLD AUTO: 325 10E9/L (ref 150–450)
PROT SERPL-MCNC: 7.8 G/DL (ref 6.8–8.8)
RBC # BLD AUTO: 4.49 10E12/L (ref 4.4–5.9)
WBC # BLD AUTO: 9 10E9/L (ref 4–11)

## 2020-05-08 NOTE — PATIENT INSTRUCTIONS
Please schedule labs and imaging and follow up in 3-4 months with Dr. Felix. Please call with any new concerns or stone related issues.     It was a pleasure meeting with you today.  Thank you for allowing me and my team the privilege of caring for you today.  YOU are the reason we are here, and I truly hope we provided you with the excellent service you deserve.  Please let us know if there is anything else we can do for you so that we can be sure you are leaving completely satisfied with your care experience.        Mike Olivia, EMT

## 2020-06-16 ENCOUNTER — MEDICAL CORRESPONDENCE (OUTPATIENT)
Dept: HEALTH INFORMATION MANAGEMENT | Facility: CLINIC | Age: 40
End: 2020-06-16

## 2020-06-19 DIAGNOSIS — K50.90 CROHN'S DISEASE (H): Primary | ICD-10-CM

## 2020-06-26 DIAGNOSIS — N20.0 KIDNEY STONE: ICD-10-CM

## 2020-06-26 DIAGNOSIS — K50.90 CROHN'S DISEASE (H): ICD-10-CM

## 2020-06-26 LAB
ALBUMIN SERPL-MCNC: 4.2 G/DL (ref 3.4–5)
ALP SERPL-CCNC: 50 U/L (ref 40–150)
ALT SERPL W P-5'-P-CCNC: 51 U/L (ref 0–70)
ANION GAP SERPL CALCULATED.3IONS-SCNC: 5 MMOL/L (ref 3–14)
AST SERPL W P-5'-P-CCNC: 25 U/L (ref 0–45)
BASOPHILS # BLD AUTO: 0 10E9/L (ref 0–0.2)
BASOPHILS NFR BLD AUTO: 0.3 %
BILIRUB DIRECT SERPL-MCNC: 0.3 MG/DL (ref 0–0.2)
BILIRUB SERPL-MCNC: 2.5 MG/DL (ref 0.2–1.3)
BUN SERPL-MCNC: 17 MG/DL (ref 7–30)
CALCIUM SERPL-MCNC: 9.4 MG/DL (ref 8.5–10.1)
CHLORIDE SERPL-SCNC: 101 MMOL/L (ref 94–109)
CO2 SERPL-SCNC: 31 MMOL/L (ref 20–32)
CREAT SERPL-MCNC: 0.84 MG/DL (ref 0.66–1.25)
DIFFERENTIAL METHOD BLD: ABNORMAL
EOSINOPHIL # BLD AUTO: 0.2 10E9/L (ref 0–0.7)
EOSINOPHIL NFR BLD AUTO: 2.7 %
ERYTHROCYTE [DISTWIDTH] IN BLOOD BY AUTOMATED COUNT: 12.9 % (ref 10–15)
GFR SERPL CREATININE-BSD FRML MDRD: >90 ML/MIN/{1.73_M2}
GLUCOSE SERPL-MCNC: 89 MG/DL (ref 70–99)
HCT VFR BLD AUTO: 45.4 % (ref 40–53)
HGB BLD-MCNC: 15.5 G/DL (ref 13.3–17.7)
IMM GRANULOCYTES # BLD: 0 10E9/L (ref 0–0.4)
IMM GRANULOCYTES NFR BLD: 0.3 %
LYMPHOCYTES # BLD AUTO: 2.2 10E9/L (ref 0.8–5.3)
LYMPHOCYTES NFR BLD AUTO: 34.4 %
MCH RBC QN AUTO: 33.3 PG (ref 26.5–33)
MCHC RBC AUTO-ENTMCNC: 34.1 G/DL (ref 31.5–36.5)
MCV RBC AUTO: 98 FL (ref 78–100)
MONOCYTES # BLD AUTO: 0.7 10E9/L (ref 0–1.3)
MONOCYTES NFR BLD AUTO: 10.6 %
NEUTROPHILS # BLD AUTO: 3.3 10E9/L (ref 1.6–8.3)
NEUTROPHILS NFR BLD AUTO: 51.7 %
NRBC # BLD AUTO: 0 10*3/UL
NRBC BLD AUTO-RTO: 0 /100
PLATELET # BLD AUTO: 292 10E9/L (ref 150–450)
POTASSIUM SERPL-SCNC: 3.5 MMOL/L (ref 3.4–5.3)
PROT SERPL-MCNC: 8 G/DL (ref 6.8–8.8)
RBC # BLD AUTO: 4.65 10E12/L (ref 4.4–5.9)
SODIUM SERPL-SCNC: 137 MMOL/L (ref 133–144)
WBC # BLD AUTO: 6.4 10E9/L (ref 4–11)

## 2020-06-26 PROCEDURE — 36415 COLL VENOUS BLD VENIPUNCTURE: CPT | Performed by: INTERNAL MEDICINE

## 2020-06-26 PROCEDURE — 80048 BASIC METABOLIC PNL TOTAL CA: CPT | Performed by: INTERNAL MEDICINE

## 2020-06-26 PROCEDURE — 85025 COMPLETE CBC W/AUTO DIFF WBC: CPT | Performed by: INTERNAL MEDICINE

## 2020-06-26 PROCEDURE — 80076 HEPATIC FUNCTION PANEL: CPT | Performed by: INTERNAL MEDICINE

## 2020-07-03 ENCOUNTER — PRE VISIT (OUTPATIENT)
Dept: UROLOGY | Facility: CLINIC | Age: 40
End: 2020-07-03

## 2020-07-07 ENCOUNTER — VIRTUAL VISIT (OUTPATIENT)
Dept: UROLOGY | Facility: CLINIC | Age: 40
End: 2020-07-07
Payer: COMMERCIAL

## 2020-07-07 DIAGNOSIS — N20.0 KIDNEY STONE: Primary | ICD-10-CM

## 2020-07-07 NOTE — PROGRESS NOTES
Video Visit Technology for this patient: [employer] Video Visit- Patient was left in waiting room    Leodan Collins is a 40 year old male who is being evaluated via a billable video visit.            UROLOGY VIDEO FOLLOW UP NOTE           Chief Complaint:   Kidney Stone Prevention          Interval Update    Leodan Collins is a very pleasant 39 yo M with hx of ileostomy and recurrent stones.  He is on potassium citrate 20 bid and chlorthalidone.  His serum K is improved since being more adherent and adjusting K citrate dosing.  Feeling well at the moment, no flank pain, hematuria, urinary symptoms or new stones.    Recent K is improved at 3.5    Last CT imaging 1/19 showed known left renal stones which are being monitored      Physical Exam:   General Appearance: Well groomed, hygenic  Eyes: No redness, discharge  Respiratory: No cough, no respiratory distress or labored breathing  Musculoskeletal:  grossly normal, full range of motion in upper extremities, no gross deficits  Skin: No discoloration or apparent rashes  Neurologic - No tremors  Psychiatric - Alert and oriented  The rest of a comprehensive physical examination is deferred due to public health emergency video visit restrictions      Labs and Pathology:    I personally reviewed all applicable laboratory data and went over findings with patient  Significant for:    CBC RESULTS:  Recent Labs   Lab Test 06/26/20  1410 04/27/20  1219 03/02/20  0952 01/29/20  1845   WBC 6.4 9.0 5.5 10.4   HGB 15.5 15.0 14.6 16.0    325 302 316        BMP RESULTS:  Recent Labs   Lab Test 06/26/20  1410 04/22/20  1458 01/29/20  1845 11/11/19  1530    138 140 141   POTASSIUM 3.5 3.1* 4.1 3.8   CHLORIDE 101 103 107 106   CO2 31 29 28 28   ANIONGAP 5 6 5 7   GLC 89 89 101* 83   BUN 17 17 23 20   CR 0.84 0.76 0.99 0.98   GFRESTIMATED >90 >90 >90 >90   GFRESTBLACK >90 >90 >90 >90   LAURA 9.4 9.3 9.2 9.6       UA RESULTS:   Recent Labs   Lab Test 01/29/20  1845 06/20/19  0924  01/24/19  1043   SG 1.023 >1.030 1.020   URINEPH 5.5 5.0 6.0   NITRITE Negative Negative Negative   RBCU >182* 5-10* >182*   WBCU 11* 5-10* 11*       PSA RESULTS  No results found for: PSA        Imaging:    I personally reviewed all applicable imaging including the images themselves and report below and went over the below findings with patient.    Results for orders placed or performed in visit on 03/10/20   US Renal Complete    Narrative    EXAMINATION: US RENAL COMPLETE, 3/10/2020 2:25 PM     COMPARISON: CT 1/29/2020.    HISTORY: Kidney stone    TECHNIQUE: The kidneys and bladder were scanned in the standard  fashion with specialized ultrasound transducer(s) using both gray  scale and limited color/spectral Doppler techniques.    FINDINGS:    Right kidney: Measures 11.8 cm in length. Parenchyma is of normal  thickness and echogenicity. No focal mass. No hydronephrosis. 5 mm  echogenic focus in the right kidney upper pole, most likely  representing a nonobstructive minor calyceal stone.    Left kidney: Measures 11 cm in length. Parenchyma is of normal  thickness and echogenicity. 6 x 4 x 2 mm stone with twinkling artifact  in the left kidney lower pole without any obstruction. No focal mass.  No hydronephrosis.     Bladder: Partially distended. Grossly unremarkable..      Impression    IMPRESSION: Bilateral nephrolithiasis without hydronephrosis.    I have personally reviewed the examination and initial interpretation  and I agree with the findings.    TONI GREENBERG MD              Assessment/Plan   40 year old male with ileostomy, recurrent stones  -Continue medical therapy  -Update litholink  -Update CT in 2 months, followed by virtual visit               Past Medical History:     Past Medical History:   Diagnosis Date     Asthma     child     Crohn's disease (H)      History of blood transfusion      Kidney stones             Past Surgical History:     Past Surgical History:   Procedure Laterality Date      COLOSTOMY  2007     COMBINED CYSTOSCOPY, RETROGRADES, URETEROSCOPY, INSERT STENT Right 5/3/2016    Procedure: COMBINED CYSTOSCOPY, RETROGRADES, URETEROSCOPY, INSERT STENT;  Surgeon: Azael Melvin MD;  Location: [facility]     COMBINED CYSTOSCOPY, RETROGRADES, URETEROSCOPY, LASER HOLMIUM LITHOTRIPSY URETER(S), INSERT STENT Right 1/31/2020    Procedure: CYSTOURETEROSCOPY, WITH RETROGRADE PYELOGRAM, HOLMIUM LASER LITHOTRIPSY OF URETERAL CALCULUS, AND STENT placement;  Surgeon: Bob Felix MD;  [facility]     EXTRACORPOREAL SHOCK WAVE LITHOTRIPSY (ESWL)  6/27/2013    Procedure: EXTRACORPOREAL SHOCK WAVE LITHOTRIPSY (ESWL);  Left Extracorporeal Shock Wave Lithotripsy Kidney And Ureter;  Surgeon: Azael Melvin MD;  Location: [facility]     ILEOSTOMY  2009     LAPAROTOMY EXPLORATORY  2009    extensive lysis of adhesions, revision of coloenteric fistula, repair of small bowel fistula, takedown of current colostomy, debridement of chronic abscess, resection of residual left colon, right hemicolectomy, Louise ileostomy     LASER HOLMIUM LITHOTRIPSY URETER(S), INSERT STENT, COMBINED  4/9/2014    Procedure: COMBINED CYSTOSCOPY, URETEROSCOPY, LASER HOLMIUM LITHOTRIPSY URETER(S), INSERT STENT;  Ureteroscopy, Cystoscopy, holmium laser,Right Uretral stent placement;  Surgeon: Azael Melvin MD;  [facility]     LASER HOLMIUM LITHOTRIPSY URETER(S), INSERT STENT, COMBINED Bilateral 5/17/2016    Procedure: COMBINED CYSTOSCOPY, URETEROSCOPY, LASER HOLMIUM LITHOTRIPSY URETER(S), INSERT STENT;  Surgeon: Azael Melvin MD;  [facility]            Medications     Current Outpatient Medications   Medication     Adalimumab (HUMIRA PEN SC)     chlorthalidone (HYGROTON) 25 MG tablet     HYDROcodone-acetaminophen (NORCO) 5-325 MG tablet     ibuprofen (ADVIL/MOTRIN) 600 MG tablet     mercaptopurine (PURINETHOL) 50 MG tablet     nitroFURantoin macrocrystal-monohydrate (MACROBID) 100 MG capsule      ondansetron (ZOFRAN-ODT) 4 MG ODT tab     potassium citrate (UROCIT-K) 10 MEQ (1080 MG) CR tablet     potassium citrate (UROCIT-K) 10 MEQ (1080 MG) CR tablet     potassium citrate-citric acid (CYTRA K) 1528-9793 MG Packet     potassium citrate-citric acid (POLYCITRA-K/CYTRA K) 1770-9221 MG Packet     tamsulosin (FLOMAX) 0.4 MG capsule     VITAMIN D PO     No current facility-administered medications for this visit.             Family History:     Family History   Problem Relation Age of Onset     Cerebrovascular Disease Maternal Grandmother      Connective Tissue Disorder Sister      Nephrolithiasis Father             Social History:     Social History     Socioeconomic History     Marital status:      Spouse name: Not on file     Number of children: Not on file     Years of education: Not on file     Highest education level: Not on file   Occupational History     Not on file   Social Needs     Financial resource strain: Not on file     Food insecurity     Worry: Not on file     Inability: Not on file     Transportation needs     Medical: Not on file     Non-medical: Not on file   Tobacco Use     Smoking status: Never Smoker     Smokeless tobacco: Never Used   Substance and Sexual Activity     Alcohol use: Yes     Alcohol/week: 0.0 standard drinks     Comment: x1 a week     Drug use: No     Sexual activity: Yes     Partners: Female     Birth control/protection: Pull-out method, Condom, I.U.D.   Lifestyle     Physical activity     Days per week: Not on file     Minutes per session: Not on file     Stress: Not on file   Relationships     Social connections     Talks on phone: Not on file     Gets together: Not on file     Attends Druze service: Not on file     Active member of club or organization: Not on file     Attends meetings of clubs or organizations: Not on file     Relationship status: Not on file     Intimate partner violence     Fear of current or ex partner: Not on file     Emotionally abused:  "Not on file     Physically abused: Not on file     Forced sexual activity: Not on file   Other Topics Concern     Parent/sibling w/ CABG, MI or angioplasty before 65F 55M? No   Social History Narrative     Not on file            Allergies:   Patient has no known allergies.         Review of Systems:  From intake questionnaire   Negative 14 system review except as noted on HPI, nurse's note.        CC:  Blane Worrell    The patient has been notified of following:     \"This video visit will be conducted via a call between you and your physician/provider. We have found that certain health care needs can be provided without the need for an in-person physical exam.  This service lets us provide the care you need with a video conversation.  If a prescription is necessary we can send it directly to your pharmacy.  If lab work is needed we can place an order for that and you can then stop by our lab to have the test done at a later time.    Video visits are billed at different rates depending on your insurance coverage.  Please reach out to your insurance provider with any questions.    If during the course of the call the physician/provider feels a video visit is not appropriate, you will not be charged for this service.\"    Patient has given verbal consent for Video visit? Yes  How would you like to obtain your AVS? Maryan  Patient would like the video invitation sent by: Send to e-mail at: [email or web address]  Will anyone else be joining your video visit? No        Video-Visit Details    Type of service:  Video Visit    Video Start Time: 11:38  Video End Time: 11:44 AM    Originating Location (pt. Location): Home    Distant Location (provider location):  [facility] UROLOGY AND [facility] FOR PROSTATE AND UROLOGIC CANCERS     Platform used for Video Visit: Yahir Felix MD        "

## 2020-07-07 NOTE — PATIENT INSTRUCTIONS
Please complete the LithoLink 24hr Urine collection. They will mail you the kit with instructions on completing the collection and mailing it back to them. If you do not receive the LithoLink in 7-10 days please call [phone]. Once you complete the kit and return it, and we get the results we will contact you to schedule a follow up appointment if one is not already made.  Schedule a CT Abdomen pelvis w/o contrast in September: [facility] [phone]    Schedule virtual visit after complete above

## 2020-07-07 NOTE — NURSING NOTE
Called 07/07/20 at 8:10 AM and VM left about check in and virtual waiting room.    Kar Albarran, EMT

## 2020-07-07 NOTE — LETTER
7/7/2020       RE: Leodan Collins  [address]  [facility] [address]     Dear Colleague,    Thank you for referring your patient, Leodan Collins, to the [facility] UROLOGY AND INST FOR PROSTATE AND UROLOGIC CANCERS at [facility]. Please see a copy of my visit note below.     left about check in and virtual waiting room.  Kar Albarran, EMT        Video Visit Technology for this patient: [facility] Video Visit- Patient was left in waiting room    Leodan Collins is a 40 year old male who is being evaluated via a billable video visit.            UROLOGY VIDEO FOLLOW UP NOTE           Chief Complaint:   Kidney Stone Prevention          Interval Update    Leodan Collins is a very pleasant 41 yo M with hx of ileostomy and recurrent stones.  He is on potassium citrate 20 bid and chlorthalidone.  His serum K is improved since being more adherent and adjusting K citrate dosing.  Feeling well at the moment, no flank pain, hematuria, urinary symptoms or new stones.    Recent K is improved at 3.5    Last CT imaging 1/19 showed known left renal stones which are being monitored      Physical Exam:   General Appearance: Well groomed, hygenic  Eyes: No redness, discharge  Respiratory: No cough, no respiratory distress or labored breathing  Musculoskeletal:  grossly normal, full range of motion in upper extremities, no gross deficits  Skin: No discoloration or apparent rashes  Neurologic - No tremors  Psychiatric - Alert and oriented  The rest of a comprehensive physical examination is deferred due to public health emergency video visit restrictions      Labs and Pathology:    I personally reviewed all applicable laboratory data and went over findings with patient  Significant for:    CBC RESULTS:  Recent Labs   Lab Test 06/26/20  1410 04/27/20  1219 03/02/20  0952 01/29/20  1845   WBC 6.4 9.0 5.5 10.4   HGB 15.5 15.0 14.6 16.0    325 302 316        BMP RESULTS:  Recent Labs   Lab  Test 06/26/20  1410 04/22/20  1458 01/29/20  1845 11/11/19  1530    138 140 141   POTASSIUM 3.5 3.1* 4.1 3.8   CHLORIDE 101 103 107 106   CO2 31 29 28 28   ANIONGAP 5 6 5 7   GLC 89 89 101* 83   BUN 17 17 23 20   CR 0.84 0.76 0.99 0.98   GFRESTIMATED >90 >90 >90 >90   GFRESTBLACK >90 >90 >90 >90   LAURA 9.4 9.3 9.2 9.6       UA RESULTS:   Recent Labs   Lab Test 01/29/20  1845 06/20/19  0924 01/24/19  1043   SG 1.023 >1.030 1.020   URINEPH 5.5 5.0 6.0   NITRITE Negative Negative Negative   RBCU >182* 5-10* >182*   WBCU 11* 5-10* 11*       PSA RESULTS  No results found for: PSA        Imaging:    I personally reviewed all applicable imaging including the images themselves and report below and went over the below findings with patient.    Results for orders placed or performed in visit on 03/10/20   US Renal Complete    Narrative    EXAMINATION: US RENAL COMPLETE, 3/10/2020 2:25 PM     COMPARISON: CT 1/29/2020.    HISTORY: Kidney stone    TECHNIQUE: The kidneys and bladder were scanned in the standard  fashion with specialized ultrasound transducer(s) using both gray  scale and limited color/spectral Doppler techniques.    FINDINGS:    Right kidney: Measures 11.8 cm in length. Parenchyma is of normal  thickness and echogenicity. No focal mass. No hydronephrosis. 5 mm  echogenic focus in the right kidney upper pole, most likely  representing a nonobstructive minor calyceal stone.    Left kidney: Measures 11 cm in length. Parenchyma is of normal  thickness and echogenicity. 6 x 4 x 2 mm stone with twinkling artifact  in the left kidney lower pole without any obstruction. No focal mass.  No hydronephrosis.     Bladder: Partially distended. Grossly unremarkable..      Impression    IMPRESSION: Bilateral nephrolithiasis without hydronephrosis.    I have personally reviewed the examination and initial interpretation  and I agree with the findings.    TONI GREENBERG MD              Assessment/Plan   40 year old male with  ileostomy, recurrent stones  -Continue medical therapy  -Update litholink  -Update CT in 2 months, followed by virtual visit               Past Medical History:     Past Medical History:   Diagnosis Date     Asthma     child     Crohn's disease (H)      History of blood transfusion      Kidney stones             Past Surgical History:     Past Surgical History:   Procedure Laterality Date     COLOSTOMY  2007     COMBINED CYSTOSCOPY, RETROGRADES, URETEROSCOPY, INSERT STENT Right 5/3/2016    Procedure: COMBINED CYSTOSCOPY, RETROGRADES, URETEROSCOPY, INSERT STENT;  Surgeon: Azael Melvin MD;  Location: [facility]     COMBINED CYSTOSCOPY, RETROGRADES, URETEROSCOPY, LASER HOLMIUM LITHOTRIPSY URETER(S), INSERT STENT Right 1/31/2020    Procedure: CYSTOURETEROSCOPY, WITH RETROGRADE PYELOGRAM, HOLMIUM LASER LITHOTRIPSY OF URETERAL CALCULUS, AND STENT placement;  Surgeon: Bob Felix MD;  [facility]     EXTRACORPOREAL SHOCK WAVE LITHOTRIPSY (ESWL)  6/27/2013    Procedure: EXTRACORPOREAL SHOCK WAVE LITHOTRIPSY (ESWL);  Left Extracorporeal Shock Wave Lithotripsy Kidney And Ureter;  Surgeon: Azael Melvin MD;  Location: [facility] OR     ILEOSTOMY  2009     LAPAROTOMY EXPLORATORY  2009    extensive lysis of adhesions, revision of coloenteric fistula, repair of small bowel fistula, takedown of current colostomy, debridement of chronic abscess, resection of residual left colon, right hemicolectomy, Louise ileostomy     LASER HOLMIUM LITHOTRIPSY URETER(S), INSERT STENT, COMBINED  4/9/2014    Procedure: COMBINED CYSTOSCOPY, URETEROSCOPY, LASER HOLMIUM LITHOTRIPSY URETER(S), INSERT STENT;  Ureteroscopy, Cystoscopy, holmium laser,Right Uretral stent placement;  Surgeon: Azael Melvin MD;  [facility]: [facility]     LASER HOLMIUM LITHOTRIPSY URETER(S), INSERT STENT, COMBINED Bilateral 5/17/2016    Procedure: COMBINED CYSTOSCOPY, URETEROSCOPY, LASER HOLMIUM LITHOTRIPSY URETER(S), INSERT STENT;  Surgeon: Azael Melvin  MD Je;  Location: [facility] OR            Medications     Current Outpatient Medications   Medication     Adalimumab (HUMIRA PEN SC)     chlorthalidone (HYGROTON) 25 MG tablet     HYDROcodone-acetaminophen (NORCO) 5-325 MG tablet     ibuprofen (ADVIL/MOTRIN) 600 MG tablet     mercaptopurine (PURINETHOL) 50 MG tablet     nitroFURantoin macrocrystal-monohydrate (MACROBID) 100 MG capsule     ondansetron (ZOFRAN-ODT) 4 MG ODT tab     potassium citrate (UROCIT-K) 10 MEQ (1080 MG) CR tablet     potassium citrate (UROCIT-K) 10 MEQ (1080 MG) CR tablet     potassium citrate-citric acid (CYTRA K) 8628-8983 MG Packet     potassium citrate-citric acid (POLYCITRA-K/CYTRA K) 3623-4954 MG Packet     tamsulosin (FLOMAX) 0.4 MG capsule     VITAMIN D PO     No current facility-administered medications for this visit.             Family History:     Family History   Problem Relation Age of Onset     Cerebrovascular Disease Maternal Grandmother      Connective Tissue Disorder Sister      Nephrolithiasis Father             Social History:     Social History     Socioeconomic History     Marital status:      Spouse name: Not on file     Number of children: Not on file     Years of education: Not on file     Highest education level: Not on file   Occupational History     Not on file   Social Needs     Financial resource strain: Not on file     Food insecurity     Worry: Not on file     Inability: Not on file     Transportation needs     Medical: Not on file     Non-medical: Not on file   Tobacco Use     Smoking status: Never Smoker     Smokeless tobacco: Never Used   Substance and Sexual Activity     Alcohol use: Yes     Alcohol/week: 0.0 standard drinks     Comment: x1 a week     Drug use: No     Sexual activity: Yes     Partners: Female     Birth control/protection: Pull-out method, Condom, I.U.D.   Lifestyle     Physical activity     Days per week: Not on file     Minutes per session: Not on file     Stress: Not on file  "  Relationships     Social connections     Talks on phone: Not on file     Gets together: Not on file     Attends Confucianism service: Not on file     Active member of club or organization: Not on file     Attends meetings of clubs or organizations: Not on file     Relationship status: Not on file     Intimate partner violence     Fear of current or ex partner: Not on file     Emotionally abused: Not on file     Physically abused: Not on file     Forced sexual activity: Not on file   Other Topics Concern     Parent/sibling w/ CABG, MI or angioplasty before 65F 55M? No   Social History Narrative     Not on file            Allergies:   Patient has no known allergies.         Review of Systems:  From intake questionnaire   Negative 14 system review except as noted on HPI, nurse's note.        CC:  Blane Worrell    The patient has been notified of following:     \"This video visit will be conducted via a call between you and your physician/provider. We have found that certain health care needs can be provided without the need for an in-person physical exam.  This service lets us provide the care you need with a video conversation.  If a prescription is necessary we can send it directly to your pharmacy.  If lab work is needed we can place an order for that and you can then stop by our lab to have the test done at a later time.    Video visits are billed at different rates depending on your insurance coverage.  Please reach out to your insurance provider with any questions.    If during the course of the call the physician/provider feels a video visit is not appropriate, you will not be charged for this service.\"    Patient has given verbal consent for Video visit? Yes  How would you like to obtain your AVS? Maryan  Patient would like the video invitation sent by: Send to e-mail at: [email or web address]  Will anyone else be joining your video visit? No        Video-Visit Details    Type of service:  Video " Visit    Video Start Time: 11:38  Video End Time: 11:44 AM    Originating Location (pt. Location): Home    Distant Location (provider location):  [facility] UROLOGY AND [facility] FOR PROSTATE AND UROLOGIC CANCERS     Platform used for Video Visit: MD Alyssa Chavez orders faxed.     Michelle Palmer CMA   07/08/20   4:06 PM

## 2020-07-09 ENCOUNTER — TELEPHONE (OUTPATIENT)
Dept: UROLOGY | Facility: CLINIC | Age: 40
End: 2020-07-09

## 2020-08-19 ENCOUNTER — TRANSFERRED RECORDS (OUTPATIENT)
Dept: HEALTH INFORMATION MANAGEMENT | Facility: CLINIC | Age: 40
End: 2020-08-19

## 2020-11-18 DIAGNOSIS — K50.90 CROHN'S DISEASE (H): ICD-10-CM

## 2020-11-18 LAB
ALBUMIN SERPL-MCNC: 4 G/DL (ref 3.4–5)
ALP SERPL-CCNC: 46 U/L (ref 40–150)
ALT SERPL W P-5'-P-CCNC: 54 U/L (ref 0–70)
AST SERPL W P-5'-P-CCNC: 28 U/L (ref 0–45)
BASOPHILS # BLD AUTO: 0.1 10E9/L (ref 0–0.2)
BASOPHILS NFR BLD AUTO: 0.8 %
BILIRUB DIRECT SERPL-MCNC: 0.4 MG/DL (ref 0–0.2)
BILIRUB SERPL-MCNC: 2.7 MG/DL (ref 0.2–1.3)
DIFFERENTIAL METHOD BLD: NORMAL
EOSINOPHIL # BLD AUTO: 0.1 10E9/L (ref 0–0.7)
EOSINOPHIL NFR BLD AUTO: 1.8 %
ERYTHROCYTE [DISTWIDTH] IN BLOOD BY AUTOMATED COUNT: 12.6 % (ref 10–15)
HCT VFR BLD AUTO: 48.2 % (ref 40–53)
HGB BLD-MCNC: 16.3 G/DL (ref 13.3–17.7)
IMM GRANULOCYTES # BLD: 0 10E9/L (ref 0–0.4)
IMM GRANULOCYTES NFR BLD: 0.3 %
LYMPHOCYTES # BLD AUTO: 2.1 10E9/L (ref 0.8–5.3)
LYMPHOCYTES NFR BLD AUTO: 33.3 %
MCH RBC QN AUTO: 32 PG (ref 26.5–33)
MCHC RBC AUTO-ENTMCNC: 33.8 G/DL (ref 31.5–36.5)
MCV RBC AUTO: 95 FL (ref 78–100)
MONOCYTES # BLD AUTO: 0.5 10E9/L (ref 0–1.3)
MONOCYTES NFR BLD AUTO: 7.2 %
NEUTROPHILS # BLD AUTO: 3.5 10E9/L (ref 1.6–8.3)
NEUTROPHILS NFR BLD AUTO: 56.6 %
NRBC # BLD AUTO: 0 10*3/UL
NRBC BLD AUTO-RTO: 0 /100
PLATELET # BLD AUTO: 373 10E9/L (ref 150–450)
PROT SERPL-MCNC: 7.9 G/DL (ref 6.8–8.8)
RBC # BLD AUTO: 5.1 10E12/L (ref 4.4–5.9)
WBC # BLD AUTO: 6.2 10E9/L (ref 4–11)

## 2020-11-18 PROCEDURE — 36415 COLL VENOUS BLD VENIPUNCTURE: CPT | Performed by: INTERNAL MEDICINE

## 2020-11-18 PROCEDURE — 80076 HEPATIC FUNCTION PANEL: CPT | Performed by: INTERNAL MEDICINE

## 2020-11-18 PROCEDURE — 85025 COMPLETE CBC W/AUTO DIFF WBC: CPT | Performed by: INTERNAL MEDICINE

## 2020-12-03 ENCOUNTER — TRANSFERRED RECORDS (OUTPATIENT)
Dept: HEALTH INFORMATION MANAGEMENT | Facility: CLINIC | Age: 40
End: 2020-12-03

## 2021-01-10 ENCOUNTER — HEALTH MAINTENANCE LETTER (OUTPATIENT)
Age: 41
End: 2021-01-10

## 2021-04-13 DIAGNOSIS — K50.90 CROHN'S DISEASE (H): ICD-10-CM

## 2021-04-13 LAB
ALBUMIN SERPL-MCNC: 4.2 G/DL (ref 3.4–5)
ALP SERPL-CCNC: 48 U/L (ref 40–150)
ALT SERPL W P-5'-P-CCNC: 38 U/L (ref 0–70)
AST SERPL W P-5'-P-CCNC: 20 U/L (ref 0–45)
BASOPHILS # BLD AUTO: 0 10E9/L (ref 0–0.2)
BASOPHILS NFR BLD AUTO: 0.7 %
BILIRUB DIRECT SERPL-MCNC: 0.4 MG/DL (ref 0–0.2)
BILIRUB SERPL-MCNC: 2.6 MG/DL (ref 0.2–1.3)
DIFFERENTIAL METHOD BLD: NORMAL
EOSINOPHIL # BLD AUTO: 0.2 10E9/L (ref 0–0.7)
EOSINOPHIL NFR BLD AUTO: 2.9 %
ERYTHROCYTE [DISTWIDTH] IN BLOOD BY AUTOMATED COUNT: 13.2 % (ref 10–15)
HCT VFR BLD AUTO: 48.6 % (ref 40–53)
HGB BLD-MCNC: 16.3 G/DL (ref 13.3–17.7)
IMM GRANULOCYTES # BLD: 0 10E9/L (ref 0–0.4)
IMM GRANULOCYTES NFR BLD: 0.2 %
LYMPHOCYTES # BLD AUTO: 2.1 10E9/L (ref 0.8–5.3)
LYMPHOCYTES NFR BLD AUTO: 35.6 %
MCH RBC QN AUTO: 31.3 PG (ref 26.5–33)
MCHC RBC AUTO-ENTMCNC: 33.5 G/DL (ref 31.5–36.5)
MCV RBC AUTO: 93 FL (ref 78–100)
MONOCYTES # BLD AUTO: 0.5 10E9/L (ref 0–1.3)
MONOCYTES NFR BLD AUTO: 8.3 %
NEUTROPHILS # BLD AUTO: 3 10E9/L (ref 1.6–8.3)
NEUTROPHILS NFR BLD AUTO: 52.3 %
NRBC # BLD AUTO: 0 10*3/UL
NRBC BLD AUTO-RTO: 0 /100
PLATELET # BLD AUTO: 334 10E9/L (ref 150–450)
PROT SERPL-MCNC: 8 G/DL (ref 6.8–8.8)
RBC # BLD AUTO: 5.21 10E12/L (ref 4.4–5.9)
WBC # BLD AUTO: 5.8 10E9/L (ref 4–11)

## 2021-04-13 PROCEDURE — 36415 COLL VENOUS BLD VENIPUNCTURE: CPT | Performed by: INTERNAL MEDICINE

## 2021-04-13 PROCEDURE — 80076 HEPATIC FUNCTION PANEL: CPT | Performed by: INTERNAL MEDICINE

## 2021-04-13 PROCEDURE — 85025 COMPLETE CBC W/AUTO DIFF WBC: CPT | Performed by: INTERNAL MEDICINE

## 2021-05-07 ENCOUNTER — TRANSFERRED RECORDS (OUTPATIENT)
Dept: HEALTH INFORMATION MANAGEMENT | Facility: CLINIC | Age: 41
End: 2021-05-07

## 2021-05-25 ENCOUNTER — TRANSFERRED RECORDS (OUTPATIENT)
Dept: HEALTH INFORMATION MANAGEMENT | Facility: CLINIC | Age: 41
End: 2021-05-25

## 2021-06-09 ENCOUNTER — ANESTHESIA (OUTPATIENT)
Dept: SURGERY | Facility: CLINIC | Age: 41
End: 2021-06-09
Payer: COMMERCIAL

## 2021-06-09 ENCOUNTER — HOSPITAL ENCOUNTER (OUTPATIENT)
Facility: CLINIC | Age: 41
Discharge: HOME | End: 2021-06-09
Attending: FAMILY MEDICINE | Admitting: UROLOGY
Payer: COMMERCIAL

## 2021-06-09 ENCOUNTER — APPOINTMENT (OUTPATIENT)
Dept: GENERAL RADIOLOGY | Facility: CLINIC | Age: 41
End: 2021-06-09
Attending: UROLOGY
Payer: COMMERCIAL

## 2021-06-09 ENCOUNTER — ANESTHESIA EVENT (OUTPATIENT)
Dept: SURGERY | Facility: CLINIC | Age: 41
End: 2021-06-09
Payer: COMMERCIAL

## 2021-06-09 ENCOUNTER — APPOINTMENT (OUTPATIENT)
Dept: CT IMAGING | Facility: CLINIC | Age: 41
End: 2021-06-09
Attending: FAMILY MEDICINE
Payer: COMMERCIAL

## 2021-06-09 VITALS
WEIGHT: 151 LBS | DIASTOLIC BLOOD PRESSURE: 82 MMHG | OXYGEN SATURATION: 97 % | HEART RATE: 92 BPM | SYSTOLIC BLOOD PRESSURE: 122 MMHG | TEMPERATURE: 97.8 F | BODY MASS INDEX: 23.65 KG/M2 | RESPIRATION RATE: 20 BRPM

## 2021-06-09 DIAGNOSIS — N20.1 URETERAL STONE: ICD-10-CM

## 2021-06-09 DIAGNOSIS — N20.0 KIDNEY STONES: Primary | ICD-10-CM

## 2021-06-09 DIAGNOSIS — N20.1 URETEROLITHIASIS: ICD-10-CM

## 2021-06-09 LAB
ALBUMIN SERPL-MCNC: 4.5 G/DL (ref 3.4–5)
ALBUMIN UR-MCNC: NEGATIVE MG/DL
ALP SERPL-CCNC: 53 U/L (ref 40–150)
ALT SERPL W P-5'-P-CCNC: 40 U/L (ref 0–70)
ANION GAP SERPL CALCULATED.3IONS-SCNC: 11 MMOL/L (ref 3–14)
APPEARANCE UR: CLEAR
AST SERPL W P-5'-P-CCNC: 21 U/L (ref 0–45)
BACTERIA #/AREA URNS HPF: ABNORMAL /HPF
BASOPHILS # BLD AUTO: 0.1 10E9/L (ref 0–0.2)
BASOPHILS NFR BLD AUTO: 0.4 %
BILIRUB SERPL-MCNC: 2.6 MG/DL (ref 0.2–1.3)
BILIRUB UR QL STRIP: NEGATIVE
BUN SERPL-MCNC: 17 MG/DL (ref 7–30)
CALCIUM SERPL-MCNC: 9.8 MG/DL (ref 8.5–10.1)
CAOX CRY #/AREA URNS HPF: ABNORMAL /HPF
CHLORIDE SERPL-SCNC: 103 MMOL/L (ref 94–109)
CO2 SERPL-SCNC: 26 MMOL/L (ref 20–32)
COLOR UR AUTO: ABNORMAL
CREAT SERPL-MCNC: 0.93 MG/DL (ref 0.66–1.25)
DIFFERENTIAL METHOD BLD: ABNORMAL
EOSINOPHIL # BLD AUTO: 0.1 10E9/L (ref 0–0.7)
EOSINOPHIL NFR BLD AUTO: 0.4 %
ERYTHROCYTE [DISTWIDTH] IN BLOOD BY AUTOMATED COUNT: 12.4 % (ref 10–15)
GFR SERPL CREATININE-BSD FRML MDRD: >90 ML/MIN/{1.73_M2}
GLUCOSE SERPL-MCNC: 121 MG/DL (ref 70–99)
GLUCOSE UR STRIP-MCNC: NEGATIVE MG/DL
HCT VFR BLD AUTO: 48.8 % (ref 40–53)
HGB BLD-MCNC: 16.6 G/DL (ref 13.3–17.7)
HGB UR QL STRIP: ABNORMAL
IMM GRANULOCYTES # BLD: 0.1 10E9/L (ref 0–0.4)
IMM GRANULOCYTES NFR BLD: 0.4 %
KETONES UR STRIP-MCNC: ABNORMAL MG/DL
LABORATORY COMMENT REPORT: NORMAL
LEUKOCYTE ESTERASE UR QL STRIP: NEGATIVE
LYMPHOCYTES # BLD AUTO: 1.9 10E9/L (ref 0.8–5.3)
LYMPHOCYTES NFR BLD AUTO: 13.4 %
MCH RBC QN AUTO: 30.9 PG (ref 26.5–33)
MCHC RBC AUTO-ENTMCNC: 34 G/DL (ref 31.5–36.5)
MCV RBC AUTO: 91 FL (ref 78–100)
MONOCYTES # BLD AUTO: 0.7 10E9/L (ref 0–1.3)
MONOCYTES NFR BLD AUTO: 5.1 %
MUCOUS THREADS #/AREA URNS LPF: PRESENT /LPF
NEUTROPHILS # BLD AUTO: 11.2 10E9/L (ref 1.6–8.3)
NEUTROPHILS NFR BLD AUTO: 80.3 %
NITRATE UR QL: NEGATIVE
NRBC # BLD AUTO: 0 10*3/UL
NRBC BLD AUTO-RTO: 0 /100
PH UR STRIP: 6.5 PH (ref 5–7)
PLATELET # BLD AUTO: 358 10E9/L (ref 150–450)
POTASSIUM SERPL-SCNC: 3.6 MMOL/L (ref 3.4–5.3)
PROT SERPL-MCNC: 8.3 G/DL (ref 6.8–8.8)
RBC # BLD AUTO: 5.37 10E12/L (ref 4.4–5.9)
RBC #/AREA URNS AUTO: 48 /HPF (ref 0–2)
SARS-COV-2 RNA RESP QL NAA+PROBE: NEGATIVE
SODIUM SERPL-SCNC: 140 MMOL/L (ref 133–144)
SOURCE: ABNORMAL
SP GR UR STRIP: 1.02 (ref 1–1.03)
SPECIMEN SOURCE: NORMAL
SQUAMOUS #/AREA URNS AUTO: 0 /HPF (ref 0–1)
UROBILINOGEN UR STRIP-MCNC: NORMAL MG/DL (ref 0–2)
WBC # BLD AUTO: 14 10E9/L (ref 4–11)
WBC #/AREA URNS AUTO: 3 /HPF (ref 0–5)

## 2021-06-09 PROCEDURE — 258N000003 HC RX IP 258 OP 636

## 2021-06-09 PROCEDURE — 250N000011 HC RX IP 250 OP 636: Performed by: NURSE ANESTHETIST, CERTIFIED REGISTERED

## 2021-06-09 PROCEDURE — 710N000009 HC RECOVERY PHASE 1, LEVEL 1, PER MIN: Performed by: UROLOGY

## 2021-06-09 PROCEDURE — 360N000082 HC SURGERY LEVEL 2 W/ FLUORO, PER MIN: Performed by: UROLOGY

## 2021-06-09 PROCEDURE — 250N000011 HC RX IP 250 OP 636: Performed by: STUDENT IN AN ORGANIZED HEALTH CARE EDUCATION/TRAINING PROGRAM

## 2021-06-09 PROCEDURE — 255N000002 HC RX 255 OP 636: Performed by: UROLOGY

## 2021-06-09 PROCEDURE — 85025 COMPLETE CBC W/AUTO DIFF WBC: CPT | Performed by: FAMILY MEDICINE

## 2021-06-09 PROCEDURE — 99285 EMERGENCY DEPT VISIT HI MDM: CPT | Performed by: FAMILY MEDICINE

## 2021-06-09 PROCEDURE — 250N000011 HC RX IP 250 OP 636: Performed by: FAMILY MEDICINE

## 2021-06-09 PROCEDURE — 96374 THER/PROPH/DIAG INJ IV PUSH: CPT | Mod: 59 | Performed by: FAMILY MEDICINE

## 2021-06-09 PROCEDURE — C2617 STENT, NON-COR, TEM W/O DEL: HCPCS | Performed by: UROLOGY

## 2021-06-09 PROCEDURE — 370N000017 HC ANESTHESIA TECHNICAL FEE, PER MIN: Performed by: UROLOGY

## 2021-06-09 PROCEDURE — 999N000180 XR SURGERY CARM FLUORO LESS THAN 5 MIN: Mod: TC

## 2021-06-09 PROCEDURE — 96376 TX/PRO/DX INJ SAME DRUG ADON: CPT | Performed by: FAMILY MEDICINE

## 2021-06-09 PROCEDURE — 258N000003 HC RX IP 258 OP 636: Performed by: NURSE ANESTHETIST, CERTIFIED REGISTERED

## 2021-06-09 PROCEDURE — 99285 EMERGENCY DEPT VISIT HI MDM: CPT | Mod: 25 | Performed by: FAMILY MEDICINE

## 2021-06-09 PROCEDURE — 99222 1ST HOSP IP/OBS MODERATE 55: CPT | Mod: 25

## 2021-06-09 PROCEDURE — 250N000025 HC SEVOFLURANE, PER MIN: Performed by: UROLOGY

## 2021-06-09 PROCEDURE — 999N000141 HC STATISTIC PRE-PROCEDURE NURSING ASSESSMENT: Performed by: UROLOGY

## 2021-06-09 PROCEDURE — 87635 SARS-COV-2 COVID-19 AMP PRB: CPT | Performed by: EMERGENCY MEDICINE

## 2021-06-09 PROCEDURE — 258N000003 HC RX IP 258 OP 636: Performed by: FAMILY MEDICINE

## 2021-06-09 PROCEDURE — 710N000012 HC RECOVERY PHASE 2, PER MINUTE: Performed by: UROLOGY

## 2021-06-09 PROCEDURE — 81001 URINALYSIS AUTO W/SCOPE: CPT | Performed by: FAMILY MEDICINE

## 2021-06-09 PROCEDURE — C9803 HOPD COVID-19 SPEC COLLECT: HCPCS | Performed by: FAMILY MEDICINE

## 2021-06-09 PROCEDURE — 96375 TX/PRO/DX INJ NEW DRUG ADDON: CPT | Performed by: FAMILY MEDICINE

## 2021-06-09 PROCEDURE — 80053 COMPREHEN METABOLIC PANEL: CPT | Performed by: FAMILY MEDICINE

## 2021-06-09 PROCEDURE — 272N000001 HC OR GENERAL SUPPLY STERILE: Performed by: UROLOGY

## 2021-06-09 PROCEDURE — 74176 CT ABD & PELVIS W/O CONTRAST: CPT

## 2021-06-09 PROCEDURE — 96361 HYDRATE IV INFUSION ADD-ON: CPT | Mod: 59 | Performed by: FAMILY MEDICINE

## 2021-06-09 PROCEDURE — 250N000009 HC RX 250: Performed by: NURSE ANESTHETIST, CERTIFIED REGISTERED

## 2021-06-09 PROCEDURE — C1769 GUIDE WIRE: HCPCS | Performed by: UROLOGY

## 2021-06-09 DEVICE — URETERAL STENT
Type: IMPLANTABLE DEVICE | Site: URETER | Status: FUNCTIONAL
Brand: PERCUFLEX™ PLUS

## 2021-06-09 RX ORDER — FENTANYL CITRATE 50 UG/ML
25-50 INJECTION, SOLUTION INTRAMUSCULAR; INTRAVENOUS
Status: DISCONTINUED | OUTPATIENT
Start: 2021-06-09 | End: 2021-06-09 | Stop reason: HOSPADM

## 2021-06-09 RX ORDER — KETOROLAC TROMETHAMINE 15 MG/ML
15 INJECTION, SOLUTION INTRAMUSCULAR; INTRAVENOUS ONCE
Status: COMPLETED | OUTPATIENT
Start: 2021-06-09 | End: 2021-06-09

## 2021-06-09 RX ORDER — TAMSULOSIN HYDROCHLORIDE 0.4 MG/1
0.4 CAPSULE ORAL DAILY
Qty: 30 CAPSULE | Refills: 0 | Status: SHIPPED | OUTPATIENT
Start: 2021-06-09 | End: 2021-12-10

## 2021-06-09 RX ORDER — CEFAZOLIN SODIUM 1 G/3ML
INJECTION, POWDER, FOR SOLUTION INTRAMUSCULAR; INTRAVENOUS PRN
Status: DISCONTINUED | OUTPATIENT
Start: 2021-06-09 | End: 2021-06-09

## 2021-06-09 RX ORDER — SODIUM CHLORIDE, SODIUM LACTATE, POTASSIUM CHLORIDE, CALCIUM CHLORIDE 600; 310; 30; 20 MG/100ML; MG/100ML; MG/100ML; MG/100ML
INJECTION, SOLUTION INTRAVENOUS CONTINUOUS
Status: DISCONTINUED | OUTPATIENT
Start: 2021-06-09 | End: 2021-06-09 | Stop reason: HOSPADM

## 2021-06-09 RX ORDER — HYDROMORPHONE HYDROCHLORIDE 1 MG/ML
0.5 INJECTION, SOLUTION INTRAMUSCULAR; INTRAVENOUS; SUBCUTANEOUS ONCE
Status: COMPLETED | OUTPATIENT
Start: 2021-06-09 | End: 2021-06-09

## 2021-06-09 RX ORDER — ONDANSETRON 2 MG/ML
INJECTION INTRAMUSCULAR; INTRAVENOUS PRN
Status: DISCONTINUED | OUTPATIENT
Start: 2021-06-09 | End: 2021-06-09

## 2021-06-09 RX ORDER — NALOXONE HYDROCHLORIDE 0.4 MG/ML
0.2 INJECTION, SOLUTION INTRAMUSCULAR; INTRAVENOUS; SUBCUTANEOUS
Status: DISCONTINUED | OUTPATIENT
Start: 2021-06-09 | End: 2021-06-09 | Stop reason: HOSPADM

## 2021-06-09 RX ORDER — NALOXONE HYDROCHLORIDE 0.4 MG/ML
0.4 INJECTION, SOLUTION INTRAMUSCULAR; INTRAVENOUS; SUBCUTANEOUS
Status: DISCONTINUED | OUTPATIENT
Start: 2021-06-09 | End: 2021-06-09 | Stop reason: HOSPADM

## 2021-06-09 RX ORDER — HYDROMORPHONE HYDROCHLORIDE 1 MG/ML
.2-.4 INJECTION, SOLUTION INTRAMUSCULAR; INTRAVENOUS; SUBCUTANEOUS EVERY 10 MIN PRN
Status: DISCONTINUED | OUTPATIENT
Start: 2021-06-09 | End: 2021-06-09 | Stop reason: HOSPADM

## 2021-06-09 RX ORDER — TAMSULOSIN HYDROCHLORIDE 0.4 MG/1
0.4 CAPSULE ORAL DAILY
Qty: 30 CAPSULE | Refills: 0 | Status: SHIPPED | OUTPATIENT
Start: 2021-06-09 | End: 2021-06-09

## 2021-06-09 RX ORDER — CEFAZOLIN SODIUM 2 G/100ML
2 INJECTION, SOLUTION INTRAVENOUS SEE ADMIN INSTRUCTIONS
Status: CANCELLED | OUTPATIENT
Start: 2021-06-09

## 2021-06-09 RX ORDER — ONDANSETRON 4 MG/1
4 TABLET, ORALLY DISINTEGRATING ORAL EVERY 30 MIN PRN
Status: DISCONTINUED | OUTPATIENT
Start: 2021-06-09 | End: 2021-06-09 | Stop reason: HOSPADM

## 2021-06-09 RX ORDER — PROPOFOL 10 MG/ML
INJECTION, EMULSION INTRAVENOUS PRN
Status: DISCONTINUED | OUTPATIENT
Start: 2021-06-09 | End: 2021-06-09

## 2021-06-09 RX ORDER — LIDOCAINE HYDROCHLORIDE 20 MG/ML
INJECTION, SOLUTION INFILTRATION; PERINEURAL PRN
Status: DISCONTINUED | OUTPATIENT
Start: 2021-06-09 | End: 2021-06-09

## 2021-06-09 RX ORDER — SODIUM CHLORIDE, SODIUM LACTATE, POTASSIUM CHLORIDE, CALCIUM CHLORIDE 600; 310; 30; 20 MG/100ML; MG/100ML; MG/100ML; MG/100ML
INJECTION, SOLUTION INTRAVENOUS CONTINUOUS PRN
Status: DISCONTINUED | OUTPATIENT
Start: 2021-06-09 | End: 2021-06-09

## 2021-06-09 RX ORDER — ONDANSETRON 2 MG/ML
4 INJECTION INTRAMUSCULAR; INTRAVENOUS EVERY 30 MIN PRN
Status: DISCONTINUED | OUTPATIENT
Start: 2021-06-09 | End: 2021-06-09 | Stop reason: HOSPADM

## 2021-06-09 RX ORDER — CEFAZOLIN SODIUM 2 G/100ML
2 INJECTION, SOLUTION INTRAVENOUS
Status: CANCELLED | OUTPATIENT
Start: 2021-06-09

## 2021-06-09 RX ORDER — SODIUM CHLORIDE 9 MG/ML
INJECTION, SOLUTION INTRAVENOUS CONTINUOUS
Status: DISCONTINUED | OUTPATIENT
Start: 2021-06-09 | End: 2021-06-09 | Stop reason: HOSPADM

## 2021-06-09 RX ORDER — HYDROMORPHONE HYDROCHLORIDE 1 MG/ML
0.5 INJECTION, SOLUTION INTRAMUSCULAR; INTRAVENOUS; SUBCUTANEOUS
Status: DISCONTINUED | OUTPATIENT
Start: 2021-06-09 | End: 2021-06-09 | Stop reason: HOSPADM

## 2021-06-09 RX ORDER — FENTANYL CITRATE 50 UG/ML
INJECTION, SOLUTION INTRAMUSCULAR; INTRAVENOUS PRN
Status: DISCONTINUED | OUTPATIENT
Start: 2021-06-09 | End: 2021-06-09

## 2021-06-09 RX ORDER — SODIUM CHLORIDE 9 MG/ML
INJECTION, SOLUTION INTRAVENOUS
Status: COMPLETED
Start: 2021-06-09 | End: 2021-06-09

## 2021-06-09 RX ORDER — OXYCODONE HYDROCHLORIDE 5 MG/1
5 TABLET ORAL EVERY 4 HOURS PRN
Status: DISCONTINUED | OUTPATIENT
Start: 2021-06-09 | End: 2021-06-09 | Stop reason: HOSPADM

## 2021-06-09 RX ORDER — DEXAMETHASONE SODIUM PHOSPHATE 4 MG/ML
INJECTION, SOLUTION INTRA-ARTICULAR; INTRALESIONAL; INTRAMUSCULAR; INTRAVENOUS; SOFT TISSUE PRN
Status: DISCONTINUED | OUTPATIENT
Start: 2021-06-09 | End: 2021-06-09

## 2021-06-09 RX ORDER — SODIUM CHLORIDE, SODIUM LACTATE, POTASSIUM CHLORIDE, CALCIUM CHLORIDE 600; 310; 30; 20 MG/100ML; MG/100ML; MG/100ML; MG/100ML
INJECTION, SOLUTION INTRAVENOUS CONTINUOUS
Status: CANCELLED | OUTPATIENT
Start: 2021-06-09

## 2021-06-09 RX ADMIN — SODIUM CHLORIDE, POTASSIUM CHLORIDE, SODIUM LACTATE AND CALCIUM CHLORIDE: 600; 310; 30; 20 INJECTION, SOLUTION INTRAVENOUS at 19:33

## 2021-06-09 RX ADMIN — SODIUM CHLORIDE 1000 ML: 9 INJECTION, SOLUTION INTRAVENOUS at 12:18

## 2021-06-09 RX ADMIN — HYDROMORPHONE HYDROCHLORIDE 0.5 MG: 1 INJECTION, SOLUTION INTRAMUSCULAR; INTRAVENOUS; SUBCUTANEOUS at 12:31

## 2021-06-09 RX ADMIN — PROPOFOL 120 MG: 10 INJECTION, EMULSION INTRAVENOUS at 19:38

## 2021-06-09 RX ADMIN — ONDANSETRON 4 MG: 2 INJECTION INTRAMUSCULAR; INTRAVENOUS at 20:10

## 2021-06-09 RX ADMIN — ONDANSETRON 4 MG: 2 INJECTION INTRAMUSCULAR; INTRAVENOUS at 12:30

## 2021-06-09 RX ADMIN — HYDROMORPHONE HYDROCHLORIDE 0.5 MG: 1 INJECTION, SOLUTION INTRAMUSCULAR; INTRAVENOUS; SUBCUTANEOUS at 13:23

## 2021-06-09 RX ADMIN — Medication 1000 ML: at 12:18

## 2021-06-09 RX ADMIN — KETOROLAC TROMETHAMINE 15 MG: 15 INJECTION, SOLUTION INTRAMUSCULAR; INTRAVENOUS at 12:31

## 2021-06-09 RX ADMIN — SODIUM CHLORIDE: 9 INJECTION, SOLUTION INTRAVENOUS at 13:23

## 2021-06-09 RX ADMIN — FENTANYL CITRATE 50 MCG: 50 INJECTION, SOLUTION INTRAMUSCULAR; INTRAVENOUS at 19:55

## 2021-06-09 RX ADMIN — LIDOCAINE HYDROCHLORIDE 60 MG: 20 INJECTION, SOLUTION INFILTRATION; PERINEURAL at 19:38

## 2021-06-09 RX ADMIN — MIDAZOLAM 2 MG: 1 INJECTION INTRAMUSCULAR; INTRAVENOUS at 19:33

## 2021-06-09 RX ADMIN — FENTANYL CITRATE 50 MCG: 50 INJECTION, SOLUTION INTRAMUSCULAR; INTRAVENOUS at 19:38

## 2021-06-09 RX ADMIN — DEXAMETHASONE SODIUM PHOSPHATE 6 MG: 4 INJECTION, SOLUTION INTRAMUSCULAR; INTRAVENOUS at 19:45

## 2021-06-09 RX ADMIN — CEFAZOLIN 2 G: 1 INJECTION, POWDER, FOR SOLUTION INTRAMUSCULAR; INTRAVENOUS at 19:50

## 2021-06-09 NOTE — H&P (VIEW-ONLY)
"    [facility] EMERGENCY DEPARTMENT ([facility])         6/09/21  History     Chief Complaint   Patient presents with     Flank Pain     Left flank pain since 10AM      Nausea & Vomiting     The history is provided by medical records and the patient.     Leodan Collins is a 41 year old male with a past medical history significant for recurrent kidney stones (s/p combined cystoscopy-1/31/2020 and previous lithotripsy 2013, 2014, 2016, 1/31/2020), Crohn's disease with previous colostomy (2007) and ileostomy (2009), and asthma who presents to the Emergency Department for evaluation of left flank pain.      Patient comes in with severe left flank pain with onset after 830 this a.m.  Patient has had recurrent kidney stones in the past and he feels this current presentation is exactly like previous kidney stones.  He is \"100% sure its a kidney stone.\"  Patient in severe pain on examination with limited ability to answer questions.  We will try speak patient started to dry heave and then vomit from pain.  Presents with his wife who confirms history is consistent with chart review.  Patient has no medication allergies.  Patient was started on IVF, IV hydromorphone, and IV Zofran.     Patient given second dose of hydromorphone after continued pain.  After this patient could tolerate questioning and physical exam.  Exam only notable for left CVA tenderness to percussion and palpation.  Patient experiencing severe pain only left CVA, nausea, and vomiting.  No chest pain, difficulty breathing, fever, chills, diarrhea, constipation, headaches, vision changes, bleeding or injuries.       I have reviewed the Medications, Allergies, Past Medical and Surgical History, and Social History in the [employer] system.  PAST MEDICAL HISTORY:   Past Medical History:   Diagnosis Date     Asthma     child     Crohn's disease (H)      History of blood transfusion      Kidney stones        PAST SURGICAL HISTORY:   Past Surgical History: "   Procedure Laterality Date     COLOSTOMY  2007     COMBINED CYSTOSCOPY, RETROGRADES, URETEROSCOPY, INSERT STENT Right 5/3/2016    Procedure: COMBINED CYSTOSCOPY, RETROGRADES, URETEROSCOPY, INSERT STENT;  Surgeon: Azael Melvin MD;  Location: [facility] OR     COMBINED CYSTOSCOPY, RETROGRADES, URETEROSCOPY, LASER HOLMIUM LITHOTRIPSY URETER(S), INSERT STENT Right 1/31/2020    Procedure: CYSTOURETEROSCOPY, WITH RETROGRADE PYELOGRAM, HOLMIUM LASER LITHOTRIPSY OF URETERAL CALCULUS, AND STENT placement;  Surgeon: Bob Felix MD;  Location: [facility] OR     EXTRACORPOREAL SHOCK WAVE LITHOTRIPSY (ESWL)  6/27/2013    Procedure: EXTRACORPOREAL SHOCK WAVE LITHOTRIPSY (ESWL);  Left Extracorporeal Shock Wave Lithotripsy Kidney And Ureter;  Surgeon: Azael Melvin MD;  Location: [facility] OR     ILEOSTOMY  2009     LAPAROTOMY EXPLORATORY  2009    extensive lysis of adhesions, revision of coloenteric fistula, repair of small bowel fistula, takedown of current colostomy, debridement of chronic abscess, resection of residual left colon, right hemicolectomy, Louise ileostomy     LASER HOLMIUM LITHOTRIPSY URETER(S), INSERT STENT, COMBINED  4/9/2014    Procedure: COMBINED CYSTOSCOPY, URETEROSCOPY, LASER HOLMIUM LITHOTRIPSY URETER(S), INSERT STENT;  Ureteroscopy, Cystoscopy, holmium laser,Right Uretral stent placement;  Surgeon: Azael Melvin MD;  Location: [facility] OR     LASER HOLMIUM LITHOTRIPSY URETER(S), INSERT STENT, COMBINED Bilateral 5/17/2016    Procedure: COMBINED CYSTOSCOPY, URETEROSCOPY, LASER HOLMIUM LITHOTRIPSY URETER(S), INSERT STENT;  Surgeon: Azael Melvin MD;  Location: [facility] OR       Past medical history, past surgical history, medications, and allergies were reviewed with the patient. Additional pertinent items: None    FAMILY HISTORY:   Family History   Problem Relation Age of Onset     Cerebrovascular Disease Maternal Grandmother      Connective Tissue Disorder Sister      Nephrolithiasis Father         SOCIAL HISTORY:   Social History     Tobacco Use     Smoking status: Never Smoker     Smokeless tobacco: Never Used   Substance Use Topics     Alcohol use: Yes     Alcohol/week: 0.0 standard drinks     Comment: x1 a week     Social history was reviewed with the patient. Additional pertinent items: None      Patient's Medications   New Prescriptions    No medications on file   Previous Medications    ADALIMUMAB (HUMIRA PEN SC)    Inject 40 mg Subcutaneous every 14 days     CHLORTHALIDONE (HYGROTON) 25 MG TABLET    Take 1 tablet (25 mg) by mouth daily    HYDROCODONE-ACETAMINOPHEN (NORCO) 5-325 MG TABLET    Take 1 tablet by mouth every 6 hours as needed for severe pain    IBUPROFEN (ADVIL/MOTRIN) 600 MG TABLET    Take 1 tablet (600 mg) by mouth every 6 hours as needed for moderate pain    MERCAPTOPURINE (PURINETHOL) 50 MG TABLET    Take 100 mg by mouth daily     NITROFURANTOIN MACROCRYSTAL-MONOHYDRATE (MACROBID) 100 MG CAPSULE    Take 1 capsule (100 mg) by mouth 2 times daily    ONDANSETRON (ZOFRAN-ODT) 4 MG ODT TAB    Take 1 tablet (4 mg) by mouth every 8 hours as needed for nausea    OXYCODONE HCL PO        POTASSIUM CITRATE (UROCIT-K) 10 MEQ (1080 MG) CR TABLET    Take 2 tablets (20 mEq) by mouth 2 times daily    POTASSIUM CITRATE (UROCIT-K) 10 MEQ (1080 MG) CR TABLET    Take 2 tablets (20 mEq) by mouth 3 times daily (with meals)    POTASSIUM CITRATE-CITRIC ACID (CYTRA K) 8541-0329 MG PACKET    Sig-Route: take 1 packet by mouth daily-Oral    POTASSIUM CITRATE-CITRIC ACID (POLYCITRA-K/CYTRA K) 5814-1078 MG PACKET    Take 1 packet by mouth daily    TAMSULOSIN (FLOMAX) 0.4 MG CAPSULE    Take 1 capsule (0.4 mg) by mouth daily    VITAMIN D PO       Modified Medications    No medications on file   Discontinued Medications    No medications on file        No Known Allergies     Review of Systems  ROS: 14 point ROS neg other than the symptoms noted above in the HPI.    Physical Exam   BP: (!) 137/94  Pulse: 67  Temp:  96.1  F (35.6  C)  Resp: 18  Weight: 68.5 kg (151 lb)  SpO2: 98 %      Physical Exam  Vitals signs and nursing note reviewed.   Constitutional:       General: He is in acute distress.      Appearance: He is ill-appearing and diaphoretic.   Eyes:      Pupils: Pupils are equal, round, and reactive to light.   Cardiovascular:      Rate and Rhythm: Regular rhythm. Tachycardia present.      Pulses: Normal pulses.      Heart sounds: Normal heart sounds.   Pulmonary:      Effort: Pulmonary effort is normal.      Breath sounds: Normal breath sounds.   Abdominal:      General: Abdomen is flat. Bowel sounds are normal. There is no distension.      Palpations: Abdomen is soft. There is no mass.      Tenderness: There is left CVA tenderness. There is no right CVA tenderness, guarding or rebound.   Musculoskeletal:         General: No swelling.   Skin:     General: Skin is warm.      Coloration: Skin is not jaundiced.   Neurological:      General: No focal deficit present.      Mental Status: He is oriented to person, place, and time.       ED Course     Procedures     Results for orders placed or performed during the hospital encounter of 06/09/21 (from the past 24 hour(s))   CBC with platelets differential   Result Value Ref Range    WBC 14.0 (H) 4.0 - 11.0 10e9/L    RBC Count 5.37 4.4 - 5.9 10e12/L    Hemoglobin 16.6 13.3 - 17.7 g/dL    Hematocrit 48.8 40.0 - 53.0 %    MCV 91 78 - 100 fl    MCH 30.9 26.5 - 33.0 pg    MCHC 34.0 31.5 - 36.5 g/dL    RDW 12.4 10.0 - 15.0 %    Platelet Count 358 150 - 450 10e9/L    Diff Method Automated Method     % Neutrophils 80.3 %    % Lymphocytes 13.4 %    % Monocytes 5.1 %    % Eosinophils 0.4 %    % Basophils 0.4 %    % Immature Granulocytes 0.4 %    Nucleated RBCs 0 0 /100    Absolute Neutrophil 11.2 (H) 1.6 - 8.3 10e9/L    Absolute Lymphocytes 1.9 0.8 - 5.3 10e9/L    Absolute Monocytes 0.7 0.0 - 1.3 10e9/L    Absolute Eosinophils 0.1 0.0 - 0.7 10e9/L    Absolute Basophils 0.1 0.0 - 0.2  10e9/L    Abs Immature Granulocytes 0.1 0 - 0.4 10e9/L    Absolute Nucleated RBC 0.0    Comprehensive metabolic panel   Result Value Ref Range    Sodium 140 133 - 144 mmol/L    Potassium 3.6 3.4 - 5.3 mmol/L    Chloride 103 94 - 109 mmol/L    Carbon Dioxide 26 20 - 32 mmol/L    Anion Gap 11 3 - 14 mmol/L    Glucose 121 (H) 70 - 99 mg/dL    Urea Nitrogen 17 7 - 30 mg/dL    Creatinine 0.93 0.66 - 1.25 mg/dL    GFR Estimate >90 >60 mL/min/[1.73_m2]    GFR Estimate If Black >90 >60 mL/min/[1.73_m2]    Calcium 9.8 8.5 - 10.1 mg/dL    Bilirubin Total 2.6 (H) 0.2 - 1.3 mg/dL    Albumin 4.5 3.4 - 5.0 g/dL    Protein Total 8.3 6.8 - 8.8 g/dL    Alkaline Phosphatase 53 40 - 150 U/L    ALT 40 0 - 70 U/L    AST 21 0 - 45 U/L   CT Abdomen Pelvis w/o Contrast    Narrative    CT ABDOMEN/PELVIS WITHOUT CONTRAST June 9, 2021 1:25 PM    CLINICAL HISTORY: Abdominal pain. Flank pain, kidney stone suspected.     TECHNIQUE: CT scan of the abdomen and pelvis was performed without IV  contrast. Multiplanar reformats were obtained. Dose reduction  techniques were used.  CONTRAST: None.    COMPARISON: January 29, 2020.    FINDINGS:   LOWER CHEST: Stable 5 mm nodule lateral left lower lobe compatible  with a benign lesion.    HEPATOBILIARY: No significant mass or bile duct dilatation. No  calcified gallstones.     PANCREAS: No significant mass, duct dilatation, or inflammatory  change.    SPLEEN: Normal size.    ADRENAL GLANDS: No significant nodules.    KIDNEYS/BLADDER: Stone at the left ureteropelvic junction measuring 5  mm with mild proximal hydronephrosis. At least seven stones on the  left measuring up to 9 mm. Two stones on the right in the upper pole  measuring up to 3 mm. No bladder stones.    BOWEL: Right lower quadrant ostomy and presumed colectomy changes, no  obstruction.    VASCULATURE: No abdominal aortic aneurysm.    PELVIC ORGANS: No pelvic masses.    OTHER: No free air or free fluid.    MUSCULOSKELETAL: No suspicious bony  lesions.      Impression    IMPRESSION:   1.  5 mm stone at the ureteropelvic junction on the left with mild  proximal hydronephrosis.  2.  At least seven stones in the left kidney measuring up to 9 mm.  3.  Two nonobstructing stones in the right kidney.    WAN DIA MD   UA reflex to Microscopic and Culture    Specimen: Urine clean catch; Midstream Urine   Result Value Ref Range    Color Urine Light Yellow     Appearance Urine Clear     Glucose Urine Negative NEG^Negative mg/dL    Bilirubin Urine Negative NEG^Negative    Ketones Urine Trace (A) NEG^Negative mg/dL    Specific Gravity Urine 1.020 1.003 - 1.035    Blood Urine Moderate (A) NEG^Negative    pH Urine 6.5 5.0 - 7.0 pH    Protein Albumin Urine Negative NEG^Negative mg/dL    Urobilinogen mg/dL Normal 0.0 - 2.0 mg/dL    Nitrite Urine Negative NEG^Negative    Leukocyte Esterase Urine Negative NEG^Negative    Source Midstream Urine     RBC Urine 48 (H) 0 - 2 /HPF    WBC Urine 3 0 - 5 /HPF    Bacteria Urine None (A) NEG^Negative /HPF    Squamous Epithelial /HPF Urine 0 0 - 1 /HPF    Mucous Urine Present (A) NEG^Negative /LPF    Calcium Oxalate Few (A) NEG^Negative /HPF     Medications   0.9% sodium chloride BOLUS (0 mLs Intravenous Stopped 6/9/21 1300)     Followed by   sodium chloride 0.9% infusion ( Intravenous New Bag 6/9/21 1323)   ondansetron (ZOFRAN) injection 4 mg (4 mg Intravenous Given 6/9/21 1230)   HYDROmorphone (PF) (DILAUDID) injection 0.5 mg (0.5 mg Intravenous Given 6/9/21 1323)   ketorolac (TORADOL) injection 15 mg (15 mg Intravenous Given 6/9/21 1231)   HYDROmorphone (PF) (DILAUDID) injection 0.5 mg (0.5 mg Intravenous Given 6/9/21 1231)       Assessments & Plan (with Medical Decision Making)   41-year-old male with history of recurrent kidney stones, Crohn's disease, and asthma who was found to have multiple kidney stones in left ureteropelvic junction.    Presenting with left flank pain. .Initial differential diagnosis included but was not  restricted to pancreatitis, pyelonephritis, ureterolithiasis,cholecystitis or cholelithiasis, cholangitis,duodenitis, small bowel obstruction.  Exam the patient is diaphoretic and uncomfortable.  His ileostomy stoma appears intact and has the appropriate amount of liquid stool.  He has left CVA tenderness.The remainder of a complete physical exam is normal.  Symptomatically and a work-up entertained.  There is blood in the urine.  His creatinine is normal.  His CT reveals a large stone burden including obstructing stone on the left.  Urology was consulted and saw the patient in the ED.  They are helping to arrange final disposition.  He is being signed out to the evening for provider at shift change with final urology recommendations pending.        I have reviewed the nursing notes.    I have reviewed the findings, diagnosis, plan and need for follow up with the patient.    New Prescriptions    No medications on file       Final diagnoses:   Ureterolithiasis       6/9/2021   [facility] EMERGENCY DEPARTMENT     Nathen Field MD  06/09/21 [id number]

## 2021-06-09 NOTE — ED PROVIDER NOTES
"    [facility] EMERGENCY DEPARTMENT ([facility])         6/09/21  History     Chief Complaint   Patient presents with     Flank Pain     Left flank pain since 10AM      Nausea & Vomiting     The history is provided by medical records and the patient.     Leodan Collins is a 41 year old male with a past medical history significant for recurrent kidney stones (s/p combined cystoscopy-1/31/2020 and previous lithotripsy 2013, 2014, 2016, 1/31/2020), Crohn's disease with previous colostomy (2007) and ileostomy (2009), and asthma who presents to the Emergency Department for evaluation of left flank pain.      Patient comes in with severe left flank pain with onset after 830 this a.m.  Patient has had recurrent kidney stones in the past and he feels this current presentation is exactly like previous kidney stones.  He is \"100% sure its a kidney stone.\"  Patient in severe pain on examination with limited ability to answer questions.  We will try speak patient started to dry heave and then vomit from pain.  Presents with his wife who confirms history is consistent with chart review.  Patient has no medication allergies.  Patient was started on IVF, IV hydromorphone, and IV Zofran.     Patient given second dose of hydromorphone after continued pain.  After this patient could tolerate questioning and physical exam.  Exam only notable for left CVA tenderness to percussion and palpation.  Patient experiencing severe pain only left CVA, nausea, and vomiting.  No chest pain, difficulty breathing, fever, chills, diarrhea, constipation, headaches, vision changes, bleeding or injuries.       I have reviewed the Medications, Allergies, Past Medical and Surgical History, and Social History in the [employer] system.  PAST MEDICAL HISTORY:   Past Medical History:   Diagnosis Date     Asthma     child     Crohn's disease (H)      History of blood transfusion      Kidney stones        PAST SURGICAL HISTORY:   Past Surgical History: "   Procedure Laterality Date     COLOSTOMY  2007     COMBINED CYSTOSCOPY, RETROGRADES, URETEROSCOPY, INSERT STENT Right 5/3/2016    Procedure: COMBINED CYSTOSCOPY, RETROGRADES, URETEROSCOPY, INSERT STENT;  Surgeon: Azael Melvin MD;  Location: [facility] OR     COMBINED CYSTOSCOPY, RETROGRADES, URETEROSCOPY, LASER HOLMIUM LITHOTRIPSY URETER(S), INSERT STENT Right 1/31/2020    Procedure: CYSTOURETEROSCOPY, WITH RETROGRADE PYELOGRAM, HOLMIUM LASER LITHOTRIPSY OF URETERAL CALCULUS, AND STENT placement;  Surgeon: Bob Felix MD;  [facility] OR     EXTRACORPOREAL SHOCK WAVE LITHOTRIPSY (ESWL)  6/27/2013    Procedure: EXTRACORPOREAL SHOCK WAVE LITHOTRIPSY (ESWL);  Left Extracorporeal Shock Wave Lithotripsy Kidney And Ureter;  Surgeon: Azael Melvin MD;  Location: [facility] OR     ILEOSTOMY  2009     LAPAROTOMY EXPLORATORY  2009    extensive lysis of adhesions, revision of coloenteric fistula, repair of small bowel fistula, takedown of current colostomy, debridement of chronic abscess, resection of residual left colon, right hemicolectomy, Louise ileostomy     LASER HOLMIUM LITHOTRIPSY URETER(S), INSERT STENT, COMBINED  4/9/2014    Procedure: COMBINED CYSTOSCOPY, URETEROSCOPY, LASER HOLMIUM LITHOTRIPSY URETER(S), INSERT STENT;  Ureteroscopy, Cystoscopy, holmium laser,Right Uretral stent placement;  Surgeon: Azael Melvin MD;  Location: [facility] OR     LASER HOLMIUM LITHOTRIPSY URETER(S), INSERT STENT, COMBINED Bilateral 5/17/2016    Procedure: COMBINED CYSTOSCOPY, URETEROSCOPY, LASER HOLMIUM LITHOTRIPSY URETER(S), INSERT STENT;  Surgeon: Azael Melvin MD;  Location: [facility] OR       Past medical history, past surgical history, medications, and allergies were reviewed with the patient. Additional pertinent items: None    FAMILY HISTORY:   Family History   Problem Relation Age of Onset     Cerebrovascular Disease Maternal Grandmother      Connective Tissue Disorder Sister      Nephrolithiasis Father         SOCIAL HISTORY:   Social History     Tobacco Use     Smoking status: Never Smoker     Smokeless tobacco: Never Used   Substance Use Topics     Alcohol use: Yes     Alcohol/week: 0.0 standard drinks     Comment: x1 a week     Social history was reviewed with the patient. Additional pertinent items: None      Patient's Medications   New Prescriptions    No medications on file   Previous Medications    ADALIMUMAB (HUMIRA PEN SC)    Inject 40 mg Subcutaneous every 14 days     CHLORTHALIDONE (HYGROTON) 25 MG TABLET    Take 1 tablet (25 mg) by mouth daily    HYDROCODONE-ACETAMINOPHEN (NORCO) 5-325 MG TABLET    Take 1 tablet by mouth every 6 hours as needed for severe pain    IBUPROFEN (ADVIL/MOTRIN) 600 MG TABLET    Take 1 tablet (600 mg) by mouth every 6 hours as needed for moderate pain    MERCAPTOPURINE (PURINETHOL) 50 MG TABLET    Take 100 mg by mouth daily     NITROFURANTOIN MACROCRYSTAL-MONOHYDRATE (MACROBID) 100 MG CAPSULE    Take 1 capsule (100 mg) by mouth 2 times daily    ONDANSETRON (ZOFRAN-ODT) 4 MG ODT TAB    Take 1 tablet (4 mg) by mouth every 8 hours as needed for nausea    OXYCODONE HCL PO        POTASSIUM CITRATE (UROCIT-K) 10 MEQ (1080 MG) CR TABLET    Take 2 tablets (20 mEq) by mouth 2 times daily    POTASSIUM CITRATE (UROCIT-K) 10 MEQ (1080 MG) CR TABLET    Take 2 tablets (20 mEq) by mouth 3 times daily (with meals)    POTASSIUM CITRATE-CITRIC ACID (CYTRA K) 2622-5553 MG PACKET    Sig-Route: take 1 packet by mouth daily-Oral    POTASSIUM CITRATE-CITRIC ACID (POLYCITRA-K/CYTRA K) 4368-2040 MG PACKET    Take 1 packet by mouth daily    TAMSULOSIN (FLOMAX) 0.4 MG CAPSULE    Take 1 capsule (0.4 mg) by mouth daily    VITAMIN D PO       Modified Medications    No medications on file   Discontinued Medications    No medications on file        No Known Allergies     Review of Systems  ROS: 14 point ROS neg other than the symptoms noted above in the HPI.    Physical Exam   BP: (!) 137/94  Pulse: 67  Temp:  96.1  F (35.6  C)  Resp: 18  Weight: 68.5 kg (151 lb)  SpO2: 98 %      Physical Exam  Vitals signs and nursing note reviewed.   Constitutional:       General: He is in acute distress.      Appearance: He is ill-appearing and diaphoretic.   Eyes:      Pupils: Pupils are equal, round, and reactive to light.   Cardiovascular:      Rate and Rhythm: Regular rhythm. Tachycardia present.      Pulses: Normal pulses.      Heart sounds: Normal heart sounds.   Pulmonary:      Effort: Pulmonary effort is normal.      Breath sounds: Normal breath sounds.   Abdominal:      General: Abdomen is flat. Bowel sounds are normal. There is no distension.      Palpations: Abdomen is soft. There is no mass.      Tenderness: There is left CVA tenderness. There is no right CVA tenderness, guarding or rebound.   Musculoskeletal:         General: No swelling.   Skin:     General: Skin is warm.      Coloration: Skin is not jaundiced.   Neurological:      General: No focal deficit present.      Mental Status: He is oriented to person, place, and time.       ED Course     Procedures     Results for orders placed or performed during the hospital encounter of 06/09/21 (from the past 24 hour(s))   CBC with platelets differential   Result Value Ref Range    WBC 14.0 (H) 4.0 - 11.0 10e9/L    RBC Count 5.37 4.4 - 5.9 10e12/L    Hemoglobin 16.6 13.3 - 17.7 g/dL    Hematocrit 48.8 40.0 - 53.0 %    MCV 91 78 - 100 fl    MCH 30.9 26.5 - 33.0 pg    MCHC 34.0 31.5 - 36.5 g/dL    RDW 12.4 10.0 - 15.0 %    Platelet Count 358 150 - 450 10e9/L    Diff Method Automated Method     % Neutrophils 80.3 %    % Lymphocytes 13.4 %    % Monocytes 5.1 %    % Eosinophils 0.4 %    % Basophils 0.4 %    % Immature Granulocytes 0.4 %    Nucleated RBCs 0 0 /100    Absolute Neutrophil 11.2 (H) 1.6 - 8.3 10e9/L    Absolute Lymphocytes 1.9 0.8 - 5.3 10e9/L    Absolute Monocytes 0.7 0.0 - 1.3 10e9/L    Absolute Eosinophils 0.1 0.0 - 0.7 10e9/L    Absolute Basophils 0.1 0.0 - 0.2  10e9/L    Abs Immature Granulocytes 0.1 0 - 0.4 10e9/L    Absolute Nucleated RBC 0.0    Comprehensive metabolic panel   Result Value Ref Range    Sodium 140 133 - 144 mmol/L    Potassium 3.6 3.4 - 5.3 mmol/L    Chloride 103 94 - 109 mmol/L    Carbon Dioxide 26 20 - 32 mmol/L    Anion Gap 11 3 - 14 mmol/L    Glucose 121 (H) 70 - 99 mg/dL    Urea Nitrogen 17 7 - 30 mg/dL    Creatinine 0.93 0.66 - 1.25 mg/dL    GFR Estimate >90 >60 mL/min/[1.73_m2]    GFR Estimate If Black >90 >60 mL/min/[1.73_m2]    Calcium 9.8 8.5 - 10.1 mg/dL    Bilirubin Total 2.6 (H) 0.2 - 1.3 mg/dL    Albumin 4.5 3.4 - 5.0 g/dL    Protein Total 8.3 6.8 - 8.8 g/dL    Alkaline Phosphatase 53 40 - 150 U/L    ALT 40 0 - 70 U/L    AST 21 0 - 45 U/L   CT Abdomen Pelvis w/o Contrast    Narrative    CT ABDOMEN/PELVIS WITHOUT CONTRAST June 9, 2021 1:25 PM    CLINICAL HISTORY: Abdominal pain. Flank pain, kidney stone suspected.     TECHNIQUE: CT scan of the abdomen and pelvis was performed without IV  contrast. Multiplanar reformats were obtained. Dose reduction  techniques were used.  CONTRAST: None.    COMPARISON: January 29, 2020.    FINDINGS:   LOWER CHEST: Stable 5 mm nodule lateral left lower lobe compatible  with a benign lesion.    HEPATOBILIARY: No significant mass or bile duct dilatation. No  calcified gallstones.     PANCREAS: No significant mass, duct dilatation, or inflammatory  change.    SPLEEN: Normal size.    ADRENAL GLANDS: No significant nodules.    KIDNEYS/BLADDER: Stone at the left ureteropelvic junction measuring 5  mm with mild proximal hydronephrosis. At least seven stones on the  left measuring up to 9 mm. Two stones on the right in the upper pole  measuring up to 3 mm. No bladder stones.    BOWEL: Right lower quadrant ostomy and presumed colectomy changes, no  obstruction.    VASCULATURE: No abdominal aortic aneurysm.    PELVIC ORGANS: No pelvic masses.    OTHER: No free air or free fluid.    MUSCULOSKELETAL: No suspicious bony  lesions.      Impression    IMPRESSION:   1.  5 mm stone at the ureteropelvic junction on the left with mild  proximal hydronephrosis.  2.  At least seven stones in the left kidney measuring up to 9 mm.  3.  Two nonobstructing stones in the right kidney.    WAN DIA MD   UA reflex to Microscopic and Culture    Specimen: Urine clean catch; Midstream Urine   Result Value Ref Range    Color Urine Light Yellow     Appearance Urine Clear     Glucose Urine Negative NEG^Negative mg/dL    Bilirubin Urine Negative NEG^Negative    Ketones Urine Trace (A) NEG^Negative mg/dL    Specific Gravity Urine 1.020 1.003 - 1.035    Blood Urine Moderate (A) NEG^Negative    pH Urine 6.5 5.0 - 7.0 pH    Protein Albumin Urine Negative NEG^Negative mg/dL    Urobilinogen mg/dL Normal 0.0 - 2.0 mg/dL    Nitrite Urine Negative NEG^Negative    Leukocyte Esterase Urine Negative NEG^Negative    Source Midstream Urine     RBC Urine 48 (H) 0 - 2 /HPF    WBC Urine 3 0 - 5 /HPF    Bacteria Urine None (A) NEG^Negative /HPF    Squamous Epithelial /HPF Urine 0 0 - 1 /HPF    Mucous Urine Present (A) NEG^Negative /LPF    Calcium Oxalate Few (A) NEG^Negative /HPF     Medications   0.9% sodium chloride BOLUS (0 mLs Intravenous Stopped 6/9/21 1300)     Followed by   sodium chloride 0.9% infusion ( Intravenous New Bag 6/9/21 1323)   ondansetron (ZOFRAN) injection 4 mg (4 mg Intravenous Given 6/9/21 1230)   HYDROmorphone (PF) (DILAUDID) injection 0.5 mg (0.5 mg Intravenous Given 6/9/21 1323)   ketorolac (TORADOL) injection 15 mg (15 mg Intravenous Given 6/9/21 1231)   HYDROmorphone (PF) (DILAUDID) injection 0.5 mg (0.5 mg Intravenous Given 6/9/21 1231)       Assessments & Plan (with Medical Decision Making)   41-year-old male with history of recurrent kidney stones, Crohn's disease, and asthma who was found to have multiple kidney stones in left ureteropelvic junction.    Presenting with left flank pain. .Initial differential diagnosis included but was not  restricted to pancreatitis, pyelonephritis, ureterolithiasis,cholecystitis or cholelithiasis, cholangitis,duodenitis, small bowel obstruction.  Exam the patient is diaphoretic and uncomfortable.  His ileostomy stoma appears intact and has the appropriate amount of liquid stool.  He has left CVA tenderness.The remainder of a complete physical exam is normal.  Symptomatically and a work-up entertained.  There is blood in the urine.  His creatinine is normal.  His CT reveals a large stone burden including obstructing stone on the left.  Urology was consulted and saw the patient in the ED.  They are helping to arrange final disposition.  He is being signed out to the evening for provider at shift change with final urology recommendations pending.        I have reviewed the nursing notes.    I have reviewed the findings, diagnosis, plan and need for follow up with the patient.    New Prescriptions    No medications on file       Final diagnoses:   Ureterolithiasis       6/9/2021   [facility] EMERGENCY DEPARTMENT     Nathen Field MD  06/09/21 [id number]

## 2021-06-09 NOTE — ED TRIAGE NOTES
"Pt reports history of many kidney stones and left flank pain and n/v started at 10AM and feels \"just like previous kidney stones.\" Pt took oxycodone at home this AM at 10 unknown dose.   "

## 2021-06-10 NOTE — DISCHARGE INSTRUCTIONS
Same-Day Surgery   Adult Discharge Orders & Instructions     For 24 hours after surgery:  1. Get plenty of rest.  A responsible adult must stay with you for at least 24 hours after you leave the hospital.   2. Pain medication can slow your reflexes. Do not drive or use heavy equipment.  If you have weakness or tingling, don't drive or use heavy equipment until this feeling goes away.  3. Mixing alcohol and pain medication can cause dizziness and slow your breathing. It can even be fatal. Do not drink alcohol while taking pain medication.  4. Avoid strenuous or risky activities.  Ask for help when climbing stairs.   5. You may feel lightheaded.  If so, sit for a few minutes before standing.  Have someone help you get up.   6. If you have nausea (feel sick to your stomach), drink only clear liquids such as apple juice, ginger ale, broth or 7-Up.  Rest may also help.  Be sure to drink enough fluids.  Move to a regular diet as you feel able. Take pain medications with a small amount of solid food, such as toast or crackers, to avoid nausea.   7. A slight fever is normal. Call the doctor if your fever is over 100 F (37.7 C) (taken under the tongue) or lasts longer than 24 hours.  8. You may have a dry mouth, muscle aches, trouble sleeping or a sore throat.  These symptoms should go away after 24 hours.  9. Do not make important or legal decisions.   Pain Management:      1. Take pain medication (if prescribed) for pain as directed by your physician.        2. WARNING: If the pain medication you have been prescribed contains Tylenol  (acetaminophen), DO NOT take additional doses of Tylenol (acetaminophen).     Call your doctor for any of the followin.  Signs of infection (fever, growing tenderness at the surgery site, severe pain, a large amount of drainage or bleeding, foul-smelling drainage, redness, swelling).    2.  It has been over 8 to 10 hours since surgery and you are still not able to urinate (pee).    3.   Headache for over 24 hours.  To contact a doctor, call Dr. Lozano @ [phone] or:      [phone] and ask for the Resident On Call for:          Urology (answered 24 hours a day)      Emergency Department:  [place] [facility]: [phone]  [place] [facility]: [phone]               Rev. 10/2014

## 2021-06-10 NOTE — ANESTHESIA PROCEDURE NOTES
Airway       Patient location during procedure: OR       Procedure Start/Stop Times: 6/9/2021 7:40 PM  Staff -        CRNA: Mariam Flores APRN CRNA       Performed By: CRNA  Consent for Airway        Urgency: elective  Indications and Patient Condition       Indications for airway management: haresh-procedural         Mask difficulty assessment: 1 - vent by mask    Final Airway Details       Final airway type: supraglottic airway    Supraglottic Airway Details        Type: LMA       Brand: Ambu AuraGain       LMA size: 4    Post intubation assessment        Placement verified by: capnometry, equal breath sounds and chest rise        Number of attempts at approach: 1       Secured with: silk tape       Ease of procedure: easy       Dentition: Intact and Unchanged

## 2021-06-10 NOTE — ANESTHESIA CARE TRANSFER NOTE
Patient: Leodan Collins    Procedure(s):  CYSTOSCOPY, WITH Left  RETROGRADE PYELOGRAM AND Left URETERAL STENT INSERTION    Diagnosis: Calculus of other lower urinary tract location [N21.8]  Diagnosis Additional Information: No value filed.    Anesthesia Type:   General     Note:    Oropharynx: oropharynx clear of all foreign objects  Level of Consciousness: drowsy  Oxygen Supplementation: face mask    Independent Airway: airway patency satisfactory and stable  Dentition: dentition unchanged  Vital Signs Stable: post-procedure vital signs reviewed and stable  Report to RN Given: handoff report given  Patient transferred to: PACU    Handoff Report: Identifed the Patient, Identified the Reponsible Provider, Reviewed the pertinent medical history, Discussed the surgical course, Reviewed Intra-OP anesthesia mangement and issues during anesthesia, Set expectations for post-procedure period and Allowed opportunity for questions and acknowledgement of understanding      Vitals: (Last set prior to Anesthesia Care Transfer)  CRNA VITALS  6/9/2021 1947 - 6/9/2021 2023 6/9/2021             EKG:  Sinus rhythm        Electronically Signed By: ARNIE Hook CRNA  June 9, 2021  8:23 PM

## 2021-06-10 NOTE — ANESTHESIA PREPROCEDURE EVALUATION
Anesthesia Pre-Procedure Evaluation    Patient: Leodan Collins   MRN: [id number] : 1980        Preoperative Diagnosis: Calculus of other lower urinary tract location [N21.8]   Procedure : Procedure(s):  CYSTOSCOPY, WITH Left  RETROGRADE PYELOGRAM AND Left URETERAL STENT INSERTION     Past Medical History:   Diagnosis Date     Asthma     child     Crohn's disease (H)      History of blood transfusion      Kidney stones       Past Surgical History:   Procedure Laterality Date     COLOSTOMY       COMBINED CYSTOSCOPY, RETROGRADES, URETEROSCOPY, INSERT STENT Right 5/3/2016    Procedure: COMBINED CYSTOSCOPY, RETROGRADES, URETEROSCOPY, INSERT STENT;  Surgeon: Azael Melvin MD;  Location: [facility] OR     COMBINED CYSTOSCOPY, RETROGRADES, URETEROSCOPY, LASER HOLMIUM LITHOTRIPSY URETER(S), INSERT STENT Right 2020    Procedure: CYSTOURETEROSCOPY, WITH RETROGRADE PYELOGRAM, HOLMIUM LASER LITHOTRIPSY OF URETERAL CALCULUS, AND STENT placement;  Surgeon: Bob Felix MD;  [facility] OR     EXTRACORPOREAL SHOCK WAVE LITHOTRIPSY (ESWL)  2013    Procedure: EXTRACORPOREAL SHOCK WAVE LITHOTRIPSY (ESWL);  Left Extracorporeal Shock Wave Lithotripsy Kidney And Ureter;  Surgeon: Azael Melvin MD;  Location: [facility] OR     ILEOSTOMY  2009     LAPAROTOMY EXPLORATORY      extensive lysis of adhesions, revision of coloenteric fistula, repair of small bowel fistula, takedown of current colostomy, debridement of chronic abscess, resection of residual left colon, right hemicolectomy, Louise ileostomy     LASER HOLMIUM LITHOTRIPSY URETER(S), INSERT STENT, COMBINED  2014    Procedure: COMBINED CYSTOSCOPY, URETEROSCOPY, LASER HOLMIUM LITHOTRIPSY URETER(S), INSERT STENT;  Ureteroscopy, Cystoscopy, holmium laser,Right Uretral stent placement;  Surgeon: Azael Melvin MD;  Location: [facility] OR     LASER HOLMIUM LITHOTRIPSY URETER(S), INSERT STENT, COMBINED Bilateral 2016    Procedure: COMBINED  CYSTOSCOPY, URETEROSCOPY, LASER HOLMIUM LITHOTRIPSY URETER(S), INSERT STENT;  Surgeon: Azael Melvin MD;  Location: [facility] OR      No Known Allergies   Social History     Tobacco Use     Smoking status: Never Smoker     Smokeless tobacco: Never Used   Substance Use Topics     Alcohol use: Yes     Alcohol/week: 0.0 standard drinks     Comment: x1 a week      Wt Readings from Last 1 Encounters:   06/09/21 68.5 kg (151 lb)        Anesthesia Evaluation   Pt has had prior anesthetic. Type: General.    No history of anesthetic complications       ROS/MED HX  ENT/Pulmonary:     (+) Intermittent, asthma (no sx since age 15)     Neurologic:  - neg neurologic ROS     Cardiovascular:  - neg cardiovascular ROS  (-) hypertension   METS/Exercise Tolerance: >4 METS    Hematologic:     (+) history of blood transfusion,  (-) history of blood clots and anemia   Musculoskeletal:       GI/Hepatic:     (+) Inflammatory bowel disease (crohn's),     Renal/Genitourinary:     (+) Nephrolithiasis ,     Endo:  - neg endo ROS  (-) thyroid disease   Psychiatric/Substance Use:  - neg psychiatric ROS     Infectious Disease:       Malignancy:       Other:            Physical Exam    Airway  airway exam normal      Mallampati: II   TM distance: > 3 FB   Neck ROM: full   Mouth opening: > 3 cm    Respiratory Devices and Support         Dental  no notable dental history         Cardiovascular   cardiovascular exam normal       Rhythm and rate: regular and normal     Pulmonary   pulmonary exam normal        breath sounds clear to auscultation           OUTSIDE LABS:  CBC:   Lab Results   Component Value Date    WBC 14.0 (H) 06/09/2021    WBC 5.8 04/13/2021    HGB 16.6 06/09/2021    HGB 16.3 04/13/2021    HCT 48.8 06/09/2021    HCT 48.6 04/13/2021     06/09/2021     04/13/2021     BMP:   Lab Results   Component Value Date     06/09/2021     06/26/2020    POTASSIUM 3.6 06/09/2021    POTASSIUM 3.5 06/26/2020    CHLORIDE 103  06/09/2021    CHLORIDE 101 06/26/2020    CO2 26 06/09/2021    CO2 31 06/26/2020    BUN 17 06/09/2021    BUN 17 06/26/2020    CR 0.93 06/09/2021    CR 0.84 06/26/2020     (H) 06/09/2021    GLC 89 06/26/2020     COAGS:   Lab Results   Component Value Date    PTT 34 06/19/2011    INR 1.05 06/19/2011     POC:   Lab Results   Component Value Date    BGM 76 12/03/2009     HEPATIC:   Lab Results   Component Value Date    ALBUMIN 4.5 06/09/2021    PROTTOTAL 8.3 06/09/2021    ALT 40 06/09/2021    AST 21 06/09/2021    ALKPHOS 53 06/09/2021    BILITOTAL 2.6 (H) 06/09/2021     OTHER:   Lab Results   Component Value Date    PH 7.47 (H) 12/16/2009    LACT 1.9 01/24/2019    LAURA 9.8 06/09/2021    PHOS 3.8 03/18/2019    MAG 2.4 (H) 06/19/2011    LIPASE 125 01/24/2019    TSH 0.71 11/11/2019    CRP 20.1 (H) 06/19/2011     (H) 11/27/2009       Anesthesia Plan    ASA Status:  2   NPO Status:  NPO Appropriate    Anesthesia Type: General.     - Airway: LMA   Induction: Intravenous.   Maintenance: Balanced.        Consents    Anesthesia Plan(s) and associated risks, benefits, and realistic alternatives discussed. Questions answered and patient/representative(s) expressed understanding.     - Discussed with:  Patient      - Extended Intubation/Ventilatory Support Discussed: No.      - Patient is DNR/DNI Status: No    Use of blood products discussed: No .     Postoperative Care    Pain management: Oral pain medications, Multi-modal analgesia, IV analgesics.   PONV prophylaxis: Ondansetron (or other 5HT-3), Dexamethasone or Solumedrol     Comments:    Discussed risks of anesthesia including nausea, vomiting, sore throat, dental damage, cardiopulmonary complications, neurologic complication, and serious complications.              Eli Kirk MD

## 2021-06-10 NOTE — OP NOTE
OPERATIVE REPORT 6/9/2021    PREOPERATIVE DIAGNOSIS: Left ureteral stone(s)    POSTOPERATIVE DIAGNOSIS:  Same    PROCEDURES PERFORMED:   1. Cystourethroscopy with left retrograde pyelography  2. Placement of left ureteral stent.  3. Intraoperative interpretation of fluoroscopic imaging.     STAFF SURGEON: Trenton Lozano MD    RESIDENT: Kathy Zarate MD    ANESTHESIA: Choice    ESTIMATED BLOOD LOSS: 0 mL.     DRAINS:  6 Fr x 26 cm double J left ureteral stent    OPERATIVE INDICATIONS:   Leodan Collins is a 41 year old male who presented with a left obstructing ureteral stone.  The patient was counseled on the alternatives, risks, and benefits and elected to proceed with the above stated procedure.    DESCRIPTION OF PROCEDURE:    After informed consent was obtained, the patient was taken to the operating room, and moved to the operating table.  After adequate anesthesia was induced, the patient was repositioned in dorsal lithotomy position and prepped and draped in the usual sterile fashion. A timeout was taken to confirm correct patient, procedure and laterality.     A 22-Tajik cystoscope was inserted into a well lubricated urethra. The urethra was unremarkable.  The bladder was free of tumors, stones or diverticuli.  The media was clear.  Bilateral ureteral orifices were orthotopic.  A Sensor guidewire was advanced into the left renal pelvis with the aid of a 5-Tajik open ended ureteral catheter.  A retrograde pyelogram demonstrated mild hydronephrosis and no filling defects.  The wire was replaced and a 6 Fr x 26 cm stent was advanced over the guidewire under fluoroscopic guidance with a good curl in the renal pelvis and bladder.  The stent passed up easily with no appreciable resistance.  The bladder was then drained.    The patient tolerated the procedure well.  There were no complications.       PLAN:   - Discharge pending tolerating diet, ambulating, pain controlled, etc  - Follow-up for definitive  stone management in next 2-3 weeks    I was present and scrubbed for the entire procedure.  Trenton Lozano MD  Urology Staff

## 2021-06-11 ENCOUNTER — TELEPHONE (OUTPATIENT)
Dept: UROLOGY | Facility: CLINIC | Age: 41
End: 2021-06-11

## 2021-06-11 NOTE — TELEPHONE ENCOUNTER
[facility] Call Center    Phone Message    May a detailed message be left on voicemail: yes     Reason for Call: Other: pt was in ED for kidney stones, he had a stent placed, but there is still a large kidney stone that needs to be surgically removed, please call pt to schedule procedure, thank you     Action Taken: Message routed to:  [facility] ([facility] uro    Travel Screening: Not Applicable

## 2021-06-13 RX ORDER — TAMSULOSIN HYDROCHLORIDE 0.4 MG/1
CAPSULE ORAL
Qty: 90 CAPSULE | OUTPATIENT
Start: 2021-06-13

## 2021-06-16 NOTE — TELEPHONE ENCOUNTER
Needs orders for kidney stone removal xiomara put in stent on the 9th of June Karolina Bellamy LPN Staff Nurse

## 2021-06-16 NOTE — TELEPHONE ENCOUNTER
[facility] Call Center    Phone Message    May a detailed message be left on voicemail: yes     Reason for Call: Other: PT Leodan called for Dr. Felix to check on status of scheduling his kidney stone + stent removal procedure. 2nd call. Pls call him to discuss.     Action Taken: Message routed to:  [facility] DANYELL URO    Travel Screening: Not Applicable

## 2021-06-18 ENCOUNTER — TELEPHONE (OUTPATIENT)
Dept: UROLOGY | Facility: CLINIC | Age: 41
End: 2021-06-18

## 2021-06-18 DIAGNOSIS — N20.1 URETERAL STONE: Primary | ICD-10-CM

## 2021-06-18 NOTE — ANESTHESIA POSTPROCEDURE EVALUATION
Patient: Leodan Collins    Procedure(s):  CYSTOSCOPY, WITH Left  RETROGRADE PYELOGRAM AND Left URETERAL STENT INSERTION    Diagnosis:Calculus of other lower urinary tract location [N21.8]  Diagnosis Additional Information: No value filed.    Anesthesia Type:  General    Note:  Disposition: Outpatient   Postop Pain Control: Uneventful            Sign Out: Well controlled pain   PONV: No   Neuro/Psych: Uneventful            Sign Out: Acceptable/Baseline neuro status   Airway/Respiratory: Uneventful            Sign Out: Acceptable/Baseline resp. status   CV/Hemodynamics: Uneventful            Sign Out: Acceptable CV status; No obvious hypovolemia; No obvious fluid overload   Other NRE: NONE   DID A NON-ROUTINE EVENT OCCUR? No           Last vitals:  Vitals:    06/09/21 2020 06/09/21 2030 06/09/21 2045   BP: 126/82 119/74 122/82   Pulse: 75 90 92   Resp: (!) 31 17 20   Temp: 36.6  C (97.9  F)  36.6  C (97.8  F)   SpO2: 100% 99% 97%       Last vitals prior to Anesthesia Care Transfer:  CRNA VITALS  6/9/2021 1947 - 6/9/2021 2047 6/9/2021             EKG:  Sinus rhythm          Electronically Signed By: Eli Kirk MD  June 18, 2021  2:15 PM

## 2021-06-18 NOTE — TELEPHONE ENCOUNTER
Patient is scheduled for surgery with Dr. Felix    Spoke with: Patient     Date of Surgery: Thursday 07/01/21     Location: [facility]    Informed patient they will need an adult  yes    Pre op with Provider polina    H&P: Scheduled with Patient will call and schedule pre-op with his PCP Blane Worrell  At [facility]     Pre-procedure COVID-19 Test: Monday 06/28/21 @ 11:00am [facility]     Additional imaging/appointments: polina    Surgery packet: mailed via [employer] 06/18/21, verified address on file is current and correct.       Additional comments: polina

## 2021-06-21 DIAGNOSIS — Z11.59 ENCOUNTER FOR SCREENING FOR OTHER VIRAL DISEASES: ICD-10-CM

## 2021-06-22 ENCOUNTER — PATIENT OUTREACH (OUTPATIENT)
Dept: UROLOGY | Facility: CLINIC | Age: 41
End: 2021-06-22

## 2021-06-22 DIAGNOSIS — R30.0 DYSURIA: Primary | ICD-10-CM

## 2021-06-22 NOTE — TELEPHONE ENCOUNTER
Called and spoke with patient to make sure he is having a pre-op and also asking him to leave a urine specimen tomorrow at our clinic.   Patient agrees with the plan. Adrianna Albarran RN

## 2021-06-23 DIAGNOSIS — R30.0 DYSURIA: ICD-10-CM

## 2021-06-23 LAB
ALBUMIN UR-MCNC: >499 MG/DL
APPEARANCE UR: ABNORMAL
BILIRUB UR QL STRIP: NEGATIVE
COLOR UR AUTO: ABNORMAL
GLUCOSE UR STRIP-MCNC: NEGATIVE MG/DL
HGB UR QL STRIP: ABNORMAL
KETONES UR STRIP-MCNC: NEGATIVE MG/DL
LEUKOCYTE ESTERASE UR QL STRIP: ABNORMAL
MUCOUS THREADS #/AREA URNS LPF: PRESENT /LPF
NITRATE UR QL: NEGATIVE
PH UR STRIP: 6 PH (ref 5–7)
RBC #/AREA URNS AUTO: >182 /HPF (ref 0–2)
SOURCE: ABNORMAL
SP GR UR STRIP: 1.02 (ref 1–1.03)
UROBILINOGEN UR STRIP-MCNC: 0 MG/DL (ref 0–2)
WBC #/AREA URNS AUTO: 169 /HPF (ref 0–5)

## 2021-06-23 PROCEDURE — 99000 SPECIMEN HANDLING OFFICE-LAB: CPT | Performed by: PATHOLOGY

## 2021-06-23 PROCEDURE — 81001 URINALYSIS AUTO W/SCOPE: CPT | Performed by: PATHOLOGY

## 2021-06-23 PROCEDURE — 87086 URINE CULTURE/COLONY COUNT: CPT | Mod: 90 | Performed by: PATHOLOGY

## 2021-06-24 LAB
BACTERIA SPEC CULT: NO GROWTH
Lab: NORMAL
SPECIMEN SOURCE: NORMAL

## 2021-06-25 ENCOUNTER — OFFICE VISIT (OUTPATIENT)
Dept: FAMILY MEDICINE | Facility: CLINIC | Age: 41
End: 2021-06-25
Payer: COMMERCIAL

## 2021-06-25 VITALS
BODY MASS INDEX: 23.96 KG/M2 | DIASTOLIC BLOOD PRESSURE: 80 MMHG | OXYGEN SATURATION: 96 % | HEART RATE: 93 BPM | WEIGHT: 153 LBS | SYSTOLIC BLOOD PRESSURE: 120 MMHG | TEMPERATURE: 98.3 F

## 2021-06-25 DIAGNOSIS — N20.1 URETERAL CALCULUS: ICD-10-CM

## 2021-06-25 DIAGNOSIS — Z01.818 PREOP GENERAL PHYSICAL EXAM: Primary | ICD-10-CM

## 2021-06-25 DIAGNOSIS — N13.2 HYDRONEPHROSIS WITH RENAL AND URETERAL CALCULOUS OBSTRUCTION: ICD-10-CM

## 2021-06-25 PROCEDURE — 99214 OFFICE O/P EST MOD 30 MIN: CPT | Performed by: FAMILY MEDICINE

## 2021-06-25 PROCEDURE — 93000 ELECTROCARDIOGRAM COMPLETE: CPT | Performed by: FAMILY MEDICINE

## 2021-06-25 NOTE — H&P (VIEW-ONLY)
[address]  [address]  [facility] [address]  Phone: [phone]  Fax: [phone]  Primary Provider: Blane Worrell  Pre-op Performing Provider: BLANE WORRELL      PREOPERATIVE EVALUATION:  Today's date: 6/25/2021    Leodan Collins is a 41 year old male who presents for a preoperative evaluation.    Surgical Information:  Surgery/Procedure: CYSTOURETEROSCOPY, WITH RETROGRADE PYELOGRAM, HOLMIUM LASER LITHOTRIPSY, STENT INSERTION  Surgery Location: [facility]   Surgeon: Bob Felix MD  Surgery Date: 07/01/2021  Time of Surgery: 10:05 AM  Where patient plans to recover: At home with family  Fax number for surgical facility: Note does not need to be faxed, will be available electronically in Epic.    Type of Anesthesia Anticipated: General  1. No - Have you ever had a heart attack or stroke?  2. No - Have you ever had surgery on your heart or blood vessels, such as a stent, coronary (heart) bypass, or surgery on an artery in the head, neck, heart, or legs?  3. No - Do you have chest pain when you are physically active?  4. No - Do you have a history of heart failure?  5. No - Do you currently have a cold, bronchitis, or symptoms of other respiratory (head and chest) infections?  6. No - Do you have a cough, shortness of breath, or wheezing?  7. No - Do you or anyone in your family have a history of blood clots?  8. No - Do you or anyone in your family have a serious bleeding problem, such as long-lasting bleeding after surgeries or cuts?  9. yes - Have you ever had anemia or been told to take iron pills?  10. No - Have you had any abnormal blood loss such as black, tarry or bloody stools, or abnormal vaginal bleeding?  11. yes - Have you ever had a blood transfusion?  12. Yes - Are you willing to have a blood transfusion if it is medically needed before, during, or after your surgery?  13. No - Have you or anyone in your family ever had  problems with anesthesia (sedation for surgery)?  14. No - Do you have sleep apnea, excessive snoring, or daytime drowsiness?   15. No - Do you have any artifical heart valves or other implanted medical devices, such as a pacemaker, defibrillator, or continuous glucose monitor?  16. No - Do you have any artifical joints?  17. No - Are you allergic to latex?  18. No - Is there any chance that you may be pregnant?  Assessment & Plan     The proposed surgical procedure is considered LOW risk.    Preop general physical exam  Ok for procedure  - EKG 12-lead complete w/read - Clinics    Hydronephrosis with renal and ureteral calculous obstruction  See CT scan    Ureteral calculus  Not resolving with time    Risks and Recommendations:  The patient has the following additional risks and recommendations for perioperative complications:   - No identified additional risk factors other than previously addressed        RECOMMENDATION:  APPROVAL GIVEN to proceed with proposed procedure, without further diagnostic evaluation.    Review of external notes as documented above           Subjective     HPI related to upcoming procedure:   Encounter Diagnoses   Name Primary?     Preop general physical exam Yes     Hydronephrosis with renal and ureteral calculous obstruction      Ureteral calculus             Preoperative Review of :   reviewed - no record of controlled substances prescribed.        crohns Disease Well controlled on medications with GI following  Has colostomy  Review of Systems  CONSTITUTIONAL: NEGATIVE for fever, chills, change in weight  INTEGUMENTARY/SKIN: NEGATIVE for worrisome rashes, moles or lesions  EYES: NEGATIVE for vision changes or irritation  ENT/MOUTH: NEGATIVE for ear, mouth and throat problems  RESP: NEGATIVE for significant cough or SOB  BREAST: NEGATIVE for masses, tenderness or discharge  CV: NEGATIVE for chest pain, palpitations or peripheral edema  MUSCULOSKELETAL: NEGATIVE for significant  arthralgias or myalgia  NEURO: NEGATIVE for weakness, dizziness or paresthesias  ENDOCRINE: NEGATIVE for temperature intolerance, skin/hair changes  HEME: NEGATIVE for bleeding problems  PSYCHIATRIC: NEGATIVE for changes in mood or affect    Patient Active Problem List    Diagnosis Date Noted     Ureteral stone 01/30/2020     Priority: Medium     Added automatically from request for surgery [id number]       Ureteral calculus 05/03/2016     Priority: Medium     Hydronephrosis with renal and ureteral calculous obstruction 04/16/2016     Priority: Medium     CARDIOVASCULAR SCREENING; LDL GOAL LESS THAN 160 02/08/2013     Priority: Medium     Kidney stones      Priority: Medium     STONE SURGERY HISTORY  6/30/13 Left ESWL.  Dr Je Melvin, [facility].  Stone analysis: No stones collected.  4/10/14 Right Ureteroscopy.  Dr Je Melvin, [facility].  Stone analysis: 90% calcium oxalate dihydrate, and 10% calcium phosphate  5/17/16 Bilateral ureteroscopy with laser lithotripsy. Dr Je Melvin, [facility].       Crohn's disease (H)      Priority: Medium     Anal fistula 08/31/2006     Priority: Medium     Overview:   LW Onset:  [id number]  ; Fistula Anal        Past Medical History:   Diagnosis Date     Asthma     child     Crohn's disease (H)      History of blood transfusion      Kidney stones      Past Surgical History:   Procedure Laterality Date     COLOSTOMY  2007     COMBINED CYSTOSCOPY, RETROGRADES, URETEROSCOPY, INSERT STENT Right 5/3/2016    Procedure: COMBINED CYSTOSCOPY, RETROGRADES, URETEROSCOPY, INSERT STENT;  Surgeon: Azael Melvin MD;  Location: [facility]     COMBINED CYSTOSCOPY, RETROGRADES, URETEROSCOPY, LASER HOLMIUM LITHOTRIPSY URETER(S), INSERT STENT Right 1/31/2020    Procedure: CYSTOURETEROSCOPY, WITH RETROGRADE PYELOGRAM, HOLMIUM LASER LITHOTRIPSY OF URETERAL CALCULUS, AND STENT placement;  Surgeon: Elvira  Bob SMITH MD;  Location: [facility]     CYSTOSCOPY, RETROGRADES, INSERT STENT URETER(S), COMBINED Left 6/9/2021    Procedure: CYSTOSCOPY, WITH Left  RETROGRADE PYELOGRAM AND Left URETERAL STENT INSERTION;  Surgeon: Trenton Lozano MD;  Location: [facility]     EXTRACORPOREAL SHOCK WAVE LITHOTRIPSY (ESWL)  6/27/2013    Procedure: EXTRACORPOREAL SHOCK WAVE LITHOTRIPSY (ESWL);  Left Extracorporeal Shock Wave Lithotripsy Kidney And Ureter;  Surgeon: Azael Melvin MD;  Location: [facility]     ILEOSTOMY  2009     LAPAROTOMY EXPLORATORY  2009    extensive lysis of adhesions, revision of coloenteric fistula, repair of small bowel fistula, takedown of current colostomy, debridement of chronic abscess, resection of residual left colon, right hemicolectomy, Louise ileostomy     LASER HOLMIUM LITHOTRIPSY URETER(S), INSERT STENT, COMBINED  4/9/2014    Procedure: COMBINED CYSTOSCOPY, URETEROSCOPY, LASER HOLMIUM LITHOTRIPSY URETER(S), INSERT STENT;  Ureteroscopy, Cystoscopy, holmium laser,Right Uretral stent placement;  Surgeon: Azael Melvin MD;  Location: [facility]     LASER HOLMIUM LITHOTRIPSY URETER(S), INSERT STENT, COMBINED Bilateral 5/17/2016    Procedure: COMBINED CYSTOSCOPY, URETEROSCOPY, LASER HOLMIUM LITHOTRIPSY URETER(S), INSERT STENT;  Surgeon: Azael Melvin MD;  Location: [facility]     Current Outpatient Medications   Medication Sig Dispense Refill     Adalimumab (HUMIRA PEN SC) Inject 40 mg Subcutaneous every 14 days        chlorthalidone (HYGROTON) 25 MG tablet Take 1 tablet (25 mg) by mouth daily 30 tablet 11     HYDROcodone-acetaminophen (NORCO) 5-325 MG tablet Take 1 tablet by mouth every 6 hours as needed for severe pain 10 tablet 0     ibuprofen (ADVIL/MOTRIN) 600 MG tablet Take 1 tablet (600 mg) by mouth every 6 hours as needed for moderate pain 30 tablet 0     mercaptopurine (PURINETHOL) 50 MG tablet Take 100 mg by mouth daily        nitroFURantoin macrocrystal-monohydrate (MACROBID) 100 MG capsule  Take 1 capsule (100 mg) by mouth 2 times daily 14 capsule 0     ondansetron (ZOFRAN-ODT) 4 MG ODT tab Take 1 tablet (4 mg) by mouth every 8 hours as needed for nausea 15 tablet 0     OXYCODONE HCL PO        potassium citrate (UROCIT-K) 10 MEQ (1080 MG) CR tablet Take 2 tablets (20 mEq) by mouth 3 times daily (with meals) 90 tablet 3     potassium citrate (UROCIT-K) 10 MEQ (1080 MG) CR tablet Take 2 tablets (20 mEq) by mouth 2 times daily 120 tablet 11     potassium citrate-citric acid (CYTRA K) 5863-0259 MG Packet Sig-Route: take 1 packet by mouth daily-Oral 100 packet 3     potassium citrate-citric acid (POLYCITRA-K/CYTRA K) 1151-6545 MG Packet Take 1 packet by mouth daily 100 packet 3     tamsulosin (FLOMAX) 0.4 MG capsule Take 1 capsule (0.4 mg) by mouth daily 30 capsule 0     tamsulosin (FLOMAX) 0.4 MG capsule Take 1 capsule (0.4 mg) by mouth daily 10 capsule 0     VITAMIN D PO          No Known Allergies     Social History     Tobacco Use     Smoking status: Never Smoker     Smokeless tobacco: Never Used   Substance Use Topics     Alcohol use: Yes     Alcohol/week: 0.0 standard drinks     Comment: x1 a week       History   Drug Use No         Objective     Temp 98.3  F (36.8  C) (Temporal)   Wt 69.4 kg (153 lb)   BMI 23.96 kg/m      Physical Exam    GENERAL APPEARANCE: alert, mild distress and cooperative     EYES: EOMI,  PERRL     HENT: ear canals and TM's normal and nose and mouth without ulcers or lesions     NECK: no adenopathy, no asymmetry, masses, or scars and thyroid normal to palpation     RESP: lungs clear to auscultation - no rales, rhonchi or wheezes     CV: regular rates and rhythm, normal S1 S2, no S3 or S4 and no murmur, click or rub     ABDOMEN: aorta normal, bowel sounds normal, well-healed incisions  and colostomy intact     MS: extremities normal- no gross deformities noted, no evidence of inflammation in joints, FROM in all extremities.     SKIN: no suspicious lesions or rashes     NEURO:  Normal strength and tone, sensory exam grossly normal, mentation intact and speech normal     PSYCH: mentation appears normal. and affect normal/bright     LYMPHATICS: No cervical adenopathy    Recent Labs   Lab Test 06/09/21  1219 04/13/21  1018 06/26/20  1410 06/26/20  1410   HGB 16.6 16.3   < > 15.5    334   < > 292     --   --  137   POTASSIUM 3.6  --   --  3.5   CR 0.93  --   --  0.84    < > = values in this interval not displayed.        Diagnostics:  Recent Results (from the past 240 hour(s))   Urine Culture Aerobic Bacterial    Collection Time: 06/23/21 12:33 PM    Specimen: Midstream Urine   Result Value Ref Range    Specimen Description Midstream Urine     Special Requests Specimen received in preservative     Culture Micro No growth    Routine UA with microscopic - No culture    Collection Time: 06/23/21 12:33 PM   Result Value Ref Range    Color Urine Brown     Appearance Urine Cloudy     Glucose Urine Negative NEG^Negative mg/dL    Bilirubin Urine Negative NEG^Negative    Ketones Urine Negative NEG^Negative mg/dL    Specific Gravity Urine 1.020 1.003 - 1.035    Blood Urine Large (A) NEG^Negative    pH Urine 6.0 5.0 - 7.0 pH    Protein Albumin Urine >499 (A) NEG^Negative mg/dL    Urobilinogen mg/dL 0.0 0.0 - 2.0 mg/dL    Nitrite Urine Negative NEG^Negative    Leukocyte Esterase Urine Moderate (A) NEG^Negative    Source Midstream Urine     WBC Urine 169 (H) 0 - 5 /HPF    RBC Urine >182 (H) 0 - 2 /HPF    Mucous Urine Present (A) NEG^Negative /LPF      EKG: Normal Sinus Rhythm, normal axis, normal intervals, no acute ST/T changes c/w ischemia, no LVH by voltage criteria, nonspecific ST-T changes    Revised Cardiac Risk Index (RCRI):  The patient has the following serious cardiovascular risks for perioperative complications:   - No serious cardiac risks = 0 points     RCRI Interpretation: 0 points: Class I (very low risk - 0.4% complication rate)           Signed Electronically by: Blane  Adolof Worrell MD  Copy of this evaluation report is provided to requesting physician.

## 2021-06-25 NOTE — PROGRESS NOTES
[address]  [address]  [facility] [address]  Phone: [phone]  Fax: [phone]  Primary Provider: Blane Worrell  Pre-op Performing Provider: BLANE WORRELL      PREOPERATIVE EVALUATION:  Today's date: 6/25/2021    Leodan Collins is a 41 year old male who presents for a preoperative evaluation.    Surgical Information:  Surgery/Procedure: CYSTOURETEROSCOPY, WITH RETROGRADE PYELOGRAM, HOLMIUM LASER LITHOTRIPSY, STENT INSERTION  Surgery Location: [facility]   Surgeon: Bob Felix MD  Surgery Date: 07/01/2021  Time of Surgery: 10:05 AM  Where patient plans to recover: At home with family  Fax number for surgical facility: Note does not need to be faxed, will be available electronically in Epic.    Type of Anesthesia Anticipated: General  1. No - Have you ever had a heart attack or stroke?  2. No - Have you ever had surgery on your heart or blood vessels, such as a stent, coronary (heart) bypass, or surgery on an artery in the head, neck, heart, or legs?  3. No - Do you have chest pain when you are physically active?  4. No - Do you have a history of heart failure?  5. No - Do you currently have a cold, bronchitis, or symptoms of other respiratory (head and chest) infections?  6. No - Do you have a cough, shortness of breath, or wheezing?  7. No - Do you or anyone in your family have a history of blood clots?  8. No - Do you or anyone in your family have a serious bleeding problem, such as long-lasting bleeding after surgeries or cuts?  9. yes - Have you ever had anemia or been told to take iron pills?  10. No - Have you had any abnormal blood loss such as black, tarry or bloody stools, or abnormal vaginal bleeding?  11. yes - Have you ever had a blood transfusion?  12. Yes - Are you willing to have a blood transfusion if it is medically needed before, during, or after your surgery?  13. No - Have you or anyone in your family ever had  problems with anesthesia (sedation for surgery)?  14. No - Do you have sleep apnea, excessive snoring, or daytime drowsiness?   15. No - Do you have any artifical heart valves or other implanted medical devices, such as a pacemaker, defibrillator, or continuous glucose monitor?  16. No - Do you have any artifical joints?  17. No - Are you allergic to latex?  18. No - Is there any chance that you may be pregnant?  Assessment & Plan     The proposed surgical procedure is considered LOW risk.    Preop general physical exam  Ok for procedure  - EKG 12-lead complete w/read - Clinics    Hydronephrosis with renal and ureteral calculous obstruction  See CT scan    Ureteral calculus  Not resolving with time    Risks and Recommendations:  The patient has the following additional risks and recommendations for perioperative complications:   - No identified additional risk factors other than previously addressed        RECOMMENDATION:  APPROVAL GIVEN to proceed with proposed procedure, without further diagnostic evaluation.    Review of external notes as documented above           Subjective     HPI related to upcoming procedure:   Encounter Diagnoses   Name Primary?     Preop general physical exam Yes     Hydronephrosis with renal and ureteral calculous obstruction      Ureteral calculus             Preoperative Review of :   reviewed - no record of controlled substances prescribed.        crohns Disease Well controlled on medications with GI following  Has colostomy  Review of Systems  CONSTITUTIONAL: NEGATIVE for fever, chills, change in weight  INTEGUMENTARY/SKIN: NEGATIVE for worrisome rashes, moles or lesions  EYES: NEGATIVE for vision changes or irritation  ENT/MOUTH: NEGATIVE for ear, mouth and throat problems  RESP: NEGATIVE for significant cough or SOB  BREAST: NEGATIVE for masses, tenderness or discharge  CV: NEGATIVE for chest pain, palpitations or peripheral edema  MUSCULOSKELETAL: NEGATIVE for significant  arthralgias or myalgia  NEURO: NEGATIVE for weakness, dizziness or paresthesias  ENDOCRINE: NEGATIVE for temperature intolerance, skin/hair changes  HEME: NEGATIVE for bleeding problems  PSYCHIATRIC: NEGATIVE for changes in mood or affect    Patient Active Problem List    Diagnosis Date Noted     Ureteral stone 01/30/2020     Priority: Medium     Added automatically from request for surgery [id number]       Ureteral calculus 05/03/2016     Priority: Medium     Hydronephrosis with renal and ureteral calculous obstruction 04/16/2016     Priority: Medium     CARDIOVASCULAR SCREENING; LDL GOAL LESS THAN 160 02/08/2013     Priority: Medium     Kidney stones      Priority: Medium     STONE SURGERY HISTORY  6/30/13 Left ESWL.  Dr Je Melvin, [facility].  Stone analysis: No stones collected.  4/10/14 Right Ureteroscopy.  Dr Je Melvin, [facility].  Stone analysis: 90% calcium oxalate dihydrate, and 10% calcium phosphate  5/17/16 Bilateral ureteroscopy with laser lithotripsy. Dr Je Melvin, [facility].       Crohn's disease (H)      Priority: Medium     Anal fistula 08/31/2006     Priority: Medium     Overview:   LW Onset:  19Ssd58  ; Fistula Anal        Past Medical History:   Diagnosis Date     Asthma     child     Crohn's disease (H)      History of blood transfusion      Kidney stones      Past Surgical History:   Procedure Laterality Date     COLOSTOMY  2007     COMBINED CYSTOSCOPY, RETROGRADES, URETEROSCOPY, INSERT STENT Right 5/3/2016    Procedure: COMBINED CYSTOSCOPY, RETROGRADES, URETEROSCOPY, INSERT STENT;  Surgeon: Azael Melvin MD;  Location: [facility] OR     COMBINED CYSTOSCOPY, RETROGRADES, URETEROSCOPY, LASER HOLMIUM LITHOTRIPSY URETER(S), INSERT STENT Right 1/31/2020    Procedure: CYSTOURETEROSCOPY, WITH RETROGRADE PYELOGRAM, HOLMIUM LASER LITHOTRIPSY OF URETERAL CALCULUS, AND STENT placement;  Surgeon: Elvira  Bob SMITH MD;  Location: [facility] OR     CYSTOSCOPY, RETROGRADES, INSERT STENT URETER(S), COMBINED Left 6/9/2021    Procedure: CYSTOSCOPY, WITH Left  RETROGRADE PYELOGRAM AND Left URETERAL STENT INSERTION;  Surgeon: Trenton Lozano MD;  Location: [facility] OR     EXTRACORPOREAL SHOCK WAVE LITHOTRIPSY (ESWL)  6/27/2013    Procedure: EXTRACORPOREAL SHOCK WAVE LITHOTRIPSY (ESWL);  Left Extracorporeal Shock Wave Lithotripsy Kidney And Ureter;  Surgeon: Azael Melvin MD;  Location: [facility] OR     ILEOSTOMY  2009     LAPAROTOMY EXPLORATORY  2009    extensive lysis of adhesions, revision of coloenteric fistula, repair of small bowel fistula, takedown of current colostomy, debridement of chronic abscess, resection of residual left colon, right hemicolectomy, Louise ileostomy     LASER HOLMIUM LITHOTRIPSY URETER(S), INSERT STENT, COMBINED  4/9/2014    Procedure: COMBINED CYSTOSCOPY, URETEROSCOPY, LASER HOLMIUM LITHOTRIPSY URETER(S), INSERT STENT;  Ureteroscopy, Cystoscopy, holmium laser,Right Uretral stent placement;  Surgeon: Azael Melvin MD;  Location: [facility] OR     LASER HOLMIUM LITHOTRIPSY URETER(S), INSERT STENT, COMBINED Bilateral 5/17/2016    Procedure: COMBINED CYSTOSCOPY, URETEROSCOPY, LASER HOLMIUM LITHOTRIPSY URETER(S), INSERT STENT;  Surgeon: Azael Melvin MD;  Location: [facility] OR     Current Outpatient Medications   Medication Sig Dispense Refill     Adalimumab (HUMIRA PEN SC) Inject 40 mg Subcutaneous every 14 days        chlorthalidone (HYGROTON) 25 MG tablet Take 1 tablet (25 mg) by mouth daily 30 tablet 11     HYDROcodone-acetaminophen (NORCO) 5-325 MG tablet Take 1 tablet by mouth every 6 hours as needed for severe pain 10 tablet 0     ibuprofen (ADVIL/MOTRIN) 600 MG tablet Take 1 tablet (600 mg) by mouth every 6 hours as needed for moderate pain 30 tablet 0     mercaptopurine (PURINETHOL) 50 MG tablet Take 100 mg by mouth daily        nitroFURantoin macrocrystal-monohydrate (MACROBID) 100 MG capsule  Take 1 capsule (100 mg) by mouth 2 times daily 14 capsule 0     ondansetron (ZOFRAN-ODT) 4 MG ODT tab Take 1 tablet (4 mg) by mouth every 8 hours as needed for nausea 15 tablet 0     OXYCODONE HCL PO        potassium citrate (UROCIT-K) 10 MEQ (1080 MG) CR tablet Take 2 tablets (20 mEq) by mouth 3 times daily (with meals) 90 tablet 3     potassium citrate (UROCIT-K) 10 MEQ (1080 MG) CR tablet Take 2 tablets (20 mEq) by mouth 2 times daily 120 tablet 11     potassium citrate-citric acid (CYTRA K) 8370-5129 MG Packet Sig-Route: take 1 packet by mouth daily-Oral 100 packet 3     potassium citrate-citric acid (POLYCITRA-K/CYTRA K) 2550-6361 MG Packet Take 1 packet by mouth daily 100 packet 3     tamsulosin (FLOMAX) 0.4 MG capsule Take 1 capsule (0.4 mg) by mouth daily 30 capsule 0     tamsulosin (FLOMAX) 0.4 MG capsule Take 1 capsule (0.4 mg) by mouth daily 10 capsule 0     VITAMIN D PO          No Known Allergies     Social History     Tobacco Use     Smoking status: Never Smoker     Smokeless tobacco: Never Used   Substance Use Topics     Alcohol use: Yes     Alcohol/week: 0.0 standard drinks     Comment: x1 a week       History   Drug Use No         Objective     Temp 98.3  F (36.8  C) (Temporal)   Wt 69.4 kg (153 lb)   BMI 23.96 kg/m      Physical Exam    GENERAL APPEARANCE: alert, mild distress and cooperative     EYES: EOMI,  PERRL     HENT: ear canals and TM's normal and nose and mouth without ulcers or lesions     NECK: no adenopathy, no asymmetry, masses, or scars and thyroid normal to palpation     RESP: lungs clear to auscultation - no rales, rhonchi or wheezes     CV: regular rates and rhythm, normal S1 S2, no S3 or S4 and no murmur, click or rub     ABDOMEN: aorta normal, bowel sounds normal, well-healed incisions  and colostomy intact     MS: extremities normal- no gross deformities noted, no evidence of inflammation in joints, FROM in all extremities.     SKIN: no suspicious lesions or rashes     NEURO:  Normal strength and tone, sensory exam grossly normal, mentation intact and speech normal     PSYCH: mentation appears normal. and affect normal/bright     LYMPHATICS: No cervical adenopathy    Recent Labs   Lab Test 06/09/21  1219 04/13/21  1018 06/26/20  1410 06/26/20  1410   HGB 16.6 16.3   < > 15.5    334   < > 292     --   --  137   POTASSIUM 3.6  --   --  3.5   CR 0.93  --   --  0.84    < > = values in this interval not displayed.        Diagnostics:  Recent Results (from the past 240 hour(s))   Urine Culture Aerobic Bacterial    Collection Time: 06/23/21 12:33 PM    Specimen: Midstream Urine   Result Value Ref Range    Specimen Description Midstream Urine     Special Requests Specimen received in preservative     Culture Micro No growth    Routine UA with microscopic - No culture    Collection Time: 06/23/21 12:33 PM   Result Value Ref Range    Color Urine Brown     Appearance Urine Cloudy     Glucose Urine Negative NEG^Negative mg/dL    Bilirubin Urine Negative NEG^Negative    Ketones Urine Negative NEG^Negative mg/dL    Specific Gravity Urine 1.020 1.003 - 1.035    Blood Urine Large (A) NEG^Negative    pH Urine 6.0 5.0 - 7.0 pH    Protein Albumin Urine >499 (A) NEG^Negative mg/dL    Urobilinogen mg/dL 0.0 0.0 - 2.0 mg/dL    Nitrite Urine Negative NEG^Negative    Leukocyte Esterase Urine Moderate (A) NEG^Negative    Source Midstream Urine     WBC Urine 169 (H) 0 - 5 /HPF    RBC Urine >182 (H) 0 - 2 /HPF    Mucous Urine Present (A) NEG^Negative /LPF      EKG: Normal Sinus Rhythm, normal axis, normal intervals, no acute ST/T changes c/w ischemia, no LVH by voltage criteria, nonspecific ST-T changes    Revised Cardiac Risk Index (RCRI):  The patient has the following serious cardiovascular risks for perioperative complications:   - No serious cardiac risks = 0 points     RCRI Interpretation: 0 points: Class I (very low risk - 0.4% complication rate)           Signed Electronically by: Blane  Adolfo Worrell MD  Copy of this evaluation report is provided to requesting physician.

## 2021-06-25 NOTE — PATIENT INSTRUCTIONS

## 2021-06-28 DIAGNOSIS — Z11.59 ENCOUNTER FOR SCREENING FOR OTHER VIRAL DISEASES: ICD-10-CM

## 2021-06-28 LAB
LABORATORY COMMENT REPORT: NORMAL
SARS-COV-2 RNA RESP QL NAA+PROBE: NEGATIVE
SARS-COV-2 RNA RESP QL NAA+PROBE: NORMAL
SPECIMEN SOURCE: NORMAL
SPECIMEN SOURCE: NORMAL

## 2021-06-28 PROCEDURE — U0005 INFEC AGEN DETEC AMPLI PROBE: HCPCS | Performed by: UROLOGY

## 2021-06-28 PROCEDURE — U0003 INFECTIOUS AGENT DETECTION BY NUCLEIC ACID (DNA OR RNA); SEVERE ACUTE RESPIRATORY SYNDROME CORONAVIRUS 2 (SARS-COV-2) (CORONAVIRUS DISEASE [COVID-19]), AMPLIFIED PROBE TECHNIQUE, MAKING USE OF HIGH THROUGHPUT TECHNOLOGIES AS DESCRIBED BY CMS-2020-01-R: HCPCS | Performed by: UROLOGY

## 2021-06-30 ENCOUNTER — ANESTHESIA EVENT (OUTPATIENT)
Dept: SURGERY | Facility: AMBULATORY SURGERY CENTER | Age: 41
End: 2021-06-30
Payer: COMMERCIAL

## 2021-07-01 ENCOUNTER — HOSPITAL ENCOUNTER (OUTPATIENT)
Facility: AMBULATORY SURGERY CENTER | Age: 41
End: 2021-07-01
Attending: UROLOGY
Payer: COMMERCIAL

## 2021-07-01 ENCOUNTER — ANESTHESIA (OUTPATIENT)
Dept: SURGERY | Facility: AMBULATORY SURGERY CENTER | Age: 41
End: 2021-07-01
Payer: COMMERCIAL

## 2021-07-01 ENCOUNTER — ANCILLARY PROCEDURE (OUTPATIENT)
Dept: RADIOLOGY | Facility: AMBULATORY SURGERY CENTER | Age: 41
End: 2021-07-01
Attending: UROLOGY
Payer: COMMERCIAL

## 2021-07-01 VITALS
WEIGHT: 151 LBS | TEMPERATURE: 97.2 F | OXYGEN SATURATION: 100 % | DIASTOLIC BLOOD PRESSURE: 98 MMHG | HEIGHT: 67 IN | HEART RATE: 55 BPM | BODY MASS INDEX: 23.7 KG/M2 | SYSTOLIC BLOOD PRESSURE: 141 MMHG | RESPIRATION RATE: 14 BRPM

## 2021-07-01 DIAGNOSIS — N20.1 URETERAL STONE: Primary | ICD-10-CM

## 2021-07-01 PROCEDURE — 74420 UROGRAPHY RTRGR +-KUB: CPT | Mod: TC

## 2021-07-01 PROCEDURE — 52356 CYSTO/URETERO W/LITHOTRIPSY: CPT | Mod: LT

## 2021-07-01 DEVICE — STENT URETERAL PERCUFLEX PLUS 6FRX26CM M0061752630: Type: IMPLANTABLE DEVICE | Site: ABDOMEN | Status: FUNCTIONAL

## 2021-07-01 RX ORDER — KETOROLAC TROMETHAMINE 30 MG/ML
INJECTION, SOLUTION INTRAMUSCULAR; INTRAVENOUS PRN
Status: DISCONTINUED | OUTPATIENT
Start: 2021-07-01 | End: 2021-07-01

## 2021-07-01 RX ORDER — CEFAZOLIN SODIUM 2 G/50ML
2 SOLUTION INTRAVENOUS SEE ADMIN INSTRUCTIONS
Status: DISCONTINUED | OUTPATIENT
Start: 2021-07-01 | End: 2021-07-01 | Stop reason: HOSPADM

## 2021-07-01 RX ORDER — OXYCODONE HYDROCHLORIDE 5 MG/1
5 TABLET ORAL EVERY 4 HOURS PRN
Status: DISCONTINUED | OUTPATIENT
Start: 2021-07-01 | End: 2021-07-02 | Stop reason: HOSPADM

## 2021-07-01 RX ORDER — LIDOCAINE 40 MG/G
CREAM TOPICAL
Status: DISCONTINUED | OUTPATIENT
Start: 2021-07-01 | End: 2021-07-01 | Stop reason: HOSPADM

## 2021-07-01 RX ORDER — ONDANSETRON 2 MG/ML
4 INJECTION INTRAMUSCULAR; INTRAVENOUS EVERY 30 MIN PRN
Status: DISCONTINUED | OUTPATIENT
Start: 2021-07-01 | End: 2021-07-02 | Stop reason: HOSPADM

## 2021-07-01 RX ORDER — NALOXONE HYDROCHLORIDE 0.4 MG/ML
0.4 INJECTION, SOLUTION INTRAMUSCULAR; INTRAVENOUS; SUBCUTANEOUS
Status: DISCONTINUED | OUTPATIENT
Start: 2021-07-01 | End: 2021-07-02 | Stop reason: HOSPADM

## 2021-07-01 RX ORDER — SODIUM CHLORIDE, SODIUM LACTATE, POTASSIUM CHLORIDE, CALCIUM CHLORIDE 600; 310; 30; 20 MG/100ML; MG/100ML; MG/100ML; MG/100ML
INJECTION, SOLUTION INTRAVENOUS CONTINUOUS
Status: DISCONTINUED | OUTPATIENT
Start: 2021-07-01 | End: 2021-07-01 | Stop reason: HOSPADM

## 2021-07-01 RX ORDER — PROPOFOL 10 MG/ML
INJECTION, EMULSION INTRAVENOUS CONTINUOUS PRN
Status: DISCONTINUED | OUTPATIENT
Start: 2021-07-01 | End: 2021-07-01

## 2021-07-01 RX ORDER — CEFAZOLIN SODIUM 2 G/50ML
2 SOLUTION INTRAVENOUS
Status: COMPLETED | OUTPATIENT
Start: 2021-07-01 | End: 2021-07-01

## 2021-07-01 RX ORDER — MEPERIDINE HYDROCHLORIDE 25 MG/ML
12.5 INJECTION INTRAMUSCULAR; INTRAVENOUS; SUBCUTANEOUS
Status: DISCONTINUED | OUTPATIENT
Start: 2021-07-01 | End: 2021-07-02 | Stop reason: HOSPADM

## 2021-07-01 RX ORDER — ACETAMINOPHEN 325 MG/1
975 TABLET ORAL ONCE
Status: COMPLETED | OUTPATIENT
Start: 2021-07-01 | End: 2021-07-01

## 2021-07-01 RX ORDER — ONDANSETRON 4 MG/1
4 TABLET, ORALLY DISINTEGRATING ORAL EVERY 30 MIN PRN
Status: DISCONTINUED | OUTPATIENT
Start: 2021-07-01 | End: 2021-07-02 | Stop reason: HOSPADM

## 2021-07-01 RX ORDER — FENTANYL CITRATE 50 UG/ML
INJECTION, SOLUTION INTRAMUSCULAR; INTRAVENOUS PRN
Status: DISCONTINUED | OUTPATIENT
Start: 2021-07-01 | End: 2021-07-01

## 2021-07-01 RX ORDER — PROPOFOL 10 MG/ML
INJECTION, EMULSION INTRAVENOUS PRN
Status: DISCONTINUED | OUTPATIENT
Start: 2021-07-01 | End: 2021-07-01

## 2021-07-01 RX ORDER — ONDANSETRON 2 MG/ML
INJECTION INTRAMUSCULAR; INTRAVENOUS PRN
Status: DISCONTINUED | OUTPATIENT
Start: 2021-07-01 | End: 2021-07-01

## 2021-07-01 RX ORDER — SODIUM CHLORIDE, SODIUM LACTATE, POTASSIUM CHLORIDE, CALCIUM CHLORIDE 600; 310; 30; 20 MG/100ML; MG/100ML; MG/100ML; MG/100ML
INJECTION, SOLUTION INTRAVENOUS CONTINUOUS
Status: DISCONTINUED | OUTPATIENT
Start: 2021-07-01 | End: 2021-07-02 | Stop reason: HOSPADM

## 2021-07-01 RX ORDER — TAMSULOSIN HYDROCHLORIDE 0.4 MG/1
0.4 CAPSULE ORAL DAILY
Qty: 7 CAPSULE | Refills: 0 | Status: SHIPPED | OUTPATIENT
Start: 2021-07-01 | End: 2021-12-10

## 2021-07-01 RX ORDER — FENTANYL CITRATE 50 UG/ML
25-50 INJECTION, SOLUTION INTRAMUSCULAR; INTRAVENOUS
Status: DISCONTINUED | OUTPATIENT
Start: 2021-07-01 | End: 2021-07-02 | Stop reason: HOSPADM

## 2021-07-01 RX ORDER — NALOXONE HYDROCHLORIDE 0.4 MG/ML
0.2 INJECTION, SOLUTION INTRAMUSCULAR; INTRAVENOUS; SUBCUTANEOUS
Status: DISCONTINUED | OUTPATIENT
Start: 2021-07-01 | End: 2021-07-02 | Stop reason: HOSPADM

## 2021-07-01 RX ORDER — GABAPENTIN 300 MG/1
300 CAPSULE ORAL ONCE
Status: COMPLETED | OUTPATIENT
Start: 2021-07-01 | End: 2021-07-01

## 2021-07-01 RX ORDER — DEXAMETHASONE SODIUM PHOSPHATE 4 MG/ML
INJECTION, SOLUTION INTRA-ARTICULAR; INTRALESIONAL; INTRAMUSCULAR; INTRAVENOUS; SOFT TISSUE PRN
Status: DISCONTINUED | OUTPATIENT
Start: 2021-07-01 | End: 2021-07-01

## 2021-07-01 RX ORDER — GLYCOPYRROLATE 0.2 MG/ML
INJECTION, SOLUTION INTRAMUSCULAR; INTRAVENOUS PRN
Status: DISCONTINUED | OUTPATIENT
Start: 2021-07-01 | End: 2021-07-01

## 2021-07-01 RX ORDER — FENTANYL CITRATE 50 UG/ML
25-50 INJECTION, SOLUTION INTRAMUSCULAR; INTRAVENOUS
Status: DISCONTINUED | OUTPATIENT
Start: 2021-07-01 | End: 2021-07-01 | Stop reason: HOSPADM

## 2021-07-01 RX ORDER — LIDOCAINE HYDROCHLORIDE 20 MG/ML
INJECTION, SOLUTION INFILTRATION; PERINEURAL PRN
Status: DISCONTINUED | OUTPATIENT
Start: 2021-07-01 | End: 2021-07-01

## 2021-07-01 RX ADMIN — KETOROLAC TROMETHAMINE 30 MG: 30 INJECTION, SOLUTION INTRAMUSCULAR; INTRAVENOUS at 12:12

## 2021-07-01 RX ADMIN — FENTANYL CITRATE 25 MCG: 50 INJECTION, SOLUTION INTRAMUSCULAR; INTRAVENOUS at 11:24

## 2021-07-01 RX ADMIN — SODIUM CHLORIDE, SODIUM LACTATE, POTASSIUM CHLORIDE, CALCIUM CHLORIDE: 600; 310; 30; 20 INJECTION, SOLUTION INTRAVENOUS at 12:00

## 2021-07-01 RX ADMIN — SODIUM CHLORIDE, SODIUM LACTATE, POTASSIUM CHLORIDE, CALCIUM CHLORIDE: 600; 310; 30; 20 INJECTION, SOLUTION INTRAVENOUS at 11:03

## 2021-07-01 RX ADMIN — FENTANYL CITRATE 25 MCG: 50 INJECTION, SOLUTION INTRAMUSCULAR; INTRAVENOUS at 11:23

## 2021-07-01 RX ADMIN — GLYCOPYRROLATE 0.2 MG: 0.2 INJECTION, SOLUTION INTRAMUSCULAR; INTRAVENOUS at 11:08

## 2021-07-01 RX ADMIN — PROPOFOL 200 MCG/KG/MIN: 10 INJECTION, EMULSION INTRAVENOUS at 11:13

## 2021-07-01 RX ADMIN — PROPOFOL 200 MG: 10 INJECTION, EMULSION INTRAVENOUS at 11:12

## 2021-07-01 RX ADMIN — GABAPENTIN 300 MG: 300 CAPSULE ORAL at 09:10

## 2021-07-01 RX ADMIN — ONDANSETRON 4 MG: 2 INJECTION INTRAMUSCULAR; INTRAVENOUS at 11:08

## 2021-07-01 RX ADMIN — LIDOCAINE HYDROCHLORIDE 100 MG: 20 INJECTION, SOLUTION INFILTRATION; PERINEURAL at 11:12

## 2021-07-01 RX ADMIN — CEFAZOLIN SODIUM 2 G: 2 SOLUTION INTRAVENOUS at 10:40

## 2021-07-01 RX ADMIN — DEXAMETHASONE SODIUM PHOSPHATE 4 MG: 4 INJECTION, SOLUTION INTRA-ARTICULAR; INTRALESIONAL; INTRAMUSCULAR; INTRAVENOUS; SOFT TISSUE at 11:08

## 2021-07-01 RX ADMIN — ACETAMINOPHEN 975 MG: 325 TABLET ORAL at 09:10

## 2021-07-01 RX ADMIN — OXYCODONE HYDROCHLORIDE 5 MG: 5 TABLET ORAL at 13:00

## 2021-07-01 RX ADMIN — FENTANYL CITRATE 50 MCG: 50 INJECTION, SOLUTION INTRAMUSCULAR; INTRAVENOUS at 11:08

## 2021-07-01 ASSESSMENT — MIFFLIN-ST. JEOR: SCORE: 1553.56

## 2021-07-01 NOTE — ANESTHESIA CARE TRANSFER NOTE
Patient: Leodan Collins    Procedure(s):  CYSTOURETEROSCOPY, WITH RETROGRADE PYELOGRAM, HOLMIUM LASER LITHOTRIPSY, STENT INSERTION    Diagnosis: Ureteral stone [N20.1]  Diagnosis Additional Information: No value filed.    Anesthesia Type:   General     Note:    Oropharynx: oropharynx clear of all foreign objects and spontaneously breathing  Level of Consciousness: drowsy  Oxygen Supplementation: nasal cannula  Level of Supplemental Oxygen (L/min / FiO2): 3  Independent Airway: airway patency satisfactory and stable  Dentition: dentition unchanged  Vital Signs Stable: post-procedure vital signs reviewed and stable  Report to RN Given: handoff report given  Patient transferred to: PACU    Handoff Report: Identifed the Patient, Identified the Reponsible Provider, Reviewed the pertinent medical history, Discussed the surgical course, Reviewed Intra-OP anesthesia mangement and issues during anesthesia, Set expectations for post-procedure period and Allowed opportunity for questions and acknowledgement of understanding      Vitals: (Last set prior to Anesthesia Care Transfer)  CRNA VITALS  7/1/2021 1158 - 7/1/2021 1228      7/1/2021             Pulse:  67    Ht Rate:  66    SpO2:  97 %    Resp Rate (observed):  11        Electronically Signed By: ARNIE Pisano CRNA  July 1, 2021  12:28 PM

## 2021-07-01 NOTE — OP NOTE
OPERATIVE REPORT    PREOPERATIVE DIAGNOSIS:  Left-sided ureteral and renal stone    POSTOPERATIVE DIAGNOSIS:  Same    PROCEDURES PERFORMED:   1. Cystourethroscopy  2. Left ureteroscopy  3. Left retrograde pyelogram with interpretation of intraoperative fluoroscopic imaging  4. Holmium laser lithotripsy with basket stone extraction  5. Left ureteral stent placement    STAFF SURGEON: Bob Ovalles MD  RESIDENT(S): Buddy Hurst MD  ANESTHESIA: General    ESTIMATED BLOOD LOSS: 1 cc  DRAINS/TUBES: 6 Zambian x 26cm double-J ureteral stent    IV FLUIDS: Please see dictated anesthesia record  COMPLICATIONS: None.   SPECIMEN: Stones for analysis  SIGNIFICANT FINDINGS:   1. Patient is stone free in left upper tract  2. Proximal curl of the ureteral stent seen in the renal pelvis under fluoroscopy and distal curl seen in the bladder under direct vision.     BRIEF OPERATIVE INDICATIONS:  Leodan Collins is a(n) 41 year old male who presented with an infected, obstructing left ureteral stone, who recently underwent stent placement. He presents today for definitive stone management.  After a discussion of all risks, benefits, and alternatives, the patient elected to proceed with definitive stone management. The patient understands the potential need for more than one procedure to eliminate all stone burden.     A 22-Zambian rigid cystoscope was inserted into a well-lubricated urethra. The urethra was unremarkable without stricture. The previous indwelling ureteral stent was identified and removed with the flexible stent grasper to the level of the urethral meatus. A sensor wire was advanced up to the renal pelvis under fluoroscopic guidance and previous stent discarded.     A flexible ureteroscope was introduced and advanced directly up the ureter until the stone was encountered. It was fractured using a 200 micron laser fiber and pushed into the renal pelvis.     Flexible URS  A dual lumen catheter was used to establish  access with a second, guide wire. The previous sensor wire was then clamped to the drape to act as our safety wire. A retrograde pyelogram was performed to serve as a roadmap. A 36 cm 11/13 ureteral access sheath was advanced up to the proximal ureter. The flexible ureteroscope was used to identify the stone(s). A 200 micron laser fiber was used at a setting of 0.6 J and 6 Hz and lithotripsy was performed.  There was a large lower pole stone burden with adherent stones most of which we were able to displace to an upper pole calyx and laser. A Halo basket was used to remove all fragments greater than 1 mm.  Pullback ureteroscopy was performed and showed no retained stone fragments or significant ureteral injury    A 6Fr x 26-cm double-J stent ON STRING was advanced over the Sensor wire, and a good proximal curl was seen in the renal pelvis on fluoroscopy and the distal curl was seen in the bladder. The bladder was drained.    The patient tolerated the procedure well and there were no apparent complications. The patient  was transported to the postanesthesia care unit in stable condition.     POSTOP PLAN:  - Remove stent attached to string at home in 1 week.  - Follow up with Dr. Felix in 6 weeks with CT scan.    I, Bob Felix, was present and participatory for the entirety of the procedure

## 2021-07-01 NOTE — PROGRESS NOTES
Saw and met with patient in preop, reviewed images and plan together, will proceed with ureteroscopic stobe treatment today.

## 2021-07-01 NOTE — DISCHARGE INSTRUCTIONS
[facility]  Home Care Following Anesthesia  For 24 hours after surgery:  1. Get plenty of rest.  A responsible adult must stay with you for at least 24 hours after you leave the surgery center.  2. Do not drive or use heavy equipment.  If you have weakness or tingling, don't drive or use heavy equipment until this feeling goes away.   3. Do not drink alcohol.   4. Avoid strenuous or risky activities.  Ask for help when climbing stairs.  5. You may feel lightheaded.  IF so, sit for a few minutes before standing.  Have someone help you get up.   6. If you have nausea (feel sick to your stomach): Drink only clear liquids such as apple juice, ginger ale, broth or 7-Up.  Rest may also help.  Be sure to drink enough fluids.  Move to a regular diet as you feel able.   7. You may have a slight fever.  Call the doctor if your fever is over 100 F (37.7 C) (taken under the tongue) or lasts longer than 24 hours.  8. You may have a dry mouth, a sore throat, muscle aches or trouble sleeping. These should go away after 24 hours.  9. Do not make important or legal decisions.   10. It is recommended to avoid smoking.               Tips for taking pain medications  To get the best pain relief possible, remember these points:    Take pain medications as directed, before pain becomes severe.    Pain medication can upset your stomach: taking it with food may help.    Constipation is a common side effect of pain medication. Drink plenty of  fluids.    Eat foods high in fiber. Take a stool softener if recommended by your doctor or pharmacist.    Do not drink alcohol, drive or operate machinery while taking pain medications.    Ask about other ways to control pain, such as with heat, ice or relaxation.    Tylenol/Acetaminophen Consumption  To help encourage the safe use of acetaminophen, the makers of TYLENOL  have lowered the maximum daily dose for single-ingredient Extra Strength TYLENOL   (acetaminophen) products sold in the [place] from 8 pills per day (4,000 mg) to 6 pills per day (3,000 mg). The dosing interval has also changed from 2 pills every 4-6 hours to 2 pills every 6 hours.    If you feel your pain relief is insufficient, you may take Tylenol/Acetaminophen in addition to your narcotic pain medication.     Be careful not to exceed 3,000 mg of Tylenol/Acetaminophen in a 24 hour period from all sources.    If you are taking extra strength Tylenol/acetaminophen (500 mg), the maximum dose is 6 tablets in 24 hours.    If you are taking regular strength acetaminophen (325 mg), the maximum dose is 9 tablets in 24 hours.    Call a doctor for any of the followin. Signs of infection (fever, growing tenderness at the surgery site, a large amount of drainage or bleeding, severe pain, foul-smelling drainage, redness, swelling).  2. It has been over 8 to 10 hours since surgery and you are still not able to urinate (pass water).  3. Headache for over 24 hours.  4. Numbness, tingling or weakness the day after surgery (if you had spinal anesthesia).  5. Signs of Covid-19 infection (temperature over 100 degrees, shortness of breath, cough, loss of taste/smell, generalized body aches, persistent headache, chills, sore throat, nausea/vomiting/diarrhea)  Your doctor is:  Dr. Bob Felix, Prostate and Urology: [phone]                    Or dial [phone] and ask for the resident on call for:  Prostate Urology  For emergency care, call the:  [facility] Emergency Department: [phone] (TTY for hearing impaired: [phone])

## 2021-07-01 NOTE — ANESTHESIA POSTPROCEDURE EVALUATION
Patient: Leodan Collins    Procedure(s):  CYSTOURETEROSCOPY, WITH RETROGRADE PYELOGRAM, HOLMIUM LASER LITHOTRIPSY, STENT INSERTION    Diagnosis:Ureteral stone [N20.1]  Diagnosis Additional Information: No value filed.    Anesthesia Type:  General    Note:  Disposition: Outpatient   Postop Pain Control: Uneventful            Sign Out: Well controlled pain   PONV: No   Neuro/Psych: Uneventful            Sign Out: Acceptable/Baseline neuro status   Airway/Respiratory: Uneventful            Sign Out: Acceptable/Baseline resp. status   CV/Hemodynamics: Uneventful            Sign Out: Acceptable CV status; No obvious hypovolemia; No obvious fluid overload   Other NRE: NONE   DID A NON-ROUTINE EVENT OCCUR? No           Last vitals:  Vitals:    07/01/21 1300 07/01/21 1311 07/01/21 1339   BP: (!) 137/90 (!) 135/97 (!) 141/98   Pulse: 50 50 55   Resp: 8 13 14   Temp:  36.1  C (97  F) 36.2  C (97.2  F)   SpO2: 100% 100% 100%       Last vitals prior to Anesthesia Care Transfer:  CRNA VITALS  7/1/2021 1205 - 7/1/2021 1305      7/1/2021             Resp Rate (set):  10          Electronically Signed By: Abhilash Read MD  July 1, 2021  3:39 PM

## 2021-07-01 NOTE — ANESTHESIA PREPROCEDURE EVALUATION
Anesthesia Pre-Procedure Evaluation    Patient: Leodan Collins   MRN: [id number] : 1980        Preoperative Diagnosis: Ureteral stone [N20.1]   Procedure : Procedure(s):  CYSTOURETEROSCOPY, WITH RETROGRADE PYELOGRAM, HOLMIUM LASER LITHOTRIPSY, STENT INSERTION     Past Medical History:   Diagnosis Date     Asthma     child     Crohn's disease (H)      History of blood transfusion      Kidney stones       Past Surgical History:   Procedure Laterality Date     COLOSTOMY       COMBINED CYSTOSCOPY, RETROGRADES, URETEROSCOPY, INSERT STENT Right 5/3/2016    Procedure: COMBINED CYSTOSCOPY, RETROGRADES, URETEROSCOPY, INSERT STENT;  Surgeon: Azael Melvin MD;  [facility]     COMBINED CYSTOSCOPY, RETROGRADES, URETEROSCOPY, LASER HOLMIUM LITHOTRIPSY URETER(S), INSERT STENT Right 2020    Procedure: CYSTOURETEROSCOPY, WITH RETROGRADE PYELOGRAM, HOLMIUM LASER LITHOTRIPSY OF URETERAL CALCULUS, AND STENT placement;  Surgeon: Bob Felix MD;  [facility]     CYSTOSCOPY, RETROGRADES, INSERT STENT URETER(S), COMBINED Left 2021    Procedure: CYSTOSCOPY, WITH Left  RETROGRADE PYELOGRAM AND Left URETERAL STENT INSERTION;  Surgeon: Trenton Lozano MD;  [facility]     EXTRACORPOREAL SHOCK WAVE LITHOTRIPSY (ESWL)  2013    Procedure: EXTRACORPOREAL SHOCK WAVE LITHOTRIPSY (ESWL);  Left Extracorporeal Shock Wave Lithotripsy Kidney And Ureter;  Surgeon: Azael Melvin MD;  [facility]     ILEOSTOMY       LAPAROTOMY EXPLORATORY      extensive lysis of adhesions, revision of coloenteric fistula, repair of small bowel fistula, takedown of current colostomy, debridement of chronic abscess, resection of residual left colon, right hemicolectomy, Louise ileostomy     LASER HOLMIUM LITHOTRIPSY URETER(S), INSERT STENT, COMBINED  2014    Procedure: COMBINED CYSTOSCOPY, URETEROSCOPY, LASER HOLMIUM LITHOTRIPSY URETER(S), INSERT STENT;  Ureteroscopy, Cystoscopy, holmium  laser,Right Uretral stent placement;  Surgeon: Azael Melvin MD;  [facility]     LASER HOLMIUM LITHOTRIPSY URETER(S), INSERT STENT, COMBINED Bilateral 5/17/2016    Procedure: COMBINED CYSTOSCOPY, URETEROSCOPY, LASER HOLMIUM LITHOTRIPSY URETER(S), INSERT STENT;  Surgeon: Azael Melvin MD;  [facility]      No Known Allergies   Social History     Tobacco Use     Smoking status: Never Smoker     Smokeless tobacco: Never Used   Substance Use Topics     Alcohol use: Yes     Alcohol/week: 0.0 standard drinks     Comment: x1 a week      Wt Readings from Last 1 Encounters:   07/01/21 68.5 kg (151 lb)        Anesthesia Evaluation            ROS/MED HX  ENT/Pulmonary:       Neurologic:       Cardiovascular:       METS/Exercise Tolerance:     Hematologic:       Musculoskeletal:       GI/Hepatic:     (+) Inflammatory bowel disease,     Renal/Genitourinary:     (+) renal disease (hydronephrosis), Nephrolithiasis ,     Endo:       Psychiatric/Substance Use:       Infectious Disease:       Malignancy:       Other:            Physical Exam    Airway        Mallampati: I   TM distance: > 3 FB      Respiratory Devices and Support         Dental           Cardiovascular          Rhythm and rate: regular and normal     Pulmonary           breath sounds clear to auscultation           OUTSIDE LABS:  CBC:   Lab Results   Component Value Date    WBC 14.0 (H) 06/09/2021    WBC 5.8 04/13/2021    HGB 16.6 06/09/2021    HGB 16.3 04/13/2021    HCT 48.8 06/09/2021    HCT 48.6 04/13/2021     06/09/2021     04/13/2021     BMP:   Lab Results   Component Value Date     06/09/2021     06/26/2020    POTASSIUM 3.6 06/09/2021    POTASSIUM 3.5 06/26/2020    CHLORIDE 103 06/09/2021    CHLORIDE 101 06/26/2020    CO2 26 06/09/2021    CO2 31 06/26/2020    BUN 17 06/09/2021    BUN 17 06/26/2020    CR 0.93 06/09/2021    CR 0.84 06/26/2020     (H) 06/09/2021    GLC 89 06/26/2020     COAGS:   Lab Results    Component Value Date    PTT 34 06/19/2011    INR 1.05 06/19/2011     POC:   Lab Results   Component Value Date    BGM 76 12/03/2009     HEPATIC:   Lab Results   Component Value Date    ALBUMIN 4.5 06/09/2021    PROTTOTAL 8.3 06/09/2021    ALT 40 06/09/2021    AST 21 06/09/2021    ALKPHOS 53 06/09/2021    BILITOTAL 2.6 (H) 06/09/2021     OTHER:   Lab Results   Component Value Date    PH 7.47 (H) 12/16/2009    LACT 1.9 01/24/2019    LAURA 9.8 06/09/2021    PHOS 3.8 03/18/2019    MAG 2.4 (H) 06/19/2011    LIPASE 125 01/24/2019    TSH 0.71 11/11/2019    CRP 20.1 (H) 06/19/2011     (H) 11/27/2009       Anesthesia Plan    ASA Status:  2      Anesthesia Type: General.     - Airway: LMA   Induction: Intravenous.   Maintenance: TIVA.        Consents    Anesthesia Plan(s) and associated risks, benefits, and realistic alternatives discussed. Questions answered and patient/representative(s) expressed understanding.     - Discussed with:  Patient         Postoperative Care    Pain management: Oral pain medications, Multi-modal analgesia.   PONV prophylaxis: Ondansetron (or other 5HT-3), Dexamethasone or Solumedrol     Comments:                Azael Arevalo MD

## 2021-07-05 ENCOUNTER — TELEPHONE (OUTPATIENT)
Dept: UROLOGY | Facility: CLINIC | Age: 41
End: 2021-07-05

## 2021-07-05 LAB
APPEARANCE STONE: NORMAL
COMPN STONE: NORMAL
NUMBER STONE: NORMAL
SIZE STONE: NORMAL MM
WT STONE: 121 MG

## 2021-07-05 NOTE — TELEPHONE ENCOUNTER
Left message for pt to call and schedule a virtual visit 6 week post-op appointment with Dr. Felix on 8/10/21 at 10:45AM with a CT scan done prior.

## 2021-07-05 NOTE — TELEPHONE ENCOUNTER
----- Message from Brittany Walters, EMT sent at 7/1/2021  3:26 PM CDT -----  8/10 10:45 am   ----- Message -----  From: Yakelin Hylton  Sent: 7/1/2021   2:46 PM CDT  To: Brittany Walters EMT    When can I set up this post op in 6-8 weeks?  ----- Message -----  From: Adrianna Albarran, RN  Sent: 7/1/2021   2:34 PM CDT  To: [facility]    Can you please help this patient get set up with a virtual with Dr. ROLDAN and a CT scan a few days prior. Thank you!  ----- Message -----  From: Buddy Hurst MD  Sent: 7/1/2021  12:37 PM CDT  To: Adrianna Albarran RN    Lea Rodas,    Could we please set up Mr. Collins for CT scan in 6 weeks with follow up with Dr. ROLDAN after? Thank you.    Best,  Buddy Hurst

## 2021-07-12 NOTE — TELEPHONE ENCOUNTER
Left second message for pt to call and schedule a virtual visit 6 week post-op appointment with Dr. Felix on 8/10/21 at 10:45AM with a CT scan done prior

## 2021-07-18 ASSESSMENT — ANXIETY QUESTIONNAIRES
GAD7 TOTAL SCORE: 16
1. FEELING NERVOUS, ANXIOUS, OR ON EDGE: NEARLY EVERY DAY
7. FEELING AFRAID AS IF SOMETHING AWFUL MIGHT HAPPEN: SEVERAL DAYS
5. BEING SO RESTLESS THAT IT IS HARD TO SIT STILL: NOT AT ALL
4. TROUBLE RELAXING: NEARLY EVERY DAY
GAD7 TOTAL SCORE: 16
3. WORRYING TOO MUCH ABOUT DIFFERENT THINGS: NEARLY EVERY DAY
6. BECOMING EASILY ANNOYED OR IRRITABLE: NEARLY EVERY DAY
2. NOT BEING ABLE TO STOP OR CONTROL WORRYING: NEARLY EVERY DAY
8. IF YOU CHECKED OFF ANY PROBLEMS, HOW DIFFICULT HAVE THESE MADE IT FOR YOU TO DO YOUR WORK, TAKE CARE OF THINGS AT HOME, OR GET ALONG WITH OTHER PEOPLE?: SOMEWHAT DIFFICULT
GAD7 TOTAL SCORE: 16
7. FEELING AFRAID AS IF SOMETHING AWFUL MIGHT HAPPEN: SEVERAL DAYS

## 2021-07-18 ASSESSMENT — PATIENT HEALTH QUESTIONNAIRE - PHQ9
SUM OF ALL RESPONSES TO PHQ QUESTIONS 1-9: 9
SUM OF ALL RESPONSES TO PHQ QUESTIONS 1-9: 9
10. IF YOU CHECKED OFF ANY PROBLEMS, HOW DIFFICULT HAVE THESE PROBLEMS MADE IT FOR YOU TO DO YOUR WORK, TAKE CARE OF THINGS AT HOME, OR GET ALONG WITH OTHER PEOPLE: SOMEWHAT DIFFICULT

## 2021-07-19 ENCOUNTER — VIRTUAL VISIT (OUTPATIENT)
Dept: PSYCHOLOGY | Facility: CLINIC | Age: 41
End: 2021-07-19
Attending: FAMILY MEDICINE
Payer: COMMERCIAL

## 2021-07-19 DIAGNOSIS — Z56.9 OCCUPATIONAL PROBLEM: ICD-10-CM

## 2021-07-19 DIAGNOSIS — F32.0 CURRENT MILD EPISODE OF MAJOR DEPRESSIVE DISORDER, UNSPECIFIED WHETHER RECURRENT (H): ICD-10-CM

## 2021-07-19 DIAGNOSIS — F41.9 ANXIETY: Primary | ICD-10-CM

## 2021-07-19 PROCEDURE — 90791 PSYCH DIAGNOSTIC EVALUATION: CPT | Mod: GT | Performed by: PSYCHOLOGIST

## 2021-07-19 SDOH — ECONOMIC STABILITY - INCOME SECURITY: UNSPECIFIED PROBLEMS RELATED TO EMPLOYMENT: Z56.9

## 2021-07-19 ASSESSMENT — PATIENT HEALTH QUESTIONNAIRE - PHQ9: SUM OF ALL RESPONSES TO PHQ QUESTIONS 1-9: 9

## 2021-07-19 ASSESSMENT — ANXIETY QUESTIONNAIRES: GAD7 TOTAL SCORE: 16

## 2021-07-19 NOTE — PROGRESS NOTES
Health Psychology                     Department of Medicine  Naomi Deutsch, Ph.D., L.P. [phone]                          [facility] Jocelin Christensen, Ph.D., L.P. [phone]                   [place] Mail Code [address]44   Sandro Giler, Ph.D., L.P. [phone]       [address] [place] [address] Sandra Sanford, Ph.D., L.P. [phone]         [place] [address]           Mahamed Ozuna, Ph.D., A.B.P.P., L.P. [phone]     Helen Jolly, Ph.D., L.P. [phone]  [address]     Confidential Summary of Health Psychology [facility] Health Evaluation*    Referral Source: Blane Worrell MD    Reason for Referral: Mr. Collins is a 41-year-old  white gentleman with a lengthy  history of Crohn's, who is referred due to anxiety, depression, and challenges coping with both chronic illness and the Covid pandemic.    History of Presenting Concerns: Mr. Collins was diagnosed with Crohn's in 1993 t age 13, reporting that it started to get bad 1n 1999, 4 weeks into his freshman year of college.  He developed a perforated colon at that time, had a colostomy which was later reversed, and then in 2007 had another  colostomy and ileostomy 2009.  Since then, he states his Crohn's has essentially been in remission.  He continues to have kidney stones approximately once per year.  He is fearful about symptoms returning and the Crohn's worsening..  He feels that he diaz relatively well with things the way they currently are in terms of his health and recognizes that it is important to avoid highly stressful situations.    Mr. Collins also reports that he tends to be anxious.  He reports a fair amount of baseline anxiety and that in stressful times he tends to get irritated, both with himself and with others.  This leaves him to feel that he is not quite doing right by his family when he is more irritable.  He tends to worry about a variety of things such as refinancing their home,  having sufficient financial stability to doing things that he like to do.  The house they live in has 1 bathroom and he believes it would be helpful to add a second for the four people in his home as he anticipates as his daughters age, it will be increasingly important to have adequate facilities    Medical History: Mr. Collins does not use tobacco products.  Alcohol consumption is reported to be approximately 2 beers per week.  He drinks an energy drink every day or two.  He denies use of street drugs.  Exercise is described as erratic.  He recently had a procedure for a kidney stone.  He is aware that stress can be a factor in exacerbating Crohn's symptoms, and therefore he has been somewhat reluctant to take on more stressful work opportunities.  In 2005, he had a  more stressful job and he thinks it led to a flare of the Crohn's.    Past Medical History:   Diagnosis Date     Asthma     child     Crohn's disease (H)      History of blood transfusion      Kidney stones      Past Surgical History:   Procedure Laterality Date     COLOSTOMY  2007     COMBINED CYSTOSCOPY, RETROGRADES, URETEROSCOPY, INSERT STENT Right 5/3/2016    Procedure: COMBINED CYSTOSCOPY, RETROGRADES, URETEROSCOPY, INSERT STENT;  Surgeon: Azael Melvin MD;  [facility]     COMBINED CYSTOSCOPY, RETROGRADES, URETEROSCOPY, LASER HOLMIUM LITHOTRIPSY URETER(S), INSERT STENT Right 1/31/2020    Procedure: CYSTOURETEROSCOPY, WITH RETROGRADE PYELOGRAM, HOLMIUM LASER LITHOTRIPSY OF URETERAL CALCULUS, AND STENT placement;  Surgeon: Bob Felix MD;  [facility]     COMBINED CYSTOSCOPY, RETROGRADES, URETEROSCOPY, LASER HOLMIUM LITHOTRIPSY URETER(S), INSERT STENT Left 7/1/2021    Procedure: CYSTOURETEROSCOPY, WITH RETROGRADE PYELOGRAM, HOLMIUM LASER LITHOTRIPSY, STENT INSERTION;  Surgeon: Bob Felix MD;  [facility]     CYSTOSCOPY, RETROGRADES, INSERT STENT URETER(S), COMBINED Left 6/9/2021    Procedure: CYSTOSCOPY, WITH  Left  RETROGRADE PYELOGRAM AND Left URETERAL STENT INSERTION;  Surgeon: Trenton Lozano MD;  [facility]     EXTRACORPOREAL SHOCK WAVE LITHOTRIPSY (ESWL)  6/27/2013    Procedure: EXTRACORPOREAL SHOCK WAVE LITHOTRIPSY (ESWL);  Left Extracorporeal Shock Wave Lithotripsy Kidney And Ureter;  Surgeon: Azael Melvin MD;  [facility]     ILEOSTOMY  2009     LAPAROTOMY EXPLORATORY  2009    extensive lysis of adhesions, revision of coloenteric fistula, repair of small bowel fistula, takedown of current colostomy, debridement of chronic abscess, resection of residual left colon, right hemicolectomy, Louise ileostomy     LASER HOLMIUM LITHOTRIPSY URETER(S), INSERT STENT, COMBINED  4/9/2014    Procedure: COMBINED CYSTOSCOPY, URETEROSCOPY, LASER HOLMIUM LITHOTRIPSY URETER(S), INSERT STENT;  Ureteroscopy, Cystoscopy, holmium laser,Right Uretral stent placement;  Surgeon: Azael Melvin MD;  [facility]     LASER HOLMIUM LITHOTRIPSY URETER(S), INSERT STENT, COMBINED Bilateral 5/17/2016    Procedure: COMBINED CYSTOSCOPY, URETEROSCOPY, LASER HOLMIUM LITHOTRIPSY URETER(S), INSERT STENT;  Surgeon: Azael Melvin MD;  [facility]     Current Outpatient Medications   Medication     Adalimumab (HUMIRA PEN SC)     chlorthalidone (HYGROTON) 25 MG tablet     HYDROcodone-acetaminophen (NORCO) 5-325 MG tablet     ibuprofen (ADVIL/MOTRIN) 600 MG tablet     mercaptopurine (PURINETHOL) 50 MG tablet     nitroFURantoin macrocrystal-monohydrate (MACROBID) 100 MG capsule     ondansetron (ZOFRAN-ODT) 4 MG ODT tab     OXYCODONE HCL PO     potassium citrate (UROCIT-K) 10 MEQ (1080 MG) CR tablet     potassium citrate (UROCIT-K) 10 MEQ (1080 MG) CR tablet     potassium citrate-citric acid (CYTRA K) 6254-2908 MG Packet     potassium citrate-citric acid (POLYCITRA-K/CYTRA K) 1545-3116 MG Packet     tamsulosin (FLOMAX) 0.4 MG capsule     tamsulosin (FLOMAX) 0.4 MG capsule     tamsulosin (FLOMAX) 0.4 MG capsule      "VITAMIN D PO     No current facility-administered medications for this visit.     Wt Readings from Last 4 Encounters:   07/01/21 68.5 kg (151 lb)   06/25/21 69.4 kg (153 lb)   06/09/21 68.5 kg (151 lb)   03/10/20 65.8 kg (145 lb)     Estimated body mass index is 23.65 kg/m  as calculated from the following:    Height as of 7/1/21: 1.702 m (5' 7\").    Weight as of 7/1/21: 68.5 kg (151 lb).    Social History: Mr. Collins grew up in [facility] [place].  He has a sister 5 years older than he is.  His father is 76 and  his mother is 75.  He reports they are both in good health.  They help to take care of his 3 and 6-year-old daughters at times.  He and his wife both found the past year to be quite stressful between the children being home for long stretches of time, his wife having been on furlough,  as well as both working from home in a relaitively small house.    He attended [employer] where he got a degree in American studies.  He has written 2 blocks, 1 about travel in [place] and the other about [place].  He has a book proposal for a third book currently being discussed with a publisher.  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  He is working approximately 30 to 35 hours a week.  He acknowledges that he does not feel fully stimulated, but is ambivalent about switching jobs because he considers it to be very supportive atmosphere.  His wife works with the travel abroad program at [employer].  He reports that they both tend to be anxious which sometimes is synergistic.  They have done fewer things in the past year during the pandemic that are recreational or bring sanjuana as there was a lot to manage in terms of childcare.    Psychiatric History: Mr. Collins reports that he saw a therapist briefly as a child to deal with anxiety.  About a decade ago he also saw a therapist on a single occasion but did not feel it was a good fit.  There is no personal history " of psychiatric hospitalizations or chemical dependency treatment.  He is not sure whether he ever tried psychoactive medication.  He believes that he discussed it in the past but never got around to trying.  The family history is remarkable for hospitalization and chemical issues in the maternal grandmother.    Psychological Screening: Mr. Collins completed the following four screening measures.    CAGE-AID Score: > 1 is a positive screen, suggesting further discussion is needed to determine if evaluation for alcohol or substance abuse is appropriate.  A score > 2 is considered clinically significant, suggesting further evaluation of alcohol or substance-related problems is indicated.  CAGE-AID Total Score 7/18/2021   Total Score 0   Total Score MyChart 0 (A total score of 2 or greater is considered clinically significant)       PIOTR-7 Score:  The General Anxiety Disorder-7 is an an anxiety screening instrument. Scores of 5, 10, and 15 respectively indicate mild, moderate, and severe anxiety symptoms.  PIOTR-7 SCORE 11/11/2019 7/18/2021   Total Score - 16 (severe anxiety)   Total Score 10 16       PHQ-9 Score:  The Patient Health Questionnaire-9 is a depression screening instrument. Scores of 5, 10, 15, and 20 respectively indicate mild, moderate, moderately severe and severe depression symptoms.   PHQ-9 SCORE 11/11/2019 7/18/2021   PHQ-9 Total Score MyChart - 9 (Mild depression)   PHQ-9 Total Score 5 9       WHODAS-12 Score:  WHODAS 2.0 Total Score 7/18/2021   Total Score 16   Total Score MyChart 16       Mental Status/Interview: Mr. Collins arrived punctually for today's meeting.  He is alert and oriented.  Affect is variable from with serious to occasional smiling.  Thoughts are linear.  He appears to be an excellent historian and is obviously very bright and articulate.  When asked about his mood he reports often feeling fatigued, frustrated, letting small things same like larger burdens than they are.  He denied feeling  hopeless or helpless.  He does not feel worthless or guilty per se but does feel that he wishes he were more available to his children and a better provider financially.  Energy is variable.  He sometimes is tired in the morning, having trouble getting up even if he has gotten 8 hours of sleep.  This is described as to the point that it sometimes feels impossible to get up.  Concentration is good once he is motivated and gets going.  He denies problems with memory or decision-making.  He denies hallucinations or delusions.  He denies suicidal homicidal ideation.  Rapport is positive.  He was interested in returning for further discussion.  We discussed the results of screening measures and also brought up the possibility that he might benefit from medication if he were interested to try it.    Impression: Mr. Collins is a very pleasant, bright gentleman with both chronic illness and long-term anxiety.  It seems that both anxiety and depression are more pronounced at this time, largely affected by the Covid pandemic.  He seems motivated for counseling.    Diagnosis:  Anxiety  Major depression, mild  Occupational problem    This telehealth service is appropriate and effective for delivering services in light of the necessity for social distancing to mitigate the COVID-19 epidemic and for conservation of PPE.     Patient has agreed to receiving telehealth services after being informed about it: Yes    Patient prefers video invitation/information to be sent by:   email    Time service started:1:04  Time service ended:  2:09  Mode of transmission: [employer]    Location of originating:  Home of the patient    Distance site:  Home office of provider for [facility]    The patient has been notified that:  Video visits will be conducted via a call with their psychologist to provide the care they need with a video conversation. Video visits may be billed at different rates depending on insurance coverage.  Patients are advised to  please contact their insurance provider with any questions about their health insurance coverage. If during the course of a call the psychologist feels a video visit is not appropriate, patients will not be charged for this service.    Recommendations/Plan: Mr. Collins will return for video visit 8/16 @ 2 to initiate problem-solving and supportive psychotherapy.  A treatment plan will be completed at that time.  I have advised him to increase his exercise as well as recreation.  If the anxiety depression do not decrease in response to counseling, medication may be effective.      Thank you for this opportunity to participate in your care of this patient.    Mahamed Ozuna, Ph.D., A.B.P.P., L.P.  Director, Health Psychology    cc: Blane Worrell MD    *In accordance with the Rules of the [place] Board of [employer], it is noted that psychological descriptions and scientific procedures underlying psychological evaluations have limitations.  Absolute predictions cannot be made based on information in this report. An information sheet describing informed consent, limits to confidentiality, clinic scheduling and practices, and feedback from patients was given to the patient.  This note is dictated utilizing voice recognition software. Unfortunately this leads to occasional typographical errors. I apologize in advance if this occurs.  If questions arise, please do not hesitate to contact the author.

## 2021-07-28 ENCOUNTER — TELEPHONE (OUTPATIENT)
Dept: UROLOGY | Facility: CLINIC | Age: 41
End: 2021-07-28

## 2021-07-28 NOTE — TELEPHONE ENCOUNTER
I called patient and he is not having any issues  He was given aug 10th a few weeks ago but he is out of town  Dr Felix wanted ct scan and go over results in 6 weeks which would be the end of august.  No time?? Karolina Bellamy LPN Staff Nurse

## 2021-08-09 DIAGNOSIS — N20.1 URETERAL STONE: ICD-10-CM

## 2021-08-09 DIAGNOSIS — N20.0 KIDNEY STONE: Primary | ICD-10-CM

## 2021-08-16 ENCOUNTER — VIRTUAL VISIT (OUTPATIENT)
Dept: PSYCHOLOGY | Facility: CLINIC | Age: 41
End: 2021-08-16
Payer: COMMERCIAL

## 2021-08-16 DIAGNOSIS — F41.9 ANXIETY: Primary | ICD-10-CM

## 2021-08-16 DIAGNOSIS — F32.0 CURRENT MILD EPISODE OF MAJOR DEPRESSIVE DISORDER, UNSPECIFIED WHETHER RECURRENT (H): ICD-10-CM

## 2021-08-16 DIAGNOSIS — Z56.9 OCCUPATIONAL PROBLEM: ICD-10-CM

## 2021-08-16 PROCEDURE — 90837 PSYTX W PT 60 MINUTES: CPT | Mod: GT | Performed by: PSYCHOLOGIST

## 2021-08-16 SDOH — ECONOMIC STABILITY - INCOME SECURITY: UNSPECIFIED PROBLEMS RELATED TO EMPLOYMENT: Z56.9

## 2021-08-16 NOTE — PROGRESS NOTES
Health Psychology                     Department of Medicine  Naomi Deutsch, Ph.D., L.P. [phone]                          [facility] Jocelin Christensen, Ph.D., L.P. [phone]                   [place] Mail Code 145   Alex Gil, Ph.D. [phone]        [address] [place] [address] Sandra Sanford, Ph.D., L.P. [phone]    [place] [address]           Mahamed Ozuna, Ph.D., A.B.P.P., L.P. [phone]        Helen Jolly, Ph.D., L.P. [phone]  [address]     Health Psychology Follow-Up Note    Referral Source: Balne Worrell MD    Reason for Referral: Mr. Collins is a 41-year-old  white gentleman with a lengthy  history of Crohn's, who is referred due to anxiety, depression, and challenges coping with both chronic illness and the Covid pandemic.    Intake:  7/19/21    History of Presenting Concerns (at intake): Mr. Collins was diagnosed with Crohn's in 1993 t age 13, reporting that it started to get bad 1n 1999, 4 weeks into his freshman year of college.  He developed a perforated colon at that time, had a colostomy which was later reversed, and then in 2007 had another  colostomy and ileostomy 2009.  Since then, he states his Crohn's has essentially been in remission.  He continues to have kidney stones approximately once per year.  He is fearful about symptoms returning and the Crohn's worsening..  He feels that he diaz relatively well with things the way they currently are in terms of his health and recognizes that it is important to avoid highly stressful situations.    Mr. Collins also reports that he tends to be anxious.  He reports a fair amount of baseline anxiety and that in stressful times he tends to get irritated, both with himself and with others.  This leaves him to feel that he is not quite doing right by his family when he is more irritable.  He tends to worry about a variety of things such as refinancing their home, having  sufficient financial stability to doing things that he like to do.  The house they live in has 1 bathroom and he believes it would be helpful to add a second for the four people in his home as he anticipates as his daughters age, it will be increasingly important to have adequate facilities    Medical History: Mr. Collins does not use tobacco products.  Alcohol consumption is reported to be approximately 2 beers per week.  He drinks an energy drink every day or two.  He denies use of street drugs.  Exercise is described as erratic.  He recently had a procedure for a kidney stone.  He is aware that stress can be a factor in exacerbating Crohn's symptoms, and therefore he has been somewhat reluctant to take on more stressful work opportunities.  In 2005, he had a  more stressful job and he thinks it led to a flare of the Crohn's.    Past Medical History:   Diagnosis Date     Asthma     child     Crohn's disease (H)      History of blood transfusion      Kidney stones      Past Surgical History:   Procedure Laterality Date     COLOSTOMY  2007     COMBINED CYSTOSCOPY, RETROGRADES, URETEROSCOPY, INSERT STENT Right 5/3/2016    Procedure: COMBINED CYSTOSCOPY, RETROGRADES, URETEROSCOPY, INSERT STENT;  Surgeon: Azael Melvin MD;  [facility]     COMBINED CYSTOSCOPY, RETROGRADES, URETEROSCOPY, LASER HOLMIUM LITHOTRIPSY URETER(S), INSERT STENT Right 1/31/2020    Procedure: CYSTOURETEROSCOPY, WITH RETROGRADE PYELOGRAM, HOLMIUM LASER LITHOTRIPSY OF URETERAL CALCULUS, AND STENT placement;  Surgeon: Bob Felix MD;  [facility]     COMBINED CYSTOSCOPY, RETROGRADES, URETEROSCOPY, LASER HOLMIUM LITHOTRIPSY URETER(S), INSERT STENT Left 7/1/2021    Procedure: CYSTOURETEROSCOPY, WITH RETROGRADE PYELOGRAM, HOLMIUM LASER LITHOTRIPSY, STENT INSERTION;  Surgeon: Bob Felix MD;  [facility]     CYSTOSCOPY, RETROGRADES, INSERT STENT URETER(S), COMBINED Left 6/9/2021    Procedure: CYSTOSCOPY, WITH Left   RETROGRADE PYELOGRAM AND Left URETERAL STENT INSERTION;  Surgeon: Trenton Lozano MD;  [facility]     EXTRACORPOREAL SHOCK WAVE LITHOTRIPSY (ESWL)  6/27/2013    Procedure: EXTRACORPOREAL SHOCK WAVE LITHOTRIPSY (ESWL);  Left Extracorporeal Shock Wave Lithotripsy Kidney And Ureter;  Surgeon: Azael Melvin MD;  [facility]     ILEOSTOMY  2009     LAPAROTOMY EXPLORATORY  2009    extensive lysis of adhesions, revision of coloenteric fistula, repair of small bowel fistula, takedown of current colostomy, debridement of chronic abscess, resection of residual left colon, right hemicolectomy, Louise ileostomy     LASER HOLMIUM LITHOTRIPSY URETER(S), INSERT STENT, COMBINED  4/9/2014    Procedure: COMBINED CYSTOSCOPY, URETEROSCOPY, LASER HOLMIUM LITHOTRIPSY URETER(S), INSERT STENT;  Ureteroscopy, Cystoscopy, holmium laser,Right Uretral stent placement;  Surgeon: Azael Melvin MD;  [facility]     LASER HOLMIUM LITHOTRIPSY URETER(S), INSERT STENT, COMBINED Bilateral 5/17/2016    Procedure: COMBINED CYSTOSCOPY, URETEROSCOPY, LASER HOLMIUM LITHOTRIPSY URETER(S), INSERT STENT;  Surgeon: Azael Melvin MD;  [facility]     Current Outpatient Medications   Medication     Adalimumab (HUMIRA PEN SC)     chlorthalidone (HYGROTON) 25 MG tablet     HYDROcodone-acetaminophen (NORCO) 5-325 MG tablet     ibuprofen (ADVIL/MOTRIN) 600 MG tablet     mercaptopurine (PURINETHOL) 50 MG tablet     nitroFURantoin macrocrystal-monohydrate (MACROBID) 100 MG capsule     ondansetron (ZOFRAN-ODT) 4 MG ODT tab     OXYCODONE HCL PO     potassium citrate (UROCIT-K) 10 MEQ (1080 MG) CR tablet     potassium citrate (UROCIT-K) 10 MEQ (1080 MG) CR tablet     potassium citrate-citric acid (CYTRA K) 7713-1552 MG Packet     potassium citrate-citric acid (POLYCITRA-K/CYTRA K) 5715-6237 MG Packet     tamsulosin (FLOMAX) 0.4 MG capsule     tamsulosin (FLOMAX) 0.4 MG capsule     tamsulosin (FLOMAX) 0.4 MG capsule     VITAMIN  "D PO     No current facility-administered medications for this visit.     Wt Readings from Last 4 Encounters:   07/01/21 68.5 kg (151 lb)   06/25/21 69.4 kg (153 lb)   06/09/21 68.5 kg (151 lb)   03/10/20 65.8 kg (145 lb)     Estimated body mass index is 23.65 kg/m  as calculated from the following:    Height as of 7/1/21: 1.702 m (5' 7\").    Weight as of 7/1/21: 68.5 kg (151 lb).    Social History: Mr. Collins grew up in [facility] [place].  He has a sister 5 years older than he is.  His father is 76 and  his mother is 75.  He reports they are both in good health.  They help to take care of his 3 and 6-year-old daughters at times.  He and his wife both found the past year to be quite stressful between the children being home for long stretches of time, his wife having been on furlough,  as well as both working from home in a relaitively small house.    He attended [employer] where he got a degree in American studies.  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  He has a book proposal for a third book currently being discussed with a publisher.  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  He is working approximately 30 to 35 hours a week.  He acknowledges that he does not feel fully stimulated, but is ambivalent about switching jobs because he considers it to be very supportive atmosphere.  His wife works with the travel abroad program at [employer].  He reports that they both tend to be anxious which sometimes is synergistic.  They have done fewer things in the past year during the pandemic that are recreational or bring sanjuana as there was a lot to manage in terms of childcare.    Psychiatric History: Mr. Collins reports that he saw a therapist briefly as a child to deal with anxiety.  About a decade ago he also saw a therapist on a single occasion but did not feel it was a good fit.  There is no personal history of " psychiatric hospitalizations or chemical dependency treatment.  He is not sure whether he ever tried psychoactive medication.  He believes that he discussed it in the past but never got around to trying.  The family history is remarkable for hospitalization and chemical issues in the maternal grandmother.    Psychological Screening: Mr. Collins completed the following four screening measures.    CAGE-AID Score: > 1 is a positive screen, suggesting further discussion is needed to determine if evaluation for alcohol or substance abuse is appropriate.  A score > 2 is considered clinically significant, suggesting further evaluation of alcohol or substance-related problems is indicated.  CAGE-AID Total Score 7/18/2021   Total Score 0   Total Score MyChart 0 (A total score of 2 or greater is considered clinically significant)     PIOTR-7 Score:  The General Anxiety Disorder-7 is an an anxiety screening instrument. Scores of 5, 10, and 15 respectively indicate mild, moderate, and severe anxiety symptoms.  PIOTR-7 SCORE 11/11/2019 7/18/2021   Total Score - 16 (severe anxiety)   Total Score 10 16     PHQ-9 Score:  The Patient Health Questionnaire-9 is a depression screening instrument. Scores of 5, 10, 15, and 20 respectively indicate mild, moderate, moderately severe and severe depression symptoms.   PHQ-9 SCORE 11/11/2019 7/18/2021   PHQ-9 Total Score MyChart - 9 (Mild depression)   PHQ-9 Total Score 5 9     WHODAS-12 Score:  WHODAS 2.0 Total Score 7/18/2021   Total Score 16   Total Score MyChart 16     Session: Mr. Collins arrived punctually for today's meeting.  He is alert and oriented.  Affect is variable from with serious to occasional smiling.  Thoughts are linear.      We discussed his concerns re: COVid. His 6 year old will be in person at school and his 3 year old goes to a small  center.  They gave her a Covid test today as she had a runny nose.  We discussed strategies for feeling less overwhelmed by Covid issues,  loss of control, etc.  He is agreeable to trying the including disciplining himself to have 2 problem-solving periods per day, 20 minutes max.    We also discussed sleep.  He reports often feeling fatigued, fnot having consistent bed time. We discussed the importance o it, imposing structure for himself and his daughters. He  is tired in the morning, having trouble getting up even if he has gotten 8 hours of sleep.  This is described as to the point that it sometimes feels impossible to get up.     He had a good vacation x1 week, which was restful.   His wife is seeking a therapist.  We discussed resources.    Rapport is positive.  He participated fully and appeared to benefit from the conversation.   .    Diagnosis:  Anxiety  Major depression, mild  Occupational problem    This telehealth service is appropriate and effective for delivering services in light of the necessity for social distancing to mitigate the COVID-19 epidemic and for conservation of PPE.     Patient has agreed to receiving telehealth services after being informed about it: Yes    Patient prefers video invitation/information to be sent by:   email    Time service started: 2:01  Time service ended:  2:56  Mode of transmission: Foldax    Location of originating:  Home of the patient    Distance site:  Home office of provider for [facility]    The patient has been notified that:  Video visits will be conducted via a call with their psychologist to provide the care they need with a video conversation. Video visits may be billed at different rates depending on insurance coverage.  Patients are advised to please contact their insurance provider with any questions about their health insurance coverage. If during the course of a call the psychologist feels a video visit is not appropriate, patients will not be charged for this service.    Recommendations/Plan: Mr. Collins will return for video visit 9/15 @ 1 to continue problem-solving and supportive  psychotherapy.  A treatment plan will be completed at that time.  It has been sent x2.  I have advised him to increase his exercise as well as recreation.  If the anxiety depression do not decrease in response to counseling, medication may be effective.      Mahamed Ozuna, Ph.D., A.B.P.P., L.P.  Director, Health Psychology

## 2021-09-14 ENCOUNTER — PRE VISIT (OUTPATIENT)
Dept: UROLOGY | Facility: CLINIC | Age: 41
End: 2021-09-14

## 2021-09-14 ENCOUNTER — ANCILLARY PROCEDURE (OUTPATIENT)
Dept: CT IMAGING | Facility: CLINIC | Age: 41
End: 2021-09-14
Attending: UROLOGY
Payer: COMMERCIAL

## 2021-09-14 DIAGNOSIS — N20.0 KIDNEY STONE: ICD-10-CM

## 2021-09-14 LAB — RADIOLOGIST FLAGS: ABNORMAL

## 2021-09-14 PROCEDURE — 74176 CT ABD & PELVIS W/O CONTRAST: CPT | Performed by: RADIOLOGY

## 2021-09-15 ENCOUNTER — VIRTUAL VISIT (OUTPATIENT)
Dept: PSYCHOLOGY | Facility: CLINIC | Age: 41
End: 2021-09-15
Payer: COMMERCIAL

## 2021-09-15 DIAGNOSIS — Z56.9 OCCUPATIONAL PROBLEM: ICD-10-CM

## 2021-09-15 DIAGNOSIS — F32.0 CURRENT MILD EPISODE OF MAJOR DEPRESSIVE DISORDER, UNSPECIFIED WHETHER RECURRENT (H): ICD-10-CM

## 2021-09-15 DIAGNOSIS — F41.9 ANXIETY: Primary | ICD-10-CM

## 2021-09-15 PROCEDURE — 90834 PSYTX W PT 45 MINUTES: CPT | Mod: GT | Performed by: PSYCHOLOGIST

## 2021-09-15 SDOH — ECONOMIC STABILITY - INCOME SECURITY: UNSPECIFIED PROBLEMS RELATED TO EMPLOYMENT: Z56.9

## 2021-09-15 NOTE — PROGRESS NOTES
Health Psychology                     Department of Medicine  Naomi Deutsch, Ph.D., L.P. [phone]                          [facility] Jocelin Christensen, Ph.D., L.P. [phone]                   [place] Mail Code 676   Alex Gil, Ph.D. [phone]        [address] [place] [address] Sandra Sanford, Ph.D., L.P. [phone]    [place] [address]           Mahamed Ozuna, Ph.D., A.B.P.P., L.P. [phone]        Helen Jolly, Ph.D., L.P. [phone]  [address]     Health Psychology Follow-Up Note    Referral Source: Blane Worrell MD    Reason for Referral: Mr. Collins is a 41-year-old  white gentleman with a lengthy  history of Crohn's, who is referred due to anxiety, depression, and challenges coping with both chronic illness and the Covid pandemic.    Intake:  7/19/21    History of Presenting Concerns (at intake): Mr. Collins was diagnosed with Crohn's in 1993 t age 13, reporting that it started to get bad 1n 1999, 4 weeks into his freshman year of college.  He developed a perforated colon at that time, had a colostomy which was later reversed, and then in 2007 had another  colostomy and ileostomy 2009.  Since then, he states his Crohn's has essentially been in remission.  He continues to have kidney stones approximately once per year.  He is fearful about symptoms returning and the Crohn's worsening..  He feels that he diaz relatively well with things the way they currently are in terms of his health and recognizes that it is important to avoid highly stressful situations.    Mr. Collins also reports that he tends to be anxious.  He reports a fair amount of baseline anxiety and that in stressful times he tends to get irritated, both with himself and with others.  This leaves him to feel that he is not quite doing right by his family when he is more irritable.  He tends to worry about a variety of things such as refinancing their home, having  sufficient financial stability to doing things that he like to do.  The house they live in has 1 bathroom and he believes it would be helpful to add a second for the four people in his home as he anticipates as his daughters age, it will be increasingly important to have adequate facilities    Medical History: Mr. Collins does not use tobacco products.  Alcohol consumption is reported to be approximately 2 beers per week.  He drinks an energy drink every day or two.  He denies use of street drugs.  Exercise is described as erratic.  He recently had a procedure for a kidney stone.  He is aware that stress can be a factor in exacerbating Crohn's symptoms, and therefore he has been somewhat reluctant to take on more stressful work opportunities.  In 2005, he had a  more stressful job and he thinks it led to a flare of the Crohn's.    Past Medical History:   Diagnosis Date     Asthma     child     Crohn's disease (H)      History of blood transfusion      Kidney stones      Past Surgical History:   Procedure Laterality Date     COLOSTOMY  2007     COMBINED CYSTOSCOPY, RETROGRADES, URETEROSCOPY, INSERT STENT Right 5/3/2016    Procedure: COMBINED CYSTOSCOPY, RETROGRADES, URETEROSCOPY, INSERT STENT;  Surgeon: Azael Melvin MD;  [facility]     COMBINED CYSTOSCOPY, RETROGRADES, URETEROSCOPY, LASER HOLMIUM LITHOTRIPSY URETER(S), INSERT STENT Right 1/31/2020    Procedure: CYSTOURETEROSCOPY, WITH RETROGRADE PYELOGRAM, HOLMIUM LASER LITHOTRIPSY OF URETERAL CALCULUS, AND STENT placement;  Surgeon: Bob Felix MD;  [facility]     COMBINED CYSTOSCOPY, RETROGRADES, URETEROSCOPY, LASER HOLMIUM LITHOTRIPSY URETER(S), INSERT STENT Left 7/1/2021    Procedure: CYSTOURETEROSCOPY, WITH RETROGRADE PYELOGRAM, HOLMIUM LASER LITHOTRIPSY, STENT INSERTION;  Surgeon: Bob Felix MD;  Location: [facility]     CYSTOSCOPY, RETROGRADES, INSERT STENT URETER(S), COMBINED Left 6/9/2021    Procedure: CYSTOSCOPY, WITH Left   RETROGRADE PYELOGRAM AND Left URETERAL STENT INSERTION;  Surgeon: Trenton Lozano MD;  [facility]     EXTRACORPOREAL SHOCK WAVE LITHOTRIPSY (ESWL)  6/27/2013    Procedure: EXTRACORPOREAL SHOCK WAVE LITHOTRIPSY (ESWL);  Left Extracorporeal Shock Wave Lithotripsy Kidney And Ureter;  Surgeon: Azael Melvin MD;  [facility]     ILEOSTOMY  2009     LAPAROTOMY EXPLORATORY  2009    extensive lysis of adhesions, revision of coloenteric fistula, repair of small bowel fistula, takedown of current colostomy, debridement of chronic abscess, resection of residual left colon, right hemicolectomy, Louise ileostomy     LASER HOLMIUM LITHOTRIPSY URETER(S), INSERT STENT, COMBINED  4/9/2014    Procedure: COMBINED CYSTOSCOPY, URETEROSCOPY, LASER HOLMIUM LITHOTRIPSY URETER(S), INSERT STENT;  Ureteroscopy, Cystoscopy, holmium laser,Right Uretral stent placement;  Surgeon: Azael Melvin MD;  [facility]     LASER HOLMIUM LITHOTRIPSY URETER(S), INSERT STENT, COMBINED Bilateral 5/17/2016    Procedure: COMBINED CYSTOSCOPY, URETEROSCOPY, LASER HOLMIUM LITHOTRIPSY URETER(S), INSERT STENT;  Surgeon: Azael Melvin MD;  [facility]     Current Outpatient Medications   Medication     Adalimumab (HUMIRA PEN SC)     chlorthalidone (HYGROTON) 25 MG tablet     HYDROcodone-acetaminophen (NORCO) 5-325 MG tablet     ibuprofen (ADVIL/MOTRIN) 600 MG tablet     mercaptopurine (PURINETHOL) 50 MG tablet     nitroFURantoin macrocrystal-monohydrate (MACROBID) 100 MG capsule     ondansetron (ZOFRAN-ODT) 4 MG ODT tab     OXYCODONE HCL PO     potassium citrate (UROCIT-K) 10 MEQ (1080 MG) CR tablet     potassium citrate (UROCIT-K) 10 MEQ (1080 MG) CR tablet     potassium citrate-citric acid (CYTRA K) 6204-7549 MG Packet     potassium citrate-citric acid (POLYCITRA-K/CYTRA K) 9398-8929 MG Packet     tamsulosin (FLOMAX) 0.4 MG capsule     tamsulosin (FLOMAX) 0.4 MG capsule     tamsulosin (FLOMAX) 0.4 MG capsule     VITAMIN  "D PO     No current facility-administered medications for this visit.     Wt Readings from Last 4 Encounters:   07/01/21 68.5 kg (151 lb)   06/25/21 69.4 kg (153 lb)   06/09/21 68.5 kg (151 lb)   03/10/20 65.8 kg (145 lb)     Estimated body mass index is 23.65 kg/m  as calculated from the following:    Height as of 7/1/21: 1.702 m (5' 7\").    Weight as of 7/1/21: 68.5 kg (151 lb).    Social History: Mr. Collins grew up in [place] [facility] [place].  He has a sister 5 years older than he is.  His father is 76 and  his mother is 75.  He reports they are both in good health.  They help to take care of his 3 and 6-year-old daughters at times.  He and his wife both found the past year to be quite stressful between the children being home for long stretches of time, his wife having been on furlough,  as well as both working from home in a relaitively small house. His wife, Laura meraz, manages global education  programs for [employer] students.    He attended [employer] where he got a degree in American studies.  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  He has a book proposal for a third book currently being discussed with a publisher.  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  He is working approximately 30 to 35 hours a week.  He acknowledges that he does not feel fully stimulated, but is ambivalent about switching jobs because he considers it to be very supportive atmosphere.  His wife works with the travel abroad program at [employer].  He reports that they both tend to be anxious which sometimes is synergistic.  They have done fewer things in the past year during the pandemic that are recreational or bring sanjuana as there was a lot to manage in terms of childcare.    Psychiatric History: Mr. Collins reports that he saw a therapist briefly as a child to deal with anxiety.  About a decade ago he also saw a therapist on a single " occasion but did not feel it was a good fit.  There is no personal history of psychiatric hospitalizations or chemical dependency treatment.  He is not sure whether he ever tried psychoactive medication.  He believes that he discussed it in the past but never got around to trying.  The family history is remarkable for hospitalization and chemical issues in the maternal grandmother.    Psychological Screening: Mr. Collins completed the following four screening measures.    CAGE-AID Score: > 1 is a positive screen, suggesting further discussion is needed to determine if evaluation for alcohol or substance abuse is appropriate.  A score > 2 is considered clinically significant, suggesting further evaluation of alcohol or substance-related problems is indicated.  CAGE-AID Total Score 7/18/2021   Total Score 0   Total Score MyChart 0 (A total score of 2 or greater is considered clinically significant)     PIOTR-7 Score:  The General Anxiety Disorder-7 is an an anxiety screening instrument. Scores of 5, 10, and 15 respectively indicate mild, moderate, and severe anxiety symptoms.  PIOTR-7 SCORE 11/11/2019 7/18/2021   Total Score - 16 (severe anxiety)   Total Score 10 16     PHQ-9 Score:  The Patient Health Questionnaire-9 is a depression screening instrument. Scores of 5, 10, 15, and 20 respectively indicate mild, moderate, moderately severe and severe depression symptoms.   PHQ-9 SCORE 11/11/2019 7/18/2021   PHQ-9 Total Score MyChart - 9 (Mild depression)   PHQ-9 Total Score 5 9     WHODAS-12 Score:  WHODAS 2.0 Total Score 7/18/2021   Total Score 16   Total Score MyChart 16     Session: Mr. Collins arrived punctually for today's meeting.  He is alert and oriented.  Affect is variable from with serious to occasional smiling.  Thoughts are linear.      We discussed his concerns re: COVID. His 6 year old began in-person person school and his 3 year old goes to a small  center. It has been very liberating for him to have time  alone to think, to be producive for things he would like to work on.   He feels less overwhelmed as a result, though remains worried about his children's risk for COVId.  They have in-home tests they plan to give to the daughter q 2 weeks.    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  He is enjoying let himself wonder about opportunities to do the types of writing he would like.    He reports still being anxious and depressed, but less so for the past few days since his kids are out of the home.     Rapport is positive.  He participated fully and appeared to benefit from the conversation.   .    Diagnosis:  Anxiety  Major depression, mild  Occupational problem    This telehealth service is appropriate and effective for delivering services in light of the necessity for social distancing to mitigate the COVID-19 epidemic and for conservation of PPE.     Patient has agreed to receiving telehealth services after being informed about it: Yes    Patient prefers video invitation/information to be sent by:   email    Time service started: 1:04  Time service ended:  1:56  Mode of transmission: Luminetx    Location of originating:  Home of the patient    Distance site:  Home office of provider for [facility]    The patient has been notified that:  Video visits will be conducted via a call with their psychologist to provide the care they need with a video conversation. Video visits may be billed at different rates depending on insurance coverage.  Patients are advised to please contact their insurance provider with any questions about their health insurance coverage. If during the course of a call the psychologist feels a video visit is not appropriate, patients will not be charged for this service.    Plan: Mr. Collins will return for video visit 10/8 @ 11 to continue problem-solving and supportive psychotherapy.  A treatment plan  will be completed at that time.  It has been sent x3.  I have advised him to increase his exercise as well as recreation.  If the anxiety depression do not decrease in response to counseling, medication may be effective.       Mahamed Ozuna, Ph.D., A.B.P.P., L.P.  Director, Health Psychology

## 2021-09-21 ENCOUNTER — VIRTUAL VISIT (OUTPATIENT)
Dept: UROLOGY | Facility: CLINIC | Age: 41
End: 2021-09-21
Payer: COMMERCIAL

## 2021-09-21 DIAGNOSIS — N20.0 KIDNEY STONE: Primary | ICD-10-CM

## 2021-09-21 PROCEDURE — 99214 OFFICE O/P EST MOD 30 MIN: CPT | Mod: GT | Performed by: UROLOGY

## 2021-09-21 ASSESSMENT — PAIN SCALES - GENERAL: PAINLEVEL: NO PAIN (0)

## 2021-09-21 NOTE — PROGRESS NOTES
Leodan is a 41 year old who is being evaluated via a billable video visit.           UROLOGY VIRTUAL VISIT      Chief Complaint:   Kidney stones      Synopsis    Leodan Collins is a very pleasant 41-year-old male with a history of an ileostomy and recurrent kidney stones.  He is on potassium citrate 20 mg daily as well as chlorthalidone.  He is supposed to be taking potassium citrate 20 mEq 3 times daily but is not taking it that way at the moment.  He previously has had issues with hypokalemia.    He returns now having undergone left ureteroscopy with laser lithotripsy in July.  He is feeling well and has no stone symptoms at the moment.    His CT scan is personally reviewed.  Each kidney has several small nonobstructing stones however there is a 4 mm right proximal ureteral stone with associated mild hydronephrosis.    He denies fevers, gross hematuria, flank pain.  We discussed the stone last week and he has been looking and straining in his urine to see if it is passed but has not seen anything.           Assessment/Plan   41 year old male with recurrent mixed calcium and uric acid stones.  History of ileostomy  -Mr. Collins notes that he has passed several stones in his life many of which larger than the one currently in his right ureter.  He will continue straining his urine to see whether or not the stone passes and will let us know if he does develop any symptoms.  -We will set him up for a follow-up CT scan in the next 2 weeks should his stone not pass  -He will also resume his normal potassium citrate dosing we will follow-up basic metabolic panel in the next 2 weeks as well  -We will also plan to update a 24-hour urine in about 3 months to ensure that his stone risk is minimized.        Physical Exam:   General Appearance: Well groomed, hygenic  Eyes: No redness, discharge  Respiratory: No cough, no respiratory distress or labored breathing  Musculoskeletal:  grossly normal, full range of motion in upper  extremities, no gross deficits  Skin: No discoloration or apparent rashes  Neurologic - No tremors  Psychiatric - Alert and oriented  The rest of a comprehensive physical examination is deferred due to public health emergency video visit restrictions      The following  distinct labs were reviewed    I personally reviewed all applicable laboratory data and went over findings with patient  Significant for:    CBC RESULTS:  Recent Labs   Lab Test 06/09/21  1219 04/13/21  1018 11/18/20  0919 06/26/20  1410   WBC 14.0* 5.8 6.2 6.4   HGB 16.6 16.3 16.3 15.5    334 373 292        BMP RESULTS:  Recent Labs   Lab Test 06/09/21  1219 06/26/20  1410 04/22/20  1458 01/29/20  1845    137 138 140   POTASSIUM 3.6 3.5 3.1* 4.1   CHLORIDE 103 101 103 107   CO2 26 31 29 28   ANIONGAP 11 5 6 5   * 89 89 101*   BUN 17 17 17 23   CR 0.93 0.84 0.76 0.99   GFRESTIMATED >90 >90 >90 >90   GFRESTBLACK >90 >90 >90 >90   LAURA 9.8 9.4 9.3 9.2       UA RESULTS:   Recent Labs   Lab Test 06/23/21  1233 06/09/21  1401 01/29/20  1845   SG 1.020 1.020 1.023   URINEPH 6.0 6.5 5.5   NITRITE Negative Negative Negative   RBCU >182* 48* >182*   WBCU 169* 3 11*       Recent Imaging Report    I personally reviewed all applicable imaging and went over the below findings with patient.    Results for orders placed or performed in visit on 09/14/21   CT Abdomen Pelvis w/o Contrast     Value    Radiologist flags (Urgent)     Right ureteropelvic junction stone with minimal right hydronephrosis.    Narrative    EXAM: CT ABDOMEN PELVIS W/O CONTRAST  LOCATION: [facility]  DATE/TIME: 9/14/2021 12:24 PM    INDICATION: Flank pain, kidney stone suspected.  COMPARISON: None.  TECHNIQUE: CT scan of the abdomen and pelvis was performed without IV contrast. Multiplanar reformats were obtained. Dose reduction techniques were used.  CONTRAST: None.    FINDINGS:   LOWER CHEST: Stable small nodules at the left  lung base. One of these measures 0.5 cm series 5 image 39 lateral left lung base. Another tiny stable subpleural nodule is seen measuring 0.2 cm on image 15.    HEPATOBILIARY: Normal.    PANCREAS: Normal.    SPLEEN: Normal.    ADRENAL GLANDS: Normal.    KIDNEYS/BLADDER: Previously noted left proximal ureter stone is not currently seen. No left hydronephrosis. New finding of a right proximal ureter stone at the ureteropelvic junction that is 0.6 x 0.4 cm, series 5 image 154. There is minimal right   hydronephrosis and right pelviectasis. There are multiple bilateral intrarenal stones again noted. The stone burden appears decreased at the lower left kidney. An example largest stone at the lower left kidney 0.6 cm series 5 image 149. There are other   bilateral examples. Approximately 4 left intrarenal stones and 2 intrarenal stones on the right. No bladder stones or bladder specific abnormality.    BOWEL: No obstruction or acute inflammation. Right abdominal ostomy.    LYMPH NODES: Normal.    VASCULATURE: Unremarkable.    PELVIC ORGANS: Normal.    MUSCULOSKELETAL: No aggressive process.      Impression    IMPRESSION:   1.  New finding of a 0.6 cm right ureteropelvic junction proximal ureter stone with minimal right hydronephrosis and right pelviectasis.  2.  Bilateral intrarenal stones again noted with stone burden decreasing at the left lower kidney.  3.  Previously noted left ureteropelvic junction stone is not currently seen.  4.  Stable small pulmonary nodules at the left lung base. See below for follow-up imaging guidelines.    Recommendations for one or multiple incidental lung nodules < 6mm:  Low-Risk Patient: No routine follow-up.  High-Risk Patient: Optional follow-up CT at 12 months; if unchanged, no further follow-up.    *Low Risk: Minimal or absent history of smoking or other known risk factors.  *Nonsolid (ground-glass) or partly solid nodules may require longer follow-up to exclude indolent  adenocarcinoma.  *Recommendations based on Guidelines for the Management of Incidental Pulmonary Nodules Detected at CT: From the Fleischner Society 2017, Radiology 2017.      [Access Center: Right ureteropelvic junction stone with minimal right hydronephrosis.]    This report will be copied to the [facility] to ensure a provider acknowledges the finding. [facility] is available Monday through Friday 8am-3:30 pm.                 Past Medical History:     Past Medical History:   Diagnosis Date     Asthma     child     Crohn's disease (H)      History of blood transfusion      Kidney stones             Past Surgical History:     Past Surgical History:   Procedure Laterality Date     COLOSTOMY  2007     COMBINED CYSTOSCOPY, RETROGRADES, URETEROSCOPY, INSERT STENT Right 5/3/2016    Procedure: COMBINED CYSTOSCOPY, RETROGRADES, URETEROSCOPY, INSERT STENT;  Surgeon: Azael Melvin MD;  Location: [facility] OR     COMBINED CYSTOSCOPY, RETROGRADES, URETEROSCOPY, LASER HOLMIUM LITHOTRIPSY URETER(S), INSERT STENT Right 1/31/2020    Procedure: CYSTOURETEROSCOPY, WITH RETROGRADE PYELOGRAM, HOLMIUM LASER LITHOTRIPSY OF URETERAL CALCULUS, AND STENT placement;  Surgeon: Bob Felix MD;  Location[facility]     COMBINED CYSTOSCOPY, RETROGRADES, URETEROSCOPY, LASER HOLMIUM LITHOTRIPSY URETER(S), INSERT STENT Left 7/1/2021    Procedure: CYSTOURETEROSCOPY, WITH RETROGRADE PYELOGRAM, HOLMIUM LASER LITHOTRIPSY, STENT INSERTION;  Surgeon: Bob Felix MD;  Location: [facility] OR     CYSTOSCOPY, RETROGRADES, INSERT STENT URETER(S), COMBINED Left 6/9/2021    Procedure: CYSTOSCOPY, WITH Left  RETROGRADE PYELOGRAM AND Left URETERAL STENT INSERTION;  Surgeon: Trenton Lozano MD;  Location: [facility] OR     EXTRACORPOREAL SHOCK WAVE LITHOTRIPSY (ESWL)  6/27/2013    Procedure: EXTRACORPOREAL SHOCK WAVE LITHOTRIPSY (ESWL);  Left Extracorporeal Shock Wave Lithotripsy Kidney And Ureter;  Surgeon: Azael Melvin MD;   Location: [facility] OR     ILEOSTOMY  2009     LAPAROTOMY EXPLORATORY  2009    extensive lysis of adhesions, revision of coloenteric fistula, repair of small bowel fistula, takedown of current colostomy, debridement of chronic abscess, resection of residual left colon, right hemicolectomy, Louise ileostomy     LASER HOLMIUM LITHOTRIPSY URETER(S), INSERT STENT, COMBINED  4/9/2014    Procedure: COMBINED CYSTOSCOPY, URETEROSCOPY, LASER HOLMIUM LITHOTRIPSY URETER(S), INSERT STENT;  Ureteroscopy, Cystoscopy, holmium laser,Right Uretral stent placement;  Surgeon: Azael Melvin MD;  Location: [facility] OR     LASER HOLMIUM LITHOTRIPSY URETER(S), INSERT STENT, COMBINED Bilateral 5/17/2016    Procedure: COMBINED CYSTOSCOPY, URETEROSCOPY, LASER HOLMIUM LITHOTRIPSY URETER(S), INSERT STENT;  Surgeon: Azael Melvin MD;  Location: [facility] OR            Medications     Current Outpatient Medications   Medication     Adalimumab (HUMIRA PEN SC)     chlorthalidone (HYGROTON) 25 MG tablet     HYDROcodone-acetaminophen (NORCO) 5-325 MG tablet     ibuprofen (ADVIL/MOTRIN) 600 MG tablet     mercaptopurine (PURINETHOL) 50 MG tablet     nitroFURantoin macrocrystal-monohydrate (MACROBID) 100 MG capsule     ondansetron (ZOFRAN-ODT) 4 MG ODT tab     OXYCODONE HCL PO     potassium citrate (UROCIT-K) 10 MEQ (1080 MG) CR tablet     potassium citrate (UROCIT-K) 10 MEQ (1080 MG) CR tablet     potassium citrate-citric acid (CYTRA K) 2044-6686 MG Packet     potassium citrate-citric acid (POLYCITRA-K/CYTRA K) 1133-6920 MG Packet     tamsulosin (FLOMAX) 0.4 MG capsule     tamsulosin (FLOMAX) 0.4 MG capsule     tamsulosin (FLOMAX) 0.4 MG capsule     VITAMIN D PO     No current facility-administered medications for this visit.            Family History:     Family History   Problem Relation Age of Onset     Cerebrovascular Disease Maternal Grandmother      Connective Tissue Disorder Sister      Nephrolithiasis Father             Social History:     Social  History     Socioeconomic History     Marital status:      Spouse name: Not on file     Number of children: Not on file     Years of education: Not on file     Highest education level: Not on file   Occupational History     Not on file   Tobacco Use     Smoking status: Never Smoker     Smokeless tobacco: Never Used   Substance and Sexual Activity     Alcohol use: Yes     Alcohol/week: 0.0 standard drinks     Comment: x1 a week     Drug use: No     Sexual activity: Yes     Partners: Female     Birth control/protection: Pull-out method, Condom, I.U.D.   Other Topics Concern     Parent/sibling w/ CABG, MI or angioplasty before 65F 55M? No   Social History Narrative     Not on file     Social Determinants of Health     Financial Resource Strain:      Difficulty of Paying Living Expenses:    Food Insecurity:      Worried About Running Out of Food in the Last Year:      Ran Out of Food in the Last Year:    Transportation Needs:      Lack of Transportation (Medical):      Lack of Transportation (Non-Medical):    Physical Activity:      Days of Exercise per Week:      Minutes of Exercise per Session:    Stress:      Feeling of Stress :    Social Connections:      Frequency of Communication with Friends and Family:      Frequency of Social Gatherings with Friends and Family:      Attends [employer] Services:      Active Member of Clubs or Organizations:      Attends Club or Organization Meetings:      Marital Status:    Intimate Partner Violence:      Fear of Current or Ex-Partner:      Emotionally Abused:      Physically Abused:      Sexually Abused:             Allergies:   Patient has no known allergies.         Review of Systems:  From intake questionnaire   Negative 14 system review except as noted on HPI, nurse's note.      CC:  Blane Worrell           How would you like to obtain your AVS? MyChart  If the video visit is dropped, the invitation should be resent by: Send to e-mail at:  [email or web address]  Will anyone else be joining your video visit? No      Video Start Time: 8:12  Video-Visit Details    Type of service:  Video Visit    Video End Time:8:17    Originating Location (pt. Location): Home    Distant Location (provider location):  [facility] [place]     Platform used for Video Visit: [employer]

## 2021-09-21 NOTE — LETTER
9/21/2021       RE: Leodan Collins  [address]  [facility] [id number]     Dear Colleague,    Thank you for referring your patient, Leodan Collins, to the [facility] [place] at [facility]. Please see a copy of my visit note below.    Leodan is a 41 year old who is being evaluated via a billable video visit.           UROLOGY VIRTUAL VISIT      Chief Complaint:   Kidney stones      Synopsis    Leodan Collins is a very pleasant 41-year-old male with a history of an ileostomy and recurrent kidney stones.  He is on potassium citrate 20 mg daily as well as chlorthalidone.  He is supposed to be taking potassium citrate 20 mEq 3 times daily but is not taking it that way at the moment.  He previously has had issues with hypokalemia.    He returns now having undergone left ureteroscopy with laser lithotripsy in July.  He is feeling well and has no stone symptoms at the moment.    His CT scan is personally reviewed.  Each kidney has several small nonobstructing stones however there is a 4 mm right proximal ureteral stone with associated mild hydronephrosis.    He denies fevers, gross hematuria, flank pain.  We discussed the stone last week and he has been looking and straining in his urine to see if it is passed but has not seen anything.           Assessment/Plan   41 year old male with recurrent mixed calcium and uric acid stones.  History of ileostomy  -Mr. Collins notes that he has passed several stones in his life many of which larger than the one currently in his right ureter.  He will continue straining his urine to see whether or not the stone passes and will let us know if he does develop any symptoms.  -We will set him up for a follow-up CT scan in the next 2 weeks should his stone not pass  -He will also resume his normal potassium citrate dosing we will follow-up basic metabolic panel in the next 2 weeks as well  -We will also plan to  update a 24-hour urine in about 3 months to ensure that his stone risk is minimized.        Physical Exam:   General Appearance: Well groomed, hygenic  Eyes: No redness, discharge  Respiratory: No cough, no respiratory distress or labored breathing  Musculoskeletal:  grossly normal, full range of motion in upper extremities, no gross deficits  Skin: No discoloration or apparent rashes  Neurologic - No tremors  Psychiatric - Alert and oriented  The rest of a comprehensive physical examination is deferred due to public health emergency video visit restrictions      The following  distinct labs were reviewed    I personally reviewed all applicable laboratory data and went over findings with patient  Significant for:    CBC RESULTS:  Recent Labs   Lab Test 06/09/21  1219 04/13/21  1018 11/18/20  0919 06/26/20  1410   WBC 14.0* 5.8 6.2 6.4   HGB 16.6 16.3 16.3 15.5    334 373 292        BMP RESULTS:  Recent Labs   Lab Test 06/09/21  1219 06/26/20  1410 04/22/20  1458 01/29/20  1845    137 138 140   POTASSIUM 3.6 3.5 3.1* 4.1   CHLORIDE 103 101 103 107   CO2 26 31 29 28   ANIONGAP 11 5 6 5   * 89 89 101*   BUN 17 17 17 23   CR 0.93 0.84 0.76 0.99   GFRESTIMATED >90 >90 >90 >90   GFRESTBLACK >90 >90 >90 >90   LAURA 9.8 9.4 9.3 9.2       UA RESULTS:   Recent Labs   Lab Test 06/23/21  1233 06/09/21  1401 01/29/20  1845   SG 1.020 1.020 1.023   URINEPH 6.0 6.5 5.5   NITRITE Negative Negative Negative   RBCU >182* 48* >182*   WBCU 169* 3 11*       Recent Imaging Report    I personally reviewed all applicable imaging and went over the below findings with patient.    Results for orders placed or performed in visit on 09/14/21   CT Abdomen Pelvis w/o Contrast     Value    Radiologist flags (Urgent)     Right ureteropelvic junction stone with minimal right hydronephrosis.    Narrative    EXAM: CT ABDOMEN PELVIS W/O CONTRAST  LOCATION: [facility]  DATE/TIME: 9/14/2021  12:24 PM    INDICATION: Flank pain, kidney stone suspected.  COMPARISON: None.  TECHNIQUE: CT scan of the abdomen and pelvis was performed without IV contrast. Multiplanar reformats were obtained. Dose reduction techniques were used.  CONTRAST: None.    FINDINGS:   LOWER CHEST: Stable small nodules at the left lung base. One of these measures 0.5 cm series 5 image 39 lateral left lung base. Another tiny stable subpleural nodule is seen measuring 0.2 cm on image 15.    HEPATOBILIARY: Normal.    PANCREAS: Normal.    SPLEEN: Normal.    ADRENAL GLANDS: Normal.    KIDNEYS/BLADDER: Previously noted left proximal ureter stone is not currently seen. No left hydronephrosis. New finding of a right proximal ureter stone at the ureteropelvic junction that is 0.6 x 0.4 cm, series 5 image 154. There is minimal right   hydronephrosis and right pelviectasis. There are multiple bilateral intrarenal stones again noted. The stone burden appears decreased at the lower left kidney. An example largest stone at the lower left kidney 0.6 cm series 5 image 149. There are other   bilateral examples. Approximately 4 left intrarenal stones and 2 intrarenal stones on the right. No bladder stones or bladder specific abnormality.    BOWEL: No obstruction or acute inflammation. Right abdominal ostomy.    LYMPH NODES: Normal.    VASCULATURE: Unremarkable.    PELVIC ORGANS: Normal.    MUSCULOSKELETAL: No aggressive process.      Impression    IMPRESSION:   1.  New finding of a 0.6 cm right ureteropelvic junction proximal ureter stone with minimal right hydronephrosis and right pelviectasis.  2.  Bilateral intrarenal stones again noted with stone burden decreasing at the left lower kidney.  3.  Previously noted left ureteropelvic junction stone is not currently seen.  4.  Stable small pulmonary nodules at the left lung base. See below for follow-up imaging guidelines.    Recommendations for one or multiple incidental lung nodules < 6mm:  Low-Risk  Patient: No routine follow-up.  High-Risk Patient: Optional follow-up CT at 12 months; if unchanged, no further follow-up.    *Low Risk: Minimal or absent history of smoking or other known risk factors.  *Nonsolid (ground-glass) or partly solid nodules may require longer follow-up to exclude indolent adenocarcinoma.  *Recommendations based on Guidelines for the Management of Incidental Pulmonary Nodules Detected at CT: From the Fleischner Society 2017, Radiology 2017.      [Access Center: Right ureteropelvic junction stone with minimal right hydronephrosis.]    This report will be copied to the [facility] to ensure a provider acknowledges the finding. [facility] is available Monday through Friday 8am-3:30 pm.                 Past Medical History:     Past Medical History:   Diagnosis Date     Asthma     child     Crohn's disease (H)      History of blood transfusion      Kidney stones             Past Surgical History:     Past Surgical History:   Procedure Laterality Date     COLOSTOMY  2007     COMBINED CYSTOSCOPY, RETROGRADES, URETEROSCOPY, INSERT STENT Right 5/3/2016    Procedure: COMBINED CYSTOSCOPY, RETROGRADES, URETEROSCOPY, INSERT STENT;  Surgeon: Azael Melvin MD;  Location: [facility] OR     COMBINED CYSTOSCOPY, RETROGRADES, URETEROSCOPY, LASER HOLMIUM LITHOTRIPSY URETER(S), INSERT STENT Right 1/31/2020    Procedure: CYSTOURETEROSCOPY, WITH RETROGRADE PYELOGRAM, HOLMIUM LASER LITHOTRIPSY OF URETERAL CALCULUS, AND STENT placement;  Surgeon: Bob Felix MD;  Location: [facility] OR     COMBINED CYSTOSCOPY, RETROGRADES, URETEROSCOPY, LASER HOLMIUM LITHOTRIPSY URETER(S), INSERT STENT Left 7/1/2021    Procedure: CYSTOURETEROSCOPY, WITH RETROGRADE PYELOGRAM, HOLMIUM LASER LITHOTRIPSY, STENT INSERTION;  Surgeon: Bob Felix MD;  Location: [facility] OR     CYSTOSCOPY, RETROGRADES, INSERT STENT URETER(S), COMBINED Left 6/9/2021    Procedure: CYSTOSCOPY, WITH Left  RETROGRADE PYELOGRAM AND Left  URETERAL STENT INSERTION;  Surgeon: Trenton Lozano MD;  Location: [facility] OR     EXTRACORPOREAL SHOCK WAVE LITHOTRIPSY (ESWL)  6/27/2013    Procedure: EXTRACORPOREAL SHOCK WAVE LITHOTRIPSY (ESWL);  Left Extracorporeal Shock Wave Lithotripsy Kidney And Ureter;  Surgeon: Azael Melvin MD;  Location: [facility] OR     ILEOSTOMY  2009     LAPAROTOMY EXPLORATORY  2009    extensive lysis of adhesions, revision of coloenteric fistula, repair of small bowel fistula, takedown of current colostomy, debridement of chronic abscess, resection of residual left colon, right hemicolectomy, Louise ileostomy     LASER HOLMIUM LITHOTRIPSY URETER(S), INSERT STENT, COMBINED  4/9/2014    Procedure: COMBINED CYSTOSCOPY, URETEROSCOPY, LASER HOLMIUM LITHOTRIPSY URETER(S), INSERT STENT;  Ureteroscopy, Cystoscopy, holmium laser,Right Uretral stent placement;  Surgeon: Azael Melvin MD;  Location: [facility] OR     LASER HOLMIUM LITHOTRIPSY URETER(S), INSERT STENT, COMBINED Bilateral 5/17/2016    Procedure: COMBINED CYSTOSCOPY, URETEROSCOPY, LASER HOLMIUM LITHOTRIPSY URETER(S), INSERT STENT;  Surgeon: Azael Melvin MD;  Location: [facility] OR            Medications     Current Outpatient Medications   Medication     Adalimumab (HUMIRA PEN SC)     chlorthalidone (HYGROTON) 25 MG tablet     HYDROcodone-acetaminophen (NORCO) 5-325 MG tablet     ibuprofen (ADVIL/MOTRIN) 600 MG tablet     mercaptopurine (PURINETHOL) 50 MG tablet     nitroFURantoin macrocrystal-monohydrate (MACROBID) 100 MG capsule     ondansetron (ZOFRAN-ODT) 4 MG ODT tab     OXYCODONE HCL PO     potassium citrate (UROCIT-K) 10 MEQ (1080 MG) CR tablet     potassium citrate (UROCIT-K) 10 MEQ (1080 MG) CR tablet     potassium citrate-citric acid (CYTRA K) 3830-1644 MG Packet     potassium citrate-citric acid (POLYCITRA-K/CYTRA K) 5376-2821 MG Packet     tamsulosin (FLOMAX) 0.4 MG capsule     tamsulosin (FLOMAX) 0.4 MG capsule     tamsulosin (FLOMAX) 0.4 MG capsule     VITAMIN D PO      No current facility-administered medications for this visit.            Family History:     Family History   Problem Relation Age of Onset     Cerebrovascular Disease Maternal Grandmother      Connective Tissue Disorder Sister      Nephrolithiasis Father             Social History:     Social History     Socioeconomic History     Marital status:      Spouse name: Not on file     Number of children: Not on file     Years of education: Not on file     Highest education level: Not on file   Occupational History     Not on file   Tobacco Use     Smoking status: Never Smoker     Smokeless tobacco: Never Used   Substance and Sexual Activity     Alcohol use: Yes     Alcohol/week: 0.0 standard drinks     Comment: x1 a week     Drug use: No     Sexual activity: Yes     Partners: Female     Birth control/protection: Pull-out method, Condom, I.U.D.   Other Topics Concern     Parent/sibling w/ CABG, MI or angioplasty before 65F 55M? No   Social History Narrative     Not on file     Social Determinants of Health     Financial Resource Strain:      Difficulty of Paying Living Expenses:    Food Insecurity:      Worried About Running Out of Food in the Last Year:      Ran Out of Food in the Last Year:    Transportation Needs:      Lack of Transportation (Medical):      Lack of Transportation (Non-Medical):    Physical Activity:      Days of Exercise per Week:      Minutes of Exercise per Session:    Stress:      Feeling of Stress :    Social Connections:      Frequency of Communication with Friends and Family:      Frequency of Social Gatherings with Friends and Family:      Attends [employer] Services:      Active Member of Clubs or Organizations:      Attends Club or Organization Meetings:      Marital Status:    Intimate Partner Violence:      Fear of Current or Ex-Partner:      Emotionally Abused:      Physically Abused:      Sexually Abused:             Allergies:   Patient has no known allergies.         Review of  Systems:  From intake questionnaire   Negative 14 system review except as noted on HPI, nurse's note.      CC:  Blane Worrell           How would you like to obtain your AVS? MyChart  If the video visit is dropped, the invitation should be resent by: Send to e-mail at: [email or web address]  Will anyone else be joining your video visit? No      Video Start Time: 8:12  Video-Visit Details    Type of service:  Video Visit    Video End Time:8:17    Originating Location (pt. Location): Home    Distant Location (provider location):  [facility] [place]     Platform used for Video Visit: [employer]

## 2021-09-21 NOTE — NURSING NOTE
Chief Complaint   Patient presents with     Kidney Stone(s) Followup     not currently experiencing symptoms related to kidney stones     - Brittany ROLDAN, EMT  Urology Clinic

## 2021-10-01 ENCOUNTER — TELEPHONE (OUTPATIENT)
Dept: UROLOGY | Facility: CLINIC | Age: 41
End: 2021-10-01

## 2021-10-01 DIAGNOSIS — N20.0 KIDNEY STONE: ICD-10-CM

## 2021-10-01 RX ORDER — POTASSIUM CITRATE 10 MEQ/1
20 TABLET, EXTENDED RELEASE ORAL 2 TIMES DAILY
Qty: 120 TABLET | Refills: 11 | Status: SHIPPED | OUTPATIENT
Start: 2021-10-01 | End: 2023-03-18

## 2021-10-01 NOTE — TELEPHONE ENCOUNTER
[facility] Call Center    Phone Message    May a detailed message be left on voicemail: yes     Reason for Call: Medication Question or concern regarding medication   Prescription Clarification  Name of Medication: potassium citrate (UROCIT-K) 10 MEQ (1080 MG) CR tablet  Prescribing Provider: ISSAC Felix   Pharmacy: [facility] at [address] [place] [address] in [place]   What on the order needs clarification? Pt states pharmacy has tried to contact us multiple times for renewal but has been unable to refill Rx. Please review and f/u as you see fit.           Action Taken: Message routed to:  [facility] uro    Travel Screening: Not Applicable

## 2021-10-05 ENCOUNTER — LAB (OUTPATIENT)
Dept: LAB | Facility: CLINIC | Age: 41
End: 2021-10-05

## 2021-10-05 ENCOUNTER — ANCILLARY PROCEDURE (OUTPATIENT)
Dept: CT IMAGING | Facility: CLINIC | Age: 41
End: 2021-10-05
Attending: UROLOGY
Payer: COMMERCIAL

## 2021-10-05 DIAGNOSIS — N20.0 KIDNEY STONE: ICD-10-CM

## 2021-10-05 LAB
ANION GAP SERPL CALCULATED.3IONS-SCNC: 7 MMOL/L (ref 3–14)
BUN SERPL-MCNC: 14 MG/DL (ref 7–30)
CALCIUM SERPL-MCNC: 10.2 MG/DL (ref 8.5–10.1)
CHLORIDE BLD-SCNC: 98 MMOL/L (ref 94–109)
CO2 SERPL-SCNC: 30 MMOL/L (ref 20–32)
CREAT SERPL-MCNC: 0.92 MG/DL (ref 0.66–1.25)
GFR SERPL CREATININE-BSD FRML MDRD: >90 ML/MIN/1.73M2
GLUCOSE BLD-MCNC: 110 MG/DL (ref 70–99)
POTASSIUM BLD-SCNC: 3.6 MMOL/L (ref 3.4–5.3)
SODIUM SERPL-SCNC: 135 MMOL/L (ref 133–144)

## 2021-10-05 PROCEDURE — 80048 BASIC METABOLIC PNL TOTAL CA: CPT | Performed by: PATHOLOGY

## 2021-10-05 PROCEDURE — 36415 COLL VENOUS BLD VENIPUNCTURE: CPT | Performed by: PATHOLOGY

## 2021-10-05 PROCEDURE — 74176 CT ABD & PELVIS W/O CONTRAST: CPT | Mod: GC | Performed by: RADIOLOGY

## 2021-10-08 ENCOUNTER — VIRTUAL VISIT (OUTPATIENT)
Dept: PSYCHOLOGY | Facility: CLINIC | Age: 41
End: 2021-10-08
Payer: COMMERCIAL

## 2021-10-08 DIAGNOSIS — Z56.9 OCCUPATIONAL PROBLEM: ICD-10-CM

## 2021-10-08 DIAGNOSIS — F32.0 CURRENT MILD EPISODE OF MAJOR DEPRESSIVE DISORDER, UNSPECIFIED WHETHER RECURRENT (H): ICD-10-CM

## 2021-10-08 DIAGNOSIS — F41.9 ANXIETY: Primary | ICD-10-CM

## 2021-10-08 PROCEDURE — 90837 PSYTX W PT 60 MINUTES: CPT | Mod: GT | Performed by: PSYCHOLOGIST

## 2021-10-08 SDOH — ECONOMIC STABILITY - INCOME SECURITY: UNSPECIFIED PROBLEMS RELATED TO EMPLOYMENT: Z56.9

## 2021-10-08 NOTE — PROGRESS NOTES
Health Psychology                     Department of Medicine  Naomi Deutsch, Ph.D., L.P. [phone]                          [facility] Jocelin Christensen, Ph.D., L.P. [phone]                   [place] Mail [address]   Alex Gil, Ph.D. [phone]        [address] [place] [address] Sandra Sanford, Ph.D., L.P. [phone]    [place] [address]           Mahamed Ozuna, Ph.D., A.B.P.P., L.P. [phone]        Helen Jolly, Ph.D., L.P. [phone]  [address]     Health Psychology Follow-Up Note    Referral Source: Blane Worrell MD    Reason for Referral: Mr. Collins is a 41-year-old  white gentleman with a lengthy  history of Crohn's, who is referred due to anxiety, depression, and challenges coping with both chronic illness and the Covid pandemic.    Intake:  7/19/21    History of Presenting Concerns (at intake): Mr. Collins was diagnosed with Crohn's in 1993 t age 13, reporting that it started to get bad 1n 1999, 4 weeks into his freshman year of college.  He developed a perforated colon at that time, had a colostomy which was later reversed, and then in 2007 had another  colostomy and ileostomy 2009.  Since then, he states his Crohn's has essentially been in remission.  He continues to have kidney stones approximately once per year.  He is fearful about symptoms returning and the Crohn's worsening..  He feels that he diaz relatively well with things the way they currently are in terms of his health and recognizes that it is important to avoid highly stressful situations.    Mr. Collins also reports that he tends to be anxious.  He reports a fair amount of baseline anxiety and that in stressful times he tends to get irritated, both with himself and with others.  This leaves him to feel that he is not quite doing right by his family when he is more irritable.  He tends to worry about a variety of things such as refinancing their home, having  sufficient financial stability to doing things that he like to do.  The house they live in has 1 bathroom and he believes it would be helpful to add a second for the four people in his home as he anticipates as his daughters age, it will be increasingly important to have adequate facilities    Medical History: Mr. Collins does not use tobacco products.  Alcohol consumption is reported to be approximately 2 beers per week.  He drinks an energy drink every day or two.  He denies use of street drugs.  Exercise is described as erratic.  He recently had a procedure for a kidney stone.  He is aware that stress can be a factor in exacerbating Crohn's symptoms, and therefore he has been somewhat reluctant to take on more stressful work opportunities.  In 2005, he had a  more stressful job and he thinks it led to a flare of the Crohn's.    Past Medical History:   Diagnosis Date     Asthma     child     Crohn's disease (H)      History of blood transfusion      Kidney stones      Past Surgical History:   Procedure Laterality Date     COLOSTOMY  2007     COMBINED CYSTOSCOPY, RETROGRADES, URETEROSCOPY, INSERT STENT Right 5/3/2016    Procedure: COMBINED CYSTOSCOPY, RETROGRADES, URETEROSCOPY, INSERT STENT;  Surgeon: Azael Melvin MD;  Location: [facility] OR     COMBINED CYSTOSCOPY, RETROGRADES, URETEROSCOPY, LASER HOLMIUM LITHOTRIPSY URETER(S), INSERT STENT Right 1/31/2020    Procedure: CYSTOURETEROSCOPY, WITH RETROGRADE PYELOGRAM, HOLMIUM LASER LITHOTRIPSY OF URETERAL CALCULUS, AND STENT placement;  Surgeon: Bob Felix MD;  Location: [facility]     COMBINED CYSTOSCOPY, RETROGRADES, URETEROSCOPY, LASER HOLMIUM LITHOTRIPSY URETER(S), INSERT STENT Left 7/1/2021    Procedure: CYSTOURETEROSCOPY, WITH RETROGRADE PYELOGRAM, HOLMIUM LASER LITHOTRIPSY, STENT INSERTION;  Surgeon: Bob Felix MD;  Location: [facility] OR     CYSTOSCOPY, RETROGRADES, INSERT STENT URETER(S), COMBINED Left 6/9/2021    Procedure: CYSTOSCOPY, WITH Left   RETROGRADE PYELOGRAM AND Left URETERAL STENT INSERTION;  Surgeon: Trenton Lozano MD;  Location: [facility] OR     EXTRACORPOREAL SHOCK WAVE LITHOTRIPSY (ESWL)  6/27/2013    Procedure: EXTRACORPOREAL SHOCK WAVE LITHOTRIPSY (ESWL);  Left Extracorporeal Shock Wave Lithotripsy Kidney And Ureter;  Surgeon: Azael Melvin MD;  Location: [facility] OR     ILEOSTOMY  2009     LAPAROTOMY EXPLORATORY  2009    extensive lysis of adhesions, revision of coloenteric fistula, repair of small bowel fistula, takedown of current colostomy, debridement of chronic abscess, resection of residual left colon, right hemicolectomy, Louise ileostomy     LASER HOLMIUM LITHOTRIPSY URETER(S), INSERT STENT, COMBINED  4/9/2014    Procedure: COMBINED CYSTOSCOPY, URETEROSCOPY, LASER HOLMIUM LITHOTRIPSY URETER(S), INSERT STENT;  Ureteroscopy, Cystoscopy, holmium laser,Right Uretral stent placement;  Surgeon: Azael Melvin MD;  Location: [facility] OR     LASER HOLMIUM LITHOTRIPSY URETER(S), INSERT STENT, COMBINED Bilateral 5/17/2016    Procedure: COMBINED CYSTOSCOPY, URETEROSCOPY, LASER HOLMIUM LITHOTRIPSY URETER(S), INSERT STENT;  Surgeon: Azael Melvin MD;  Location: [facility] OR     Current Outpatient Medications   Medication     Adalimumab (HUMIRA PEN SC)     chlorthalidone (HYGROTON) 25 MG tablet     HYDROcodone-acetaminophen (NORCO) 5-325 MG tablet     ibuprofen (ADVIL/MOTRIN) 600 MG tablet     mercaptopurine (PURINETHOL) 50 MG tablet     nitroFURantoin macrocrystal-monohydrate (MACROBID) 100 MG capsule     ondansetron (ZOFRAN-ODT) 4 MG ODT tab     OXYCODONE HCL PO     potassium citrate (UROCIT-K) 10 MEQ (1080 MG) CR tablet     potassium citrate (UROCIT-K) 10 MEQ (1080 MG) CR tablet     potassium citrate-citric acid (CYTRA K) 5675-0196 MG Packet     potassium citrate-citric acid (POLYCITRA-K/CYTRA K) 7592-9004 MG Packet     tamsulosin (FLOMAX) 0.4 MG capsule     tamsulosin (FLOMAX) 0.4 MG capsule     tamsulosin (FLOMAX) 0.4 MG capsule     VITAMIN  "D PO     No current facility-administered medications for this visit.     Wt Readings from Last 4 Encounters:   07/01/21 68.5 kg (151 lb)   06/25/21 69.4 kg (153 lb)   06/09/21 68.5 kg (151 lb)   03/10/20 65.8 kg (145 lb)     Estimated body mass index is 23.65 kg/m  as calculated from the following:    Height as of 7/1/21: 1.702 m (5' 7\").    Weight as of 7/1/21: 68.5 kg (151 lb).    Social History: Mr. Collins grew up in [facility] [place] [facility].  He has a sister 5 years older than he is.  His father is 76 and  his mother is 75.  He reports they are both in good health.  They help to take care of his 3 and 6-year-old daughters at times.  He and his wife both found the past year to be quite stressful between the children being home for long stretches of time, his wife having been on furlough,  as well as both working from home in a relaitively small house. His wife, Laura meraz, manages global education  programs for [employer] students.    He attended [employer] where he got a degree in American studies.  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  He has a book proposal for a third book currently being discussed with a publisher.  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  He is working approximately 30 to 35 hours a week.  He acknowledges that he does not feel fully stimulated, but is ambivalent about switching jobs because he considers it to be very supportive atmosphere.  His wife works with the travel abroad program at [employer].  He reports that they both tend to be anxious which sometimes is synergistic.  They have done fewer things in the past year during the pandemic that are recreational or bring sanjuana as there was a lot to manage in terms of childcare.    Psychiatric History: Mr. Collins reports that he saw a therapist briefly as a child to deal with anxiety.  About a decade ago he also saw a therapist on a single " occasion but did not feel it was a good fit.  There is no personal history of psychiatric hospitalizations or chemical dependency treatment.  He is not sure whether he ever tried psychoactive medication.  He believes that he discussed it in the past but never got around to trying.  The family history is remarkable for hospitalization and chemical issues in the maternal grandmother.    Psychological Screening: Mr. Collins completed the following four screening measures.    CAGE-AID Score: > 1 is a positive screen, suggesting further discussion is needed to determine if evaluation for alcohol or substance abuse is appropriate.  A score > 2 is considered clinically significant, suggesting further evaluation of alcohol or substance-related problems is indicated.  CAGE-AID Total Score 7/18/2021   Total Score 0   Total Score MyChart 0 (A total score of 2 or greater is considered clinically significant)     PIOTR-7 Score:  The General Anxiety Disorder-7 is an an anxiety screening instrument. Scores of 5, 10, and 15 respectively indicate mild, moderate, and severe anxiety symptoms.  PIOTR-7 SCORE 11/11/2019 7/18/2021   Total Score - 16 (severe anxiety)   Total Score 10 16     PHQ-9 Score:  The Patient Health Questionnaire-9 is a depression screening instrument. Scores of 5, 10, 15, and 20 respectively indicate mild, moderate, moderately severe and severe depression symptoms.   PHQ-9 SCORE 11/11/2019 7/18/2021   PHQ-9 Total Score MyChart - 9 (Mild depression)   PHQ-9 Total Score 5 9     WHODAS-12 Score:  WHODAS 2.0 Total Score 7/18/2021   Total Score 16   Total Score MyChart 16     Session: Mr. Collins arrived punctually for today's meeting.  He is alert and oriented.  Affect is variable from with serious to occasional smiling.  Thoughts are linear.      We discussed his concerns re: COVID. His 6 year old began in-person person school and his 3 year old goes to a small  center. It has been very liberating for him to have time  alone to think, to be producive for things he would like to work on.   He feels less overwhelmed as a result, though remains worried about his children's risk for COVId.  They have in-home tests they plan to give to the daughter q 2 weeks.    We discussed how much better he feels.  We discussed time management, the Eisenhower Matrix, and other ways of setting priorities, dong things. We discussed exercise, networking, reading and other things to do   We discussed his being overwhelmde by Twitter and how he ight relate to it differently .     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  \    He reports still being anxious and depressed, but less so for the past few days since his kids are out of the home.     Rapport is positive.  He participated fully and appeared to benefit from the conversation.   .    Diagnosis:  Anxiety  Major depression, mild  Occupational problem    This telehealth service is appropriate and effective for delivering services in light of the necessity for social distancing to mitigate the COVID-19 epidemic and for conservation of PPE.     Patient has agreed to receiving telehealth services after being informed about it: Yes    Patient prefers video invitation/information to be sent by:   email    Time service started: 11:07  Time service ended:  12:00  Mode of transmission: [employer]    Location of originating:  Home of the patient    Distance site:  Home office of provider for [facility]    The patient has been notified that:  Video visits will be conducted via a call with their psychologist to provide the care they need with a video conversation. Video visits may be billed at different rates depending on insurance coverage.  Patients are advised to please contact their insurance provider with any questions about their health insurance coverage. If during the course of a call the psychologist feels a  video visit is not appropriate, patients will not be charged for this service.    Plan: Mr. Collins will return for video visit 11/5 @ 12  to continue problem-solving and supportive psychotherapy.  A treatment plan will be completed at that time.  It has been sent x4.  I have advised him to increase his exercise as well as recreation.  If the anxiety depression do not decrease in response to counseling, medication may be effective.       Mahamed Ozuna, Ph.D., A.B.P.P., L.P.  Director, Health Psychology

## 2021-10-14 ENCOUNTER — LAB (OUTPATIENT)
Dept: LAB | Facility: CLINIC | Age: 41
End: 2021-10-14
Payer: COMMERCIAL

## 2021-10-14 DIAGNOSIS — K50.80 CROHN'S DISEASE, SMALL AND LARGE INTESTINE (H): ICD-10-CM

## 2021-10-14 LAB
ALBUMIN SERPL-MCNC: 4.6 G/DL (ref 3.4–5)
ALP SERPL-CCNC: 53 U/L (ref 40–150)
ALT SERPL W P-5'-P-CCNC: 54 U/L (ref 0–70)
AST SERPL W P-5'-P-CCNC: 25 U/L (ref 0–45)
BASOPHILS # BLD AUTO: 0.1 10E3/UL (ref 0–0.2)
BASOPHILS NFR BLD AUTO: 1 %
BILIRUB DIRECT SERPL-MCNC: 0.4 MG/DL (ref 0–0.2)
BILIRUB SERPL-MCNC: 2.5 MG/DL (ref 0.2–1.3)
EOSINOPHIL # BLD AUTO: 0.2 10E3/UL (ref 0–0.7)
EOSINOPHIL NFR BLD AUTO: 2 %
ERYTHROCYTE [DISTWIDTH] IN BLOOD BY AUTOMATED COUNT: 12.4 % (ref 10–15)
HCT VFR BLD AUTO: 48.1 % (ref 40–53)
HGB BLD-MCNC: 16.5 G/DL (ref 13.3–17.7)
IMM GRANULOCYTES # BLD: 0 10E3/UL
IMM GRANULOCYTES NFR BLD: 0 %
LYMPHOCYTES # BLD AUTO: 2.5 10E3/UL (ref 0.8–5.3)
LYMPHOCYTES NFR BLD AUTO: 27 %
MCH RBC QN AUTO: 30.7 PG (ref 26.5–33)
MCHC RBC AUTO-ENTMCNC: 34.3 G/DL (ref 31.5–36.5)
MCV RBC AUTO: 89 FL (ref 78–100)
MONOCYTES # BLD AUTO: 0.6 10E3/UL (ref 0–1.3)
MONOCYTES NFR BLD AUTO: 6 %
NEUTROPHILS # BLD AUTO: 6.1 10E3/UL (ref 1.6–8.3)
NEUTROPHILS NFR BLD AUTO: 64 %
NRBC # BLD AUTO: 0 10E3/UL
NRBC BLD AUTO-RTO: 0 /100
PLATELET # BLD AUTO: 434 10E3/UL (ref 150–450)
PROT SERPL-MCNC: 8.9 G/DL (ref 6.8–8.8)
RBC # BLD AUTO: 5.38 10E6/UL (ref 4.4–5.9)
WBC # BLD AUTO: 9.4 10E3/UL (ref 4–11)

## 2021-10-14 PROCEDURE — 80076 HEPATIC FUNCTION PANEL: CPT

## 2021-10-14 PROCEDURE — 36415 COLL VENOUS BLD VENIPUNCTURE: CPT

## 2021-10-14 PROCEDURE — 85025 COMPLETE CBC W/AUTO DIFF WBC: CPT

## 2021-10-23 ENCOUNTER — HEALTH MAINTENANCE LETTER (OUTPATIENT)
Age: 41
End: 2021-10-23

## 2021-11-05 ENCOUNTER — VIRTUAL VISIT (OUTPATIENT)
Dept: PSYCHOLOGY | Facility: CLINIC | Age: 41
End: 2021-11-05
Payer: COMMERCIAL

## 2021-11-05 DIAGNOSIS — Z56.9 OCCUPATIONAL PROBLEM: ICD-10-CM

## 2021-11-05 DIAGNOSIS — F32.0 CURRENT MILD EPISODE OF MAJOR DEPRESSIVE DISORDER, UNSPECIFIED WHETHER RECURRENT (H): ICD-10-CM

## 2021-11-05 DIAGNOSIS — F41.9 ANXIETY: Primary | ICD-10-CM

## 2021-11-05 PROCEDURE — 90837 PSYTX W PT 60 MINUTES: CPT | Mod: GT | Performed by: PSYCHOLOGIST

## 2021-11-05 SDOH — ECONOMIC STABILITY - INCOME SECURITY: UNSPECIFIED PROBLEMS RELATED TO EMPLOYMENT: Z56.9

## 2021-11-05 NOTE — PROGRESS NOTES
Health Psychology                     Department of Medicine  Naomi Deutsch, Ph.D., L.P. [phone]                          [facility] Jocelin Christensen, Ph.D., L.P. [phone]                   [place] Mail Code 572   Alex Gil, Ph.D. [phone]        [address] [place] [address] Sandra Sanford, Ph.D., L.P. [phone]    [place] [address]           Mahamed Ozuna, Ph.D., A.B.P.P., L.P. [phone]        Helen Jolly, Ph.D., L.P. [phone]  [address]     Health Psychology Follow-Up Note    Referral Source: Blane Worrell MD    Reason for Referral: Mr. Collins is a 41-year-old  white gentleman with a lengthy  history of Crohn's, who is referred due to anxiety, depression, and challenges coping with both chronic illness and the Covid pandemic.    Intake:  7/19/21    History of Presenting Concerns (at intake): Mr. Collins was diagnosed with Crohn's in 1993 t age 13, reporting that it started to get bad 1n 1999, 4 weeks into his freshman year of college.  He developed a perforated colon at that time, had a colostomy which was later reversed, and then in 2007 had another  colostomy and ileostomy 2009.  Since then, he states his Crohn's has essentially been in remission.  He continues to have kidney stones approximately once per year.  He is fearful about symptoms returning and the Crohn's worsening..  He feels that he diaz relatively well with things the way they currently are in terms of his health and recognizes that it is important to avoid highly stressful situations.    Mr. Collins also reports that he tends to be anxious.  He reports a fair amount of baseline anxiety and that in stressful times he tends to get irritated, both with himself and with others.  This leaves him to feel that he is not quite doing right by his family when he is more irritable.  He tends to worry about a variety of things such as refinancing their home, having  sufficient financial stability to doing things that he like to do.  The house they live in has 1 bathroom and he believes it would be helpful to add a second for the four people in his home as he anticipates as his daughters age, it will be increasingly important to have adequate facilities    Medical History: Mr. Collins does not use tobacco products.  Alcohol consumption is reported to be approximately 2 beers per week.  He drinks an energy drink every day or two.  He denies use of street drugs.  Exercise is described as erratic.  He recently had a procedure for a kidney stone.  He is aware that stress can be a factor in exacerbating Crohn's symptoms, and therefore he has been somewhat reluctant to take on more stressful work opportunities.  In 2005, he had a  more stressful job and he thinks it led to a flare of the Crohn's.    Past Medical History:   Diagnosis Date     Asthma     child     Crohn's disease (H)      History of blood transfusion      Kidney stones      Past Surgical History:   Procedure Laterality Date     COLOSTOMY  2007     COMBINED CYSTOSCOPY, RETROGRADES, URETEROSCOPY, INSERT STENT Right 5/3/2016    Procedure: COMBINED CYSTOSCOPY, RETROGRADES, URETEROSCOPY, INSERT STENT;  Surgeon: Azael Melvin MD;  Location: [facility] OR     COMBINED CYSTOSCOPY, RETROGRADES, URETEROSCOPY, LASER HOLMIUM LITHOTRIPSY URETER(S), INSERT STENT Right 1/31/2020    Procedure: CYSTOURETEROSCOPY, WITH RETROGRADE PYELOGRAM, HOLMIUM LASER LITHOTRIPSY OF URETERAL CALCULUS, AND STENT placement;  Surgeon: Bob Felix MD;  Location: [facility]     COMBINED CYSTOSCOPY, RETROGRADES, URETEROSCOPY, LASER HOLMIUM LITHOTRIPSY URETER(S), INSERT STENT Left 7/1/2021    Procedure: CYSTOURETEROSCOPY, WITH RETROGRADE PYELOGRAM, HOLMIUM LASER LITHOTRIPSY, STENT INSERTION;  Surgeon: Bob Felix MD;  Location: [facility] OR     CYSTOSCOPY, RETROGRADES, INSERT STENT URETER(S), COMBINED Left 6/9/2021    Procedure: CYSTOSCOPY, WITH Left   RETROGRADE PYELOGRAM AND Left URETERAL STENT INSERTION;  Surgeon: Trenton Lozano MD;  Location: [facility] OR     EXTRACORPOREAL SHOCK WAVE LITHOTRIPSY (ESWL)  6/27/2013    Procedure: EXTRACORPOREAL SHOCK WAVE LITHOTRIPSY (ESWL);  Left Extracorporeal Shock Wave Lithotripsy Kidney And Ureter;  Surgeon: Azael Melvin MD;  Location: [facility] OR     ILEOSTOMY  2009     LAPAROTOMY EXPLORATORY  2009    extensive lysis of adhesions, revision of coloenteric fistula, repair of small bowel fistula, takedown of current colostomy, debridement of chronic abscess, resection of residual left colon, right hemicolectomy, Louise ileostomy     LASER HOLMIUM LITHOTRIPSY URETER(S), INSERT STENT, COMBINED  4/9/2014    Procedure: COMBINED CYSTOSCOPY, URETEROSCOPY, LASER HOLMIUM LITHOTRIPSY URETER(S), INSERT STENT;  Ureteroscopy, Cystoscopy, holmium laser,Right Uretral stent placement;  Surgeon: Azael Melvin MD;  Location: [facility] OR     LASER HOLMIUM LITHOTRIPSY URETER(S), INSERT STENT, COMBINED Bilateral 5/17/2016    Procedure: COMBINED CYSTOSCOPY, URETEROSCOPY, LASER HOLMIUM LITHOTRIPSY URETER(S), INSERT STENT;  Surgeon: Azael Melvin MD;  Location: [facility] OR     Current Outpatient Medications   Medication     Adalimumab (HUMIRA PEN SC)     chlorthalidone (HYGROTON) 25 MG tablet     HYDROcodone-acetaminophen (NORCO) 5-325 MG tablet     ibuprofen (ADVIL/MOTRIN) 600 MG tablet     mercaptopurine (PURINETHOL) 50 MG tablet     nitroFURantoin macrocrystal-monohydrate (MACROBID) 100 MG capsule     ondansetron (ZOFRAN-ODT) 4 MG ODT tab     OXYCODONE HCL PO     potassium citrate (UROCIT-K) 10 MEQ (1080 MG) CR tablet     potassium citrate (UROCIT-K) 10 MEQ (1080 MG) CR tablet     potassium citrate-citric acid (CYTRA K) 6025-1982 MG Packet     potassium citrate-citric acid (POLYCITRA-K/CYTRA K) 2133-2285 MG Packet     tamsulosin (FLOMAX) 0.4 MG capsule     tamsulosin (FLOMAX) 0.4 MG capsule     tamsulosin (FLOMAX) 0.4 MG capsule     VITAMIN  "D PO     No current facility-administered medications for this visit.     Wt Readings from Last 4 Encounters:   07/01/21 68.5 kg (151 lb)   06/25/21 69.4 kg (153 lb)   06/09/21 68.5 kg (151 lb)   03/10/20 65.8 kg (145 lb)     Estimated body mass index is 23.65 kg/m  as calculated from the following:    Height as of 7/1/21: 1.702 m (5' 7\").    Weight as of 7/1/21: 68.5 kg (151 lb).    Social History (at intake): Mr. Collins grew up in [facility] [place] [facility].  He has a sister 5 years older than he is.  His father was 76 and  his mother was 75.  He reports they are both in good health but are less capable than they were.  They help to take care of his 3 and 6-year-old daughters at times. He is concerned about his parnts' driving.  His father is an   His mother worked for [employer] retired in 2004.    He and his wife both found the past year to be quite stressful between the children being home for long stretches of time, his wife having been on furlough, as well as both working from home in a relaitively small house. His wife, Laura Collins, manages global education  programs for [employer] students.    He attended [employer] where he got a degree in American studies.  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  He has a book proposal for a third book currently being discussed with a publisher.  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  He is working approximately 30 to 35 hours a week.  He acknowledges that he does not feel fully stimulated, but is ambivalent about switching jobs because he considers it to be very supportive atmosphere.  His wife works with the travel abroad program at [employer].  He reports that they both tend to be anxious which sometimes is synergistic.  They have done fewer things in the past year during the pandemic that are recreational or bring sanjuana as there was a lot to manage in terms of " childcare.    Psychiatric History: Mr. Collins reports that he saw a therapist briefly as a child to deal with anxiety.  About a decade ago he also saw a therapist on a single occasion but did not feel it was a good fit.  There is no personal history of psychiatric hospitalizations or chemical dependency treatment.  He is not sure whether he ever tried psychoactive medication.  He believes that he discussed it in the past but never got around to trying.  The family history is remarkable for hospitalization and chemical issues in the maternal grandmother.    Psychological Screening: Mr. Collins completed the following four screening measures.    CAGE-AID Score: > 1 is a positive screen, suggesting further discussion is needed to determine if evaluation for alcohol or substance abuse is appropriate.  A score > 2 is considered clinically significant, suggesting further evaluation of alcohol or substance-related problems is indicated.  CAGE-AID Total Score 7/18/2021   Total Score 0   Total Score MyChart 0 (A total score of 2 or greater is considered clinically significant)     PIOTR-7 Score:  The General Anxiety Disorder-7 is an an anxiety screening instrument. Scores of 5, 10, and 15 respectively indicate mild, moderate, and severe anxiety symptoms.  PIOTR-7 SCORE 11/11/2019 7/18/2021   Total Score - 16 (severe anxiety)   Total Score 10 16     PHQ-9 Score:  The Patient Health Questionnaire-9 is a depression screening instrument. Scores of 5, 10, 15, and 20 respectively indicate mild, moderate, moderately severe and severe depression symptoms.   PHQ-9 SCORE 11/11/2019 7/18/2021   PHQ-9 Total Score MyChart - 9 (Mild depression)   PHQ-9 Total Score 5 9     WHODAS-12 Score:  WHODAS 2.0 Total Score 7/18/2021   Total Score 16   Total Score MyChart 16     Session: Mr. Collins arrived punctually for today's meeting.  He is alert and oriented.  Affect is variable from with serious to occasional smiling.  Thoughts are linear.      We  discussed his concerns r: his parents for the first half of the session.  He was on a trip to [place] for a family wedding, observed his parents slowing down andhad concerns about his father's driving.   We discussed his dawning awareness of being in the sandwich generation and wondering how to address some of his concerns with his parents.    We discussed how much better he feels.  He thinks he is functioning better, less anxious overall.     He reports still being somewhatanxious and depressed, but less so.  The trip was restorative, ormalizing, and he had some alone time, enjoyed walking n the beach.   It was his first trip since COVID.    Rapport is positive.  He participated fully and appeared to benefit from the conversation.      A treatment plan was completed.      Diagnosis:  Anxiety  Major depression, mild  Occupational problem    This telehealth service is appropriate and effective for delivering services in light of the necessity for social distancing to mitigate the COVID-19 epidemic and for conservation of PPE.     Patient has agreed to receiving telehealth services after being informed about it: Yes    Patient prefers video invitation/information to be sent by:   email    Time service started: 12:00  Time service ended:  12:56  Mode of transmission: [employer]    Location of originating:  Home of the patient    Distance site:  Home office of provider for [facility]    The patient has been notified that:  Video visits will be conducted via a call with their psychologist to provide the care they need with a video conversation. Video visits may be billed at different rates depending on insurance coverage.  Patients are advised to please contact their insurance provider with any questions about their health insurance coverage. If during the course of a call the psychologist feels a video visit is not appropriate, patients will not be charged for this service.    Plan: Mr. Collins will return for video visit 11/5 @ 12   to continue problem-solving and supportive psychotherapy consistent with  treatment plan.  If the anxiety depression do not decrease in response to counseling, medication may be effective.     Last treatment plan signed:  11/5/21  Treatment plan due:  11/5/22                  Preference for future meetings:             In-person               xRemote              Either           Mahamed Ozuna, Ph.D., A.B.P.P., L.P.  Director, Health Psychology

## 2021-11-16 ENCOUNTER — PRE VISIT (OUTPATIENT)
Dept: UROLOGY | Facility: CLINIC | Age: 41
End: 2021-11-16
Payer: COMMERCIAL

## 2021-11-16 NOTE — TELEPHONE ENCOUNTER
Reason for visit: Litholink results      Dx/Hx/Sx: stones    Records/imaging/labs/orders: Litholink in epic    At Rooming: video visit

## 2021-11-18 ENCOUNTER — TRANSFERRED RECORDS (OUTPATIENT)
Dept: HEALTH INFORMATION MANAGEMENT | Facility: CLINIC | Age: 41
End: 2021-11-18
Payer: COMMERCIAL

## 2021-12-01 ENCOUNTER — TRANSFERRED RECORDS (OUTPATIENT)
Dept: HEALTH INFORMATION MANAGEMENT | Facility: CLINIC | Age: 41
End: 2021-12-01
Payer: COMMERCIAL

## 2021-12-06 ENCOUNTER — VIRTUAL VISIT (OUTPATIENT)
Dept: PSYCHOLOGY | Facility: CLINIC | Age: 41
End: 2021-12-06
Payer: COMMERCIAL

## 2021-12-06 DIAGNOSIS — F41.9 ANXIETY: Primary | ICD-10-CM

## 2021-12-06 DIAGNOSIS — Z56.9 OCCUPATIONAL PROBLEM: ICD-10-CM

## 2021-12-06 DIAGNOSIS — F32.0 CURRENT MILD EPISODE OF MAJOR DEPRESSIVE DISORDER, UNSPECIFIED WHETHER RECURRENT (H): ICD-10-CM

## 2021-12-06 PROCEDURE — 90837 PSYTX W PT 60 MINUTES: CPT | Mod: GT | Performed by: PSYCHOLOGIST

## 2021-12-06 SDOH — ECONOMIC STABILITY - INCOME SECURITY: UNSPECIFIED PROBLEMS RELATED TO EMPLOYMENT: Z56.9

## 2021-12-06 NOTE — PROGRESS NOTES
Health Psychology                     Department of Medicine  Naomi Deutsch, Ph.D., L.P. [phone]                          [facility] Jocelin Christensen, Ph.D., L.P. [phone]                   [place] Mail [address]   Alex Gil, Ph.D. [phone]        [address] [place] [address] Sandra Sanford, Ph.D., L.P. [phone]    [place] [address]           Mahamed Ozuna, Ph.D., A.B.P.P., L.P. [phone]        Helen Jolly, Ph.D., L.P. [phone]  [address]     Health Psychology Follow-Up Note    Referral Source: Blane Worrell MD    Reason for Referral: Mr. Collins is a 41-year-old  white gentleman with a lengthy  history of Crohn's, who is referred due to anxiety, depression, and challenges coping with both chronic illness and the Covid pandemic.    Intake:  7/19/21    History of Presenting Concerns (at intake): Mr. Collins was diagnosed with Crohn's in 1993 t age 13, reporting that it started to get bad 1n 1999, 4 weeks into his freshman year of college.  He developed a perforated colon at that time, had a colostomy which was later reversed, and then in 2007 had another  colostomy and ileostomy 2009.  Since then, he states his Crohn's has essentially been in remission.  He continues to have kidney stones approximately once per year.  He is fearful about symptoms returning and the Crohn's worsening..  He feels that he diaz relatively well with things the way they currently are in terms of his health and recognizes that it is important to avoid highly stressful situations.    Mr. Collins also reports that he tends to be anxious.  He reports a fair amount of baseline anxiety and that in stressful times he tends to get irritated, both with himself and with others.  This leaves him to feel that he is not quite doing right by his family when he is more irritable.  He tends to worry about a variety of things such as refinancing their home, having  sufficient financial stability to doing things that he like to do.  The house they live in has 1 bathroom and he believes it would be helpful to add a second for the four people in his home as he anticipates as his daughters age, it will be increasingly important to have adequate facilities    Medical History: Mr. Collins does not use tobacco products.  Alcohol consumption is reported to be approximately 2 beers per week.  He drinks an energy drink every day or two.  He denies use of street drugs.  Exercise is described as erratic.  He recently had a procedure for a kidney stone.  He is aware that stress can be a factor in exacerbating Crohn's symptoms, and therefore he has been somewhat reluctant to take on more stressful work opportunities.  In 2005, he had a  more stressful job and he thinks it led to a flare of the Crohn's.    Past Medical History:   Diagnosis Date     Asthma     child     Crohn's disease (H)      History of blood transfusion      Kidney stones      Past Surgical History:   Procedure Laterality Date     COLOSTOMY  2007     COMBINED CYSTOSCOPY, RETROGRADES, URETEROSCOPY, INSERT STENT Right 5/3/2016    Procedure: COMBINED CYSTOSCOPY, RETROGRADES, URETEROSCOPY, INSERT STENT;  Surgeon: Azael Melvin MD;  Location: [facility] OR     COMBINED CYSTOSCOPY, RETROGRADES, URETEROSCOPY, LASER HOLMIUM LITHOTRIPSY URETER(S), INSERT STENT Right 1/31/2020    Procedure: CYSTOURETEROSCOPY, WITH RETROGRADE PYELOGRAM, HOLMIUM LASER LITHOTRIPSY OF URETERAL CALCULUS, AND STENT placement;  Surgeon: Bob Felix MD;  [facility]     COMBINED CYSTOSCOPY, RETROGRADES, URETEROSCOPY, LASER HOLMIUM LITHOTRIPSY URETER(S), INSERT STENT Left 7/1/2021    Procedure: CYSTOURETEROSCOPY, WITH RETROGRADE PYELOGRAM, HOLMIUM LASER LITHOTRIPSY, STENT INSERTION;  Surgeon: Bob Felix MD;  Location: [facility] OR     CYSTOSCOPY, RETROGRADES, INSERT STENT URETER(S), COMBINED Left 6/9/2021    Procedure: CYSTOSCOPY, WITH Left   RETROGRADE PYELOGRAM AND Left URETERAL STENT INSERTION;  Surgeon: Trenton Lozano MD;  Location: [facility] OR     EXTRACORPOREAL SHOCK WAVE LITHOTRIPSY (ESWL)  6/27/2013    Procedure: EXTRACORPOREAL SHOCK WAVE LITHOTRIPSY (ESWL);  Left Extracorporeal Shock Wave Lithotripsy Kidney And Ureter;  Surgeon: Azael Melvin MD;  Location: [facility] OR     ILEOSTOMY  2009     LAPAROTOMY EXPLORATORY  2009    extensive lysis of adhesions, revision of coloenteric fistula, repair of small bowel fistula, takedown of current colostomy, debridement of chronic abscess, resection of residual left colon, right hemicolectomy, Louise ileostomy     LASER HOLMIUM LITHOTRIPSY URETER(S), INSERT STENT, COMBINED  4/9/2014    Procedure: COMBINED CYSTOSCOPY, URETEROSCOPY, LASER HOLMIUM LITHOTRIPSY URETER(S), INSERT STENT;  Ureteroscopy, Cystoscopy, holmium laser,Right Uretral stent placement;  Surgeon: Azael Melvin MD;  Location: [facility] OR     LASER HOLMIUM LITHOTRIPSY URETER(S), INSERT STENT, COMBINED Bilateral 5/17/2016    Procedure: COMBINED CYSTOSCOPY, URETEROSCOPY, LASER HOLMIUM LITHOTRIPSY URETER(S), INSERT STENT;  Surgeon: Azael Melvin MD;  Location: [facility] OR     Current Outpatient Medications   Medication     Adalimumab (HUMIRA PEN SC)     chlorthalidone (HYGROTON) 25 MG tablet     HYDROcodone-acetaminophen (NORCO) 5-325 MG tablet     ibuprofen (ADVIL/MOTRIN) 600 MG tablet     mercaptopurine (PURINETHOL) 50 MG tablet     nitroFURantoin macrocrystal-monohydrate (MACROBID) 100 MG capsule     ondansetron (ZOFRAN-ODT) 4 MG ODT tab     OXYCODONE HCL PO     potassium citrate (UROCIT-K) 10 MEQ (1080 MG) CR tablet     potassium citrate (UROCIT-K) 10 MEQ (1080 MG) CR tablet     potassium citrate-citric acid (CYTRA K) 1059-4188 MG Packet     potassium citrate-citric acid (POLYCITRA-K/CYTRA K) 8140-1092 MG Packet     tamsulosin (FLOMAX) 0.4 MG capsule     tamsulosin (FLOMAX) 0.4 MG capsule     tamsulosin (FLOMAX) 0.4 MG capsule     VITAMIN  "D PO     No current facility-administered medications for this visit.     Wt Readings from Last 4 Encounters:   07/01/21 68.5 kg (151 lb)   06/25/21 69.4 kg (153 lb)   06/09/21 68.5 kg (151 lb)   03/10/20 65.8 kg (145 lb)     Estimated body mass index is 23.65 kg/m  as calculated from the following:    Height as of 7/1/21: 1.702 m (5' 7\").    Weight as of 7/1/21: 68.5 kg (151 lb).    Social History (at intake): Mr. Collins grew up in [place].  He has a sister 5 years older than he is.  His father was 76 and  his mother was 75.  He reports they are both in good health but are less capable than they were.  They help to take care of his 3 and 6-year-old daughters at times. He is concerned about his parnts' driving.  His father is an   His mother worked for [employer] retired in 2004.    He and his wife both found the past year to be quite stressful between the children being home for long stretches of time, his wife having been on furlough, as well as both working from home in a relaitively small house. His wife, Larua Collins, manages global education  programs for [employer] students.    He attended [employer] where he got a degree in American studies.  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  He has a book proposal for a third book currently being discussed with a publisher.  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  He is working approximately 30 to 35 hours a week.  He acknowledges that he does not feel fully stimulated, but is ambivalent about switching jobs because he considers it to be very supportive atmosphere.  His wife works with the travel abroad program at [employer].  He reports that they both tend to be anxious which sometimes is synergistic.  They have done fewer things in the past year during the pandemic that are recreational or bring sanjuana as there was a lot to manage in terms of " childcare.    Psychiatric History: Mr. Collins reports that he saw a therapist briefly as a child to deal with anxiety.  About a decade ago he also saw a therapist on a single occasion but did not feel it was a good fit.  There is no personal history of psychiatric hospitalizations or chemical dependency treatment.  He is not sure whether he ever tried psychoactive medication.  He believes that he discussed it in the past but never got around to trying.  The family history is remarkable for hospitalization and chemical issues in the maternal grandmother.    Psychological Screening: Mr. Collins completed the following four screening measures.    CAGE-AID Score: > 1 is a positive screen, suggesting further discussion is needed to determine if evaluation for alcohol or substance abuse is appropriate.  A score > 2 is considered clinically significant, suggesting further evaluation of alcohol or substance-related problems is indicated.  CAGE-AID Total Score 7/18/2021   Total Score 0   Total Score MyChart 0 (A total score of 2 or greater is considered clinically significant)     PIOTR-7 Score:  The General Anxiety Disorder-7 is an an anxiety screening instrument. Scores of 5, 10, and 15 respectively indicate mild, moderate, and severe anxiety symptoms.  PIOTR-7 SCORE 11/11/2019 7/18/2021   Total Score - 16 (severe anxiety)   Total Score 10 16     PHQ-9 Score:  The Patient Health Questionnaire-9 is a depression screening instrument. Scores of 5, 10, 15, and 20 respectively indicate mild, moderate, moderately severe and severe depression symptoms.   PHQ-9 SCORE 11/11/2019 7/18/2021   PHQ-9 Total Score MyChart - 9 (Mild depression)   PHQ-9 Total Score 5 9     WHODAS-12 Score:  WHODAS 2.0 Total Score 7/18/2021   Total Score 16   Total Score MyChart 16     Session: Mr. Collins arrived punctually for today's meeting.  He is alert and oriented.  Affect is variable from with serious to occasional smiling.  Thoughts are linear.      We  discussed his concerns re his stress levels.  At times his mood is good and other times he feels more upset.  We discussed cognitive aspects of it.  We explored specific stresses, e.g., upcoming trip to a conference, but feeling guilty about it.  We also discussed a stressor of his wanting to be on time but his wife not sharing that value, and how it affects things daily, e.g., kids' bedtime.    We discussed how much better he feels.  He thinks he is functioning better, less anxious overall.   He is  Having a hard time exercising regularly.     Rapport is positive.  He participated fully and appeared to benefit from the conversation.      Diagnosis:  Anxiety  Major depression, mild  Occupational problem    This telehealth service is appropriate and effective for delivering services in light of the necessity for social distancing to mitigate the COVID-19 epidemic and for conservation of PPE.     Patient has agreed to receiving telehealth services after being informed about it: Yes    Patient prefers video invitation/information to be sent by:   email    Time service started: 12:03  Time service ended:  12:57  Extended session due to complexity of case and length of interval.    Mode of transmission: iQVCloud    Location of originating:  Home of the patient    Distance site:  Home office of provider for [facility]    The patient has been notified that:  Video visits will be conducted via a call with their psychologist to provide the care they need with a video conversation. Video visits may be billed at different rates depending on insurance coverage.  Patients are advised to please contact their insurance provider with any questions about their health insurance coverage. If during the course of a call the psychologist feels a video visit is not appropriate, patients will not be charged for this service.    Plan: Mr. Collins will return for video visit 1/13 @ 11  to continue problem-solving and supportive psychotherapy  consistent with  treatment plan.  If the anxiety depression do not decrease in response to counseling, medication may be effective.     Last treatment plan signed:  11/5/21  Treatment plan due:  11/5/22                  Preference for future meetings:             In-person               xRemote              Either           Mahamed Ozuna, Ph.D., A.B.P.P., L.P.  Director, Health Psychology

## 2021-12-10 ENCOUNTER — VIRTUAL VISIT (OUTPATIENT)
Dept: UROLOGY | Facility: CLINIC | Age: 41
End: 2021-12-10
Payer: COMMERCIAL

## 2021-12-10 DIAGNOSIS — N20.0 KIDNEY STONE: ICD-10-CM

## 2021-12-10 PROCEDURE — 99213 OFFICE O/P EST LOW 20 MIN: CPT | Mod: GT | Performed by: NURSE PRACTITIONER

## 2021-12-10 RX ORDER — CHLORTHALIDONE 25 MG/1
25 TABLET ORAL DAILY
Qty: 90 TABLET | Refills: 3 | Status: SHIPPED | OUTPATIENT
Start: 2021-12-10 | End: 2023-02-14

## 2021-12-10 NOTE — LETTER
12/10/2021       RE: Loedan Collins  [address]  [place] [address]     Dear Colleague,    Thank you for referring your patient, Leodan Collins, to the [facility] [place] at [facility]. Please see a copy of my visit note below.    Leodan is a 41 year old who is being evaluated via a billable video visit.      How would you like to obtain your AVS? MyChart  If the video visit is dropped, the invitation should be resent by: Send to e-mail at: [email or web address]  Will anyone else be joining your video visit? No    Video Start Time: 1:36 PM  Video-Visit Details    Type of service:  Video Visit    Video End Time: 1:56 PM    Originating Location (pt. 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 He notes that he has been passing his potassium citrate tablets whole in his stool. Per my search of this, it appears to be a common thing. However, it sounds as though the intended amount of medication is absorbed and the tablet's shell is simply passed whole. However, given his ongoing hypocitraturia, I will discuss this concern further with a pharmacist to gain their insight. He may benefit from a higher dose but will first investigate absorption concerns. I will be in touch with him in the coming days about this.   -For now, will have him continue with his current dose of potassium citrate and chlorthalidone.  -Follow up with me in one year with a repeat Litholink completed beforehand.     Maisha  ISSAC Dowling, CNP  Department of Urology      Subjective:   41 year old male with a history of an ileostomy and recurrent kidney stones. He is s/p left URS/HLL with Dr. Felix in July. Stones composed of 90% CaOx and 10% CaP. Stones managed with potassium citrate and chlorthalidone. At his last visit with Dr. Felix in September, he was noted to be passing a 4 mm stone on the right. He had been straining his urine but had not passed it. Follow up CT obtained on 10/5 showed a new 5 mm stone in the lower pole of his right kidney, likely proximal migration of his previously-noted stone. Multiple additional nonobstructing stones also noted bilaterally.     He completed an updated 24-hour urine collection on 12/1/21, which shows low urine volume, mildly-elevated urine calcium, low urine citrate, and increased CaOx and CaP stone risks.    He is currently taking potassium citrate 20 mEq twice daily and chlorthalidone 25 mg daily. He questions if he is absorbing the potassium citrate, given his ileostomy, as he often finds the tablet whole in his stool.     Objective:     Exam:   Patient is a 41 year old male   No vital signs obtained due to virtual visit.  General Appearance: Well groomed, hygenic  Respiratory: No cough, no respiratory distress or labored breathing  Psychiatric: Alert and oriented  Further examination is deferred due to the nature of our visit.

## 2021-12-10 NOTE — PROGRESS NOTES
Leodan is a 41 year old who is being evaluated via a billable video visit.      How would you like to obtain your AVS? MyChart  If the video visit is dropped, the invitation should be resent by: Send to e-mail at: [email or web address]  Will anyone else be joining your video visit? No    Video Start Time: 1:36 PM  Video-Visit Details    Type of service:  Video Visit    Video End Time: 1:56 PM    Originating Location (pt. 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 He notes that he has been passing his potassium citrate tablets whole in his stool. Per my search of this, it appears to be a common thing. However, it sounds as though the intended amount of medication is absorbed and the tablet's shell is simply passed whole. However, given his ongoing hypocitraturia, I will discuss this concern further with a pharmacist to gain their insight. He may benefit from a higher dose but will first investigate absorption concerns. I will be in touch with him in the coming days about this.   -For now, will have him continue with his current dose of potassium citrate and chlorthalidone.  -Follow up with me in one year with a repeat Litholink completed beforehand.     Maisha Dowling, CNP  Department of Urology      Subjective:   41 year old male with a history of an ileostomy and recurrent kidney stones. He is s/p left URS/HLL with Dr. Felix in July. Stones composed of 90% CaOx and 10% CaP. Stones managed with potassium citrate and chlorthalidone. At his last visit with   Elvira in September, he was noted to be passing a 4 mm stone on the right. He had been straining his urine but had not passed it. Follow up CT obtained on 10/5 showed a new 5 mm stone in the lower pole of his right kidney, likely proximal migration of his previously-noted stone. Multiple additional nonobstructing stones also noted bilaterally.     He completed an updated 24-hour urine collection on 12/1/21, which shows low urine volume, mildly-elevated urine calcium, low urine citrate, and increased CaOx and CaP stone risks.    He is currently taking potassium citrate 20 mEq twice daily and chlorthalidone 25 mg daily. He questions if he is absorbing the potassium citrate, given his ileostomy, as he often finds the tablet whole in his stool.     Objective:     Exam:   Patient is a 41 year old male   No vital signs obtained due to virtual visit.  General Appearance: Well groomed, hygenic  Respiratory: No cough, no respiratory distress or labored breathing  Psychiatric: Alert and oriented  Further examination is deferred due to the nature of our visit.

## 2021-12-10 NOTE — PATIENT INSTRUCTIONS
UROLOGY CLINIC VISIT PATIENT INSTRUCTIONS    -Continue chlorthalidone and potassium citrate  -I will discuss the potassium citrate with the pharmacy team and get back to you regarding absorption and dose changes.   -Follow up with me in one year with a repeat Litholink collection done beforehand, or sooner if needed.     If you have any issues, questions or concerns in the meantime, do not hesitate to contact us at [phone] or via [employer].       Maisha Dowling, CNP  Department of Urology

## 2021-12-13 ENCOUNTER — MEDICAL CORRESPONDENCE (OUTPATIENT)
Dept: HEALTH INFORMATION MANAGEMENT | Facility: CLINIC | Age: 41
End: 2021-12-13
Payer: COMMERCIAL

## 2021-12-15 ENCOUNTER — TELEPHONE (OUTPATIENT)
Dept: UROLOGY | Facility: CLINIC | Age: 41
End: 2021-12-15
Payer: COMMERCIAL

## 2021-12-15 NOTE — TELEPHONE ENCOUNTER
LVM for patient to -Follow up with me in one year with a repeat Litholink collection done beforehand, or sooner if needed.

## 2022-01-13 ENCOUNTER — VIRTUAL VISIT (OUTPATIENT)
Dept: PSYCHOLOGY | Facility: CLINIC | Age: 42
End: 2022-01-13
Payer: COMMERCIAL

## 2022-01-13 DIAGNOSIS — F32.0 CURRENT MILD EPISODE OF MAJOR DEPRESSIVE DISORDER, UNSPECIFIED WHETHER RECURRENT (H): ICD-10-CM

## 2022-01-13 DIAGNOSIS — F41.9 ANXIETY: Primary | ICD-10-CM

## 2022-01-13 DIAGNOSIS — Z56.9 OCCUPATIONAL PROBLEM: ICD-10-CM

## 2022-01-13 PROCEDURE — 90837 PSYTX W PT 60 MINUTES: CPT | Mod: GT | Performed by: PSYCHOLOGIST

## 2022-01-13 SDOH — ECONOMIC STABILITY - INCOME SECURITY: UNSPECIFIED PROBLEMS RELATED TO EMPLOYMENT: Z56.9

## 2022-01-13 NOTE — PROGRESS NOTES
Health Psychology                     Department of Medicine  Naomi Deutsch, Ph.D., L.P. [phone]                          [facility] Jocelin Christensen, Ph.D., L.P. [phone]                   [place] Mail Code 916   Alex Gil, Ph.D. [phone]        [address] [place] [address] Sandra Sanford, Ph.D., L.P. [phone]    [place] [address]           Mahamed Ozuna, Ph.D., A.B.P.P., L.P. [phone]        Helen Jolly, Ph.D., L.P. [phone]  [address]     Health Psychology Follow-Up Note    Referral Source: Blane Worrell MD    Reason for Referral: Mr. Collins is a 41-year-old  white gentleman with a lengthy  history of Crohn's, who is referred due to anxiety, depression, and challenges coping with both chronic illness and the Covid pandemic.    Intake:  7/19/21    History of Presenting Concerns (at intake): Mr. Collins was diagnosed with Crohn's in 1993 t age 13, reporting that it started to get bad 1n 1999, 4 weeks into his freshman year of college.  He developed a perforated colon at that time, had a colostomy which was later reversed, and then in 2007 had another  colostomy and ileostomy 2009.  Since then, he states his Crohn's has essentially been in remission.  He continues to have kidney stones approximately once per year.  He is fearful about symptoms returning and the Crohn's worsening..  He feels that he diaz relatively well with things the way they currently are in terms of his health and recognizes that it is important to avoid highly stressful situations.    Mr. Collins also reports that he tends to be anxious.  He reports a fair amount of baseline anxiety and that in stressful times he tends to get irritated, both with himself and with others.  This leaves him to feel that he is not quite doing right by his family when he is more irritable.  He tends to worry about a variety of things such as refinancing their home, having  sufficient financial stability to doing things that he like to do.  The house they live in has 1 bathroom and he believes it would be helpful to add a second for the four people in his home as he anticipates as his daughters age, it will be increasingly important to have adequate facilities    Medical History: Mr. Collins does not use tobacco products.  Alcohol consumption is reported to be approximately 2 beers per week.  He drinks an energy drink every day or two.  He denies use of street drugs.  Exercise is described as erratic.  He recently had a procedure for a kidney stone.  He is aware that stress can be a factor in exacerbating Crohn's symptoms, and therefore he has been somewhat reluctant to take on more stressful work opportunities.  In 2005, he had a  more stressful job and he thinks it led to a flare of the Crohn's.    Past Medical History:   Diagnosis Date     Asthma     child     Crohn's disease (H)      History of blood transfusion      Kidney stones      Past Surgical History:   Procedure Laterality Date     COLOSTOMY  2007     COMBINED CYSTOSCOPY, RETROGRADES, URETEROSCOPY, INSERT STENT Right 5/3/2016    Procedure: COMBINED CYSTOSCOPY, RETROGRADES, URETEROSCOPY, INSERT STENT;  Surgeon: Azael Melvin MD;  Location: [facility] OR     COMBINED CYSTOSCOPY, RETROGRADES, URETEROSCOPY, LASER HOLMIUM LITHOTRIPSY URETER(S), INSERT STENT Right 1/31/2020    Procedure: CYSTOURETEROSCOPY, WITH RETROGRADE PYELOGRAM, HOLMIUM LASER LITHOTRIPSY OF URETERAL CALCULUS, AND STENT placement;  Surgeon: Bob Felix MD;  [facility]: [facility]     COMBINED CYSTOSCOPY, RETROGRADES, URETEROSCOPY, LASER HOLMIUM LITHOTRIPSY URETER(S), INSERT STENT Left 7/1/2021    Procedure: CYSTOURETEROSCOPY, WITH RETROGRADE PYELOGRAM, HOLMIUM LASER LITHOTRIPSY, STENT INSERTION;  Surgeon: Bob Felix MD;  Location: [facility] OR     CYSTOSCOPY, RETROGRADES, INSERT STENT URETER(S), COMBINED Left 6/9/2021    Procedure: CYSTOSCOPY, WITH Left   RETROGRADE PYELOGRAM AND Left URETERAL STENT INSERTION;  Surgeon: Trenton Lozano MD;  Location: [facility] OR     EXTRACORPOREAL SHOCK WAVE LITHOTRIPSY (ESWL)  6/27/2013    Procedure: EXTRACORPOREAL SHOCK WAVE LITHOTRIPSY (ESWL);  Left Extracorporeal Shock Wave Lithotripsy Kidney And Ureter;  Surgeon: Azael Melvin MD;  Location: [facility] OR     ILEOSTOMY  2009     LAPAROTOMY EXPLORATORY  2009    extensive lysis of adhesions, revision of coloenteric fistula, repair of small bowel fistula, takedown of current colostomy, debridement of chronic abscess, resection of residual left colon, right hemicolectomy, Louise ileostomy     LASER HOLMIUM LITHOTRIPSY URETER(S), INSERT STENT, COMBINED  4/9/2014    Procedure: COMBINED CYSTOSCOPY, URETEROSCOPY, LASER HOLMIUM LITHOTRIPSY URETER(S), INSERT STENT;  Ureteroscopy, Cystoscopy, holmium laser,Right Uretral stent placement;  Surgeon: Azael Melvin MD;  Location: [facility] OR     LASER HOLMIUM LITHOTRIPSY URETER(S), INSERT STENT, COMBINED Bilateral 5/17/2016    Procedure: COMBINED CYSTOSCOPY, URETEROSCOPY, LASER HOLMIUM LITHOTRIPSY URETER(S), INSERT STENT;  Surgeon: Azael Melvin MD;  Location: [facility] OR     Current Outpatient Medications   Medication     Adalimumab (HUMIRA PEN SC)     chlorthalidone (HYGROTON) 25 MG tablet     ibuprofen (ADVIL/MOTRIN) 600 MG tablet     mercaptopurine (PURINETHOL) 50 MG tablet     nitroFURantoin macrocrystal-monohydrate (MACROBID) 100 MG capsule     potassium citrate (UROCIT-K) 10 MEQ (1080 MG) CR tablet     potassium citrate (UROCIT-K) 10 MEQ (1080 MG) CR tablet     potassium citrate-citric acid (CYTRA K) 7315-4293 MG Packet     potassium citrate-citric acid (POLYCITRA-K/CYTRA K) 1613-1218 MG Packet     VITAMIN D PO     No current facility-administered medications for this visit.     Wt Readings from Last 4 Encounters:   07/01/21 68.5 kg (151 lb)   06/25/21 69.4 kg (153 lb)   06/09/21 68.5 kg (151 lb)   03/10/20 65.8 kg (145 lb)  "    Estimated body mass index is 23.65 kg/m  as calculated from the following:    Height as of 7/1/21: 1.702 m (5' 7\").    Weight as of 7/1/21: 68.5 kg (151 lb).    Social History (at intake): Mr. Collins grew up in [facility].  He has a sister 5 years older than he is.  His father was 76 and  his mother was 75.  He reports they are both in good health but are less capable than they were.  They help to take care of his 3 and 6-year-old daughters at times. He is concerned about his parnts' driving.  His father is an   His mother worked for [employer] retired in 2004.    He and his wife both found the past year to be quite stressful between the children being home for long stretches of time, his wife having been on furlough, as well as both working from home in a relaitively small house. His wife, Laura Collins, manages global education  programs for [employer] students.    He attended [employer] where he got a degree in American studies.  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  He has a book proposal for a third book currently being discussed with a publisher.  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  He is working approximately 30 to 35 hours a week.  He acknowledges that he does not feel fully stimulated, but is ambivalent about switching jobs because he considers it to be very supportive atmosphere.  His wife works with the travel abroad program at [employer].  He reports that they both tend to be anxious which sometimes is synergistic.  They have done fewer things in the past year during the pandemic that are recreational or bring sanjuana as there was a lot to manage in terms of childcare.    Psychiatric History: Mr. Collins reports that he saw a therapist briefly as a child to deal with anxiety.  About a decade ago he also saw a therapist on a single occasion but did not feel it was a good fit.  There is no " "personal history of psychiatric hospitalizations or chemical dependency treatment.  He is not sure whether he ever tried psychoactive medication.  He believes that he discussed it in the past but never got around to trying.  The family history is remarkable for hospitalization and chemical issues in the maternal grandmother.    Psychological Screening: Mr. Collins completed the following four screening measures.    CAGE-AID Score: > 1 is a positive screen, suggesting further discussion is needed to determine if evaluation for alcohol or substance abuse is appropriate.  A score > 2 is considered clinically significant, suggesting further evaluation of alcohol or substance-related problems is indicated.  CAGE-AID Total Score 7/18/2021   Total Score 0   Total Score MyChart 0 (A total score of 2 or greater is considered clinically significant)     PIOTR-7 Score:  The General Anxiety Disorder-7 is an an anxiety screening instrument. Scores of 5, 10, and 15 respectively indicate mild, moderate, and severe anxiety symptoms.  PIOTR-7 SCORE 11/11/2019 7/18/2021   Total Score - 16 (severe anxiety)   Total Score 10 16     PHQ-9 Score:  The Patient Health Questionnaire-9 is a depression screening instrument. Scores of 5, 10, 15, and 20 respectively indicate mild, moderate, moderately severe and severe depression symptoms.   PHQ-9 SCORE 11/11/2019 7/18/2021   PHQ-9 Total Score MyChart - 9 (Mild depression)   PHQ-9 Total Score 5 9     WHODAS-12 Score:  WHODAS 2.0 Total Score 7/18/2021   Total Score 16   Total Score MyChart 16     Session: Mr. Collins arrived punctually for today's meeting.  He is alert and oriented.  Affect is variable from with serious to occasional smiling.  Thoughts are linear.      He cancelled his trip to [place] due to Omicron.  \"It has been a hard week.\" Various friends, I and his brother in law contracted COVID.  His boss broke her wrist with a fall.   He feels guilty for not getting much done at work I light of " the surge.  He worries about whether he is immunized given his immunosuppressant use.  His boss is compassionate; his wife's isn't.  His wife had to change plans for a getaway due to covid in one of the friends' families.     His 4 year old had 2 kids and the director of his kid's  tested positive.  They reopened after a week on Monday.  One was exposed. Schools are closing tomorrow, so they kept their oldest home today  It has been hard to deal with the closures.     We discussed his concerns re his stress levels.  At times his mood is good and other times he feels more upset. He has experienced panic with all of the changes and frustration with the difficulty getting good guidance on covid-related recommendations.     We discussed how much better he feels.  He thinks he is functioning better, less anxious overall.   He is  Having a hard time exercising regularly.     Rapport is positive.  He participated fully and appeared to benefit from the conversation.      Diagnosis:  Anxiety  Major depression, mild  Occupational problem    This telehealth service is appropriate and effective for delivering services in light of the necessity for social distancing to mitigate the COVID-19 epidemic and for conservation of PPE.     Patient has agreed to receiving telehealth services after being informed about it: Yes    Patient prefers video invitation/information to be sent by:   email    Time service started: 11:05  Time service ended:  12:02  Extended session due to complexity of case and length of interval.    Mode of transmission: [employer]    Location of originating:  Home of the patient    Distance site:  Home office of provider for [facility]    The patient has been notified that:  Video visits will be conducted via a call with their psychologist to provide the care they need with a video conversation. Video visits may be billed at different rates depending on insurance coverage.  Patients are advised to please contact  their insurance provider with any questions about their health insurance coverage. If during the course of a call the psychologist feels a video visit is not appropriate, patients will not be charged for this service.    Plan: Mr. Collins will return for video visit 2/2 @ 10 to continue problem-solving and supportive psychotherapy consistent with  treatment plan.  If the anxiety depression do not decrease in response to counseling, medication may be effective.     Last treatment plan signed:  11/5/21  Treatment plan due:  11/5/22                  Preference for future meetings:             In-person               xRemote              Either           Mahamed Ozuna, Ph.D., A.B.P.P., L.P.  Director, Health Psychology

## 2022-02-02 ENCOUNTER — VIRTUAL VISIT (OUTPATIENT)
Dept: PSYCHOLOGY | Facility: CLINIC | Age: 42
End: 2022-02-02
Payer: COMMERCIAL

## 2022-02-02 DIAGNOSIS — F32.0 CURRENT MILD EPISODE OF MAJOR DEPRESSIVE DISORDER, UNSPECIFIED WHETHER RECURRENT (H): ICD-10-CM

## 2022-02-02 DIAGNOSIS — F41.9 ANXIETY: Primary | ICD-10-CM

## 2022-02-02 DIAGNOSIS — Z56.9 OCCUPATIONAL PROBLEM: ICD-10-CM

## 2022-02-02 PROCEDURE — 90837 PSYTX W PT 60 MINUTES: CPT | Mod: GT | Performed by: PSYCHOLOGIST

## 2022-02-02 SDOH — ECONOMIC STABILITY - INCOME SECURITY: UNSPECIFIED PROBLEMS RELATED TO EMPLOYMENT: Z56.9

## 2022-02-02 NOTE — PROGRESS NOTES
Health Psychology                     Department of Medicine  Naomi Deutsch, Ph.D., L.P. [phone]                          [facility] Jocelin Christensen, Ph.D., L.P. [phone]                   [place] Mail Code 941   Alex Gil, Ph.D. [phone]        [address] [place] [address] Sandra Sanford, Ph.D., L.P. [phone]    [place] [address]           Mahamed Ozuna, Ph.D., A.B.P.P., L.P. [phone]        Helen Jolly, Ph.D., L.P. [phone]  [address]     Health Psychology Follow-Up Note    Referral Source: Blane Worrell MD    Reason for Referral: Mr. Collins is a 41-year-old  white gentleman with a lengthy  history of Crohn's, who is referred due to anxiety, depression, and challenges coping with both chronic illness and the Covid pandemic.    Intake:  7/19/21    History of Presenting Concerns (at intake): Mr. Collins was diagnosed with Crohn's in 1993 t age 13, reporting that it started to get bad 1n 1999, 4 weeks into his freshman year of college.  He developed a perforated colon at that time, had a colostomy which was later reversed, and then in 2007 had another  colostomy and ileostomy 2009.  Since then, he stated his Crohn's has essentially been in remission.  He continued to have kidney stones approximately once per year.  He was fearful about symptoms returning and the Crohn's worsening..  He felt that he diaz relatively well with things the way they currently are in terms of his health and recognizes that it is important to avoid highly stressful situations.    Mr. Collins also report edthat he tends to be anxious.  He reported a fair amount of baseline anxiety and that in stressful times he tends to get irritated, both with himself and with others.  This leaves him to feel that he is not quite doing right by his family when he is more irritable.  He tends to worry about a variety of things such as refinancing their home, having  sufficient financial stability to doing things that he like to do.  The house they live in has 1 bathroom and he believes it would be helpful to add a second for the four people in his home as he anticipates as his daughters age, it will be increasingly important to have adequate facilities    Medical History: Mr. Collins does not use tobacco products.  Alcohol consumption is reported to be approximately 2 beers per week.  He drinks an energy drink every day or two.  He denies use of street drugs.  Exercise is described as erratic.  He recently had a procedure for a kidney stone.  He is aware that stress can be a factor in exacerbating Crohn's symptoms, and therefore he has been somewhat reluctant to take on more stressful work opportunities.  In 2005, he had a  more stressful job and he thinks it led to a flare of the Crohn's.    Past Medical History:   Diagnosis Date     Asthma     child     Crohn's disease (H)      History of blood transfusion      Kidney stones      Past Surgical History:   Procedure Laterality Date     COLOSTOMY  2007     COMBINED CYSTOSCOPY, RETROGRADES, URETEROSCOPY, INSERT STENT Right 5/3/2016    Procedure: COMBINED CYSTOSCOPY, RETROGRADES, URETEROSCOPY, INSERT STENT;  Surgeon: Azael Melvin MD;  Location: [facility] OR     COMBINED CYSTOSCOPY, RETROGRADES, URETEROSCOPY, LASER HOLMIUM LITHOTRIPSY URETER(S), INSERT STENT Right 1/31/2020    Procedure: CYSTOURETEROSCOPY, WITH RETROGRADE PYELOGRAM, HOLMIUM LASER LITHOTRIPSY OF URETERAL CALCULUS, AND STENT placement;  Surgeon: Bob Felix MD;  [facility]     COMBINED CYSTOSCOPY, RETROGRADES, URETEROSCOPY, LASER HOLMIUM LITHOTRIPSY URETER(S), INSERT STENT Left 7/1/2021    Procedure: CYSTOURETEROSCOPY, WITH RETROGRADE PYELOGRAM, HOLMIUM LASER LITHOTRIPSY, STENT INSERTION;  Surgeon: Bob Felix MD;  Location: [facility] OR     CYSTOSCOPY, RETROGRADES, INSERT STENT URETER(S), COMBINED Left 6/9/2021    Procedure: CYSTOSCOPY, WITH Left   RETROGRADE PYELOGRAM AND Left URETERAL STENT INSERTION;  Surgeon: Trenton Lozano MD;  Location: [facility] OR     EXTRACORPOREAL SHOCK WAVE LITHOTRIPSY (ESWL)  6/27/2013    Procedure: EXTRACORPOREAL SHOCK WAVE LITHOTRIPSY (ESWL);  Left Extracorporeal Shock Wave Lithotripsy Kidney And Ureter;  Surgeon: Azael Melvin MD;  Location: [facility] OR     ILEOSTOMY  2009     LAPAROTOMY EXPLORATORY  2009    extensive lysis of adhesions, revision of coloenteric fistula, repair of small bowel fistula, takedown of current colostomy, debridement of chronic abscess, resection of residual left colon, right hemicolectomy, Louise ileostomy     LASER HOLMIUM LITHOTRIPSY URETER(S), INSERT STENT, COMBINED  4/9/2014    Procedure: COMBINED CYSTOSCOPY, URETEROSCOPY, LASER HOLMIUM LITHOTRIPSY URETER(S), INSERT STENT;  Ureteroscopy, Cystoscopy, holmium laser,Right Uretral stent placement;  Surgeon: Azael Melvin MD;  Location: [facility]     LASER HOLMIUM LITHOTRIPSY URETER(S), INSERT STENT, COMBINED Bilateral 5/17/2016    Procedure: COMBINED CYSTOSCOPY, URETEROSCOPY, LASER HOLMIUM LITHOTRIPSY URETER(S), INSERT STENT;  Surgeon: Azael Melvin MD;  Location: [facility] OR     Current Outpatient Medications   Medication     Adalimumab (HUMIRA PEN SC)     chlorthalidone (HYGROTON) 25 MG tablet     ibuprofen (ADVIL/MOTRIN) 600 MG tablet     mercaptopurine (PURINETHOL) 50 MG tablet     nitroFURantoin macrocrystal-monohydrate (MACROBID) 100 MG capsule     potassium citrate (UROCIT-K) 10 MEQ (1080 MG) CR tablet     potassium citrate (UROCIT-K) 10 MEQ (1080 MG) CR tablet     potassium citrate-citric acid (CYTRA K) 3141-4756 MG Packet     potassium citrate-citric acid (POLYCITRA-K/CYTRA K) 2558-2765 MG Packet     VITAMIN D PO     No current facility-administered medications for this visit.     Wt Readings from Last 4 Encounters:   07/01/21 68.5 kg (151 lb)   06/25/21 69.4 kg (153 lb)   06/09/21 68.5 kg (151 lb)   03/10/20 65.8 kg (145 lb)  "    Estimated body mass index is 23.65 kg/m  as calculated from the following:    Height as of 7/1/21: 1.702 m (5' 7\").    Weight as of 7/1/21: 68.5 kg (151 lb).    Social History (at intake): Mr. Collins grew up in [facility].  He has a sister 5 years older than he is.  His father (76) and  his mother ( 75.) were reported to both in good health but are less capable than they were.  They help to take care of his 3 and 6-year-old daughters at times. He is concerned about his parnts' driving.  His father is an   His mother worked for [employer] retired in 2004.    He and his wife both found the past year to be quite stressful between the children being home for long stretches of time, his wife having been on furlough, as well as both working from home in a relaitively small house. His wife, Laura Collins, manages global education  programs for [facility] students.    He attended [employer] where he got a degree in American studies.  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  He has a book proposal for a third book currently being discussed with a publisher.  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  He is working approximately 30 to 35 hours a week.  He acknowledges that he does not feel fully stimulated, but is ambivalent about switching jobs because he considers it to be very supportive atmosphere.  His wife works with the travel abroad program at [facility] evly.  He reports that they both tend to be anxious which sometimes is synergistic.  They have done fewer things in the past year during the pandemic that are recreational or bring sanjuana as there was a lot to manage in terms of childcare.    Psychiatric History: Mr. Collins reports that he saw a therapist briefly as a child to deal with anxiety.  About a decade ago he also saw a therapist on a single occasion but did not feel it was a good fit.  There is no personal " "history of psychiatric hospitalizations or chemical dependency treatment.  He is not sure whether he ever tried psychoactive medication.  He believes that he discussed it in the past but never got around to trying.  The family history is remarkable for hospitalization and chemical issues in the maternal grandmother.    Psychological Screening: Mr. Collins completed the following four screening measures.    CAGE-AID Score: > 1 is a positive screen, suggesting further discussion is needed to determine if evaluation for alcohol or substance abuse is appropriate.  A score > 2 is considered clinically significant, suggesting further evaluation of alcohol or substance-related problems is indicated.  CAGE-AID Total Score 7/18/2021   Total Score 0   Total Score MyChart 0 (A total score of 2 or greater is considered clinically significant)     PIOTR-7 Score:  The General Anxiety Disorder-7 is an an anxiety screening instrument. Scores of 5, 10, and 15 respectively indicate mild, moderate, and severe anxiety symptoms.  PIOTR-7 SCORE 11/11/2019 7/18/2021   Total Score - 16 (severe anxiety)   Total Score 10 16     PHQ-9 Score:  The Patient Health Questionnaire-9 is a depression screening instrument. Scores of 5, 10, 15, and 20 respectively indicate mild, moderate, moderately severe and severe depression symptoms.   PHQ-9 SCORE 11/11/2019 7/18/2021   PHQ-9 Total Score MyChart - 9 (Mild depression)   PHQ-9 Total Score 5 9     WHODAS-12 Score:  WHODAS 2.0 Total Score 7/18/2021   Total Score 16   Total Score MyChart 16     Session: Mr. Collins arrived punctually for today's meeting.  He is alert and oriented.  Affect is variable from with serious to occasional smiling.  Thoughts are linear.        \"It has been a hard month.\"  He feels guilty for not getting much done at work I light of the surge.     His 4 year old 's   Reopened. y.  His older daughter was doing remote learning x 2 weeks.  It has been hard to deal with the closures.  " Her older daughter was also exposed at school.  He is weary from dong all I takes to keep the kids where they need to be, get them to bed on time, etc.     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  We explored the potential benefits.  He is hopeful, but realistic that there are probably a lot of candidates.  He is frustrated at work. The frustrations were discussed.     Overall,  he feels better   He thinks he is functioning better, less anxious overall.   He is  Having a hard time exercising regularly.   Discussed resilience.    Rapport is positive.  He participated fully and appeared to benefit from the conversation.      Diagnosis:  Anxiety  Major depression, mild  Occupational problem    This telehealth service is appropriate and effective for delivering services in light of the necessity for social distancing to mitigate the COVID-19 epidemic and for conservation of PPE.     Patient has agreed to receiving telehealth services after being informed about it: Yes    Patient prefers video invitation/information to be sent by:   email    Time service started: 10:04  Time service ended:  10:57  Extended session due to complexity of case and length of interval.    Mode of transmission: Zmags    Location of originating:  Home of the patient    Distance site:  Home office of provider for [facility]    The patient has been notified that:  Video visits will be conducted via a call with their psychologist to provide the care they need with a video conversation. Video visits may be billed at different rates depending on insurance coverage.  Patients are advised to please contact their insurance provider with any questions about their health insurance coverage. If during the course of a call the psychologist feels a video visit is not appropriate, patients will not be charged for this service.    Plan: Mr. Collins will return for video visit 3/7 @ 11 to continue problem-solving and supportive psychotherapy consistent with   treatment plan.  If the anxiety depression do not decrease in response to counseling, medication may be effective.     Last treatment plan signed:11/5/21  Treatment plan due: 11/5/22                  Preference for future meetings:             In-person               xRemote              Either           Mahamed Ozuna, Ph.D., A.B.P.P., L.P.  Director, Health Psychology

## 2022-02-12 ENCOUNTER — HEALTH MAINTENANCE LETTER (OUTPATIENT)
Age: 42
End: 2022-02-12

## 2022-02-14 ENCOUNTER — VIRTUAL VISIT (OUTPATIENT)
Dept: FAMILY MEDICINE | Facility: CLINIC | Age: 42
End: 2022-02-14
Payer: COMMERCIAL

## 2022-02-14 DIAGNOSIS — F41.1 GAD (GENERALIZED ANXIETY DISORDER): Primary | ICD-10-CM

## 2022-02-14 PROCEDURE — 99214 OFFICE O/P EST MOD 30 MIN: CPT | Mod: TEL | Performed by: FAMILY MEDICINE

## 2022-02-14 RX ORDER — ESCITALOPRAM OXALATE 5 MG/1
5 TABLET ORAL DAILY
Qty: 90 TABLET | Refills: 1 | Status: SHIPPED | OUTPATIENT
Start: 2022-02-14 | End: 2022-08-16

## 2022-02-14 NOTE — PROGRESS NOTES
Leodan is a 41 year old who is being evaluated via a billable telephone visit.      What phone number would you like to be contacted at? cell  How would you like to obtain your AVS? MyChart    Assessment & Plan     PIOTR (generalized anxiety disorder)  Worsening  Exercise daily  See therapist   Follow up in 1 week.   - escitalopram (LEXAPRO) 5 MG tablet; Take 1 tablet (5 mg) by mouth daily  - Vitamin D Deficiency; Future  - TSH with free T4 reflex; Future  - Comprehensive metabolic panel (BMP + Alb, Alk Phos, ALT, AST, Total. Bili, TP); Future    Review of external notes as documented elsewhere in note  11 minutes spent on the date of the encounter doing review of outside records        Regular exercise  See Patient Instructions    No follow-ups on file.    Blane Worrell MD  [facility]    Subjective   Leodan is a 41 year old who presents for the following health issues     HPI     Depression and Anxiety Follow-Up    How are you doing with your depression since your last visit? Worsened     How are you doing with your anxiety since your last visit?  Worsened     Are you having other symptoms that might be associated with depression or anxiety? Yes:  lack of energy    Have you had a significant life event? Health Concerns     Do you have any concerns with your use of alcohol or other drugs? No    Social History     Tobacco Use     Smoking status: Never Smoker     Smokeless tobacco: Never Used   Substance Use Topics     Alcohol use: Yes     Alcohol/week: 0.0 standard drinks     Comment: x1 a week     Drug use: No     PHQ 11/11/2019 7/18/2021   PHQ-9 Total Score 5 9   Q9: Thoughts of better off dead/self-harm past 2 weeks Not at all Not at all     PIOTR-7 SCORE 11/11/2019 7/18/2021   Total Score - 16 (severe anxiety)   Total Score 10 16         Suicide Assessment Five-step Evaluation and Treatment (SAFE-T)         Review of Systems   Constitutional, HEENT, cardiovascular, pulmonary, gi  and gu systems are negative, except as otherwise noted.      Objective           Vitals:  No vitals were obtained today due to virtual visit.    Physical Exam   healthy, alert and no distress  PSYCH: Alert and oriented times 3; coherent speech, normal   rate and volume, able to articulate logical thoughts, able   to abstract reason, no tangential thoughts, no hallucinations   or delusions  His affect is normal  RESP: No cough, no audible wheezing, able to talk in full sentences  Remainder of exam unable to be completed due to telephone visits    Lab on 10/14/2021   Component Date Value Ref Range Status     Bilirubin Total 10/14/2021 2.5* 0.2 - 1.3 mg/dL Final     Bilirubin Direct 10/14/2021 0.4* 0.0 - 0.2 mg/dL Final     Protein Total 10/14/2021 8.9* 6.8 - 8.8 g/dL Final     Albumin 10/14/2021 4.6  3.4 - 5.0 g/dL Final     Alkaline Phosphatase 10/14/2021 53  40 - 150 U/L Final     AST 10/14/2021 25  0 - 45 U/L Final     ALT 10/14/2021 54  0 - 70 U/L Final     WBC Count 10/14/2021 9.4  4.0 - 11.0 10e3/uL Final     RBC Count 10/14/2021 5.38  4.40 - 5.90 10e6/uL Final     Hemoglobin 10/14/2021 16.5  13.3 - 17.7 g/dL Final     Hematocrit 10/14/2021 48.1  40.0 - 53.0 % Final     MCV 10/14/2021 89  78 - 100 fL Final     MCH 10/14/2021 30.7  26.5 - 33.0 pg Final     MCHC 10/14/2021 34.3  31.5 - 36.5 g/dL Final     RDW 10/14/2021 12.4  10.0 - 15.0 % Final     Platelet Count 10/14/2021 434  150 - 450 10e3/uL Final     % Neutrophils 10/14/2021 64  % Final     % Lymphocytes 10/14/2021 27  % Final     % Monocytes 10/14/2021 6  % Final     % Eosinophils 10/14/2021 2  % Final     % Basophils 10/14/2021 1  % Final     % Immature Granulocytes 10/14/2021 0  % Final     NRBCs per 100 WBC 10/14/2021 0  <1 /100 Final     Absolute Neutrophils 10/14/2021 6.1  1.6 - 8.3 10e3/uL Final     Absolute Lymphocytes 10/14/2021 2.5  0.8 - 5.3 10e3/uL Final     Absolute Monocytes 10/14/2021 0.6  0.0 - 1.3 10e3/uL Final     Absolute Eosinophils  10/14/2021 0.2  0.0 - 0.7 10e3/uL Final     Absolute Basophils 10/14/2021 0.1  0.0 - 0.2 10e3/uL Final     Absolute Immature Granulocytes 10/14/2021 0.0  <=0.0 10e3/uL Final     Absolute NRBCs 10/14/2021 0.0  10e3/uL Final               Phone call duration: 16 minutes

## 2022-02-18 ENCOUNTER — LAB (OUTPATIENT)
Dept: LAB | Facility: CLINIC | Age: 42
End: 2022-02-18
Payer: COMMERCIAL

## 2022-02-18 DIAGNOSIS — F41.1 GAD (GENERALIZED ANXIETY DISORDER): ICD-10-CM

## 2022-02-18 LAB
ALBUMIN SERPL-MCNC: 4.4 G/DL (ref 3.4–5)
ALP SERPL-CCNC: 75 U/L (ref 40–150)
ALT SERPL W P-5'-P-CCNC: 71 U/L (ref 0–70)
ANION GAP SERPL CALCULATED.3IONS-SCNC: 9 MMOL/L (ref 3–14)
AST SERPL W P-5'-P-CCNC: 35 U/L (ref 0–45)
BILIRUB SERPL-MCNC: 2 MG/DL (ref 0.2–1.3)
BUN SERPL-MCNC: 21 MG/DL (ref 7–30)
CALCIUM SERPL-MCNC: 10.3 MG/DL (ref 8.5–10.1)
CHLORIDE BLD-SCNC: 101 MMOL/L (ref 94–109)
CO2 SERPL-SCNC: 27 MMOL/L (ref 20–32)
CREAT SERPL-MCNC: 0.96 MG/DL (ref 0.66–1.25)
DEPRECATED CALCIDIOL+CALCIFEROL SERPL-MC: 32 UG/L (ref 20–75)
GFR SERPL CREATININE-BSD FRML MDRD: >90 ML/MIN/1.73M2
GLUCOSE BLD-MCNC: 97 MG/DL (ref 70–99)
POTASSIUM BLD-SCNC: 3.9 MMOL/L (ref 3.4–5.3)
PROT SERPL-MCNC: 8.7 G/DL (ref 6.8–8.8)
SODIUM SERPL-SCNC: 137 MMOL/L (ref 133–144)
TSH SERPL DL<=0.005 MIU/L-ACNC: 0.53 MU/L (ref 0.4–4)

## 2022-02-18 PROCEDURE — 80053 COMPREHEN METABOLIC PANEL: CPT

## 2022-02-18 PROCEDURE — 84443 ASSAY THYROID STIM HORMONE: CPT

## 2022-02-18 PROCEDURE — 82306 VITAMIN D 25 HYDROXY: CPT

## 2022-02-18 PROCEDURE — 36415 COLL VENOUS BLD VENIPUNCTURE: CPT

## 2022-03-10 ENCOUNTER — TRANSFERRED RECORDS (OUTPATIENT)
Dept: HEALTH INFORMATION MANAGEMENT | Facility: CLINIC | Age: 42
End: 2022-03-10
Payer: COMMERCIAL

## 2022-03-11 ENCOUNTER — VIRTUAL VISIT (OUTPATIENT)
Dept: PSYCHOLOGY | Facility: CLINIC | Age: 42
End: 2022-03-11
Payer: COMMERCIAL

## 2022-03-11 ENCOUNTER — OFFICE VISIT (OUTPATIENT)
Dept: URGENT CARE | Facility: URGENT CARE | Age: 42
End: 2022-03-11
Payer: COMMERCIAL

## 2022-03-11 VITALS
HEART RATE: 55 BPM | SYSTOLIC BLOOD PRESSURE: 136 MMHG | WEIGHT: 151 LBS | TEMPERATURE: 98.1 F | BODY MASS INDEX: 23.65 KG/M2 | OXYGEN SATURATION: 97 % | DIASTOLIC BLOOD PRESSURE: 89 MMHG

## 2022-03-11 DIAGNOSIS — F32.0 CURRENT MILD EPISODE OF MAJOR DEPRESSIVE DISORDER, UNSPECIFIED WHETHER RECURRENT (H): ICD-10-CM

## 2022-03-11 DIAGNOSIS — R11.0 NAUSEA: ICD-10-CM

## 2022-03-11 DIAGNOSIS — Z56.9 OCCUPATIONAL PROBLEM: ICD-10-CM

## 2022-03-11 DIAGNOSIS — F41.9 ANXIETY: Primary | ICD-10-CM

## 2022-03-11 DIAGNOSIS — R42 DIZZINESS: Primary | ICD-10-CM

## 2022-03-11 PROCEDURE — 99214 OFFICE O/P EST MOD 30 MIN: CPT | Performed by: FAMILY MEDICINE

## 2022-03-11 PROCEDURE — 90837 PSYTX W PT 60 MINUTES: CPT | Mod: GT | Performed by: PSYCHOLOGIST

## 2022-03-11 RX ORDER — ONDANSETRON 4 MG/1
4-8 TABLET, ORALLY DISINTEGRATING ORAL EVERY 8 HOURS PRN
Qty: 12 TABLET | Refills: 0 | Status: ON HOLD | OUTPATIENT
Start: 2022-03-11 | End: 2023-10-29

## 2022-03-11 RX ORDER — MECLIZINE HYDROCHLORIDE 25 MG/1
25 TABLET ORAL 3 TIMES DAILY PRN
Qty: 30 TABLET | Refills: 0 | Status: SHIPPED | OUTPATIENT
Start: 2022-03-11 | End: 2023-08-31

## 2022-03-11 SDOH — ECONOMIC STABILITY - INCOME SECURITY: UNSPECIFIED PROBLEMS RELATED TO EMPLOYMENT: Z56.9

## 2022-03-11 NOTE — PROGRESS NOTES
Health Psychology                    Department of Medicine  Naomi Deutsch, Ph.D., L.P. [phone]                         [facility] Jocelin Christensen, Ph.D., L.P. [phone]                     [place] Mail Code 252   Alex Gil, Ph.D. [phone]      [address] [place] [address] Sandra Sanford, Ph.D., A.B.P.P., L.P. [phone]              [place] [address]           Mahamed Ozuna, Ph.D., A.B.P.P., L.P. [phone]      Helen Jolly, Ph.D., L.P. [phone]  [facility]   Lizette Griffith, Ph.D., A.B.P.P., L.P. [phone]    [address] [place] [address]    Health Psychology [facility]    Referral Source: Blane Worrell MD    Reason for Referral: Mr. Collins is a 41-year-old  white gentleman with a lengthy  history of Crohn's, who is referred due to anxiety, depression, and challenges coping with both chronic illness and the Covid pandemic.    Intake:  7/19/21    History of Presenting Concerns (at intake): Mr. Collins was diagnosed with Crohn's in 1993 t age 13, reporting that it started to get bad in 1999, 4 weeks into his freshman year of college.  He developed a perforated colon at that time, had a colostomy which was later reversed, and then in 2007 had another  colostomy and ileostomy 2009.  Since then, he stated his Crohn's has essentially been in remission.  He continued to have kidney stones approximately once per year.  He was fearful about symptoms returning and the Crohn's worsening..  He felt that he had been copeing relatively well with things the way they currently are in terms of his health and recognized that it is important to avoid highly stressful situations.    Mr. Collins also reported that he tends to be anxious.  He reported a fair amount of baseline anxiety and that in stressful times he tends to get irritated, both with himself and with others.  This leaves him to feel that he is not quite doing right by his family when he is more  irritable.  He tends to worry about a variety of things such as refinancing their home, having sufficient financial stability to doing things that he like to do.  The house they live in has 1 bathroom and he believes it would be helpful to add a second for the four people in his home as he anticipates as his daughters age, it will be increasingly important to have adequate facilities    Medical History: Mr. Collins does not use tobacco products.  Alcohol consumption is reported to be approximately 2 beers per week.  He drinks an energy drink every day or two.  He denies use of street drugs.  Exercise is described as erratic.  He recently had a procedure for a kidney stone.  He is aware that stress can be a factor in exacerbating Crohn's symptoms, and therefore he has been somewhat reluctant to take on more stressful work opportunities.  In 2005, he had a  more stressful job and he thinks it led to a flare of the Crohn's.    Past Medical History:   Diagnosis Date     Asthma     child     Crohn's disease (H)      History of blood transfusion      Kidney stones      Past Surgical History:   Procedure Laterality Date     COLOSTOMY  2007     COMBINED CYSTOSCOPY, RETROGRADES, URETEROSCOPY, INSERT STENT Right 5/3/2016    Procedure: COMBINED CYSTOSCOPY, RETROGRADES, URETEROSCOPY, INSERT STENT;  Surgeon: Azael Melvin MD;  Location: [facility] OR     COMBINED CYSTOSCOPY, RETROGRADES, URETEROSCOPY, LASER HOLMIUM LITHOTRIPSY URETER(S), INSERT STENT Right 1/31/2020    Procedure: CYSTOURETEROSCOPY, WITH RETROGRADE PYELOGRAM, HOLMIUM LASER LITHOTRIPSY OF URETERAL CALCULUS, AND STENT placement;  Surgeon: Bob Felix MD;  [facility]     COMBINED CYSTOSCOPY, RETROGRADES, URETEROSCOPY, LASER HOLMIUM LITHOTRIPSY URETER(S), INSERT STENT Left 7/1/2021    Procedure: CYSTOURETEROSCOPY, WITH RETROGRADE PYELOGRAM, HOLMIUM LASER LITHOTRIPSY, STENT INSERTION;  Surgeon: Bob Felix MD;  Location: [facility] OR     CYSTOSCOPY,  RETROGRADES, INSERT STENT URETER(S), COMBINED Left 6/9/2021    Procedure: CYSTOSCOPY, WITH Left  RETROGRADE PYELOGRAM AND Left URETERAL STENT INSERTION;  Surgeon: Trenton Lozano MD;  Location: [facility] OR     EXTRACORPOREAL SHOCK WAVE LITHOTRIPSY (ESWL)  6/27/2013    Procedure: EXTRACORPOREAL SHOCK WAVE LITHOTRIPSY (ESWL);  Left Extracorporeal Shock Wave Lithotripsy Kidney And Ureter;  Surgeon: Azael Melvin MD;  Location: [facility] OR     ILEOSTOMY  2009     LAPAROTOMY EXPLORATORY  2009    extensive lysis of adhesions, revision of coloenteric fistula, repair of small bowel fistula, takedown of current colostomy, debridement of chronic abscess, resection of residual left colon, right hemicolectomy, Louise ileostomy     LASER HOLMIUM LITHOTRIPSY URETER(S), INSERT STENT, COMBINED  4/9/2014    Procedure: COMBINED CYSTOSCOPY, URETEROSCOPY, LASER HOLMIUM LITHOTRIPSY URETER(S), INSERT STENT;  Ureteroscopy, Cystoscopy, holmium laser,Right Uretral stent placement;  Surgeon: Azael Melvin MD;  Location: [facility] OR     LASER HOLMIUM LITHOTRIPSY URETER(S), INSERT STENT, COMBINED Bilateral 5/17/2016    Procedure: COMBINED CYSTOSCOPY, URETEROSCOPY, LASER HOLMIUM LITHOTRIPSY URETER(S), INSERT STENT;  Surgeon: Azael Melvin MD;  Location: [facility] OR     Current Outpatient Medications   Medication     Adalimumab (HUMIRA PEN SC)     chlorthalidone (HYGROTON) 25 MG tablet     escitalopram (LEXAPRO) 5 MG tablet     ibuprofen (ADVIL/MOTRIN) 600 MG tablet     mercaptopurine (PURINETHOL) 50 MG tablet     nitroFURantoin macrocrystal-monohydrate (MACROBID) 100 MG capsule     potassium citrate (UROCIT-K) 10 MEQ (1080 MG) CR tablet     potassium citrate (UROCIT-K) 10 MEQ (1080 MG) CR tablet     potassium citrate-citric acid (CYTRA K) 8332-0002 MG Packet     potassium citrate-citric acid (POLYCITRA-K/CYTRA K) 0101-4955 MG Packet     VITAMIN D PO     No current facility-administered medications for this visit.     Wt Readings from Last 4  "Encounters:   07/01/21 68.5 kg (151 lb)   06/25/21 69.4 kg (153 lb)   06/09/21 68.5 kg (151 lb)   03/10/20 65.8 kg (145 lb)     Estimated body mass index is 23.65 kg/m  as calculated from the following:    Height as of 7/1/21: 1.702 m (5' 7\").    Weight as of 7/1/21: 68.5 kg (151 lb).    Social History (at intake): Mr. Collins grew up in [place].  He has a sister 5 years older than he is.  His father (76) and  his mother ( 75) were reported to both be in good health but are less capable than they were.  They help to take care of his 3 and 6-year-old daughters at times. He is concerned about his parnts' driving.  His father is an   His mother worked for [employer],  retired in 2004.    He and his wife both found the past year to be quite stressful between the children being home for long stretches of time, his wife having been on furlough, as well as both working from home in a relaitively small house. His wife, Laura Collins, manages global education  programs for [employer] students.    He attended [employer] where he got a degree in American Studies.  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  He has a book proposal for a third book currently being discussed with a publisher.  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  He is working approximately 30 to 35 hours a week.  He acknowledges that he does not feel fully stimulated, but is ambivalent about switching jobs because he considers it to be very supportive atmosphere.  His wife works with the travel abroad program at [employer].  He reports that they both tend to be anxious which sometimes is synergistic.  They have done fewer things in the past year during the pandemic that are recreational or bring sanjuana as there was a lot to manage in terms of childcare.    Psychiatric History: Mr. Collins reports that he saw a therapist briefly as a child to deal with " anxiety.  About a decade ago he also saw a therapist on a single occasion but did not feel it was a good fit.  There is no personal history of psychiatric hospitalizations or chemical dependency treatment.  He is not sure whether he ever tried psychoactive medication in college.  He believes that he discussed it in the past but never got around to trying.  The family history is remarkable for hospitalization and chemical issues in the maternal grandmother.    Psychological Screening: Mr. Collins completed the following four screening measures.    CAGE-AID Score: > 1 is a positive screen, suggesting further discussion is needed to determine if evaluation for alcohol or substance abuse is appropriate.  A score > 2 is considered clinically significant, suggesting further evaluation of alcohol or substance-related problems is indicated.  CAGE-AID Total Score 7/18/2021   Total Score 0   Total Score MyChart 0 (A total score of 2 or greater is considered clinically significant)     PIOTR-7 Score:  The General Anxiety Disorder-7 is an an anxiety screening instrument. Scores of 5, 10, and 15 respectively indicate mild, moderate, and severe anxiety symptoms.  PIOTR-7 SCORE 11/11/2019 7/18/2021   Total Score - 16 (severe anxiety)   Total Score 10 16     PHQ-9 Score:  The Patient Health Questionnaire-9 is a depression screening instrument. Scores of 5, 10, 15, and 20 respectively indicate mild, moderate, moderately severe and severe depression symptoms.   PHQ-9 SCORE 11/11/2019 7/18/2021   PHQ-9 Total Score MyChart - 9 (Mild depression)   PHQ-9 Total Score 5 9     WHODAS-12 Score:  WHODAS 2.0 Total Score 7/18/2021   Total Score 16   Total Score MyChart 16     Session: Mr. Collins arrived punctually for today's meeting.  He is alert and oriented.  Affect is more positive. Thoughts are linear.      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 It will be full-time.  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    Wife started an antidepressant a few weeks ago.  She felt better quickly.  He contacted Dr. Worrell, and started Lexapro, reporting the fog lifted when he started it. \" It has been miraculous.\"  He can't recall what he might have tried briefly in college.  He is doing better on it.    Discussed stressors of COVID, [place] war, and [employer] teacher strike, which is disruptive.   .  His wife's job continues to be stressful.  His mother-in-law santhosh be visiting this week, allowing projects to be completed, etc.     Rapport is positive.  He participated fully and appeared to benefit from the conversation.      Diagnosis:  Anxiety  Major depression, mild  Occupational problem    This telehealth service is appropriate and effective for delivering services in light of the necessity for social distancing to mitigate the COVID-19 epidemic and for conservation of PPE.     Patient has agreed to receiving telehealth services after being informed about it: Yes    Patient prefers video invitation/information to be sent by:   email    Time service started: 2:01  Time service ended:  2:58  Extended session due to complexity of case and length of interval.    Mode of transmission: [employer]    Location of originating:  Home of the patient    Distance site:  [facility] [place] [facility]    The patient has been notified that:  Video visits will be conducted via a call with their psychologist to provide the care they need with a video conversation. Video visits may be billed at different rates depending on insurance coverage.  Patients are advised to please contact their insurance provider with any questions about their health insurance coverage. If during the course of a call the psychologist feels a video visit is not appropriate, patients will not be charged for this service.    Plan: Mr. Collins will return for video visit to " continue problem-solving and supportive psychotherapy consistent with  treatment plan.  He will call to advise when his new job permits him to schedule time. If the anxiety depression do not decrease in response to counseling, medication may be effective.     Last treatment plan signed:11/5/21  Treatment plan due: 11/5/22                  Preference for future meetings:             In-person               xRemote              Either           Mahamed Ozuna, Ph.D., A.B.P.P., L.P.  Director, Health Psychology

## 2022-03-11 NOTE — PROGRESS NOTES
SUBJECTIVE  HPI:  Ledoan Collins is a 41 year old male who presents with the CC of nausea and dizziness.    Notice dizziness mid morning, has gotten worse as the day progressed.  Threw up once today but has felt nauseated.  Improved with food.  Has been staying hydrated.  No diarrhea.  Improved with laying down and sitting still.  Worse with going up and down stairs and with movement.    Does not feel off balance.  Had mild headache yesterday, took tylenol and resolved.  Denies any hearing issues, no tinnitus.  Denies any vision problems, last eye check was normal.    Has crohn's, this is not the same GI symptoms     Past Medical History:   Diagnosis Date     Asthma     child     Crohn's disease (H)      History of blood transfusion      Kidney stones      Current Outpatient Medications   Medication Sig Dispense Refill     Adalimumab (HUMIRA PEN SC) Inject 40 mg Subcutaneous every 14 days        chlorthalidone (HYGROTON) 25 MG tablet Take 1 tablet (25 mg) by mouth daily 90 tablet 3     escitalopram (LEXAPRO) 5 MG tablet Take 1 tablet (5 mg) by mouth daily 90 tablet 1     ibuprofen (ADVIL/MOTRIN) 600 MG tablet Take 1 tablet (600 mg) by mouth every 6 hours as needed for moderate pain 30 tablet 0     mercaptopurine (PURINETHOL) 50 MG tablet Take 100 mg by mouth daily        nitroFURantoin macrocrystal-monohydrate (MACROBID) 100 MG capsule Take 1 capsule (100 mg) by mouth 2 times daily 14 capsule 0     potassium citrate (UROCIT-K) 10 MEQ (1080 MG) CR tablet Take 2 tablets (20 mEq) by mouth 2 times daily 120 tablet 11     potassium citrate (UROCIT-K) 10 MEQ (1080 MG) CR tablet Take 2 tablets (20 mEq) by mouth 3 times daily (with meals) 90 tablet 3     potassium citrate-citric acid (CYTRA K) 7246-6311 MG Packet Sig-Route: take 1 packet by mouth daily-Oral 100 packet 3     potassium citrate-citric acid (POLYCITRA-K/CYTRA K) 1040-0521 MG Packet Take 1 packet by mouth daily 100 packet 3     VITAMIN D PO        Social History      Tobacco Use     Smoking status: Never Smoker     Smokeless tobacco: Never Used   Substance Use Topics     Alcohol use: Yes     Alcohol/week: 0.0 standard drinks     Comment: x1 a week       ROS:  Review of systems negative except as stated above.    OBJECTIVE:  /89   Pulse 55   Temp 98.1  F (36.7  C) (Tympanic)   Wt 68.5 kg (151 lb)   SpO2 97%   BMI 23.65 kg/m    GENERAL APPEARANCE: healthy, alert and no distress  EYES: EOMI,  PERRL, conjunctiva clear  HENT: ear canals and TM's normal.   PSYCH: mentation appears normal and affect normal/bright    ASSESSMENT/PLAN:  (R42) Dizziness  (primary encounter diagnosis)  Comment: suspect inner ear/vertgo  Plan: meclizine (ANTIVERT) 25 MG tablet            (R11.0) Nausea  Plan: ondansetron (ZOFRAN-ODT) 4 MG ODT tab            Suspect that dizziness is most likely inner ear etiology - reviewed benign positional vertigo and trial of RX meclizine.  If not improving, may need to be seen by ENT.  Encourage rest, plenty of fluids and refrain from head movements that causes symptoms.  RX zofran given to help with nausea, discussed that this is most likely due to dizziness/vertigo.    Follow up with primary provider if no improvement of symptoms in 1 week    Des Gutierres MD  March 11, 2022 8:54 PM

## 2022-03-12 NOTE — PATIENT INSTRUCTIONS
Patient Education     Inner Ear Problems: Causes of Dizziness (Vertigo)        Benign positional vertigo (BPV)   This is the most common cause of vertigo. BPV is also called benign positional paroxysmal vertigo (BPPV). It happens when crystals in the ear canals shift into the wrong place. Vertigo usually occurs when you move your head in a certain way. This can happen when turning in bed, bending, or looking up. Because BPV comes on quickly, you should think about if you are safe to drive or do other tasks that need your full attention.   BPV:    Causes vertigo that lasts for seconds. Vertigo can occur several times a day, depending on body position.    Doesn t cause hearing loss.    Often goes away on its own. But it may go away sooner with treatment.  Infection or inflammation  Sometimes the semicircular canals swell and send incorrect balance signals. This problem may be caused by a viral infection. Depending on the cause, your hearing can be affected (labyrinthitis). Or your hearing can remain normal (neuronitis).   Infection or inflammation:    Causes vertigo that lasts for hours or days. The first episode is usually the worst.    Can cause hearing loss.    Often goes away on its own. But it may go away sooner with treatment.  You may need vestibular rehabilitation if you have balance problems that don't go away.   Meniere s disease  This condition is uncommon. It happens when there is too much fluid in the ear canals. This causes increased pressure and swelling. It affects balance and hearing signals.   Meniere s disease may:    Cause vertigo that last for hours    Cause hearing problems that come and go. The problems are usually in one ear and get worse over time.    Cause buzzing or ringing in the ears (tinnitus)    Cause a feeling of fullness or pressure in the ear    Cause any of these lasting symptoms: vertigo, hearing loss, tinnitus, or ear fullness  Other causes of vertigo  Vertigo can also be caused  by:     A head injury    Certain medicines    Migraines    Brain problems, such as a stroke or bleeding in the brain    [employer] last reviewed this educational content on 2/1/2020 2000-2021 [employer]. All rights reserved. This information is not intended as a substitute for professional medical care. Always follow your healthcare professional's instructions.

## 2022-06-01 ENCOUNTER — APPOINTMENT (OUTPATIENT)
Dept: LAB | Facility: CLINIC | Age: 42
End: 2022-06-01
Payer: COMMERCIAL

## 2022-06-29 ENCOUNTER — TRANSFERRED RECORDS (OUTPATIENT)
Dept: FAMILY MEDICINE | Facility: CLINIC | Age: 42
End: 2022-06-29

## 2022-06-29 DIAGNOSIS — K50.90 CROHN'S DISEASE (H): Primary | ICD-10-CM

## 2022-06-29 DIAGNOSIS — K50.90: Primary | ICD-10-CM

## 2022-07-01 ENCOUNTER — LAB (OUTPATIENT)
Dept: LAB | Facility: CLINIC | Age: 42
End: 2022-07-01
Payer: COMMERCIAL

## 2022-07-01 DIAGNOSIS — K50.90: ICD-10-CM

## 2022-07-01 LAB
ALBUMIN SERPL-MCNC: 3.8 G/DL (ref 3.4–5)
ALP SERPL-CCNC: 54 U/L (ref 40–150)
ALT SERPL W P-5'-P-CCNC: 36 U/L (ref 0–70)
AST SERPL W P-5'-P-CCNC: 22 U/L (ref 0–45)
BASOPHILS # BLD AUTO: 0 10E3/UL (ref 0–0.2)
BASOPHILS NFR BLD AUTO: 1 %
BILIRUB DIRECT SERPL-MCNC: 0.2 MG/DL (ref 0–0.2)
BILIRUB SERPL-MCNC: 1.7 MG/DL (ref 0.2–1.3)
EOSINOPHIL # BLD AUTO: 0.1 10E3/UL (ref 0–0.7)
EOSINOPHIL NFR BLD AUTO: 2 %
ERYTHROCYTE [DISTWIDTH] IN BLOOD BY AUTOMATED COUNT: 12.5 % (ref 10–15)
ERYTHROCYTE [SEDIMENTATION RATE] IN BLOOD BY WESTERGREN METHOD: 3 MM/HR (ref 0–15)
HCT VFR BLD AUTO: 45 % (ref 40–53)
HGB BLD-MCNC: 15 G/DL (ref 13.3–17.7)
IMM GRANULOCYTES # BLD: 0 10E3/UL
IMM GRANULOCYTES NFR BLD: 0 %
LYMPHOCYTES # BLD AUTO: 1.9 10E3/UL (ref 0.8–5.3)
LYMPHOCYTES NFR BLD AUTO: 34 %
MCH RBC QN AUTO: 30.3 PG (ref 26.5–33)
MCHC RBC AUTO-ENTMCNC: 33.3 G/DL (ref 31.5–36.5)
MCV RBC AUTO: 91 FL (ref 78–100)
MONOCYTES # BLD AUTO: 0.6 10E3/UL (ref 0–1.3)
MONOCYTES NFR BLD AUTO: 10 %
NEUTROPHILS # BLD AUTO: 3 10E3/UL (ref 1.6–8.3)
NEUTROPHILS NFR BLD AUTO: 53 %
NRBC # BLD AUTO: 0 10E3/UL
NRBC BLD AUTO-RTO: 0 /100
PLATELET # BLD AUTO: 308 10E3/UL (ref 150–450)
PROT SERPL-MCNC: 7.4 G/DL (ref 6.8–8.8)
RBC # BLD AUTO: 4.95 10E6/UL (ref 4.4–5.9)
WBC # BLD AUTO: 5.6 10E3/UL (ref 4–11)

## 2022-07-01 PROCEDURE — 85004 AUTOMATED DIFF WBC COUNT: CPT

## 2022-07-01 PROCEDURE — 80076 HEPATIC FUNCTION PANEL: CPT

## 2022-07-01 PROCEDURE — 36415 COLL VENOUS BLD VENIPUNCTURE: CPT | Performed by: INTERNAL MEDICINE

## 2022-07-01 PROCEDURE — 85652 RBC SED RATE AUTOMATED: CPT | Performed by: INTERNAL MEDICINE

## 2022-07-06 ENCOUNTER — TRANSFERRED RECORDS (OUTPATIENT)
Dept: FAMILY MEDICINE | Facility: CLINIC | Age: 42
End: 2022-07-06

## 2022-08-29 ENCOUNTER — TRANSFERRED RECORDS (OUTPATIENT)
Dept: HEALTH INFORMATION MANAGEMENT | Facility: CLINIC | Age: 42
End: 2022-08-29

## 2022-10-10 ENCOUNTER — HEALTH MAINTENANCE LETTER (OUTPATIENT)
Age: 42
End: 2022-10-10

## 2022-12-15 ENCOUNTER — LAB (OUTPATIENT)
Dept: LAB | Facility: CLINIC | Age: 42
End: 2022-12-15
Payer: COMMERCIAL

## 2022-12-15 DIAGNOSIS — K50.90: ICD-10-CM

## 2022-12-15 DIAGNOSIS — K50.80 CROHN'S DISEASE OF BOTH SMALL AND LARGE INTESTINE (H): Primary | ICD-10-CM

## 2022-12-15 LAB
ALBUMIN SERPL-MCNC: 4.2 G/DL (ref 3.4–5)
ALP SERPL-CCNC: 42 U/L (ref 40–150)
ALT SERPL W P-5'-P-CCNC: 67 U/L (ref 0–70)
AST SERPL W P-5'-P-CCNC: 37 U/L (ref 0–45)
BASOPHILS # BLD AUTO: 0.1 10E3/UL (ref 0–0.2)
BASOPHILS NFR BLD AUTO: 1 %
BILIRUB DIRECT SERPL-MCNC: 0.3 MG/DL (ref 0–0.2)
BILIRUB SERPL-MCNC: 2.9 MG/DL (ref 0.2–1.3)
EOSINOPHIL # BLD AUTO: 0.1 10E3/UL (ref 0–0.7)
EOSINOPHIL NFR BLD AUTO: 2 %
ERYTHROCYTE [DISTWIDTH] IN BLOOD BY AUTOMATED COUNT: 12.8 % (ref 10–15)
HCT VFR BLD AUTO: 44.3 % (ref 40–53)
HGB BLD-MCNC: 15.5 G/DL (ref 13.3–17.7)
IMM GRANULOCYTES # BLD: 0 10E3/UL
IMM GRANULOCYTES NFR BLD: 0 %
LYMPHOCYTES # BLD AUTO: 2 10E3/UL (ref 0.8–5.3)
LYMPHOCYTES NFR BLD AUTO: 31 %
MCH RBC QN AUTO: 32.8 PG (ref 26.5–33)
MCHC RBC AUTO-ENTMCNC: 35 G/DL (ref 31.5–36.5)
MCV RBC AUTO: 94 FL (ref 78–100)
MONOCYTES # BLD AUTO: 0.6 10E3/UL (ref 0–1.3)
MONOCYTES NFR BLD AUTO: 9 %
NEUTROPHILS # BLD AUTO: 3.7 10E3/UL (ref 1.6–8.3)
NEUTROPHILS NFR BLD AUTO: 57 %
NRBC # BLD AUTO: 0 10E3/UL
NRBC BLD AUTO-RTO: 0 /100
PLATELET # BLD AUTO: 369 10E3/UL (ref 150–450)
PROT SERPL-MCNC: 7.9 G/DL (ref 6.8–8.8)
RBC # BLD AUTO: 4.72 10E6/UL (ref 4.4–5.9)
WBC # BLD AUTO: 6.4 10E3/UL (ref 4–11)

## 2022-12-15 PROCEDURE — 85025 COMPLETE CBC W/AUTO DIFF WBC: CPT

## 2022-12-15 PROCEDURE — 80076 HEPATIC FUNCTION PANEL: CPT

## 2022-12-15 PROCEDURE — 36415 COLL VENOUS BLD VENIPUNCTURE: CPT

## 2022-12-19 ENCOUNTER — TRANSFERRED RECORDS (OUTPATIENT)
Dept: HEALTH INFORMATION MANAGEMENT | Facility: CLINIC | Age: 42
End: 2022-12-19

## 2023-01-11 DIAGNOSIS — N20.0 KIDNEY STONE: ICD-10-CM

## 2023-01-14 NOTE — TELEPHONE ENCOUNTER
potassium citrate (UROCIT-K) 10 MEQ (1080 MG) CR tablet   Last Written Prescription Date: 10/1/21  Last Fill Quantity: 120,   # refills: 11  Last Office Visit : 12/10/21  Future Office visit: none      Routing refill request to provider for review/approval because: not on protocol, gap in med rf.

## 2023-01-17 RX ORDER — POTASSIUM CITRATE 10 MEQ/1
20 TABLET, EXTENDED RELEASE ORAL 2 TIMES DAILY
Qty: 120 TABLET | OUTPATIENT
Start: 2023-01-17

## 2023-01-18 ENCOUNTER — TELEPHONE (OUTPATIENT)
Dept: UROLOGY | Facility: CLINIC | Age: 43
End: 2023-01-18
Payer: COMMERCIAL

## 2023-02-07 DIAGNOSIS — F41.1 GAD (GENERALIZED ANXIETY DISORDER): ICD-10-CM

## 2023-02-07 RX ORDER — ESCITALOPRAM OXALATE 5 MG/1
TABLET ORAL
Qty: 90 TABLET | Refills: 1 | Status: SHIPPED | OUTPATIENT
Start: 2023-02-07 | End: 2023-08-10

## 2023-02-07 NOTE — TELEPHONE ENCOUNTER
"Requested Prescriptions   Pending Prescriptions Disp Refills     escitalopram (LEXAPRO) 5 MG tablet [Pharmacy Med Name: ESCITALOPRAM 5MG TABLETS] 90 tablet 1     Sig: TAKE 1 TABLET(5 MG) BY MOUTH DAILY       [facility] Protocol Passed - 2/7/2023  2:50 PM        Passed - Recent (12 mo) or future (30 days) visit within the authorizing provider's specialty     Patient has had an office visit with the authorizing provider or a provider within the authorizing providers department within the previous 12 mos or has a future within next 30 days. See \"Patient Info\" tab in inbasket, or \"Choose Columns\" in Meds & Orders section of the refill encounter.              Passed - Medication is active on med list        Passed - Patient is age 18 or older             Prescription approved per [facility] Refill Protocol.    Gin David RN  [facility]   "

## 2023-02-14 DIAGNOSIS — N20.0 KIDNEY STONE: ICD-10-CM

## 2023-02-14 RX ORDER — CHLORTHALIDONE 25 MG/1
25 TABLET ORAL DAILY
Qty: 60 TABLET | Refills: 0 | Status: SHIPPED | OUTPATIENT
Start: 2023-02-14 | End: 2023-04-11

## 2023-02-14 NOTE — TELEPHONE ENCOUNTER
chlorthalidone (HYGROTON) 25 MG tablet      Last Written Prescription Date:  12/10/21  Last Fill Quantity: 90,   # refills: 3  Last Office Visit : 12/10/21  Future Office visit:  4/11/23    Routing refill request to provider for review/approval because:  Overdue for 12mo office visit    60d carlee refill sent to cover patient until his appt on 4/11/23.

## 2023-02-18 ENCOUNTER — HEALTH MAINTENANCE LETTER (OUTPATIENT)
Age: 43
End: 2023-02-18

## 2023-03-15 DIAGNOSIS — N20.0 KIDNEY STONE: ICD-10-CM

## 2023-03-17 ENCOUNTER — PRE VISIT (OUTPATIENT)
Dept: UROLOGY | Facility: CLINIC | Age: 43
End: 2023-03-17
Payer: COMMERCIAL

## 2023-03-17 NOTE — TELEPHONE ENCOUNTER
Reason for visit: follow up      Dx/Hx/Sx: kidney stone     Records/imaging/labs/orders: [facility] in epic    At Rooming: video visit

## 2023-03-18 NOTE — TELEPHONE ENCOUNTER
POTASSIUM CIT 1080MG (10MEQ) TABS  Last Written Prescription Date:   10/1/2021  Last Fill Quantity: 120,   # refills: 11  Last Office Visit :  12/10/2021  Future Office visit:  4/11/2023    Routing refill request to provider for review/approval because:  Not on protocol to fill.  Last filled Oct 2021.  Refer to clinic for review and refills per Providers orders for Pt care.       Salena Crandall RN  Central Triage Red Flags/Med Refills

## 2023-03-20 RX ORDER — POTASSIUM CITRATE 10 MEQ/1
20 TABLET, EXTENDED RELEASE ORAL 2 TIMES DAILY
Qty: 120 TABLET | Refills: 3 | Status: SHIPPED | OUTPATIENT
Start: 2023-03-20 | End: 2023-08-11

## 2023-03-23 ENCOUNTER — TRANSFERRED RECORDS (OUTPATIENT)
Dept: HEALTH INFORMATION MANAGEMENT | Facility: CLINIC | Age: 43
End: 2023-03-23
Payer: COMMERCIAL

## 2023-04-06 ENCOUNTER — OFFICE VISIT (OUTPATIENT)
Dept: DERMATOLOGY | Facility: CLINIC | Age: 43
End: 2023-04-06
Payer: COMMERCIAL

## 2023-04-06 DIAGNOSIS — L57.8 ACTINIC SKIN DAMAGE: ICD-10-CM

## 2023-04-06 DIAGNOSIS — L82.0 INFLAMED SEBORRHEIC KERATOSIS: Primary | ICD-10-CM

## 2023-04-06 DIAGNOSIS — B07.9 VERRUCA VULGARIS: ICD-10-CM

## 2023-04-06 PROCEDURE — 99203 OFFICE O/P NEW LOW 30 MIN: CPT | Mod: 25 | Performed by: STUDENT IN AN ORGANIZED HEALTH CARE EDUCATION/TRAINING PROGRAM

## 2023-04-06 PROCEDURE — 17110 DESTRUCTION B9 LES UP TO 14: CPT | Mod: GC | Performed by: STUDENT IN AN ORGANIZED HEALTH CARE EDUCATION/TRAINING PROGRAM

## 2023-04-06 NOTE — LETTER
4/6/2023       RE: Leodan Collins  [address]  [facility] [address]     Dear Colleague,    Thank you for referring your patient, Leodan Collins, to the [facility] DERMATOLOGY CLINIC [place] at [facility]. Please see a copy of my visit note below.    [facility] Dermatology Note  Encounter Date: Apr 6, 2023  Office Visit     Dermatology Problem List:  1. Inflamed Seborrheic Keratosis  2. Verruca Vulgaris    ____________________________________________    Assessment & Plan:     # Inflamed Seborrheic Keratosis  Patient has numerous inflamed SKs on the forehead. Given inflammation, this is amenable for LN cryotherapy. Risks and benefits discussed, patient expressed understanding with plan.  - 5 iSKs on forehead treated with LN cryotherapy (see procedure note below)    # Verruca Vulgaris  4mm brown papule with filiform appearance on dermoscopy. Recommend treatmetn with cryotherapy at this time. Risks and benefits discussed, patient expressed understanding with plan.   - 1 lesion treated on right dorsal nose    #Actinic skin damage  - discussed sunprotective behaviors, clothing, and the use of sunscreen        Procedures Performed:   - Cryotherapy procedure note, location(s): Forehead. After verbal consent and discussion of risks and benefits including, but not limited to, dyspigmentation/scar, blister, and pain, 5 lesion(s) was(were) treated with 1-2 mm freeze border for 1-2 cycles with liquid nitrogen. Post cryotherapy instructions were provided.  - Cryotherapy procedure note, location(s): Nose. After verbal consent and discussion of risks and benefits including, but not limited to, dyspigmentation/scar, blister, and pain, 1 lesion(s) was(were) treated with 1-2 mm freeze border for 1-2 cycles with liquid nitrogen. Post cryotherapy instructions were provided.      Follow-up: prn for new or changing lesions    Staff and Resident:      Vincenzo Polanco MD  Internal Medicine - Dermatology (PGY-2)    ____________________________________________    CC: Derm Problem (Pt states he has keratosis on his temples.)    HPI:  Mr. Leodan Collins is a(n) 43 year old male who presents today for evaluation of skin lesions on the face. Patient notes brown lesions scattered on the face that are irritating for him. Also has hx of wart on the nose. Previous warts have been treated with LN cryotherapy. No personal or family hx skin cancer. Patient is otherwise feeling well, without additional skin concerns.    Labs Reviewed:  N/A    Physical Exam:  Vitals: There were no vitals taken for this visit.  SKIN: Limited exam of face and scalp per patient preference  - There are waxy stuck on tan to brown papules on the forehead, few of which are inflamed   - There is a verrucous papule with filiform scale pattern on the right dorsal nose.   - Flat brown macules and patches in a sun exposed areas on face and extremities  - No other lesions of concern on areas examined.     Medications:  Current Outpatient Medications   Medication    Adalimumab (HUMIRA PEN SC)    chlorthalidone (HYGROTON) 25 MG tablet    escitalopram (LEXAPRO) 5 MG tablet    ibuprofen (ADVIL/MOTRIN) 600 MG tablet    meclizine (ANTIVERT) 25 MG tablet    mercaptopurine (PURINETHOL) 50 MG tablet    nitroFURantoin macrocrystal-monohydrate (MACROBID) 100 MG capsule    ondansetron (ZOFRAN-ODT) 4 MG ODT tab    potassium citrate (UROCIT-K) 10 MEQ (1080 MG) CR tablet    potassium citrate (UROCIT-K) 10 MEQ (1080 MG) CR tablet    potassium citrate-citric acid (CYTRA K) 2888-0583 MG Packet    potassium citrate-citric acid (POLYCITRA-K/CYTRA K) 6696-4867 MG Packet    VITAMIN D PO     No current facility-administered medications for this visit.      Past Medical History:   Patient Active Problem List   Diagnosis    Kidney stones    Crohn's disease (H)    CARDIOVASCULAR SCREENING; LDL GOAL LESS THAN 160     Hydronephrosis with renal and ureteral calculous obstruction    Ureteral calculus    Anal fistula    Ureteral stone     Past Medical History:   Diagnosis Date    Asthma     child    Crohn's disease (H)     History of blood transfusion     Kidney stones        CC Referred Self, MD  No address on file on close of this encounter.    Attestation with edits by Yan De La Rosa MD at 4/6/2023  2:42 PM:  I have personally examined this patient and agree with the resident doctor's documentation and plan of care. I have reviewed and amended the resident's note. The documentation accurately reflects my clinical observations, diagnoses, treatment and follow-up plans. I was present for key portions of the procedure(s).       Yan De La Rosa MD  Dermatology Staff

## 2023-04-06 NOTE — PROGRESS NOTES
[facility] Dermatology Note  Encounter Date: Apr 6, 2023  Office Visit     Dermatology Problem List:  1. Inflamed Seborrheic Keratosis  2. Verruca Vulgaris    ____________________________________________    Assessment & Plan:     # Inflamed Seborrheic Keratosis  Patient has numerous inflamed SKs on the forehead. Given inflammation, this is amenable for LN cryotherapy. Risks and benefits discussed, patient expressed understanding with plan.  - 5 iSKs on forehead treated with LN cryotherapy (see procedure note below)    # Verruca Vulgaris  4mm brown papule with filiform appearance on dermoscopy. Recommend treatmetn with cryotherapy at this time. Risks and benefits discussed, patient expressed understanding with plan.   - 1 lesion treated on right dorsal nose    #Actinic skin damage  - discussed sunprotective behaviors, clothing, and the use of sunscreen        Procedures Performed:   - Cryotherapy procedure note, location(s): Forehead. After verbal consent and discussion of risks and benefits including, but not limited to, dyspigmentation/scar, blister, and pain, 5 lesion(s) was(were) treated with 1-2 mm freeze border for 1-2 cycles with liquid nitrogen. Post cryotherapy instructions were provided.  - Cryotherapy procedure note, location(s): Nose. After verbal consent and discussion of risks and benefits including, but not limited to, dyspigmentation/scar, blister, and pain, 1 lesion(s) was(were) treated with 1-2 mm freeze border for 1-2 cycles with liquid nitrogen. Post cryotherapy instructions were provided.      Follow-up: prn for new or changing lesions    Staff and Resident:     Vincenzo Polanco MD  Internal Medicine - Dermatology (PGY-2)    ____________________________________________    CC: Derm Problem (Pt states he has keratosis on his temples.)    HPI:  Mr. Leodan Collins is a(n) 43 year old male who presents today for evaluation of skin lesions on the face. Patient notes brown lesions  scattered on the face that are irritating for him. Also has hx of wart on the nose. Previous warts have been treated with LN cryotherapy. No personal or family hx skin cancer. Patient is otherwise feeling well, without additional skin concerns.    Labs Reviewed:  N/A    Physical Exam:  Vitals: There were no vitals taken for this visit.  SKIN: Limited exam of face and scalp per patient preference  - There are waxy stuck on tan to brown papules on the forehead, few of which are inflamed   - There is a verrucous papule with filiform scale pattern on the right dorsal nose.   - Flat brown macules and patches in a sun exposed areas on face and extremities  - No other lesions of concern on areas examined.     Medications:  Current Outpatient Medications   Medication    Adalimumab (HUMIRA PEN SC)    chlorthalidone (HYGROTON) 25 MG tablet    escitalopram (LEXAPRO) 5 MG tablet    ibuprofen (ADVIL/MOTRIN) 600 MG tablet    meclizine (ANTIVERT) 25 MG tablet    mercaptopurine (PURINETHOL) 50 MG tablet    nitroFURantoin macrocrystal-monohydrate (MACROBID) 100 MG capsule    ondansetron (ZOFRAN-ODT) 4 MG ODT tab    potassium citrate (UROCIT-K) 10 MEQ (1080 MG) CR tablet    potassium citrate (UROCIT-K) 10 MEQ (1080 MG) CR tablet    potassium citrate-citric acid (CYTRA K) 8784-3903 MG Packet    potassium citrate-citric acid (POLYCITRA-K/CYTRA K) 4639-5686 MG Packet    VITAMIN D PO     No current facility-administered medications for this visit.      Past Medical History:   Patient Active Problem List   Diagnosis    Kidney stones    Crohn's disease (H)    CARDIOVASCULAR SCREENING; LDL GOAL LESS THAN 160    Hydronephrosis with renal and ureteral calculous obstruction    Ureteral calculus    Anal fistula    Ureteral stone     Past Medical History:   Diagnosis Date    Asthma     child    Crohn's disease (H)     History of blood transfusion     Kidney stones        CC Referred Self, MD  No address on file on close of this encounter.

## 2023-04-06 NOTE — NURSING NOTE
.  Dermatology Rooming Note    Leodan Collins's goals for this visit include:   Chief Complaint   Patient presents with     Derm Problem     Pt states he has keratosis on his temples.     Tulio Colbert, EMT-B

## 2023-04-06 NOTE — PATIENT INSTRUCTIONS
Cryotherapy    What is it?  Use of a very cold liquid, such as liquid nitrogen, to freeze and destroy abnormal skin cells that need to be removed    What should I expect?  Tenderness and redness  A small blister that might grow and fill with dark purple blood. There may be crusting.  More than one treatment may be needed if the lesions do not go away.    How do I care for the treated area?  Gently wash the area with your hands when bathing.  Use a thin layer of Vaseline to help with healing. You may use a Band-Aid.   The area should heal within 7-10 days and may leave behind a pink or lighter color.   Do not use an antibiotic or Neosporin ointment.   You may take acetaminophen (Tylenol) for pain.     Call your doctor if you have:  Severe pain  Signs of infection (warmth, redness, cloudy yellow drainage, and or a bad smell)  Questions or concerns    Who should I call with questions?      [facility]: [phone]      [facility]: [phone]      For urgent needs outside of business hours call the [facility] at [phone] and ask for the dermatology resident on call

## 2023-04-11 ENCOUNTER — VIRTUAL VISIT (OUTPATIENT)
Dept: UROLOGY | Facility: CLINIC | Age: 43
End: 2023-04-11
Payer: COMMERCIAL

## 2023-04-11 DIAGNOSIS — N20.0 KIDNEY STONE: ICD-10-CM

## 2023-04-11 PROCEDURE — 99213 OFFICE O/P EST LOW 20 MIN: CPT | Mod: VID | Performed by: NURSE PRACTITIONER

## 2023-04-11 RX ORDER — CHLORTHALIDONE 25 MG/1
25 TABLET ORAL DAILY
Qty: 90 TABLET | Refills: 3 | Status: SHIPPED | OUTPATIENT
Start: 2023-04-11 | End: 2023-08-11

## 2023-04-11 NOTE — PROGRESS NOTES
Virtual Visit Details    Type of service:  Video Visit   Video start time: 1:07 PM  Video end time: 1:20 PM    Originating Location (pt. Location): Home  Distant Location (provider location):  Off-site  Platform used for Video Visit: [employer]     Assessment/Plan:  43 year old male with a history of ileostomy and recurrent kidney stones, managed on potassium citrate and chlorthalidone. Repeat Litholink showing worsening hypocitraturia, but he had not been taking the Kcitrate for several days prior to completing this collection due to running out. He now has refills and has resumed taking it. Therefore, recommend that he repeat the Litholink in a few months to see where the citrate ends up on his current dose. If it remains low, may need to consider switching him to the powder form due to absorption changes with his ileostomy. Will also obtain a KUB at that time to get an updated look at his current stone burden.   -Litholink x1 and KUB in 2-3 months. Follow up with me at that time to review results.     Maisha Dowling, CNP  Department of Urology      Subjective:   43 year old male with a history of an ileostomy and recurrent kidney stones. Had URS with Dr. Felix in 07/2021. He takes potassium citrate and chlorthalidone for prevention. Our last visit was in 12/2021, at which time his Litholink continued to show hypercalciuria and hypocitraturia. He reported passing his potassium citrate tablets whole and was concerned about the possibility of not getting the entire dose, though this is common with this drug.     Litholink repeated a few weeks ago and shows worsened hypocitraturia (117 from 289). Results also show low urine output (1.30 L). His pH is borderline high at 6.3. He notes that he unfortunately ran out of the potassium citrate for several days and had only taken one dose by the time he did this collection. Otherwise feeling well today with no pain.     Objective:     Exam:   Patient is a 43 year old male    No vital signs obtained due to virtual visit.  General Appearance: Well groomed, hygenic  Respiratory: No cough, no respiratory distress or labored breathing  Musculoskeletal: Grossly normal with no gross deficits  Skin: No discoloration or apparent rashes  Neurologic: No tremors  Psychiatric: Alert and oriented  Further examination is deferred due to the nature of our visit.

## 2023-04-11 NOTE — LETTER
4/11/2023       RE: Leodan Collins  [address]  [facility] [address]     Dear Colleague,    Thank you for referring your patient, Leodan Collins, to the [facility] UROLOGY CLINIC [place] at [facility]. Please see a copy of my visit note below.    Virtual Visit Details    Type of service:  Video Visit   Video start time: 1:07 PM  Video end time: 1:20 PM    Originating Location (pt. Location): Home  Distant Location (provider location):  Off-site  Platform used for Video Visit: Well     Assessment/Plan:  43 year old male with a history of ileostomy and recurrent kidney stones, managed on potassium citrate and chlorthalidone. Repeat Litholink showing worsening hypocitraturia, but he had not been taking the Kcitrate for several days prior to completing this collection due to running out. He now has refills and has resumed taking it. Therefore, recommend that he repeat the Litholink in a few months to see where the citrate ends up on his current dose. If it remains low, may need to consider switching him to the powder form due to absorption changes with his ileostomy. Will also obtain a KUB at that time to get an updated look at his current stone burden.   -Litholink x1 and KUB in 2-3 months. Follow up with me at that time to review results.     Maisha Dowling, CNP  Department of Urology      Subjective:   43 year old male with a history of an ileostomy and recurrent kidney stones. Had URS with Dr. Felix in 07/2021. He takes potassium citrate and chlorthalidone for prevention. Our last visit was in 12/2021, at which time his Litholink continued to show hypercalciuria and hypocitraturia. He reported passing his potassium citrate tablets whole and was concerned about the possibility of not getting the entire dose, though this is common with this drug.     Litholink repeated a few weeks ago and shows worsened hypocitraturia (117 from 289). Results  also show low urine output (1.30 L). His pH is borderline high at 6.3. He notes that he unfortunately ran out of the potassium citrate for several days and had only taken one dose by the time he did this collection. Otherwise feeling well today with no pain.     Objective:     Exam:   Patient is a 43 year old male   No vital signs obtained due to virtual visit.  General Appearance: Well groomed, hygenic  Respiratory: No cough, no respiratory distress or labored breathing  Musculoskeletal: Grossly normal with no gross deficits  Skin: No discoloration or apparent rashes  Neurologic: No tremors  Psychiatric: Alert and oriented  Further examination is deferred due to the nature of our visit.

## 2023-04-11 NOTE — NURSING NOTE
Is the patient currently in the state of [place]? YES    Visit mode:VIDEO    If the visit is dropped, the patient can be reconnected by: VIDEO VISIT: Send to e-mail at: [email or web address]    Will anyone else be joining the visit? NO      How would you like to obtain your AVS? MyChart    Are changes needed to the allergy or medication list? YES: Pt has medications on list that he is no longer taking and need removal     Reason for visit: Follow up     Fernanda Walker on 4/11/2023 at 12:56 PM

## 2023-04-11 NOTE — PATIENT INSTRUCTIONS
UROLOGY CLINIC VISIT PATIENT INSTRUCTIONS    -Follow up with me in 2-3 months with a repeat Litholink collection and KUB x-ray done beforehand.     If you have any issues, questions or concerns in the meantime, do not hesitate to contact us at [phone] or via [employer].     Maisha Dowling, CNP  Department of Urology

## 2023-04-12 ENCOUNTER — MEDICAL CORRESPONDENCE (OUTPATIENT)
Dept: HEALTH INFORMATION MANAGEMENT | Facility: CLINIC | Age: 43
End: 2023-04-12
Payer: COMMERCIAL

## 2023-06-23 ENCOUNTER — ANCILLARY PROCEDURE (OUTPATIENT)
Dept: GENERAL RADIOLOGY | Facility: CLINIC | Age: 43
End: 2023-06-23
Attending: NURSE PRACTITIONER
Payer: COMMERCIAL

## 2023-06-23 DIAGNOSIS — N20.0 KIDNEY STONE: ICD-10-CM

## 2023-06-23 PROCEDURE — 74018 RADEX ABDOMEN 1 VIEW: CPT | Performed by: STUDENT IN AN ORGANIZED HEALTH CARE EDUCATION/TRAINING PROGRAM

## 2023-06-24 DIAGNOSIS — N20.0 KIDNEY STONE: ICD-10-CM

## 2023-06-28 RX ORDER — CHLORTHALIDONE 25 MG/1
25 TABLET ORAL DAILY
Qty: 90 TABLET | Refills: 3 | OUTPATIENT
Start: 2023-06-28

## 2023-06-30 ENCOUNTER — LAB (OUTPATIENT)
Dept: LAB | Facility: CLINIC | Age: 43
End: 2023-06-30
Payer: COMMERCIAL

## 2023-06-30 DIAGNOSIS — K50.90 CROHN'S DISEASE (H): ICD-10-CM

## 2023-06-30 DIAGNOSIS — K50.80 CROHN'S DISEASE OF BOTH SMALL AND LARGE INTESTINE (H): ICD-10-CM

## 2023-06-30 LAB
ALBUMIN SERPL BCG-MCNC: 4.5 G/DL (ref 3.5–5.2)
ALP SERPL-CCNC: 61 U/L (ref 40–129)
ALT SERPL W P-5'-P-CCNC: 46 U/L (ref 0–70)
AST SERPL W P-5'-P-CCNC: 33 U/L (ref 0–45)
BASOPHILS # BLD AUTO: 0.1 10E3/UL (ref 0–0.2)
BASOPHILS NFR BLD AUTO: 1 %
BILIRUB DIRECT SERPL-MCNC: <0.2 MG/DL (ref 0–0.3)
BILIRUB SERPL-MCNC: 1.3 MG/DL
EOSINOPHIL # BLD AUTO: 0.1 10E3/UL (ref 0–0.7)
EOSINOPHIL NFR BLD AUTO: 2 %
ERYTHROCYTE [DISTWIDTH] IN BLOOD BY AUTOMATED COUNT: 13.2 % (ref 10–15)
HCT VFR BLD AUTO: 49 % (ref 40–53)
HGB BLD-MCNC: 16.5 G/DL (ref 13.3–17.7)
IMM GRANULOCYTES # BLD: 0 10E3/UL
IMM GRANULOCYTES NFR BLD: 0 %
LYMPHOCYTES # BLD AUTO: 1.8 10E3/UL (ref 0.8–5.3)
LYMPHOCYTES NFR BLD AUTO: 24 %
MCH RBC QN AUTO: 31.4 PG (ref 26.5–33)
MCHC RBC AUTO-ENTMCNC: 33.7 G/DL (ref 31.5–36.5)
MCV RBC AUTO: 93 FL (ref 78–100)
MONOCYTES # BLD AUTO: 0.6 10E3/UL (ref 0–1.3)
MONOCYTES NFR BLD AUTO: 8 %
NEUTROPHILS # BLD AUTO: 4.8 10E3/UL (ref 1.6–8.3)
NEUTROPHILS NFR BLD AUTO: 65 %
NRBC # BLD AUTO: 0 10E3/UL
NRBC BLD AUTO-RTO: 0 /100
PLATELET # BLD AUTO: 288 10E3/UL (ref 150–450)
PROT SERPL-MCNC: 7.7 G/DL (ref 6.4–8.3)
RBC # BLD AUTO: 5.25 10E6/UL (ref 4.4–5.9)
WBC # BLD AUTO: 7.4 10E3/UL (ref 4–11)

## 2023-06-30 PROCEDURE — 85025 COMPLETE CBC W/AUTO DIFF WBC: CPT

## 2023-06-30 PROCEDURE — 80076 HEPATIC FUNCTION PANEL: CPT

## 2023-06-30 PROCEDURE — 86481 TB AG RESPONSE T-CELL SUSP: CPT

## 2023-06-30 PROCEDURE — 36415 COLL VENOUS BLD VENIPUNCTURE: CPT

## 2023-07-02 LAB
GAMMA INTERFERON BACKGROUND BLD IA-ACNC: 0.05 IU/ML
M TB IFN-G BLD-IMP: NEGATIVE
M TB IFN-G CD4+ BCKGRND COR BLD-ACNC: 9.95 IU/ML
MITOGEN IGNF BCKGRD COR BLD-ACNC: -0.01 IU/ML
MITOGEN IGNF BCKGRD COR BLD-ACNC: 0 IU/ML
QUANTIFERON MITOGEN: 10 IU/ML
QUANTIFERON NIL TUBE: 0.05 IU/ML
QUANTIFERON TB1 TUBE: 0.04 IU/ML
QUANTIFERON TB2 TUBE: 0.05

## 2023-08-03 DIAGNOSIS — F41.1 GAD (GENERALIZED ANXIETY DISORDER): ICD-10-CM

## 2023-08-03 NOTE — TELEPHONE ENCOUNTER
"Requested Prescriptions   Pending Prescriptions Disp Refills    escitalopram (LEXAPRO) 5 MG tablet [Pharmacy Med Name: ESCITALOPRAM 5MG TABLETS] 90 tablet 1     Sig: TAKE 1 TABLET(5 MG) BY MOUTH DAILY       SSRIs Protocol Failed - 8/3/2023  3:40 AM        Failed - Recent (12 mo) or future (30 days) visit within the authorizing provider's specialty     Patient has had an office visit with the authorizing provider or a provider within the authorizing providers department within the previous 12 mos or has a future within next 30 days. See \"Patient Info\" tab in inbasket, or \"Choose Columns\" in Meds & Orders section of the refill encounter.              Passed - Medication is active on med list        Passed - Patient is age 18 or older          Routing refill request to provider for review/approval because:  Patient needs to be seen because it has been more than 1 year since last office visit.     Sent pt a My chart message to schedule a appointment.     Gin David RN  [facility]   "

## 2023-08-09 ENCOUNTER — PRE VISIT (OUTPATIENT)
Dept: UROLOGY | Facility: CLINIC | Age: 43
End: 2023-08-09
Payer: COMMERCIAL

## 2023-08-09 NOTE — TELEPHONE ENCOUNTER
Reason for visit: follow up      Dx/Hx/Sx: kidney stone     Records/imaging/labs/orders: XR KUB scheduled, Litholink not in epic     At Rooming: video visit

## 2023-08-10 RX ORDER — ESCITALOPRAM OXALATE 5 MG/1
TABLET ORAL
Qty: 90 TABLET | Refills: 1 | Status: SHIPPED | OUTPATIENT
Start: 2023-08-10 | End: 2023-08-31

## 2023-08-10 NOTE — TELEPHONE ENCOUNTER
"Requested Prescriptions   Pending Prescriptions Disp Refills    escitalopram (LEXAPRO) 5 MG tablet [Pharmacy Med Name: ESCITALOPRAM 5MG TABLETS] 90 tablet 1     Sig: TAKE 1 TABLET(5 MG) BY MOUTH DAILY       SSRIs Protocol Failed - 8/3/2023  3:40 AM        Failed - Recent (12 mo) or future (30 days) visit within the authorizing provider's specialty     Patient has had an office visit with the authorizing provider or a provider within the authorizing providers department within the previous 12 mos or has a future within next 30 days. See \"Patient Info\" tab in inbasket, or \"Choose Columns\" in Meds & Orders section of the refill encounter.              Passed - Medication is active on med list        Passed - Patient is age 18 or older           Has upcoming OV on 8/31/23, per sebastien requesting bridge to appointment.    Thanks!  Khris Fuentes RN   [facility]    "

## 2023-08-11 ENCOUNTER — VIRTUAL VISIT (OUTPATIENT)
Dept: UROLOGY | Facility: CLINIC | Age: 43
End: 2023-08-11
Payer: COMMERCIAL

## 2023-08-11 DIAGNOSIS — N20.0 KIDNEY STONE: Primary | ICD-10-CM

## 2023-08-11 PROCEDURE — 99213 OFFICE O/P EST LOW 20 MIN: CPT | Mod: VID | Performed by: NURSE PRACTITIONER

## 2023-08-11 RX ORDER — CHLORTHALIDONE 25 MG/1
25 TABLET ORAL DAILY
Qty: 90 TABLET | Refills: 3 | Status: SHIPPED | OUTPATIENT
Start: 2023-08-11

## 2023-08-11 RX ORDER — POTASSIUM CITRATE 10 MEQ/1
20 TABLET, EXTENDED RELEASE ORAL
Qty: 180 TABLET | Refills: 11 | Status: SHIPPED | OUTPATIENT
Start: 2023-08-11 | End: 2024-01-30

## 2023-08-11 NOTE — PATIENT INSTRUCTIONS
UROLOGY CLINIC VISIT PATIENT INSTRUCTIONS    -Continue the potassium citrate 20 mEq three times daily with meals, as well as the chlorthalidone 25 mg daily. Refills for both sent to the [facility] on [address]   -Let me know when the 24-hour collection is complete and I will watch for results to come back.   -Will check a metabolic panel with your next routine blood draw in October. Will also add a parathyroid hormone level to this.   -Will otherwise plan to see you in one year with a CT scan done beforehand.     If you have any issues, questions or concerns in the meantime, do not hesitate to contact us at [phone] or via [employer].     Maisha Dowling, CNP  Department of Urology

## 2023-08-11 NOTE — LETTER
8/11/2023       RE: Leodan Collins  [address]  [facility] [address]     Dear Colleague,    Thank you for referring your patient, Leodan Collins, to the [facility] UROLOGY CLINIC [place] at [facility]. Please see a copy of my visit note below.    Virtual Visit Details    Type of service:  Video Visit   Originating Location (pt. Location): Home  Distant Location (provider location):  Off-site  Platform used for Video Visit: [employer]     Video start time: 10:00 AM  Video end time: 10:15 AM    CC: Kidney stones    Assessment/Plan:  43 year old male with a history of Crohn's with ileostomy and recurrent kidney stones, managed on potassium citrate and chlorthalidone. Stone burden appears slightly increased on recent KUB and he also recently passed a small stone. Planned for him to repeat a 24-hour collection but has not done this yet. He does have the kit so will plan to do this next week. He will notify me when this is complete so that I can watch for results. Will send him comments via [employer] rather than scheduling another visit for review of this.   -Continue potassium citrate and chlorthalidone. Refills sent to pharmacy.   -Will be in touch with him regarding results of his Litholink, per above. If citrate level remains suboptimal despite his current Kcitrate dose, will need to revisit the powder/liquid form, which may require a LMN to be written.    -With next routine blood draw he has in October, will check a BMP and parathyroid level, as he has been noted to have high serum calcium on previous checks.   -He will otherwise follow up with me in one year with a non-contrast CT A/P completed beforehand.     Maisha Dowling, CNP  Department of Urology      Subjective:   43 year old male with a history of Crohn's with ileostomy and recurrent kidney stones, managed on potassium citrate and chlorthalidone. At our last visit we reviewed his Litholink, which  showed worsening hypocitraturia, but he had not been taking the Kcitrate for several days prior to completing this collection due to running out. Therefore planned for him to repeat this collection while on the Kcitrate.     KUB obtained on 6/23 shows a slight increase in stone burden, including bilateral stones measuring up to 7 mm on the right and 6 mm on the left.     Today, he states that he passed a small stone a few days ago. Was very small and he saw it at the bottom of the toilet. Did not capture it. Denies any pain currently. Continues to take the potassium citrate regularly. Continues to see partially/whole tablets in his stool so again questions if he is receiving the whole dose. Power/liquid form of this prescribed in the past due to his anatomy and question of under-absorption but was never started- suspected 2/2 insurance coverage.     He did receive his 24-hour urine kit but has not yet completed this.     Objective:     Exam:   Patient is a 43 year old male   No vital signs obtained due to virtual visit.  General Appearance: Well groomed, hygenic  Respiratory: No cough, no respiratory distress or labored breathing  Musculoskeletal: Grossly normal with no gross deficits  Skin: No discoloration or apparent rashes  Neurologic: No tremors  Psychiatric: Alert and oriented  Further examination is deferred due to the nature of our visit.

## 2023-08-11 NOTE — PROGRESS NOTES
Virtual Visit Details    Type of service:  Video Visit   Originating Location (pt. Location): Home  Distant Location (provider location):  Off-site  Platform used for Video Visit: DFine     Video start time: 10:00 AM  Video end time: 10:15 AM    CC: Kidney stones    Assessment/Plan:  43 year old male with a history of Crohn's with ileostomy and recurrent kidney stones, managed on potassium citrate and chlorthalidone. Stone burden appears slightly increased on recent KUB and he also recently passed a small stone. Planned for him to repeat a 24-hour collection but has not done this yet. He does have the kit so will plan to do this next week. He will notify me when this is complete so that I can watch for results. Will send him comments via [employer] rather than scheduling another visit for review of this.   -Continue potassium citrate and chlorthalidone. Refills sent to pharmacy.   -Will be in touch with him regarding results of his Litholink, per above. If citrate level remains suboptimal despite his current Kcitrate dose, will need to revisit the powder/liquid form, which may require a LMN to be written.    -With next routine blood draw he has in October, will check a BMP and parathyroid level, as he has been noted to have high serum calcium on previous checks.   -He will otherwise follow up with me in one year with a non-contrast CT A/P completed beforehand.     Maisha Dowling, CNP  Department of Urology      Subjective:   43 year old male with a history of Crohn's with ileostomy and recurrent kidney stones, managed on potassium citrate and chlorthalidone. At our last visit we reviewed his Litholink, which showed worsening hypocitraturia, but he had not been taking the Kcitrate for several days prior to completing this collection due to running out. Therefore planned for him to repeat this collection while on the Kcitrate.     KUB obtained on 6/23 shows a slight increase in stone burden, including bilateral stones  measuring up to 7 mm on the right and 6 mm on the left.     Today, he states that he passed a small stone a few days ago. Was very small and he saw it at the bottom of the toilet. Did not capture it. Denies any pain currently. Continues to take the potassium citrate regularly. Continues to see partially/whole tablets in his stool so again questions if he is receiving the whole dose. Power/liquid form of this prescribed in the past due to his anatomy and question of under-absorption but was never started- suspected 2/2 insurance coverage.     He did receive his 24-hour urine kit but has not yet completed this.     Objective:     Exam:   Patient is a 43 year old male   No vital signs obtained due to virtual visit.  General Appearance: Well groomed, hygenic  Respiratory: No cough, no respiratory distress or labored breathing  Musculoskeletal: Grossly normal with no gross deficits  Skin: No discoloration or apparent rashes  Neurologic: No tremors  Psychiatric: Alert and oriented  Further examination is deferred due to the nature of our visit.

## 2023-08-11 NOTE — NURSING NOTE
Is the patient currently in the state of [place]? YES    Visit mode:VIDEO    If the visit is dropped, the patient can be reconnected by: VIDEO VISIT: Text to cell phone: [phone]    Will anyone else be joining the visit? NO      How would you like to obtain your AVS? MyChart    Are changes needed to the allergy or medication list? NO    Reason for visit: RECHECK

## 2023-08-31 ENCOUNTER — OFFICE VISIT (OUTPATIENT)
Dept: FAMILY MEDICINE | Facility: CLINIC | Age: 43
End: 2023-08-31
Payer: COMMERCIAL

## 2023-08-31 VITALS
OXYGEN SATURATION: 98 % | BODY MASS INDEX: 26.23 KG/M2 | SYSTOLIC BLOOD PRESSURE: 117 MMHG | DIASTOLIC BLOOD PRESSURE: 75 MMHG | HEART RATE: 56 BPM | HEIGHT: 67 IN | WEIGHT: 167.1 LBS | TEMPERATURE: 97.5 F | RESPIRATION RATE: 14 BRPM

## 2023-08-31 DIAGNOSIS — F41.1 GAD (GENERALIZED ANXIETY DISORDER): ICD-10-CM

## 2023-08-31 DIAGNOSIS — Z13.6 CARDIOVASCULAR SCREENING; LDL GOAL LESS THAN 160: ICD-10-CM

## 2023-08-31 DIAGNOSIS — K50.918 CROHN'S DISEASE WITH OTHER COMPLICATION, UNSPECIFIED GASTROINTESTINAL TRACT LOCATION (H): ICD-10-CM

## 2023-08-31 DIAGNOSIS — Z00.00 ROUTINE HISTORY AND PHYSICAL EXAMINATION OF ADULT: Primary | ICD-10-CM

## 2023-08-31 LAB
ANION GAP SERPL CALCULATED.3IONS-SCNC: 11 MMOL/L (ref 7–15)
BUN SERPL-MCNC: 16.9 MG/DL (ref 6–20)
CALCIUM SERPL-MCNC: 9.9 MG/DL (ref 8.6–10)
CHLORIDE SERPL-SCNC: 99 MMOL/L (ref 98–107)
CHOLEST SERPL-MCNC: 215 MG/DL
CREAT SERPL-MCNC: 0.9 MG/DL (ref 0.67–1.17)
DEPRECATED HCO3 PLAS-SCNC: 28 MMOL/L (ref 22–29)
GFR SERPL CREATININE-BSD FRML MDRD: >90 ML/MIN/1.73M2
GLUCOSE SERPL-MCNC: 89 MG/DL (ref 70–99)
HDLC SERPL-MCNC: 42 MG/DL
LDLC SERPL CALC-MCNC: 124 MG/DL
NONHDLC SERPL-MCNC: 173 MG/DL
POTASSIUM SERPL-SCNC: 3.9 MMOL/L (ref 3.4–5.3)
SODIUM SERPL-SCNC: 138 MMOL/L (ref 136–145)
TRIGL SERPL-MCNC: 245 MG/DL
TSH SERPL DL<=0.005 MIU/L-ACNC: 0.6 UIU/ML (ref 0.3–4.2)

## 2023-08-31 PROCEDURE — 90677 PCV20 VACCINE IM: CPT | Performed by: FAMILY MEDICINE

## 2023-08-31 PROCEDURE — 80048 BASIC METABOLIC PNL TOTAL CA: CPT | Performed by: FAMILY MEDICINE

## 2023-08-31 PROCEDURE — 80061 LIPID PANEL: CPT | Performed by: FAMILY MEDICINE

## 2023-08-31 PROCEDURE — 99396 PREV VISIT EST AGE 40-64: CPT | Mod: 25 | Performed by: FAMILY MEDICINE

## 2023-08-31 PROCEDURE — 90715 TDAP VACCINE 7 YRS/> IM: CPT | Performed by: FAMILY MEDICINE

## 2023-08-31 PROCEDURE — 90472 IMMUNIZATION ADMIN EACH ADD: CPT | Performed by: FAMILY MEDICINE

## 2023-08-31 PROCEDURE — 90471 IMMUNIZATION ADMIN: CPT | Performed by: FAMILY MEDICINE

## 2023-08-31 PROCEDURE — 84443 ASSAY THYROID STIM HORMONE: CPT | Performed by: FAMILY MEDICINE

## 2023-08-31 PROCEDURE — 36415 COLL VENOUS BLD VENIPUNCTURE: CPT | Performed by: FAMILY MEDICINE

## 2023-08-31 RX ORDER — ESCITALOPRAM OXALATE 5 MG/1
5 TABLET ORAL DAILY
Qty: 90 TABLET | Refills: 3 | Status: SHIPPED | OUTPATIENT
Start: 2023-08-31

## 2023-08-31 ASSESSMENT — ENCOUNTER SYMPTOMS
FREQUENCY: 0
CONSTIPATION: 0
NAUSEA: 0
WEAKNESS: 0
HEMATOCHEZIA: 0
HEMATURIA: 0
EYE PAIN: 0
JOINT SWELLING: 0
DIARRHEA: 0
ABDOMINAL PAIN: 0
HEADACHES: 0
MYALGIAS: 0
ARTHRALGIAS: 0
FEVER: 0
SHORTNESS OF BREATH: 0
DYSURIA: 0
HEARTBURN: 0
PARESTHESIAS: 0
CHILLS: 0
COUGH: 0
DIZZINESS: 0
PALPITATIONS: 0
NERVOUS/ANXIOUS: 1
SORE THROAT: 0

## 2023-08-31 ASSESSMENT — PATIENT HEALTH QUESTIONNAIRE - PHQ9
SUM OF ALL RESPONSES TO PHQ QUESTIONS 1-9: 1
SUM OF ALL RESPONSES TO PHQ QUESTIONS 1-9: 1
10. IF YOU CHECKED OFF ANY PROBLEMS, HOW DIFFICULT HAVE THESE PROBLEMS MADE IT FOR YOU TO DO YOUR WORK, TAKE CARE OF THINGS AT HOME, OR GET ALONG WITH OTHER PEOPLE: NOT DIFFICULT AT ALL

## 2023-08-31 ASSESSMENT — PAIN SCALES - GENERAL: PAINLEVEL: NO PAIN (0)

## 2023-08-31 NOTE — PROGRESS NOTES
SUBJECTIVE:   CC: Leodan is an 43 year old who presents for preventative health visit.       8/31/2023    11:05 AM   Additional Questions   Roomed by Cheryl Vazquez   Accompanied by N/A       Healthy Habits:     Getting at least 3 servings of Calcium per day:  Yes    Bi-annual eye exam:  Yes    Dental care twice a year:  NO    Sleep apnea or symptoms of sleep apnea:  None    Diet:  Regular (no restrictions)    Frequency of exercise:  6-7 days/week    Duration of exercise:  15-30 minutes    Taking medications regularly:  Yes    Medication side effects:  None    Additional concerns today:  No      Today's PHQ-9 Score:       8/31/2023    10:58 AM   PHQ-9 SCORE   PHQ-9 Total Score MyChart 1 (Minimal depression)   PHQ-9 Total Score 1                 Chrons Well controlled   Have you ever done Advance Care Planning? (For example, a Health Directive, POLST, or a discussion with a medical provider or your loved ones about your wishes): No, advance care planning information given to patient to review.  Patient plans to discuss their wishes with loved ones or provider.      Social History     Tobacco Use    Smoking status: Never    Smokeless tobacco: Never   Substance Use Topics    Alcohol use: Yes     Alcohol/week: 0.0 standard drinks of alcohol     Comment: x1 a week             8/31/2023    11:01 AM   Alcohol Use   Prescreen: >3 drinks/day or >7 drinks/week? No          No data to display                Last PSA: No results found for: PSA    Reviewed orders with patient. Reviewed health maintenance and updated orders accordingly - Yes  Lab work is in process    Reviewed and updated as needed this visit by clinical staff   Tobacco  Allergies  Meds              Reviewed and updated as needed this visit by Provider                 Past Medical History:   Diagnosis Date    Asthma     child    Crohn's disease (H)     History of blood transfusion     Kidney stones         Review of Systems   Constitutional:  Negative for  "chills and fever.   HENT:  Negative for congestion, ear pain, hearing loss and sore throat.    Eyes:  Negative for pain and visual disturbance.   Respiratory:  Negative for cough and shortness of breath.    Cardiovascular:  Negative for chest pain, palpitations and peripheral edema.   Gastrointestinal:  Negative for abdominal pain, constipation, diarrhea, heartburn, hematochezia and nausea.   Genitourinary:  Negative for dysuria, frequency, genital sores, hematuria, impotence, penile discharge and urgency.   Musculoskeletal:  Negative for arthralgias, joint swelling and myalgias.   Skin:  Negative for rash.   Neurological:  Negative for dizziness, weakness, headaches and paresthesias.   Psychiatric/Behavioral:  Negative for mood changes. The patient is nervous/anxious.          OBJECTIVE:   /75 (BP Location: Left arm, Patient Position: Sitting, Cuff Size: Adult Regular)   Pulse 56   Temp 97.5  F (36.4  C) (Temporal)   Resp 14   Ht 1.707 m (5' 7.21\")   Wt 75.8 kg (167 lb 1.6 oz)   SpO2 98%   BMI 26.01 kg/m      Physical Exam  GENERAL: healthy, alert and no distress  EYES: Eyes grossly normal to inspection, PERRL and conjunctivae and sclerae normal  HENT: ear canals and TM's normal, nose and mouth without ulcers or lesions  NECK: no adenopathy, no asymmetry, masses, or scars and thyroid normal to palpation  RESP: lungs clear to auscultation - no rales, rhonchi or wheezes  CV: regular rate and rhythm, normal S1 S2, no S3 or S4, no murmur, click or rub, no peripheral edema and peripheral pulses strong  ABDOMEN: soft, nontender, without hepatosplenomegaly or masses, bowel sounds normal, and well-healed incision with colostomy bag  MS: no gross musculoskeletal defects noted, no edema  SKIN: no suspicious lesions or rashes  NEURO: Normal strength and tone, mentation intact and speech normal  PSYCH: mentation appears normal, affect normal/bright  LYMPH: no cervical, supraclavicular, axillary, or inguinal " "adenopathy    Diagnostic Test Results:  Labs reviewed in Epic  No results found for any visits on 08/31/23.    ASSESSMENT/PLAN:   (Z00.00) Routine history and physical examination of adult  (primary encounter diagnosis)  Comment: in good health  Plan: Basic metabolic panel  (Ca, Cl, CO2, Creat,         Gluc, K, Na, BUN), TSH with free T4 reflex,         Lipid panel reflex to direct LDL Fasting            (F41.1) PIOTR (generalized anxiety disorder)  Comment: .Well controlled   Plan: escitalopram (LEXAPRO) 5 MG tablet            (K50.918) Crohn's disease with other complication, unspecified gastrointestinal tract location (H)  Comment: stable  Plan: Follow up with consultant as planned.     (Z13.6) CARDIOVASCULAR SCREENING; LDL GOAL LESS THAN 160  Comment: recheck  Plan: lab          COUNSELING:   Reviewed preventive health counseling, as reflected in patient instructions       Regular exercise       Healthy diet/nutrition       Vision screening       Hearing screening       Pneumococcal Vaccine          Immunizations  Vaccinated for: TDAP        BMI:   Estimated body mass index is 26.01 kg/m  as calculated from the following:    Height as of this encounter: 1.707 m (5' 7.21\").    Weight as of this encounter: 75.8 kg (167 lb 1.6 oz).         He reports that he has never smoked. He has never used smokeless tobacco.            Blane Worrell MD  [facility]  "

## 2023-09-12 ENCOUNTER — TRANSFERRED RECORDS (OUTPATIENT)
Dept: HEALTH INFORMATION MANAGEMENT | Facility: CLINIC | Age: 43
End: 2023-09-12
Payer: COMMERCIAL

## 2023-10-25 ENCOUNTER — ANCILLARY PROCEDURE (OUTPATIENT)
Dept: CT IMAGING | Facility: CLINIC | Age: 43
End: 2023-10-25
Attending: NURSE PRACTITIONER
Payer: COMMERCIAL

## 2023-10-25 DIAGNOSIS — N20.0 KIDNEY STONE: ICD-10-CM

## 2023-10-25 LAB — RADIOLOGIST FLAGS: ABNORMAL

## 2023-10-25 PROCEDURE — 74176 CT ABD & PELVIS W/O CONTRAST: CPT | Mod: GC | Performed by: RADIOLOGY

## 2023-10-26 ENCOUNTER — TELEPHONE (OUTPATIENT)
Dept: UROLOGY | Facility: CLINIC | Age: 43
End: 2023-10-26
Payer: COMMERCIAL

## 2023-10-26 ENCOUNTER — PATIENT OUTREACH (OUTPATIENT)
Dept: UROLOGY | Facility: CLINIC | Age: 43
End: 2023-10-26
Payer: COMMERCIAL

## 2023-10-26 DIAGNOSIS — N20.1 URETERAL STONE: Primary | ICD-10-CM

## 2023-10-26 DIAGNOSIS — N20.0 KIDNEY STONE: Primary | ICD-10-CM

## 2023-10-26 RX ORDER — TAMSULOSIN HYDROCHLORIDE 0.4 MG/1
0.4 CAPSULE ORAL DAILY
Qty: 30 CAPSULE | Refills: 3 | Status: SHIPPED | OUTPATIENT
Start: 2023-10-26 | End: 2023-11-17

## 2023-10-26 RX ORDER — OXYCODONE HYDROCHLORIDE 5 MG/1
5 TABLET ORAL EVERY 6 HOURS PRN
Qty: 12 TABLET | Refills: 0 | Status: SHIPPED | OUTPATIENT
Start: 2023-10-26 | End: 2023-11-08

## 2023-10-26 NOTE — PROGRESS NOTES
Called patient to discuss results of his CT from yesterday, showing a 7-8 mm distal right ureteral stone. He is doing ok today, with intermittent flares of pain. Was contacted by [facility] earlier and prescribed Flomax. He has been utilizing ibuprofen and warm baths for pain but has had a few breakthrough episodes. Will send a small fill of oxycodone to use in this case, especially through the weekend. Also discussed that OTC Dramamine (dimenhydrinate) can help with flank pain episodes overnight when passing a stone. He can pick this up at any drug store. Will reach out to stone surgeon regarding potential need for URS.     Maisha Dowling, CNP  Department of Urology     Anesthesia Volume In Cc: 3

## 2023-10-26 NOTE — TELEPHONE ENCOUNTER
[facility] Call Center    Phone Message    May a detailed message be left on voicemail: no     Reason for Call: Other: Patient called to schedule a follow up with Maisha Dowling to discuss imaging results. Writer did see that there was an urgent message in patients chart from the imaging department and told patient someone would probably call back to schedule. Writer saw that the first available appointment wasn't until January.     Action Taken: Message routed to:  [facility] ([facility] Urology    Travel Screening: Not Applicable

## 2023-10-26 NOTE — TELEPHONE ENCOUNTER
Writer reached out to pt due to urgent results of his imaging this morning.     Pt stated that his discomfort has been waxing and waning for the last 48 hours. Pt notes that he has seen his results and was aware of the size of his obstructing stone.     Writer was able to speak with the provider about a plan moving forward. Per the provider, the recommendation is to start flomax, continue with ibuprofen, and heating pad as needed. Pt reports that he has been taking ibuprofen. Pt denies nausea.    Provider will try and reach out to the pt either later today or sometime tomorrow to discuss the plan moving forward. Pt understands that he will likely need surgery to resolve the obstructing stone.     Will continue to follow as needed.

## 2023-10-27 ENCOUNTER — PREP FOR PROCEDURE (OUTPATIENT)
Dept: UROLOGY | Facility: CLINIC | Age: 43
End: 2023-10-27
Payer: COMMERCIAL

## 2023-10-27 DIAGNOSIS — N20.1 URETERAL STONE: Primary | ICD-10-CM

## 2023-10-28 ENCOUNTER — HOSPITAL ENCOUNTER (OUTPATIENT)
Facility: CLINIC | Age: 43
Setting detail: OBSERVATION
Discharge: HOME | End: 2023-10-29
Attending: FAMILY MEDICINE | Admitting: UROLOGY
Payer: COMMERCIAL

## 2023-10-28 DIAGNOSIS — N20.0 KIDNEY STONE: ICD-10-CM

## 2023-10-28 DIAGNOSIS — N20.1 URETERAL CALCULUS: Primary | ICD-10-CM

## 2023-10-28 DIAGNOSIS — N13.2 HYDRONEPHROSIS WITH URINARY OBSTRUCTION DUE TO URETERAL CALCULUS: ICD-10-CM

## 2023-10-28 DIAGNOSIS — N20.1 URETERAL STONE: ICD-10-CM

## 2023-10-28 LAB
ALBUMIN SERPL BCG-MCNC: 4.6 G/DL (ref 3.5–5.2)
ALP SERPL-CCNC: 52 U/L (ref 40–129)
ALT SERPL W P-5'-P-CCNC: 42 U/L (ref 0–70)
ANION GAP SERPL CALCULATED.3IONS-SCNC: 12 MMOL/L (ref 7–15)
AST SERPL W P-5'-P-CCNC: 28 U/L (ref 0–45)
BASOPHILS # BLD AUTO: 0.1 10E3/UL (ref 0–0.2)
BASOPHILS NFR BLD AUTO: 0 %
BILIRUB SERPL-MCNC: 1.4 MG/DL
BUN SERPL-MCNC: 21.3 MG/DL (ref 6–20)
CALCIUM SERPL-MCNC: 9.7 MG/DL (ref 8.6–10)
CHLORIDE SERPL-SCNC: 98 MMOL/L (ref 98–107)
CREAT SERPL-MCNC: 1.11 MG/DL (ref 0.67–1.17)
DEPRECATED HCO3 PLAS-SCNC: 29 MMOL/L (ref 22–29)
EGFRCR SERPLBLD CKD-EPI 2021: 85 ML/MIN/1.73M2
EOSINOPHIL # BLD AUTO: 0.2 10E3/UL (ref 0–0.7)
EOSINOPHIL NFR BLD AUTO: 1 %
ERYTHROCYTE [DISTWIDTH] IN BLOOD BY AUTOMATED COUNT: 12.8 % (ref 10–15)
GLUCOSE SERPL-MCNC: 127 MG/DL (ref 70–99)
HCT VFR BLD AUTO: 45.4 % (ref 40–53)
HGB BLD-MCNC: 15.2 G/DL (ref 13.3–17.7)
IMM GRANULOCYTES # BLD: 0 10E3/UL
IMM GRANULOCYTES NFR BLD: 0 %
LYMPHOCYTES # BLD AUTO: 1.2 10E3/UL (ref 0.8–5.3)
LYMPHOCYTES NFR BLD AUTO: 9 %
MCH RBC QN AUTO: 30.7 PG (ref 26.5–33)
MCHC RBC AUTO-ENTMCNC: 33.5 G/DL (ref 31.5–36.5)
MCV RBC AUTO: 92 FL (ref 78–100)
MONOCYTES # BLD AUTO: 0.7 10E3/UL (ref 0–1.3)
MONOCYTES NFR BLD AUTO: 5 %
NEUTROPHILS # BLD AUTO: 10.7 10E3/UL (ref 1.6–8.3)
NEUTROPHILS NFR BLD AUTO: 85 %
NRBC # BLD AUTO: 0 10E3/UL
NRBC BLD AUTO-RTO: 0 /100
PLATELET # BLD AUTO: 305 10E3/UL (ref 150–450)
POTASSIUM SERPL-SCNC: 3.4 MMOL/L (ref 3.4–5.3)
PROT SERPL-MCNC: 7.4 G/DL (ref 6.4–8.3)
RBC # BLD AUTO: 4.95 10E6/UL (ref 4.4–5.9)
SODIUM SERPL-SCNC: 139 MMOL/L (ref 135–145)
WBC # BLD AUTO: 12.8 10E3/UL (ref 4–11)

## 2023-10-28 PROCEDURE — 96375 TX/PRO/DX INJ NEW DRUG ADDON: CPT

## 2023-10-28 PROCEDURE — 250N000011 HC RX IP 250 OP 636: Performed by: FAMILY MEDICINE

## 2023-10-28 PROCEDURE — 96361 HYDRATE IV INFUSION ADD-ON: CPT

## 2023-10-28 PROCEDURE — 258N000003 HC RX IP 258 OP 636: Performed by: FAMILY MEDICINE

## 2023-10-28 PROCEDURE — 85025 COMPLETE CBC W/AUTO DIFF WBC: CPT | Performed by: FAMILY MEDICINE

## 2023-10-28 PROCEDURE — 80053 COMPREHEN METABOLIC PANEL: CPT | Performed by: FAMILY MEDICINE

## 2023-10-28 PROCEDURE — 96374 THER/PROPH/DIAG INJ IV PUSH: CPT

## 2023-10-28 PROCEDURE — 99285 EMERGENCY DEPT VISIT HI MDM: CPT | Mod: 25 | Performed by: FAMILY MEDICINE

## 2023-10-28 PROCEDURE — 36415 COLL VENOUS BLD VENIPUNCTURE: CPT | Performed by: FAMILY MEDICINE

## 2023-10-28 PROCEDURE — 99285 EMERGENCY DEPT VISIT HI MDM: CPT | Performed by: FAMILY MEDICINE

## 2023-10-28 RX ORDER — KETOROLAC TROMETHAMINE 30 MG/ML
30 INJECTION, SOLUTION INTRAMUSCULAR; INTRAVENOUS ONCE
Status: COMPLETED | OUTPATIENT
Start: 2023-10-28 | End: 2023-10-28

## 2023-10-28 RX ORDER — HYDROMORPHONE HYDROCHLORIDE 1 MG/ML
0.5 INJECTION, SOLUTION INTRAMUSCULAR; INTRAVENOUS; SUBCUTANEOUS ONCE
Status: COMPLETED | OUTPATIENT
Start: 2023-10-28 | End: 2023-10-28

## 2023-10-28 RX ORDER — ONDANSETRON 2 MG/ML
4 INJECTION INTRAMUSCULAR; INTRAVENOUS ONCE
Status: COMPLETED | OUTPATIENT
Start: 2023-10-28 | End: 2023-10-28

## 2023-10-28 RX ADMIN — SODIUM CHLORIDE 1000 ML: 9 INJECTION, SOLUTION INTRAVENOUS at 23:47

## 2023-10-28 RX ADMIN — ONDANSETRON 4 MG: 2 INJECTION INTRAMUSCULAR; INTRAVENOUS at 23:48

## 2023-10-28 RX ADMIN — HYDROMORPHONE HYDROCHLORIDE 0.5 MG: 1 INJECTION, SOLUTION INTRAMUSCULAR; INTRAVENOUS; SUBCUTANEOUS at 23:49

## 2023-10-28 RX ADMIN — KETOROLAC TROMETHAMINE 30 MG: 30 INJECTION, SOLUTION INTRAMUSCULAR; INTRAVENOUS at 23:48

## 2023-10-29 ENCOUNTER — ANESTHESIA EVENT (OUTPATIENT)
Dept: SURGERY | Facility: CLINIC | Age: 43
End: 2023-10-29
Payer: COMMERCIAL

## 2023-10-29 ENCOUNTER — APPOINTMENT (OUTPATIENT)
Dept: GENERAL RADIOLOGY | Facility: CLINIC | Age: 43
End: 2023-10-29
Attending: UROLOGY
Payer: COMMERCIAL

## 2023-10-29 ENCOUNTER — APPOINTMENT (OUTPATIENT)
Dept: CT IMAGING | Facility: CLINIC | Age: 43
End: 2023-10-29
Attending: FAMILY MEDICINE
Payer: COMMERCIAL

## 2023-10-29 ENCOUNTER — ANESTHESIA (OUTPATIENT)
Dept: SURGERY | Facility: CLINIC | Age: 43
End: 2023-10-29
Payer: COMMERCIAL

## 2023-10-29 VITALS
TEMPERATURE: 97.2 F | HEART RATE: 67 BPM | RESPIRATION RATE: 16 BRPM | DIASTOLIC BLOOD PRESSURE: 83 MMHG | HEIGHT: 67 IN | SYSTOLIC BLOOD PRESSURE: 107 MMHG | WEIGHT: 159.83 LBS | OXYGEN SATURATION: 97 % | BODY MASS INDEX: 25.09 KG/M2

## 2023-10-29 PROBLEM — N20.0 KIDNEY STONE: Status: ACTIVE | Noted: 2023-10-29

## 2023-10-29 PROBLEM — R00.1 SINUS BRADYCARDIA: Status: ACTIVE | Noted: 2023-10-29

## 2023-10-29 LAB
ALBUMIN UR-MCNC: 50 MG/DL
ANION GAP SERPL CALCULATED.3IONS-SCNC: 10 MMOL/L (ref 7–15)
APPEARANCE UR: CLEAR
BILIRUB UR QL STRIP: NEGATIVE
BUN SERPL-MCNC: 20.3 MG/DL (ref 6–20)
CALCIUM SERPL-MCNC: 8.8 MG/DL (ref 8.6–10)
CAOX CRY #/AREA URNS HPF: ABNORMAL /HPF
CHLORIDE SERPL-SCNC: 103 MMOL/L (ref 98–107)
COLOR UR AUTO: YELLOW
CREAT SERPL-MCNC: 1.05 MG/DL (ref 0.67–1.17)
DEPRECATED HCO3 PLAS-SCNC: 26 MMOL/L (ref 22–29)
EGFRCR SERPLBLD CKD-EPI 2021: >90 ML/MIN/1.73M2
ERYTHROCYTE [DISTWIDTH] IN BLOOD BY AUTOMATED COUNT: 12.8 % (ref 10–15)
GLUCOSE SERPL-MCNC: 115 MG/DL (ref 70–99)
GLUCOSE UR STRIP-MCNC: NEGATIVE MG/DL
HCT VFR BLD AUTO: 41 % (ref 40–53)
HGB BLD-MCNC: 13.6 G/DL (ref 13.3–17.7)
HGB UR QL STRIP: ABNORMAL
KETONES UR STRIP-MCNC: ABNORMAL MG/DL
LEUKOCYTE ESTERASE UR QL STRIP: ABNORMAL
MCH RBC QN AUTO: 30.6 PG (ref 26.5–33)
MCHC RBC AUTO-ENTMCNC: 33.2 G/DL (ref 31.5–36.5)
MCV RBC AUTO: 92 FL (ref 78–100)
MUCOUS THREADS #/AREA URNS LPF: PRESENT /LPF
NITRATE UR QL: NEGATIVE
PH UR STRIP: 5.5 [PH] (ref 5–7)
PHOSPHATE SERPL-MCNC: 3.7 MG/DL (ref 2.5–4.5)
PLATELET # BLD AUTO: 292 10E3/UL (ref 150–450)
POTASSIUM SERPL-SCNC: 3.6 MMOL/L (ref 3.4–5.3)
PTH-INTACT SERPL-MCNC: 38 PG/ML (ref 15–65)
RBC # BLD AUTO: 4.44 10E6/UL (ref 4.4–5.9)
RBC URINE: >182 /HPF
SODIUM SERPL-SCNC: 139 MMOL/L (ref 135–145)
SP GR UR STRIP: 1.03 (ref 1–1.03)
UROBILINOGEN UR STRIP-MCNC: NORMAL MG/DL
WBC # BLD AUTO: 6.8 10E3/UL (ref 4–11)
WBC URINE: 31 /HPF

## 2023-10-29 PROCEDURE — 82310 ASSAY OF CALCIUM: CPT

## 2023-10-29 PROCEDURE — 250N000013 HC RX MED GY IP 250 OP 250 PS 637

## 2023-10-29 PROCEDURE — 999N000180 XR SURGERY CARM FLUORO LESS THAN 5 MIN

## 2023-10-29 PROCEDURE — 96361 HYDRATE IV INFUSION ADD-ON: CPT | Mod: XU

## 2023-10-29 PROCEDURE — 255N000002 HC RX 255 OP 636: Mod: JZ | Performed by: UROLOGY

## 2023-10-29 PROCEDURE — 250N000011 HC RX IP 250 OP 636: Performed by: FAMILY MEDICINE

## 2023-10-29 PROCEDURE — 258N000003 HC RX IP 258 OP 636

## 2023-10-29 PROCEDURE — 83970 ASSAY OF PARATHORMONE: CPT

## 2023-10-29 PROCEDURE — 710N000010 HC RECOVERY PHASE 1, LEVEL 2, PER MIN: Performed by: UROLOGY

## 2023-10-29 PROCEDURE — 258N000003 HC RX IP 258 OP 636: Performed by: NURSE ANESTHETIST, CERTIFIED REGISTERED

## 2023-10-29 PROCEDURE — 52332 CYSTOSCOPY AND TREATMENT: CPT | Mod: RT | Performed by: UROLOGY

## 2023-10-29 PROCEDURE — 87086 URINE CULTURE/COLONY COUNT: CPT | Performed by: FAMILY MEDICINE

## 2023-10-29 PROCEDURE — 250N000011 HC RX IP 250 OP 636: Performed by: NURSE ANESTHETIST, CERTIFIED REGISTERED

## 2023-10-29 PROCEDURE — 250N000009 HC RX 250: Performed by: NURSE ANESTHETIST, CERTIFIED REGISTERED

## 2023-10-29 PROCEDURE — 250N000011 HC RX IP 250 OP 636

## 2023-10-29 PROCEDURE — 36415 COLL VENOUS BLD VENIPUNCTURE: CPT

## 2023-10-29 PROCEDURE — 272N000001 HC OR GENERAL SUPPLY STERILE: Performed by: UROLOGY

## 2023-10-29 PROCEDURE — 370N000017 HC ANESTHESIA TECHNICAL FEE, PER MIN: Performed by: UROLOGY

## 2023-10-29 PROCEDURE — 81001 URINALYSIS AUTO W/SCOPE: CPT | Performed by: FAMILY MEDICINE

## 2023-10-29 PROCEDURE — 84100 ASSAY OF PHOSPHORUS: CPT

## 2023-10-29 PROCEDURE — 74420 UROGRAPHY RTRGR +-KUB: CPT | Mod: 26 | Performed by: UROLOGY

## 2023-10-29 PROCEDURE — G0378 HOSPITAL OBSERVATION PER HR: HCPCS

## 2023-10-29 PROCEDURE — 74176 CT ABD & PELVIS W/O CONTRAST: CPT

## 2023-10-29 PROCEDURE — 999N000141 HC STATISTIC PRE-PROCEDURE NURSING ASSESSMENT: Performed by: UROLOGY

## 2023-10-29 PROCEDURE — 93005 ELECTROCARDIOGRAM TRACING: CPT

## 2023-10-29 PROCEDURE — C2617 STENT, NON-COR, TEM W/O DEL: HCPCS | Performed by: UROLOGY

## 2023-10-29 PROCEDURE — 85014 HEMATOCRIT: CPT

## 2023-10-29 PROCEDURE — C1769 GUIDE WIRE: HCPCS | Performed by: UROLOGY

## 2023-10-29 PROCEDURE — 96376 TX/PRO/DX INJ SAME DRUG ADON: CPT | Mod: XU

## 2023-10-29 PROCEDURE — 360N000082 HC SURGERY LEVEL 2 W/ FLUORO, PER MIN: Performed by: UROLOGY

## 2023-10-29 PROCEDURE — 99222 1ST HOSP IP/OBS MODERATE 55: CPT | Mod: AI

## 2023-10-29 PROCEDURE — 99222 1ST HOSP IP/OBS MODERATE 55: CPT | Mod: 25 | Performed by: UROLOGY

## 2023-10-29 DEVICE — URETERAL STENT
Type: IMPLANTABLE DEVICE | Site: URETER | Status: NON-FUNCTIONAL
Brand: PERCUFLEX™ PLUS
Removed: 2023-11-17

## 2023-10-29 RX ORDER — NALOXONE HYDROCHLORIDE 0.4 MG/ML
0.4 INJECTION, SOLUTION INTRAMUSCULAR; INTRAVENOUS; SUBCUTANEOUS
Status: DISCONTINUED | OUTPATIENT
Start: 2023-10-29 | End: 2023-10-29 | Stop reason: HOSPADM

## 2023-10-29 RX ORDER — CEFAZOLIN SODIUM/WATER 2 G/20 ML
2 SYRINGE (ML) INTRAVENOUS SEE ADMIN INSTRUCTIONS
Status: DISCONTINUED | OUTPATIENT
Start: 2023-10-29 | End: 2023-10-29 | Stop reason: HOSPADM

## 2023-10-29 RX ORDER — ACETAMINOPHEN 325 MG/1
975 TABLET ORAL 3 TIMES DAILY
Status: DISCONTINUED | OUTPATIENT
Start: 2023-10-29 | End: 2023-10-29

## 2023-10-29 RX ORDER — PROPOFOL 10 MG/ML
INJECTION, EMULSION INTRAVENOUS CONTINUOUS PRN
Status: DISCONTINUED | OUTPATIENT
Start: 2023-10-29 | End: 2023-10-29

## 2023-10-29 RX ORDER — KETOROLAC TROMETHAMINE 15 MG/ML
15 INJECTION, SOLUTION INTRAMUSCULAR; INTRAVENOUS EVERY 6 HOURS PRN
Status: DISCONTINUED | OUTPATIENT
Start: 2023-10-29 | End: 2023-10-29 | Stop reason: HOSPADM

## 2023-10-29 RX ORDER — CHLORTHALIDONE 25 MG/1
25 TABLET ORAL DAILY
Status: DISCONTINUED | OUTPATIENT
Start: 2023-10-29 | End: 2023-10-29 | Stop reason: HOSPADM

## 2023-10-29 RX ORDER — MERCAPTOPURINE 50 MG/1
100 TABLET ORAL DAILY
Status: DISCONTINUED | OUTPATIENT
Start: 2023-10-29 | End: 2023-10-29 | Stop reason: HOSPADM

## 2023-10-29 RX ORDER — ONDANSETRON 2 MG/ML
4 INJECTION INTRAMUSCULAR; INTRAVENOUS EVERY 6 HOURS PRN
Status: DISCONTINUED | OUTPATIENT
Start: 2023-10-29 | End: 2023-10-29 | Stop reason: HOSPADM

## 2023-10-29 RX ORDER — HYDROMORPHONE HYDROCHLORIDE 1 MG/ML
0.5 INJECTION, SOLUTION INTRAMUSCULAR; INTRAVENOUS; SUBCUTANEOUS ONCE
Status: COMPLETED | OUTPATIENT
Start: 2023-10-29 | End: 2023-10-29

## 2023-10-29 RX ORDER — NALOXONE HYDROCHLORIDE 0.4 MG/ML
0.2 INJECTION, SOLUTION INTRAMUSCULAR; INTRAVENOUS; SUBCUTANEOUS
Status: DISCONTINUED | OUTPATIENT
Start: 2023-10-29 | End: 2023-10-29 | Stop reason: HOSPADM

## 2023-10-29 RX ORDER — PROCHLORPERAZINE 25 MG
25 SUPPOSITORY, RECTAL RECTAL EVERY 12 HOURS PRN
Status: DISCONTINUED | OUTPATIENT
Start: 2023-10-29 | End: 2023-10-29 | Stop reason: HOSPADM

## 2023-10-29 RX ORDER — ONDANSETRON 4 MG/1
4 TABLET, ORALLY DISINTEGRATING ORAL EVERY 6 HOURS PRN
Status: DISCONTINUED | OUTPATIENT
Start: 2023-10-29 | End: 2023-10-29 | Stop reason: HOSPADM

## 2023-10-29 RX ORDER — DEXAMETHASONE SODIUM PHOSPHATE 4 MG/ML
INJECTION, SOLUTION INTRA-ARTICULAR; INTRALESIONAL; INTRAMUSCULAR; INTRAVENOUS; SOFT TISSUE PRN
Status: DISCONTINUED | OUTPATIENT
Start: 2023-10-29 | End: 2023-10-29

## 2023-10-29 RX ORDER — SODIUM CHLORIDE, SODIUM LACTATE, POTASSIUM CHLORIDE, CALCIUM CHLORIDE 600; 310; 30; 20 MG/100ML; MG/100ML; MG/100ML; MG/100ML
INJECTION, SOLUTION INTRAVENOUS CONTINUOUS PRN
Status: DISCONTINUED | OUTPATIENT
Start: 2023-10-29 | End: 2023-10-29

## 2023-10-29 RX ORDER — POTASSIUM CITRATE 10 MEQ/1
20 TABLET, EXTENDED RELEASE ORAL
Status: DISCONTINUED | OUTPATIENT
Start: 2023-10-29 | End: 2023-10-29 | Stop reason: HOSPADM

## 2023-10-29 RX ORDER — ONDANSETRON 4 MG/1
4 TABLET, ORALLY DISINTEGRATING ORAL EVERY 6 HOURS PRN
Qty: 15 TABLET | Refills: 0 | Status: SHIPPED | OUTPATIENT
Start: 2023-10-29 | End: 2023-10-29

## 2023-10-29 RX ORDER — OXYBUTYNIN CHLORIDE 5 MG/1
5 TABLET ORAL EVERY 8 HOURS PRN
Qty: 30 TABLET | Refills: 0 | Status: SHIPPED | OUTPATIENT
Start: 2023-10-29 | End: 2023-10-29

## 2023-10-29 RX ORDER — PROPOFOL 10 MG/ML
INJECTION, EMULSION INTRAVENOUS PRN
Status: DISCONTINUED | OUTPATIENT
Start: 2023-10-29 | End: 2023-10-29

## 2023-10-29 RX ORDER — HYDROMORPHONE HYDROCHLORIDE 1 MG/ML
0.5 INJECTION, SOLUTION INTRAMUSCULAR; INTRAVENOUS; SUBCUTANEOUS
Status: DISCONTINUED | OUTPATIENT
Start: 2023-10-29 | End: 2023-10-29 | Stop reason: HOSPADM

## 2023-10-29 RX ORDER — SODIUM CHLORIDE, SODIUM LACTATE, POTASSIUM CHLORIDE, CALCIUM CHLORIDE 600; 310; 30; 20 MG/100ML; MG/100ML; MG/100ML; MG/100ML
INJECTION, SOLUTION INTRAVENOUS CONTINUOUS
Status: DISPENSED | OUTPATIENT
Start: 2023-10-29 | End: 2023-10-29

## 2023-10-29 RX ORDER — ESCITALOPRAM OXALATE 5 MG/1
5 TABLET ORAL DAILY
Status: DISCONTINUED | OUTPATIENT
Start: 2023-10-29 | End: 2023-10-29 | Stop reason: HOSPADM

## 2023-10-29 RX ORDER — ONDANSETRON 2 MG/ML
INJECTION INTRAMUSCULAR; INTRAVENOUS PRN
Status: DISCONTINUED | OUTPATIENT
Start: 2023-10-29 | End: 2023-10-29

## 2023-10-29 RX ORDER — CEFAZOLIN SODIUM/WATER 2 G/20 ML
2 SYRINGE (ML) INTRAVENOUS
Status: COMPLETED | OUTPATIENT
Start: 2023-10-29 | End: 2023-10-29

## 2023-10-29 RX ORDER — TAMSULOSIN HYDROCHLORIDE 0.4 MG/1
0.4 CAPSULE ORAL DAILY
Status: DISCONTINUED | OUTPATIENT
Start: 2023-10-29 | End: 2023-10-29 | Stop reason: HOSPADM

## 2023-10-29 RX ORDER — PROCHLORPERAZINE MALEATE 10 MG
10 TABLET ORAL EVERY 6 HOURS PRN
Status: DISCONTINUED | OUTPATIENT
Start: 2023-10-29 | End: 2023-10-29 | Stop reason: HOSPADM

## 2023-10-29 RX ORDER — OXYBUTYNIN CHLORIDE 5 MG/1
5 TABLET ORAL EVERY 8 HOURS PRN
Qty: 30 TABLET | Refills: 0 | Status: SHIPPED | OUTPATIENT
Start: 2023-10-29 | End: 2023-11-17

## 2023-10-29 RX ORDER — FENTANYL CITRATE 50 UG/ML
INJECTION, SOLUTION INTRAMUSCULAR; INTRAVENOUS PRN
Status: DISCONTINUED | OUTPATIENT
Start: 2023-10-29 | End: 2023-10-29

## 2023-10-29 RX ORDER — OXYCODONE HYDROCHLORIDE 5 MG/1
5 TABLET ORAL EVERY 4 HOURS PRN
Status: DISCONTINUED | OUTPATIENT
Start: 2023-10-29 | End: 2023-10-29 | Stop reason: HOSPADM

## 2023-10-29 RX ORDER — LIDOCAINE HYDROCHLORIDE 20 MG/ML
INJECTION, SOLUTION INFILTRATION; PERINEURAL PRN
Status: DISCONTINUED | OUTPATIENT
Start: 2023-10-29 | End: 2023-10-29

## 2023-10-29 RX ORDER — ACETAMINOPHEN 325 MG/1
975 TABLET ORAL ONCE
Status: COMPLETED | OUTPATIENT
Start: 2023-10-29 | End: 2023-10-29

## 2023-10-29 RX ORDER — IOPAMIDOL 510 MG/ML
INJECTION, SOLUTION INTRAVASCULAR PRN
Status: DISCONTINUED | OUTPATIENT
Start: 2023-10-29 | End: 2023-10-29 | Stop reason: HOSPADM

## 2023-10-29 RX ORDER — ONDANSETRON 4 MG/1
4 TABLET, ORALLY DISINTEGRATING ORAL EVERY 6 HOURS PRN
Qty: 15 TABLET | Refills: 0 | Status: SHIPPED | OUTPATIENT
Start: 2023-10-29

## 2023-10-29 RX ADMIN — LIDOCAINE HYDROCHLORIDE 60 MG: 20 INJECTION, SOLUTION INFILTRATION; PERINEURAL at 13:41

## 2023-10-29 RX ADMIN — PROPOFOL 100 MCG/KG/MIN: 10 INJECTION, EMULSION INTRAVENOUS at 13:45

## 2023-10-29 RX ADMIN — POTASSIUM CITRATE 20 MEQ: 10 TABLET, EXTENDED RELEASE ORAL at 10:50

## 2023-10-29 RX ADMIN — SODIUM CHLORIDE, POTASSIUM CHLORIDE, SODIUM LACTATE AND CALCIUM CHLORIDE: 600; 310; 30; 20 INJECTION, SOLUTION INTRAVENOUS at 13:41

## 2023-10-29 RX ADMIN — Medication 2 G: at 13:38

## 2023-10-29 RX ADMIN — DEXAMETHASONE SODIUM PHOSPHATE 8 MG: 4 INJECTION, SOLUTION INTRA-ARTICULAR; INTRALESIONAL; INTRAMUSCULAR; INTRAVENOUS; SOFT TISSUE at 13:49

## 2023-10-29 RX ADMIN — CHLORTHALIDONE 25 MG: 25 TABLET ORAL at 10:50

## 2023-10-29 RX ADMIN — ONDANSETRON 4 MG: 2 INJECTION INTRAMUSCULAR; INTRAVENOUS at 14:06

## 2023-10-29 RX ADMIN — HYDROMORPHONE HYDROCHLORIDE 0.5 MG: 1 INJECTION, SOLUTION INTRAMUSCULAR; INTRAVENOUS; SUBCUTANEOUS at 10:44

## 2023-10-29 RX ADMIN — FENTANYL CITRATE 25 MCG: 50 INJECTION INTRAMUSCULAR; INTRAVENOUS at 14:05

## 2023-10-29 RX ADMIN — HYDROMORPHONE HYDROCHLORIDE 0.5 MG: 1 INJECTION, SOLUTION INTRAMUSCULAR; INTRAVENOUS; SUBCUTANEOUS at 13:00

## 2023-10-29 RX ADMIN — TAMSULOSIN HYDROCHLORIDE 0.4 MG: 0.4 CAPSULE ORAL at 10:51

## 2023-10-29 RX ADMIN — FENTANYL CITRATE 25 MCG: 50 INJECTION INTRAMUSCULAR; INTRAVENOUS at 13:41

## 2023-10-29 RX ADMIN — MERCAPTOPURINE 100 MG: 50 TABLET ORAL at 11:40

## 2023-10-29 RX ADMIN — SODIUM CHLORIDE, POTASSIUM CHLORIDE, SODIUM LACTATE AND CALCIUM CHLORIDE: 600; 310; 30; 20 INJECTION, SOLUTION INTRAVENOUS at 04:03

## 2023-10-29 RX ADMIN — HYDROMORPHONE HYDROCHLORIDE 0.5 MG: 1 INJECTION, SOLUTION INTRAMUSCULAR; INTRAVENOUS; SUBCUTANEOUS at 01:52

## 2023-10-29 RX ADMIN — KETOROLAC TROMETHAMINE 15 MG: 15 INJECTION INTRAMUSCULAR; INTRAVENOUS at 11:01

## 2023-10-29 RX ADMIN — ESCITALOPRAM OXALATE 5 MG: 5 TABLET, FILM COATED ORAL at 10:50

## 2023-10-29 RX ADMIN — PROPOFOL 60 MG: 10 INJECTION, EMULSION INTRAVENOUS at 13:41

## 2023-10-29 RX ADMIN — ACETAMINOPHEN 975 MG: 325 TABLET, FILM COATED ORAL at 13:23

## 2023-10-29 ASSESSMENT — ACTIVITIES OF DAILY LIVING (ADL)
ADLS_ACUITY_SCORE: 35
ADLS_ACUITY_SCORE: 22
ADLS_ACUITY_SCORE: 35
ADLS_ACUITY_SCORE: 22

## 2023-10-29 NOTE — ANESTHESIA CARE TRANSFER NOTE
Patient: Leodan Collins    Procedure: Procedure(s):  Cystoscopy,right retrograde pyelogram, Right Stent Ureter placement       Diagnosis: Ureteral stone [N20.1]  Diagnosis Additional Information: No value filed.    Anesthesia Type:   MAC     Note:    Oropharynx: oropharynx clear of all foreign objects and spontaneously breathing  Level of Consciousness: drowsy  Oxygen Supplementation: room air    Independent Airway: airway patency satisfactory and stable  Dentition: dentition unchanged  Vital Signs Stable: post-procedure vital signs reviewed and stable  Report to RN Given: handoff report given  Patient transferred to: PACU    Handoff Report: Identifed the Patient, Identified the Reponsible Provider, Reviewed the pertinent medical history, Discussed the surgical course, Reviewed Intra-OP anesthesia mangement and issues during anesthesia, Set expectations for post-procedure period and Allowed opportunity for questions and acknowledgement of understanding      Vitals:  Vitals Value Taken Time   /70 10/29/23 1415   Temp     Pulse 48 10/29/23 1415   Resp     SpO2 92 % 10/29/23 1420   Vitals shown include unfiled device data.    Electronically Signed By: ARNIE Herring CRNA  October 29, 2023  2:22 PM

## 2023-10-29 NOTE — OR NURSING
PACU to Inpatient Nursing Handoff    Patient Leodan Collins is a 43 year old male who speaks English.   Procedure Procedure(s):  Cystoscopy,right retrograde pyelogram, Right Stent Ureter placement   Surgeon(s) Primary: Renetta Jeff MD  Resident - Assisting: Jaspal Ram MD     No Known Allergies    Isolation  None    Past Medical History   has a past medical history of Asthma, Crohn's disease (H), History of blood transfusion, and Kidney stones.    Anesthesia * No anesthesia type entered *   Dermatome Level     Preop Meds acetaminophen (Tylenol) - time given: 1323   Nerve block Not applicable   Intraop Meds fentaNYL 50 mcg/mL (mcg)   Total dose: 50 mcg  Date/Time Rate/Dose/Volume Action Route Admin User Audit    10/29/23 1341 25 mcg Given Intravenous Clarisa Quintanilla APRN CRNA     1405 25 mcg Given Intravenous HetalcClarisa APRN CRNA     lidocaine 2% (mg)   Total dose: 60 mg  Date/Time Rate/Dose/Volume Action Route Admin User Audit    10/29/23 1341 60 mg Given Intravenous Clarisa Quintanilla APRN CRNA     propofol 10 mg/mL (mg)   Total dose: 60 mg  Date/Time Rate/Dose/Volume Action Route Admin User Audit    10/29/23 1341 60 mg Given Intravenous Clarisa Quintanilla APRN CRNA     propofol drip mcg/kg/min (mcg/kg/min)   Total dose: 194.12 mg Dosing weight: 72.5  Date/Time Rate/Dose/Volume Action Route Admin User Audit    10/29/23 1345 100 mcg/kg/min - 43.5 mL/hr New Bag Intravenous HetalcClarisa APRN CRNA     1348 75 mcg/kg/min - 32.625 mL/hr Rate Change Intravenous HetalcClarisa APRN CRNA     dexamethasone (DECADRON) 4 mg/mL (mg)   Total dose: 8 mg  Date/Time Rate/Dose/Volume Action Route Admin User Audit    10/29/23 1349 8 mg Given Intravenous HetalcClarisa APRN CRNA     ondansetron 2 mg/mL (mg)   Total dose: 4 mg  Date/Time Rate/Dose/Volume Action Route Admin User Audit    10/29/23 1406 4 mg Given Intravenous Clarisa Quintanilla APRN  CRNA     ceFAZolin Sodium (ANCEF) injection 2 g (g)   Total dose: 2 g Dosing weight: 72.5  Date/Time Rate/Dose/Volume Action Route Admin User Audit    10/29/23 1338 2 g (over 5 min) Given Intravenous Clarisa Quintanilla APRN CRNA     LR (mL)   Total volume: 0 mL    Date/Time Rate/Dose/Volume Action Route Admin User Audit    10/29/23 1341  New Bag Intravenous Clarisa Quintanilla APRN CRNA        Local Meds No   Antibiotics cefazolin (Ancef) - last given at 1338     Pain Patient currently in pain: denies   PACU meds  Not applicable   PCA / epidural No   Capnography     Telemetry     Inpatient Telemetry Monitor Ordered? No        Labs Glucose Lab Results   Component Value Date     10/29/2023    GLC 97 02/18/2022     06/09/2021       Hgb Lab Results   Component Value Date    HGB 13.6 10/29/2023    HGB 16.6 06/09/2021       INR Lab Results   Component Value Date    INR 1.05 06/19/2011      PACU Imaging Not applicable     Wound/Incision     CMS        Equipment Not applicable   Other LDA       IV Access Peripheral IV 10/28/23 Right Upper forearm (Active)   Site Assessment WDL 10/29/23 1415   Line Status Infusing 10/29/23 1415   Dressing Transparent 10/29/23 1415   Dressing Status dry;intact;old drainage 10/29/23 1415   Line Intervention Tubing change;Flushed 10/29/23 1200   Phlebitis Scale 0-->no symptoms 10/29/23 1415   Infiltration? no 10/29/23 1415   Number of days: 1      Blood Products Not applicable EBL 1 mL   Intake/Output    Drains / Stevenson Ileostomy (Active)   Stomal Appliance Intact 10/29/23 1415   Stoma Assessment UTV 10/29/23 1415   Number of days:       Time of void PreOp Time of Void Prior to Procedure: 1300 (10/29/23 1319)    PostOp      Diapered? No   Bladder Scan     PO    water     Vitals    B/P: 112/70  T: 97.7  F (36.5  C)    Temp src: Oral  P:  Pulse: 51 (10/29/23 0841)          R: 16  O2:  SpO2: 96 %    O2 Device: None (Room air) (10/29/23 1415)              Family/support  present Inpatient   Patient belongings     Patient transported on cart   DC meds/scripts (obs/outpt) Not applicable   Inpatient Pain Meds Released? Inpatient - medications on hold       Special needs/considerations Plan to discharge later today pending no signs of infection.    Tasks needing completion None       Frida Capone RN  [phone]

## 2023-10-29 NOTE — PROGRESS NOTES
"Urology  Progress Note    24 hour events/Subjective:     - No acute events overnight   - His flank pain is well controlled on current regimen  - No fever chills overnight    Exam  /70   Pulse 50   Temp 97.9  F (36.6  C) (Oral)   Resp 16   Ht 1.702 m (5' 7.01\")   Wt 72.5 kg (159 lb 13.3 oz)   SpO2 98%   BMI 25.03 kg/m    No acute distress  Unlabored breathing on RA  Abdomen soft, appears nondistended.          No intake/output data recorded.     Labs    Recent Labs   Lab Test 10/29/23  0621 10/28/23  2335 08/31/23  1155 06/30/23  1344   WBC 6.8 12.8*  --  7.4   HGB 13.6 15.2  --  16.5   CR 1.05 1.11 0.90  --              7-Day Micro Results       Collected Updated Procedure Result Status      10/29/2023 0143 10/29/2023 0157 Urine Culture [[id number]]   Urine, Midstream    In process Component Value   No component results                          Assessment/Plan  43 year old y/o male with PMH of Crohn's, recurrent urolithiasis, presents with flank pain and found to have obstructing 8mm R UPJ stone.    Note - plan changes for today are in bold below.    - Patient would like to avoid stent if possible, which is reasonable given HDS AF, no leukocytosis, UA unremarkable  - Reasonable to go home today with scheduled tylenol, ibuprofen and prn oxycodone, flomax, we will coordinate follow up  - Watch for s/s of infection  - Appreciate medicine excellent care      Seen and examined with the chief resident and Dr. Jeff.    Jaspal Ram MD, PGY-2  Urology Resident      PTA medications:   Prior to Admission medications    Medication Sig Start Date End Date Taking? Authorizing Provider   chlorthalidone (HYGROTON) 25 MG tablet Take 1 tablet (25 mg) by mouth daily 8/11/23  Yes Maisha Dowling CNP   escitalopram (LEXAPRO) 5 MG tablet Take 1 tablet (5 mg) by mouth daily 8/31/23  Yes Blane Worrell MD   mercaptopurine (PURINETHOL) 50 MG tablet Take 100 mg by mouth daily    Yes Reported, Patient " "  oxyCODONE (ROXICODONE) 5 MG tablet Take 1 tablet (5 mg) by mouth every 6 hours as needed for breakthrough pain (Kidney stone) 10/26/23  Yes Maisha Dowling CNP   potassium citrate (UROCIT-K) 10 MEQ (1080 MG) CR tablet Take 2 tablets (20 mEq) by mouth 3 times daily (with meals) 8/11/23  Yes Maisha Dowling CNP   tamsulosin (FLOMAX) 0.4 MG capsule Take 1 capsule (0.4 mg) by mouth daily 10/26/23  Yes Maisha Dowling CNP   Adalimumab (HUMIRA PEN SC) Inject 40 mg Subcutaneous every 14 days     Reported, Patient   ondansetron (ZOFRAN-ODT) 4 MG ODT tab Take 1-2 tablets (4-8 mg) by mouth every 8 hours as needed for nausea  Patient not taking: Reported on 4/11/2023 3/11/22   Des Gutierres MD          Contacting the urology team: Please see [facility] and page on-call clinician with any questions or concerns regarding this patient. Note writer may be unavailable.     To access "[employer] from intranet: under \"Applications\" --> \"Business Applications\" select \"[employer]\" and search \"Urology Adult & Pediatric/[facility].\" Please note that any question about a urology inpatient, [place] or [place], should go to job code [id number].     "

## 2023-10-29 NOTE — CONSULTS
Urology Consult, History and Physical    Name: Leodan Collins    MRN: [id number]   YOB: 1980               Chief Complaint:   Kidney Stones    History is obtained from the patient and chart review          History of Present Illness:   Leodan Collins is a 43 year old male with PMH of Crohn's with ileostomy and recurrent kidney stones  who presents with Right flank pain intermittently for the last week now constant and intolerable at home. Patient has history of recurrent urolithiasis and was scheduled for definitive management with URS/HLL on 11/17 with Dr. Felix. He was on conservative management with flomax, ibuprofen, and heating pad as needed but reports the right flank pain became unbearable, not controlled on oral pain medications, and was associated with vomiting prompting him to present to the ED. He denies any gross hematuria, subjective fevers/chills, dysuria, and frequency.    Currently, patient's pain is well controlled in the ED with dilaudid and toradol. In the ER his workup was notable for the following lab results:    Recent Labs   Lab 10/28/23  2335   WBC 12.8*       Recent Labs   Lab 10/28/23  2335   CR 1.11     Urinalysis  Recent Labs   Lab Test 10/29/23  0143 06/23/21  1233 06/09/21  1401   UBLD Large* Large* Moderate*   NITRITE Negative Negative Negative   LEUKEST Small* Moderate* Negative   RBCU >182* >182* 48*   WBCU 31* 169* 3              Past Medical History:     Past Medical History:   Diagnosis Date    Asthma     child    Crohn's disease (H)     History of blood transfusion     Kidney stones             Past Surgical History:     Past Surgical History:   Procedure Laterality Date    COLOSTOMY  2007    COMBINED CYSTOSCOPY, RETROGRADES, URETEROSCOPY, INSERT STENT Right 5/3/2016    Procedure: COMBINED CYSTOSCOPY, RETROGRADES, URETEROSCOPY, INSERT STENT;  Surgeon: Azael Melvin MD;  [facility]    COMBINED CYSTOSCOPY, RETROGRADES, URETEROSCOPY, LASER HOLMIUM  LITHOTRIPSY URETER(S), INSERT STENT Right 1/31/2020    Procedure: CYSTOURETEROSCOPY, WITH RETROGRADE PYELOGRAM, HOLMIUM LASER LITHOTRIPSY OF URETERAL CALCULUS, AND STENT placement;  Surgeon: Bob Felix MD;  [facility]    COMBINED CYSTOSCOPY, RETROGRADES, URETEROSCOPY, LASER HOLMIUM LITHOTRIPSY URETER(S), INSERT STENT Left 7/1/2021    Procedure: CYSTOURETEROSCOPY, WITH RETROGRADE PYELOGRAM, HOLMIUM LASER LITHOTRIPSY, STENT INSERTION;  Surgeon: Bob Felix MD;  Location[facility]    CYSTOSCOPY, RETROGRADES, INSERT STENT URETER(S), COMBINED Left 6/9/2021    Procedure: CYSTOSCOPY, WITH Left  RETROGRADE PYELOGRAM AND Left URETERAL STENT INSERTION;  Surgeon: Trenton Lozano MD;  [facility]    EXTRACORPOREAL SHOCK WAVE LITHOTRIPSY (ESWL)  6/27/2013    Procedure: EXTRACORPOREAL SHOCK WAVE LITHOTRIPSY (ESWL);  Left Extracorporeal Shock Wave Lithotripsy Kidney And Ureter;  Surgeon: Azael Melvin MD;  [facility]    ILEOSTOMY  2009    LAPAROTOMY EXPLORATORY  2009    extensive lysis of adhesions, revision of coloenteric fistula, repair of small bowel fistula, takedown of current colostomy, debridement of chronic abscess, resection of residual left colon, right hemicolectomy, Louise ileostomy    LASER HOLMIUM LITHOTRIPSY URETER(S), INSERT STENT, COMBINED  4/9/2014    Procedure: COMBINED CYSTOSCOPY, URETEROSCOPY, LASER HOLMIUM LITHOTRIPSY URETER(S), INSERT STENT;  Ureteroscopy, Cystoscopy, holmium laser,Right Uretral stent placement;  Surgeon: Azael Melvin MD;  [facility]    LASER HOLMIUM LITHOTRIPSY URETER(S), INSERT STENT, COMBINED Bilateral 5/17/2016    Procedure: COMBINED CYSTOSCOPY, URETEROSCOPY, LASER HOLMIUM LITHOTRIPSY URETER(S), INSERT STENT;  Surgeon: Azael Melvin MD;  [facility]            Social History:     Social History     Tobacco Use    Smoking status: Never    Smokeless tobacco: Never   Substance Use Topics    Alcohol use: Yes      Alcohol/week: 0.0 standard drinks of alcohol     Comment: x1 a week            Family History:     Family History   Problem Relation Age of Onset    Cerebrovascular Disease Maternal Grandmother     Connective Tissue Disorder Sister     Nephrolithiasis Father             Allergies:   No Known Allergies         Medications:     No current facility-administered medications for this encounter.     Current Outpatient Medications   Medication Sig    chlorthalidone (HYGROTON) 25 MG tablet Take 1 tablet (25 mg) by mouth daily    escitalopram (LEXAPRO) 5 MG tablet Take 1 tablet (5 mg) by mouth daily    oxyCODONE (ROXICODONE) 5 MG tablet Take 1 tablet (5 mg) by mouth every 6 hours as needed for breakthrough pain (Kidney stone)    potassium citrate (UROCIT-K) 10 MEQ (1080 MG) CR tablet Take 2 tablets (20 mEq) by mouth 3 times daily (with meals)    tamsulosin (FLOMAX) 0.4 MG capsule Take 1 capsule (0.4 mg) by mouth daily    Adalimumab (HUMIRA PEN SC) Inject 40 mg Subcutaneous every 14 days     mercaptopurine (PURINETHOL) 50 MG tablet Take 100 mg by mouth daily     ondansetron (ZOFRAN-ODT) 4 MG ODT tab Take 1-2 tablets (4-8 mg) by mouth every 8 hours as needed for nausea (Patient not taking: Reported on 4/11/2023)    potassium citrate-citric acid (POLYCITRA-K/CYTRA K) 5885-4171 MG Packet Take 1 packet by mouth daily (Patient not taking: Reported on 4/11/2023)    VITAMIN D PO  (Patient not taking: Reported on 4/11/2023)             Review of Systems:   10 point ROS negative unless otherwise specified in HPI          Physical Exam:   VS:  T: 98.2    HR: 70    BP: 138/89    RR: 16   GEN:  NAD, pleasant, responds appropriately to questions  CV:  RRR  LUNGS: Non-labored breathing on room air  BACK:  + R CVA tenderness   ABD:  soft, non-distended, mildly tender to palpation over right abdomen   EXT:  Warm, well perfused.    SKIN:  Warm.  Dry.            Data:   All laboratory data reviewed and highlighted above:    All pertinent  imaging reviewed:  CT scan of the abdomen:   EXAM: CT ABDOMEN PELVIS W/O CONTRAST  LOCATION: [facility]  DATE: 10/29/2023     INDICATION: right flank pain  COMPARISON: 10/25/2023    FINDINGS:   LOWER CHEST: Tiny 2 mm right middle lobe nodules stable.  HEPATOBILIARY: No significant mass or bile duct dilatation. No calcified gallstones.   PANCREAS: No significant mass, duct dilatation, or inflammatory change.  SPLEEN: Normal.  ADRENAL GLANDS: Normal.  KIDNEYS/BLADDER: Interval development of moderate right hydronephrosis secondary to 8.2 x 6.2 mm stone at the right ureterovesicular junction previously this was seen in the right lower pole collecting system. There is an additional 5.2 x 4.2 mm stones   seen in the distal right ureter at the same level as the prior examination. No other stones are seen along the courses of the ureters. Stable tiny left renal calculi. Tiny nonobstructive stone in the upper pole right kidney collecting system.  BOWEL: Nonobstructive nonspecific bowel gas pattern. Right lower quadrant ileostomy. Postoperative changes of cholectomy  LYMPH NODES: No lymphadenopathy.  VASCULATURE: No abdominal aortic aneurysm.  PELVIC ORGANS: No pelvic masses. No pelvic free fluid.  MUSCULOSKELETAL: No concerning osseous abnormalities.                                                             IMPRESSION:   1.  Interval development of moderate right hydronephrosis secondary to 8.2 x 6.2 mm stone at the right ureterovesicular junction previously this was seen in the right lower pole collecting system. There is an additional 5.2 x 4.2 mm stones seen in the   distal right ureter at the same level as the prior examination. No other stones are seen along the courses of the ureters. Stable tiny left renal calculi. Tiny nonobstructive stone in the upper pole right kidney collecting system.     2.  Nonobstructive nonspecific bowel gas pattern. Right lower quadrant  ileostomy. Postoperative changes of colectomy.          Impression and Plan:   Impression:   Leodan Collins is a 43 year old male with hx of Crohn's and recurrent kidney stones who presents with right flank pain secondary to an obstructing ureteral stone at the right UPJ. Interval imaging from 10/25 demonstrates progression of hydronephrosis with stone migration from the lower pole. Patient is afebrile, hemodynamically stable, and clinically non-toxic appearing with pain controlled at this time. Urinalysis is unremarkable. We discussed the natural history of kidney stones, the risk of progression to symptomatic pain/infection, and the possibility of renal failure/kidney damage. Will plan to admit to medicine and keep patient NPO for likely stent placement tomorrow.       Plan:   - Admit to medicine  - Keep NPO  - Continue flomax and fluids at this time  - Urology to assess for stent placement in AM  - Monitor for signs of hemodynamic instability, clinical decompensation, and development of fevers or impending infection        Discussed with Dr. Tomer Sidhu MD

## 2023-10-29 NOTE — ANESTHESIA POSTPROCEDURE EVALUATION
Patient: Leodan Collins    Procedure: Procedure(s):  Cystoscopy,right retrograde pyelogram, Right Stent Ureter placement       Anesthesia Type:  MAC    Note:  Disposition: Inpatient   Postop Pain Control: Uneventful            Sign Out: Well controlled pain   PONV: No   Neuro/Psych: Uneventful            Sign Out: Acceptable/Baseline neuro status   Airway/Respiratory: Uneventful            Sign Out: Acceptable/Baseline resp. status   CV/Hemodynamics: Uneventful            Sign Out: Acceptable CV status; No obvious hypovolemia; No obvious fluid overload   Other NRE: NONE   DID A NON-ROUTINE EVENT OCCUR? No           Last vitals:  Vitals Value Taken Time   /83 10/29/23 1427   Temp 36.2  C (97.2  F) 10/29/23 1427   Pulse 59 10/29/23 1427   Resp 16 10/29/23 1427   SpO2 96 % 10/29/23 1427   Vitals shown include unfiled device data.    Electronically Signed By: Mojgan Huddleston MD  October 29, 2023  5:24 PM

## 2023-10-29 NOTE — PLAN OF CARE
Goal Outcome Evaluation:  Plan of care reviewed with: Patient, Spouse  Overall patient progress: Improving    Outcome Evaluation: Pt discharging home    Shift: 0700 - 1930  Admitted from ED r/t BL flank pain - Kidney stones  >>> Stent placed    Vital Signs: Temp: 97.2  F (36.2  C) Temp src: Temporal BP: 107/83 Pulse: 67   Resp: 16 SpO2: 97 % O2 Device: None (Room air)     CMS/Neuro: A&O x4      Cardio/Resp: No SOB or chest pain     Output: Continent of B/B - Ileostomy  Pt was able to urinate before being discharged     Activity: Independent     Skin: Intact - No rashes, abrasions, or lesions     Pain: 0-10 out of 10 - Controlled w/ Scheduled medication and PRNs   >>>> Pre-surgery pt was in a lot more pain, Post-surgery pt rated pain a 0/10     LDA: RN removed PIV in R AC - Wrapped w/ gauze and coban     Diet: NPO for majority of the shift - Regular diet at discharge, Thin liquids, Good appetite, Medication whole  Pt drank 1/2 cup of water after surgery and before leaving     Additional Info: Call light w/in reach and able to make needs known       DISCHARGE SUMMARY  Pt discharging to: Home  Transportation: Wheelchair to personal vehicle. Pt has a  - Wife  AVS given and discussed: Yes, Pt verbalized understanding and all questions were answered.   Medications given: Yes, Pt verbalized understanding of medication and questions were answered.  Belongings returned: Yes, belongings stayed w/ pt during the stay  Comments: Pt was safely transported via wheelchair with wife off the unit without complication or concerns.    Paperwork turned into paperwork bin at nursing station. Pt pharmacy box was emptied and items were placed in return to pharmacy bin.     Discharged by: Marge Yi RN, BSN, PHN

## 2023-10-29 NOTE — ED TRIAGE NOTES
Pt presents with bilateral flank pain radiating more to the right. Hx of kidney stones and states this is what that felt like. Pt visibly uncomfortable in triage. Multiple episodes of emesis     Triage Assessment (Adult)       Row Name 10/28/23 1551          Triage Assessment    Airway WDL WDL        Respiratory WDL    Respiratory WDL WDL        Skin Circulation/Temperature WDL    Skin Circulation/Temperature WDL WDL        Cardiac WDL    Cardiac WDL WDL        Peripheral/Neurovascular WDL    Peripheral Neurovascular WDL WDL        Cognitive/Neuro/Behavioral WDL    Cognitive/Neuro/Behavioral WDL WDL

## 2023-10-29 NOTE — ED PROVIDER NOTES
[facility] EMERGENCY DEPARTMENT ([facility])    10/28/23        History     Chief Complaint   Patient presents with    Flank Pain     C/o flank pain, more on right side. Hx of kidney stone     HPI  Leodan Collins is a 43 year old male who with PMH of Crohn's disease, hydronephrosis, kidney stones, and anal fistula presents to the ED with flank pain.  Patient notes that this has been increasing over the last 24 to 48 hours and has intensified today is no longer able to manage the pain at home.  He does have a recent history of known renal stone but is having increased pain.    Per Epic Review:   CT Abdomen/Pelvis w/o contrast on 10/25/23:   IMPRESSION:  Mild right hydroureteronephrosis with an 8 mm obstructing calculus in  the distal ureter. Bilateral nonobstructing renal stones overall  increased stone burden compared to 10/5/2020.     Past Medical History  Past Medical History:   Diagnosis Date    Asthma     child    Crohn's disease (H)     History of blood transfusion     Kidney stones      Past Surgical History:   Procedure Laterality Date    COLOSTOMY  2007    COMBINED CYSTOSCOPY, RETROGRADES, URETEROSCOPY, INSERT STENT Right 5/3/2016    Procedure: COMBINED CYSTOSCOPY, RETROGRADES, URETEROSCOPY, INSERT STENT;  Surgeon: Azael Melvin MD;  [facility]    COMBINED CYSTOSCOPY, RETROGRADES, URETEROSCOPY, LASER HOLMIUM LITHOTRIPSY URETER(S), INSERT STENT Right 1/31/2020    Procedure: CYSTOURETEROSCOPY, WITH RETROGRADE PYELOGRAM, HOLMIUM LASER LITHOTRIPSY OF URETERAL CALCULUS, AND STENT placement;  Surgeon: Bob Felix MD;  [facility]    COMBINED CYSTOSCOPY, RETROGRADES, URETEROSCOPY, LASER HOLMIUM LITHOTRIPSY URETER(S), INSERT STENT Left 7/1/2021    Procedure: CYSTOURETEROSCOPY, WITH RETROGRADE PYELOGRAM, HOLMIUM LASER LITHOTRIPSY, STENT INSERTION;  Surgeon: Bob Felix MD;  Location: [facility] OR    CYSTOSCOPY, RETROGRADES, INSERT STENT URETER(S), COMBINED Left 6/9/2021     Procedure: CYSTOSCOPY, WITH Left  RETROGRADE PYELOGRAM AND Left URETERAL STENT INSERTION;  Surgeon: Trenton Lozano MD;  [facility]    EXTRACORPOREAL SHOCK WAVE LITHOTRIPSY (ESWL)  6/27/2013    Procedure: EXTRACORPOREAL SHOCK WAVE LITHOTRIPSY (ESWL);  Left Extracorporeal Shock Wave Lithotripsy Kidney And Ureter;  Surgeon: Azael Melvin MD;  [facility]    ILEOSTOMY  2009    LAPAROTOMY EXPLORATORY  2009    extensive lysis of adhesions, revision of coloenteric fistula, repair of small bowel fistula, takedown of current colostomy, debridement of chronic abscess, resection of residual left colon, right hemicolectomy, Louise ileostomy    LASER HOLMIUM LITHOTRIPSY URETER(S), INSERT STENT, COMBINED  4/9/2014    Procedure: COMBINED CYSTOSCOPY, URETEROSCOPY, LASER HOLMIUM LITHOTRIPSY URETER(S), INSERT STENT;  Ureteroscopy, Cystoscopy, holmium laser,Right Uretral stent placement;  Surgeon: Azael Melvin MD;  [facility]    LASER HOLMIUM LITHOTRIPSY URETER(S), INSERT STENT, COMBINED Bilateral 5/17/2016    Procedure: COMBINED CYSTOSCOPY, URETEROSCOPY, LASER HOLMIUM LITHOTRIPSY URETER(S), INSERT STENT;  Surgeon: Azael Melvin MD;  [facility]     chlorthalidone (HYGROTON) 25 MG tablet  escitalopram (LEXAPRO) 5 MG tablet  oxyCODONE (ROXICODONE) 5 MG tablet  potassium citrate (UROCIT-K) 10 MEQ (1080 MG) CR tablet  tamsulosin (FLOMAX) 0.4 MG capsule  Adalimumab (HUMIRA PEN SC)  mercaptopurine (PURINETHOL) 50 MG tablet  ondansetron (ZOFRAN-ODT) 4 MG ODT tab  potassium citrate-citric acid (POLYCITRA-K/CYTRA K) 8904-5518 MG Packet  VITAMIN D PO      No Known Allergies  Family History  Family History   Problem Relation Age of Onset    Cerebrovascular Disease Maternal Grandmother     Connective Tissue Disorder Sister     Nephrolithiasis Father      Social History   Social History     Tobacco Use    Smoking status: Never    Smokeless tobacco: Never   Vaping Use    Vaping Use: Never used  "  Substance Use Topics    Alcohol use: Yes     Alcohol/week: 0.0 standard drinks of alcohol     Comment: x1 a week    Drug use: No         A medically appropriate review of systems was performed with pertinent positives and negatives noted in the HPI, and all other systems negative.    Physical Exam   BP: 138/89  Pulse: 70  Temp: 98.2  F (36.8  C)  Resp: 16  Height: 170.2 cm (5' 7\")  Weight: 72.6 kg (160 lb)  SpO2: 97 %  Physical Exam  Constitutional:       General: He is not in acute distress.     Appearance: Normal appearance. He is not toxic-appearing.   HENT:      Head: Atraumatic.   Eyes:      General: No scleral icterus.     Conjunctiva/sclera: Conjunctivae normal.   Cardiovascular:      Rate and Rhythm: Normal rate.      Heart sounds: Normal heart sounds.   Pulmonary:      Effort: Pulmonary effort is normal. No respiratory distress.      Breath sounds: Normal breath sounds.   Abdominal:      Palpations: Abdomen is soft.      Tenderness: There is abdominal tenderness. There is right CVA tenderness and guarding. There is no rebound.   Musculoskeletal:         General: No deformity.      Cervical back: Neck supple.   Skin:     General: Skin is warm.   Neurological:      General: No focal deficit present.      Mental Status: He is alert and oriented to person, place, and time.      Sensory: No sensory deficit.      Motor: No weakness.      Coordination: Coordination normal.           ED Course, Procedures, & Data      Procedures       Medications   sodium chloride 0.9% BOLUS 1,000 mL (0 mLs Intravenous Stopped 10/29/23 0121)   ketorolac (TORADOL) injection 30 mg (30 mg Intravenous $Given 10/28/23 2348)   HYDROmorphone (PF) (DILAUDID) injection 0.5 mg (0.5 mg Intravenous $Given 10/28/23 2349)   ondansetron (ZOFRAN) injection 4 mg (4 mg Intravenous $Given 10/28/23 2348)   HYDROmorphone (PF) (DILAUDID) injection 0.5 mg (0.5 mg Intravenous $Given 10/29/23 0152)     Labs Ordered and Resulted from Time of ED Arrival to " Time of ED Departure   COMPREHENSIVE METABOLIC PANEL - Abnormal       Result Value    Sodium 139      Potassium 3.4      Carbon Dioxide (CO2) 29      Anion Gap 12      Urea Nitrogen 21.3 (*)     Creatinine 1.11      GFR Estimate 85      Calcium 9.7      Chloride 98      Glucose 127 (*)     Alkaline Phosphatase 52      AST 28      ALT 42      Protein Total 7.4      Albumin 4.6      Bilirubin Total 1.4 (*)    CBC WITH PLATELETS AND DIFFERENTIAL - Abnormal    WBC Count 12.8 (*)     RBC Count 4.95      Hemoglobin 15.2      Hematocrit 45.4      MCV 92      MCH 30.7      MCHC 33.5      RDW 12.8      Platelet Count 305      % Neutrophils 85      % Lymphocytes 9      % Monocytes 5      % Eosinophils 1      % Basophils 0      % Immature Granulocytes 0      NRBCs per 100 WBC 0      Absolute Neutrophils 10.7 (*)     Absolute Lymphocytes 1.2      Absolute Monocytes 0.7      Absolute Eosinophils 0.2      Absolute Basophils 0.1      Absolute Immature Granulocytes 0.0      Absolute NRBCs 0.0     ROUTINE UA WITH MICROSCOPIC REFLEX TO CULTURE     CT Abdomen Pelvis w/o Contrast   Final Result   IMPRESSION:    1.  Interval development of moderate right hydronephrosis secondary to 8.2 x 6.2 mm stone at the right ureterovesicular junction previously this was seen in the right lower pole collecting system. There is an additional 5.2 x 4.2 mm stones seen in the    distal right ureter at the same level as the prior examination. No other stones are seen along the courses of the ureters. Stable tiny left renal calculi. Tiny nonobstructive stone in the upper pole right kidney collecting system.      2.  Nonobstructive nonspecific bowel gas pattern. Right lower quadrant ileostomy. Postoperative changes of colectomy.                Critical care was not performed.     Medical Decision Making  The patient's presentation was of moderate complexity (an acute illness with systemic symptoms).    The patient's evaluation involved:  ordering and/or  review of 3+ test(s) in this encounter (see separate area of note for details)  discussion of management or test interpretation with another health professional (patient discussed with independent provider or urologist at this time advising admission for possible intervention)    The patient's management necessitated high risk (a parenteral controlled substance) and high risk (a decision regarding hospitalization).    Assessment & Plan        I have reviewed the nursing notes. I have reviewed the findings, diagnosis, plan and need for follow up with the patient.    Patient with sizable kidney stone in the right ureteral system with increased hydronephrosis stone is at the right UVJ at this time will be admitting to the hospital with likely urologic intervention needed.    Final diagnoses:   Kidney stone       Hayden Alexandre  [facility] EMERGENCY DEPARTMENT  10/28/2023     Hayden Alexandre MD  10/29/23 0154       Hayden Alexandre MD  10/29/23 0155

## 2023-10-29 NOTE — PLAN OF CARE
Goal Outcome Evaluation:    Plan of Care Reviewed With: patient    Overall Patient Progress: improving    Outcome Evaluation: Pt complained of mild pain on R flank, denied the need for pain meds. Denies sob or chest pain.

## 2023-10-29 NOTE — PLAN OF CARE
ADMISSION to [facility] UNIT:    Leodan Collins was admitted ED for  BL flank pain.  2 RN skin assessment: completed by Kayla Ayers   Result of skin assessment and interventions/actions: Intact   Height, weight: completed? Yes  Patient belongings & admission documents: see Flowsheets, completed? yes  MDRO education added to care plan: no

## 2023-10-29 NOTE — DISCHARGE INSTRUCTIONS
Please continue treatment of stones with adequate hydration, Flomax  and Oxybutynin. For pain, use Tylenol 1000mg every 6 hours and 5mg Oxycodone every 6 hours. You can continue taking your Citrate medication but crush or dissolve in fluids prior to consumption to increase effectiveness. Please get adequate rest and nutrition as you heal with activity as tolerated. Be mindful for signs of infection and complication, and seek prompt medical attention for worsening signs and symptoms which include fevers, chills, shortness of breath, chest pain, cough, abdominal pain, persistent nausea, inability to pass urine and low blood pressure.

## 2023-10-29 NOTE — DISCHARGE SUMMARY
"[facility]  Discharge Summary - Medicine & Pediatrics       Date of Admission:  10/28/2023  Date of Discharge:  10/29/2023  Discharging Provider: Dr. Ijeoma Head  Discharge Service: [facility]    Discharge Diagnoses   Right obstructing kidney stone  Hx recurrent kidney stones  Microscopic Hematuria  Hx Crohn's disease  Sinus Bradycardia    Clinically Significant Risk Factors     # Overweight: Estimated body mass index is 25.03 kg/m  as calculated from the following:    Height as of this encounter: 1.702 m (5' 7.01\").    Weight as of this encounter: 72.5 kg (159 lb 13.3 oz).       Follow-ups Needed After Discharge   Follow-up Appointments     Follow Up ([facility])      Stent:    Activity  - There are no activity restrictions    Diet:  You may resume your regular diet.     Common symptoms related to the stent:  - You will likely notice some blood in the urine for as long as the stent   is in place. You may also notice more blood in the urine after physical   activity. Normal urine color can range from yellow to dark red. This is   not problematic as long as you are able to empty your bladder. Although   urine may seem quite bloody, a few drops of blood can color the entire   toilet bowl red- much like food coloring. Increase your fluid intake to   help flush the blood out of your bladder.   - You may also notice a twinge of pain in your kidney during urination.   This can be severe, but should get better after the first few days with   the stent. This is due to urine traveling backward (up the stent into your   kidney) when the bladder squeezes. It is normal and not a cause for   concern.  - You may feel like you need to urinate more frequently than usual. Some   patients experience a lower abdominal cramping or \"spasm\". You may notice   urine leakage from your urethra after this happens. This is due to the   stent rubbing against your bladder wall and " "should get better over the   next few days.  - You may experience some burning with urination due to minor trauma from   the scope used during your surgery. This should disappear over the next   few days.     Medications:  Anti-inflammatories/NSAIDs (Ibuprofen, Advil, Aleve) are the most   effective pain relievers for stent-related discomfort. You may safely use   these in combination with Tylenol.   You may also take Tylenol for pain control- but not more than 3000 mg   daily.  We recommend over the counter fiber (metamucil or benefiber) and stool   softeners (miralax, docusate or senna) to prevent postoperative   constipation, but stop if you develop diarrhea.    Follow-Up:  - Follow up at your scheduled surgery  - Call or return sooner than your regularly scheduled visit if you develop   any of the following: fever (greater than 101.5), uncontrolled pain,   uncontrolled nausea or vomiting, or inability to urinate.    Phone numbers:   - Monday through Friday 8am to 4:30pm: Call [phone] with questions,   requests for medication refills, or to schedule or confirm an appointment.  - Nights or weekends: call the after hours emergency pager - [phone]   and tell the  \"I would like to page the Urology Resident on call.\"   Please note, due to prescribing laws, resident physicians are unable to   prescribe narcotics after-hours. If you feel as though you will need a   refill of a narcotic pain medication, you will need to call the clinic   during business hours OR seek emergency care.  - For emergencies, call 911       Appointments on [place] and/or [facility] (with [facility] or [facility]   provider or service). Call [phone] if you haven't heard regarding   these appointments within 7 days of discharge.        Follow Up and recommended labs and tests      Follow up with primary care provider, Blane Worrell, within 7   days for hospital follow- up.  No follow up labs or test are needed.        " PCP: also follow up on bradycardia and Crohn's treatment    Unresulted Labs Ordered in the Past 30 Days of this Admission       Date and Time Order Name Status Description    10/29/2023  1:57 AM Urine Culture In process         These results will be followed up by Urology and PCP.    Discharge Disposition   Discharged to home  Condition at discharge: Stable    Hospital Course   Leodan Collins is a 43 year old male who with PMH of Crohn's disease s/p ileostomy in 2009 now with colostomy, recurrent kidney stones, history of anal fistula, and generalized anxiety disorder who was admitted on 10/28/23 for management of right obstructed hydronephrosis secondary to UVJ stone.      Right obstructing kidney stone  Hx recurrent kidney stones  Microscopic Hematuria  Leukocytosis  History of recurrent kidney stones presenting with right flank pain, nausea and vomiting. Given history of Crohn's and ileostomy in 2009, work up considered poor calcium absorption leading to increased urinary excretion of calcium. Work up for hyperparathyroidism was benign with normal limit Calcium, Phos and PTH. Leukocytosis to 12.8 with UA demonstrating microscopic hematuria. Pending culture. CT AP demonstrated moderate right hydronephrosis secondary to obstructing 8.2 x 6.2 mm stone at uterovesicular junction as well as additional 5.2 x 4.2 mm stone in distal R ureter with interval increase from mild hydronephrosis from CT on 10/25/23. Currently being managed on Citrate and Chlorthalidone, noted to have poor GI absorption of citrate seeing tabs in ostomy pouch. The patient failed outpatient management with Flomax and oxycodone PRNs and developed recurring pain despite initial resolution with medical expulsion therapy, analgesics, antiemetics and IVF. Urology consulted.  The patient was noted to have a benign cystoscopic evaluation followed by retrograde pyelogram and 6 x 26 ureteral stent with proximal and distal curls on fluoroscopy. The patient  was improved and stabilized with intervention and observed for a period before discharge. The patient was given instructions to continue Flomax 0.4 mg daily with increased PO hydration, discharged with Urology recommended Oxybutynin 5mg q8hr PRN as well as Zofran for antiemetic. Has remaining Oxycodone at home so deferred on offering more analgesia over the recommended Tylenol 1000mg q6hr PRN. Consider 24 hour urine calcium collection at follow up which has not yet been completed. Follow up with Urology.      Hx Crohn's disease  S/p ileostomy in 2009, has been well controlled since then with colostomy. Currently not using Humira due to administration issues. PCP can consider other medical management or support with dosing in clinic.      Bradycardia  EKG on 06/25/2021 demonstrated normal sinus rhythm. Incidental bradycardia with waveform concerning for AV block 2/2 MI vs other infiltration. Patient is asymptomatic.   -PCP can follow up       Consultations This Hospital Stay   None    Code Status   Full Code       The patient was discussed with Dr. Amira Rodriguez MD  [facility]  [facility]  [address] [place] [id number]  Phone: [phone]  Fax: [phone]  ______________________________________________________________________    Physical Exam   Vital Signs: Temp: 97.2  F (36.2  C) Temp src: Temporal BP: 107/83 Pulse: 67   Resp: 16 SpO2: 97 % O2 Device: None (Room air)    Weight: 159 lbs 13.34 oz    Constitutional: awake, alert, appears comfortable, no apparent distress, and appears stated age  Eyes: sclera clear, conjunctiva normal  ENT: Normocephalic, atraumatic, moist mucous membranes  Respiratory: No increased work of breathing, good air exchange, clear to auscultation bilaterally, no crackles or wheezing  Cardiovascular: Bradycardic rate and rhythm, normal S1 and S2, no murmur noted  GI: Abdomen soft, non-tender on mild palpation, non-distended.  "colostomy bag in place. No flank pain.   Skin: No redness or rashes on back, abdomen or face. Warm, dry skin.  Musculoskeletal: no lower extremity pitting edema present  Neurologic: Awake, alert, oriented to name, place and situation. No focal deficits.  Neuropsychiatric: General: normal, calm, and normal eye contact  Level of consciousness: alert / normal  Affect: normal      Primary Care Physician   Blane Worrell    Discharge Orders      Follow Up ([facility])    Stent:    Activity  - There are no activity restrictions    Diet:  You may resume your regular diet.     Common symptoms related to the stent:  - You will likely notice some blood in the urine for as long as the stent is in place. You may also notice more blood in the urine after physical activity. Normal urine color can range from yellow to dark red. This is not problematic as long as you are able to empty your bladder. Although urine may seem quite bloody, a few drops of blood can color the entire toilet bowl red- much like food coloring. Increase your fluid intake to help flush the blood out of your bladder.   - You may also notice a twinge of pain in your kidney during urination. This can be severe, but should get better after the first few days with the stent. This is due to urine traveling backward (up the stent into your kidney) when the bladder squeezes. It is normal and not a cause for concern.  - You may feel like you need to urinate more frequently than usual. Some patients experience a lower abdominal cramping or \"spasm\". You may notice urine leakage from your urethra after this happens. This is due to the stent rubbing against your bladder wall and should get better over the next few days.  - You may experience some burning with urination due to minor trauma from the scope used during your surgery. This should disappear over the next few days.     Medications:  Anti-inflammatories/NSAIDs (Ibuprofen, Advil, Aleve) are the most " "effective pain relievers for stent-related discomfort. You may safely use these in combination with Tylenol.   You may also take Tylenol for pain control- but not more than 3000 mg daily.  We recommend over the counter fiber (metamucil or benefiber) and stool softeners (miralax, docusate or senna) to prevent postoperative constipation, but stop if you develop diarrhea.    Follow-Up:  - Follow up at your scheduled surgery  - Call or return sooner than your regularly scheduled visit if you develop any of the following: fever (greater than 101.5), uncontrolled pain, uncontrolled nausea or vomiting, or inability to urinate.    Phone numbers:   - Monday through Friday 8am to 4:30pm: Call [phone] with questions, requests for medication refills, or to schedule or confirm an appointment.  - Nights or weekends: call the after hours emergency pager - [phone] and tell the  \"I would like to page the Urology Resident on call.\" Please note, due to prescribing laws, resident physicians are unable to prescribe narcotics after-hours. If you feel as though you will need a refill of a narcotic pain medication, you will need to call the clinic during business hours OR seek emergency care.  - For emergencies, call 911       Appointments on [place] and/or [facility] (with [facility] or [facility] provider or service). Call [phone] if you haven't heard regarding these appointments within 7 days of discharge.     Reason for your hospital stay    You were hospitalized for management of a kidney stone with a placement of a uretral stent to help with it passing. We also helped you with hydration and pain control.     Activity    Your activity upon discharge: activity as tolerated     Follow Up and recommended labs and tests    Follow up with primary care provider, Blane Worrell, within 7 days for hospital follow- up.  No follow up labs or test are needed.     Diet    Follow this diet upon discharge: Regular Adult " Diet       Significant Results and Procedures   Most Recent 3 CBC's:  Recent Labs   Lab Test 10/29/23  0621 10/28/23  2335 06/30/23  1344   WBC 6.8 12.8* 7.4   HGB 13.6 15.2 16.5   MCV 92 92 93    305 288     Most Recent 3 BMP's:  Recent Labs   Lab Test 10/29/23  0621 10/28/23  2335 08/31/23  1155    139 138   POTASSIUM 3.6 3.4 3.9   CHLORIDE 103 98 99   CO2 26 29 28   BUN 20.3* 21.3* 16.9   CR 1.05 1.11 0.90   ANIONGAP 10 12 11   LAURA 8.8 9.7 9.9   * 127* 89     Most Recent 2 LFT's:  Recent Labs   Lab Test 10/28/23  2335 06/30/23  1344   AST 28 33   ALT 42 46   ALKPHOS 52 61   BILITOTAL 1.4* 1.3*     Most Recent Urinalysis:  Recent Labs   Lab Test 10/29/23  0143 01/29/20  1845 06/20/19  0924   COLOR Yellow   < > Yellow   APPEARANCE Clear   < > Clear   URINEGLC Negative   < > Negative   URINEBILI Negative   < > Negative   URINEKETONE Trace*   < > Negative   SG 1.026   < > >1.030   UBLD Large*   < > Moderate*   URINEPH 5.5   < > 5.0   PROTEIN 50*   < > Negative   UROBILINOGEN  --   --  0.2   NITRITE Negative   < > Negative   LEUKEST Small*   < > Small*   RBCU >182*   < > 5-10*   WBCU 31*   < > 5-10*    < > = values in this interval not displayed.   ,   Results for orders placed or performed during the hospital encounter of 10/28/23   CT Abdomen Pelvis w/o Contrast    Narrative    EXAM: CT ABDOMEN PELVIS W/O CONTRAST  LOCATION: [facility]  DATE: 10/29/2023    INDICATION: right flank pain  COMPARISON: 10/25/2023  TECHNIQUE: CT scan of the abdomen and pelvis was performed without IV contrast. Multiplanar reformats were obtained. Dose reduction techniques were used.  CONTRAST: None.    FINDINGS:   LOWER CHEST: Tiny 2 mm right middle lobe nodules stable.    HEPATOBILIARY: No significant mass or bile duct dilatation. No calcified gallstones.     PANCREAS: No significant mass, duct dilatation, or inflammatory change.    SPLEEN: Normal.    ADRENAL GLANDS:  Normal.    KIDNEYS/BLADDER: Interval development of moderate right hydronephrosis secondary to 8.2 x 6.2 mm stone at the right ureterovesicular junction previously this was seen in the right lower pole collecting system. There is an additional 5.2 x 4.2 mm stones   seen in the distal right ureter at the same level as the prior examination. No other stones are seen along the courses of the ureters. Stable tiny left renal calculi. Tiny nonobstructive stone in the upper pole right kidney collecting system.    BOWEL: Nonobstructive nonspecific bowel gas pattern. Right lower quadrant ileostomy. Postoperative changes of cholectomy    LYMPH NODES: No lymphadenopathy.    VASCULATURE: No abdominal aortic aneurysm.    PELVIC ORGANS: No pelvic masses. No pelvic free fluid.    MUSCULOSKELETAL: No concerning osseous abnormalities.      Impression    IMPRESSION:   1.  Interval development of moderate right hydronephrosis secondary to 8.2 x 6.2 mm stone at the right ureterovesicular junction previously this was seen in the right lower pole collecting system. There is an additional 5.2 x 4.2 mm stones seen in the   distal right ureter at the same level as the prior examination. No other stones are seen along the courses of the ureters. Stable tiny left renal calculi. Tiny nonobstructive stone in the upper pole right kidney collecting system.    2.  Nonobstructive nonspecific bowel gas pattern. Right lower quadrant ileostomy. Postoperative changes of colectomy.     XR Surgery MARIBELL L/T 5 Min Fluoro    Narrative    This exam was marked as non-reportable because it will not be read by a   radiologist or a [place] non-radiologist provider.             Discharge Medications   Current Discharge Medication List        START taking these medications    Details   ondansetron (ZOFRAN ODT) 4 MG ODT tab Take 1 tablet (4 mg) by mouth every 6 hours as needed for nausea or vomiting  Qty: 15 tablet, Refills: 0    Associated Diagnoses: Ureteral  calculus           CONTINUE these medications which have NOT CHANGED    Details   adalimumab (HUMIRA) 40 MG/0.8ML prefilled syringe kit Inject 40 mg Subcutaneous every 14 days      chlorthalidone (HYGROTON) 25 MG tablet Take 1 tablet (25 mg) by mouth daily  Qty: 90 tablet, Refills: 3    Associated Diagnoses: Kidney stone      escitalopram (LEXAPRO) 5 MG tablet Take 1 tablet (5 mg) by mouth daily  Qty: 90 tablet, Refills: 3    Associated Diagnoses: PIOTR (generalized anxiety disorder)      mercaptopurine (PURINETHOL) 50 MG tablet Take 100 mg by mouth daily     Associated Diagnoses: Calculus of kidney      oxyCODONE (ROXICODONE) 5 MG tablet Take 1 tablet (5 mg) by mouth every 6 hours as needed for breakthrough pain (Kidney stone)  Qty: 12 tablet, Refills: 0    Associated Diagnoses: Ureteral stone      potassium citrate (UROCIT-K) 10 MEQ (1080 MG) CR tablet Take 2 tablets (20 mEq) by mouth 3 times daily (with meals)  Qty: 180 tablet, Refills: 11    Associated Diagnoses: Kidney stone      tamsulosin (FLOMAX) 0.4 MG capsule Take 1 capsule (0.4 mg) by mouth daily  Qty: 30 capsule, Refills: 3    Associated Diagnoses: Kidney stone           Allergies   No Known Allergies

## 2023-10-29 NOTE — ANESTHESIA PREPROCEDURE EVALUATION
Anesthesia Pre-Procedure Evaluation    Patient: Leodan Collins   MRN: [id number] : 1980        Preoperative Diagnosis: Ureteral stone [N20.1]   Procedure : Procedure(s):  CYSTOURETEROSCOPY, WITH RETROGRADE PYELOGRAM, HOLMIUM LASER LITHOTRIPSY, STENT INSERTION     Past Medical History:   Diagnosis Date    Asthma     child    Crohn's disease (H)     History of blood transfusion     Kidney stones       Past Surgical History:   Procedure Laterality Date    COLOSTOMY      COMBINED CYSTOSCOPY, RETROGRADES, URETEROSCOPY, INSERT STENT Right 5/3/2016    Procedure: COMBINED CYSTOSCOPY, RETROGRADES, URETEROSCOPY, INSERT STENT;  Surgeon: Azael Melivn MD;  Location: [facility] OR    COMBINED CYSTOSCOPY, RETROGRADES, URETEROSCOPY, LASER HOLMIUM LITHOTRIPSY URETER(S), INSERT STENT Right 2020    Procedure: CYSTOURETEROSCOPY, WITH RETROGRADE PYELOGRAM, HOLMIUM LASER LITHOTRIPSY OF URETERAL CALCULUS, AND STENT placement;  Surgeon: Bob Felix MD;  Location: [facility] OR    COMBINED CYSTOSCOPY, RETROGRADES, URETEROSCOPY, LASER HOLMIUM LITHOTRIPSY URETER(S), INSERT STENT Left 2021    Procedure: CYSTOURETEROSCOPY, WITH RETROGRADE PYELOGRAM, HOLMIUM LASER LITHOTRIPSY, STENT INSERTION;  Surgeon: Bob Felix MD;  Location: [facility] OR    CYSTOSCOPY, RETROGRADES, INSERT STENT URETER(S), COMBINED Left 2021    Procedure: CYSTOSCOPY, WITH Left  RETROGRADE PYELOGRAM AND Left URETERAL STENT INSERTION;  Surgeon: Trenton Lozano MD;  Location: [facility] OR    EXTRACORPOREAL SHOCK WAVE LITHOTRIPSY (ESWL)  2013    Procedure: EXTRACORPOREAL SHOCK WAVE LITHOTRIPSY (ESWL);  Left Extracorporeal Shock Wave Lithotripsy Kidney And Ureter;  Surgeon: Azael Melvin MD;  Location: [facility] OR    ILEOSTOMY  2009    LAPAROTOMY EXPLORATORY      extensive lysis of adhesions, revision of coloenteric fistula, repair of small bowel fistula, takedown of current colostomy, debridement of chronic abscess, resection of residual  left colon, right hemicolectomy, Louise ileostomy    LASER HOLMIUM LITHOTRIPSY URETER(S), INSERT STENT, COMBINED  4/9/2014    Procedure: COMBINED CYSTOSCOPY, URETEROSCOPY, LASER HOLMIUM LITHOTRIPSY URETER(S), INSERT STENT;  Ureteroscopy, Cystoscopy, holmium laser,Right Uretral stent placement;  Surgeon: Azael Melvin MD;  Location: [facility] OR    LASER HOLMIUM LITHOTRIPSY URETER(S), INSERT STENT, COMBINED Bilateral 5/17/2016    Procedure: COMBINED CYSTOSCOPY, URETEROSCOPY, LASER HOLMIUM LITHOTRIPSY URETER(S), INSERT STENT;  Surgeon: Azael Melvin MD;  Location: [facility] OR      No Known Allergies   Social History     Tobacco Use    Smoking status: Never    Smokeless tobacco: Never   Substance Use Topics    Alcohol use: Yes     Alcohol/week: 0.0 standard drinks of alcohol     Comment: x1 a week      Wt Readings from Last 1 Encounters:   10/29/23 72.5 kg (159 lb 13.3 oz)      Leodan Collins is a 43 year old male who with PMH of Crohn's disease s/p ileostomy in 2009 now with colostomy, recurrent kidney stones, hx anal fistula, and PIOTR    Anesthesia Evaluation   Pt has had prior anesthetic.     No history of anesthetic complications       ROS/MED HX  ENT/Pulmonary:       Neurologic:       Cardiovascular:       METS/Exercise Tolerance:     Hematologic:       Musculoskeletal:       GI/Hepatic:     (+)       Inflammatory bowel disease,             Renal/Genitourinary:     (+) renal disease (hydronephrosis),      Nephrolithiasis ,       Endo:       Psychiatric/Substance Use:       Infectious Disease:       Malignancy:       Other:            Physical Exam    Airway        Mallampati: I   TM distance: > 3 FB   Neck ROM: full   Mouth opening: > 3 cm    Respiratory Devices and Support         Dental       (+) Modest Abnormalities - crowns, retainers, 1 or 2 missing teeth      Cardiovascular   cardiovascular exam normal       Rhythm and rate: regular and normal     Pulmonary   pulmonary exam normal        breath sounds  clear to auscultation           OUTSIDE LABS:  CBC:   Lab Results   Component Value Date    WBC 6.8 10/29/2023    WBC 12.8 (H) 10/28/2023    HGB 13.6 10/29/2023    HGB 15.2 10/28/2023    HCT 41.0 10/29/2023    HCT 45.4 10/28/2023     10/29/2023     10/28/2023     BMP:   Lab Results   Component Value Date     10/29/2023     10/28/2023    POTASSIUM 3.6 10/29/2023    POTASSIUM 3.4 10/28/2023    CHLORIDE 103 10/29/2023    CHLORIDE 98 10/28/2023    CO2 26 10/29/2023    CO2 29 10/28/2023    BUN 20.3 (H) 10/29/2023    BUN 21.3 (H) 10/28/2023    CR 1.05 10/29/2023    CR 1.11 10/28/2023     (H) 10/29/2023     (H) 10/28/2023     COAGS:   Lab Results   Component Value Date    PTT 34 06/19/2011    INR 1.05 06/19/2011     POC:   Lab Results   Component Value Date    BGM 76 12/03/2009     HEPATIC:   Lab Results   Component Value Date    ALBUMIN 4.6 10/28/2023    PROTTOTAL 7.4 10/28/2023    ALT 42 10/28/2023    AST 28 10/28/2023    ALKPHOS 52 10/28/2023    BILITOTAL 1.4 (H) 10/28/2023     OTHER:   Lab Results   Component Value Date    PH 7.47 (H) 12/16/2009    LACT 1.9 01/24/2019    LAURA 8.8 10/29/2023    PHOS 3.7 10/29/2023    MAG 2.4 (H) 06/19/2011    LIPASE 125 01/24/2019    TSH 0.60 08/31/2023    CRP 20.1 (H) 06/19/2011    SED 3 07/01/2022       Anesthesia Plan    ASA Status:  2    NPO Status:  NPO Appropriate    Anesthesia Type: MAC.     - Reason for MAC: straight local not clinically adequate   Induction: Intravenous.   Maintenance: TIVA.        Consents    Anesthesia Plan(s) and associated risks, benefits, and realistic alternatives discussed. Questions answered and patient/representative(s) expressed understanding.     - Discussed: Risks, Benefits and Alternatives for BOTH SEDATION and the PROCEDURE were discussed     - Discussed with:  Patient      - Extended Intubation/Ventilatory Support Discussed: No.      - Patient is DNR/DNI Status: No     Use of blood products discussed: No .      Postoperative Care    Pain management: Oral pain medications, Multi-modal analgesia.   PONV prophylaxis: Ondansetron (or other 5HT-3), Dexamethasone or Solumedrol, Background Propofol Infusion     Comments:           H&P reviewed: Unable to attach VIRTUAL H&P to encounter due to EHR limitations. Appropriate H&P reviewed. The physical exam performed by anesthesia during this surgical encounter serves as the physical portion of that virtual H&P.  Any significant changes noted within this preop evaluation.          Mojgan Huddleston MD

## 2023-10-29 NOTE — PHARMACY-ADMISSION MEDICATION HISTORY
Pharmacy Intern Admission Medication History    Admission medication history is complete. The information provided in this note is only as accurate as the sources available at the time of the update.    Information Source(s):   Patient  Dispense Report    Pertinent Information:   Patient-reported medications are consistent with dispense history.  Patient reports that his last dose of Humira was about a month ago.    Changes made to PTA medication list:  Added: None  Deleted:   Patient is no longer taking, per patient:  Ondansetron 4 mg ODT tablet  Changed: None    Allergies reviewed with patient and updates made in EHR: yes    Medication History Completed By: Kayla Johnston 10/29/2023 3:42 PM    PTA Med List   Medication Sig Last Dose    adalimumab (HUMIRA) 40 MG/0.8ML prefilled syringe kit Inject 40 mg Subcutaneous every 14 days Past Month    chlorthalidone (HYGROTON) 25 MG tablet Take 1 tablet (25 mg) by mouth daily 10/28/2023    escitalopram (LEXAPRO) 5 MG tablet Take 1 tablet (5 mg) by mouth daily 10/28/2023    mercaptopurine (PURINETHOL) 50 MG tablet Take 100 mg by mouth daily  10/28/2023    oxyCODONE (ROXICODONE) 5 MG tablet Take 1 tablet (5 mg) by mouth every 6 hours as needed for breakthrough pain (Kidney stone) 10/28/2023    potassium citrate (UROCIT-K) 10 MEQ (1080 MG) CR tablet Take 2 tablets (20 mEq) by mouth 3 times daily (with meals) 10/28/2023    tamsulosin (FLOMAX) 0.4 MG capsule Take 1 capsule (0.4 mg) by mouth daily 10/28/2023

## 2023-10-29 NOTE — OP NOTE
OPERATIVE REPORT  October 29, 2023      PREOPERATIVE DIAGNOSIS:    1. Right ureteral stone(s)  2. Right flank pain uncontrolled  3. Crohn's disease with ileostomy    POSTOPERATIVE DIAGNOSIS: Same    PROCEDURES PERFORMED:   -Cystourethroscopy  -Right retrograde pyelogram with intraoperative interpretation of images  -Right ureteral stent placement      STAFF SURGEON: Renetta Jeff MD  RESIDENT(S): Jaspal Ram MD    ANESTHESIA: MAC  ESTIMATED BLOOD LOSS: 1 ml  COMPLICATIONS: None.   SPECIMEN: None    SIGNIFICANT FINDINGS:   Successful placement of right ureteral stent    BRIEF OPERATIVE INDICATIONS: Leodan Collins is a(n) 43 year old male who presented with upper tract obstruction secondary to ureterolithiasis.  Due evidence of intractable pain, the decision was made to offer cystoscopy, ureteral stent placement.  The patient was counseled on the possibility of stent discomfort.  After a discussion of all risks, benefits, and alternatives, the patient elected to proceed. The patient understands the potential need for more than one procedure to eliminate all stone burden.      DESCRIPTION OF PROCEDURE:  After informed consent was obtained, the patient was transported to the operating room & placed supine on the table. After adequate anesthesia was induced, the patient was placed in lithotomy and prepped and draped in the usual sterile fashion with care in neural safety in positioning of all four extremities. A timeout was taken to confirm correct patient, procedure and laterality. Pre-operative IV antibiotics were administered.     A 22-Spanish rigid cystoscope was inserted into a well-lubricated urethra. The urethra was unremarkable without stricture. A cystoscopic evaluation demonstration demonstrated no abnormal findings.  The bilateral ureteral orifices were in orthotopic position.     The wire advanced easily to the upper tract.  A retrograde pyelogram was performed, which demonstrated minimally hydronephrotic.  The  wire was replaced and the 5 Fr catheter was backloaded off. A 6 x 26 ureteral stent was advanced with noted proximal and distal curls on fluoroscopy.  The bladder was emptied.    The patient tolerated the procedure well and there were no apparent complications. The patient  was transported to the postanesthesia care unit in stable condition.        POSTOP PLAN:   Ok to discharge from urology perspective once pain well managed   Continue flomax, can add oxybutynin 5mg Q8hr prn for stent discomfort, with risk of constipation, dry eye dry mouth, etc.   Plan for definitive stone management as scheduled    Addendum:    I, Renetta Jeff, was present for the entire case.  I agree with the note as above with changes made as needed.  I spoke to his spouse Laura over the telephone at the end of the case    Renetta Jeff MD MPH  (she/her/hers)   of Urology  [facility]

## 2023-10-29 NOTE — H&P
[facility]    History and Physical - [facility]       Date of Admission:  10/28/2023    Assessment & Plan      Leodan Collins is a 43 year old male who with PMH of Crohn's disease s/p ileostomy in 2009 now with colostomy, recurrent kidney stones, hx anal fistula, and PIOTR presents to the ED with right sided flank pain. Admitted for management of obstructing right kidney stone.    Right obstructing kidney stone  Hx recurrent kidney stones  Pyuria  Leukocytosis  Pt with hx of recurrent kidney stones presenting with right flank pain, nausea and vomiting, CTAP shows moderate right hydronephrosis secondary to obstructing 8.2 x 6.2 mm stone at uterovesicular junction as well as additional 5.2 x 4.2 mm stone in distal R ureter. Has progressive hydronephrosis compared to his CT on 10/25/23 that showed only mild hydronephrosis. Was started on Flomax and oxycodone prn a few days ago for outpatient mgmt.   For recurrent kidney stones, patient follows with urology and takes potassium citrate and chlorthalidone, last follow up with urology on 8/11/23 and had been planning on checking parathyroid hormone level and completing 24 hr urine collection but this has not been completed. Had PTH checked in 2014 and was normal at 20. Given his history of Crohn's and ileostomy in 2009, poor absorption causing increased excretion of calcium is likely contributing to recurrent kidney stones.  WBC 12.8, ANC elevated to 10.7 on admission. UA with LE, consistent with hematuria, but no systemic symptoms concerning for UTI. Seems unlikely that this obstructing stone is infected. Patient has frequently had similar UAs with negative cultures in the past; per urology recs, do not feel it is indicated to start antibiotics despite being on Humera.  - Urology consult, appreciate recs   - Continue flomax 0.4mg daily   - Fluids as below   - NPO for likely stent placement in AM  - PTA  "chlorthalidone 25mg, potassium citrate tablets  - Follow up urine culture  - Pain: toradol 15mg q6h, 5 mg oxy q4h prn, IV dilaudid 0.5mg q2h prn for breakthrough and if not tolerating PO, heat packs  - Nausea: IV Zofran, compazine prn  - mIVF - LR @ 125mL until 10 am  - Parathyroid hormone  - Repeat BMP, CBC in AM  - Strict I/O's    Hx Crohn's disease  S/p ileostomy in 2009, has been well controlled since then. Has colostomy.  - PTA mercaptopurine 100mg  - Ask about last dose of Humira    PIOTR  Stable.  - PTA Lexapro 5mg        Diet: NPO for Medical/Clinical Reasons Except for: Meds  DVT Prophylaxis: Ambulate every shift  Stevenson Catheter: Not present  Fluids: mIVF until 10 am, reassess  Lines: None     Cardiac Monitoring: None  Code Status: Full Code    Clinically Significant Risk Factors Present on Admission                       # Overweight: Estimated body mass index is 25.06 kg/m  as calculated from the following:    Height as of this encounter: 1.702 m (5' 7\").    Weight as of this encounter: 72.6 kg (160 lb).              Disposition Plan      Expected Discharge Date: 10/30/2023                The patient's care was discussed with the Attending Physician, Dr. Head .      Luis F Ramirez MD  [facility]  [facility]  Securely message with [employer] (more info)  Text page via [employer] Paging/Directory   See signed in provider for up to date coverage information  ______________________________________________________________________    Chief Complaint   Flank pain    History is obtained from the patient and chart    History of Present Illness   Leodan Collins is a 43 year old male who with PMH of Crohn's disease s/p ileostomy in 2009 now with colostomy, recurrent kidney stones, hx anal fistula, and PIOTR presents to the ED with right sided flank pain.     Pain started on 10/23, waxing and waning, reached out to his urologist on 10/24 as his urologist via " [facility] as he was managing pain okay at home, though pain was increasing after 36 hrs of onset. Urology ordered CT on 10/25:  IMPRESSION:  Mild right hydroureteronephrosis with an 8 mm obstructing calculus in  the distal ureter. Bilateral nonobstructing renal stones overall  increased stone burden compared to 10/5/2020.    Had been managing pain outpatient with recommendations from urology. Urology note 10/26:  Called patient to discuss results of his CT from yesterday, showing a 7-8 mm distal right ureteral stone. He is doing ok today, with intermittent flares of pain. Was contacted by [facility] earlier and prescribed Flomax. He has been utilizing ibuprofen and warm baths for pain but has had a few breakthrough episodes. Will send a small fill of oxycodone to use in this case, especially through the weekend. Also discussed that OTC Dramamine (dimenhydrinate) can help with flank pain episodes overnight when passing a stone. He can pick this up at any drug store. Will reach out to stone surgeon regarding potential need for URS.      Had procedure scheduled for 11/17 for cystoscopy, pyelogram, lithotripsy and possible bilateral ureteral stent placement.    Now presenting as his pain is too significant to manage at home. Endorses right sided flank pain that radiates into groin. Denies fever, chills, abdominal pain, changes in BM, and dysuria. Endorses urinary frequency for a few days, and nausea and vomiting 2/2 pain that started 10/28 evening, last emesis at 2200 on 10/28 just prior to getting to ED.    While in ED, labs completed and repeat CT showing obstructing stone with moderate hydronephrosis. Urology made aware. Patient received a 1 L bolus of fluid, toradol and 2 doses IV dilaudid, which vastly improved his pain. Reports that his nausea and vomiting is tied to his pain, so if his pain is under control, he does not feel nauseous.     Past Medical History    Past Medical History:   Diagnosis Date    Asthma     child     Crohn's disease (H)     History of blood transfusion     Kidney stones        Past Surgical History   Past Surgical History:   Procedure Laterality Date    COLOSTOMY  2007    COMBINED CYSTOSCOPY, RETROGRADES, URETEROSCOPY, INSERT STENT Right 5/3/2016    Procedure: COMBINED CYSTOSCOPY, RETROGRADES, URETEROSCOPY, INSERT STENT;  Surgeon: Azael Melvin MD;  Location: [facility] OR    COMBINED CYSTOSCOPY, RETROGRADES, URETEROSCOPY, LASER HOLMIUM LITHOTRIPSY URETER(S), INSERT STENT Right 1/31/2020    Procedure: CYSTOURETEROSCOPY, WITH RETROGRADE PYELOGRAM, HOLMIUM LASER LITHOTRIPSY OF URETERAL CALCULUS, AND STENT placement;  Surgeon: Bob Felix MD;  Location: [facility]    COMBINED CYSTOSCOPY, RETROGRADES, URETEROSCOPY, LASER HOLMIUM LITHOTRIPSY URETER(S), INSERT STENT Left 7/1/2021    Procedure: CYSTOURETEROSCOPY, WITH RETROGRADE PYELOGRAM, HOLMIUM LASER LITHOTRIPSY, STENT INSERTION;  Surgeon: Bob Felix MD;  Location: [facility] OR    CYSTOSCOPY, RETROGRADES, INSERT STENT URETER(S), COMBINED Left 6/9/2021    Procedure: CYSTOSCOPY, WITH Left  RETROGRADE PYELOGRAM AND Left URETERAL STENT INSERTION;  Surgeon: Trenton Lozano MD;  Location: [facility] OR    EXTRACORPOREAL SHOCK WAVE LITHOTRIPSY (ESWL)  6/27/2013    Procedure: EXTRACORPOREAL SHOCK WAVE LITHOTRIPSY (ESWL);  Left Extracorporeal Shock Wave Lithotripsy Kidney And Ureter;  Surgeon: Azael Melvin MD;  Location: [facility] OR    ILEOSTOMY  2009    LAPAROTOMY EXPLORATORY  2009    extensive lysis of adhesions, revision of coloenteric fistula, repair of small bowel fistula, takedown of current colostomy, debridement of chronic abscess, resection of residual left colon, right hemicolectomy, Louise ileostomy    LASER HOLMIUM LITHOTRIPSY URETER(S), INSERT STENT, COMBINED  4/9/2014    Procedure: COMBINED CYSTOSCOPY, URETEROSCOPY, LASER HOLMIUM LITHOTRIPSY URETER(S), INSERT STENT;  Ureteroscopy, Cystoscopy, holmium laser,Right Uretral stent placement;  Surgeon:  Azael Melvin MD;  Location: [facility] OR    LASER HOLMIUM LITHOTRIPSY URETER(S), INSERT STENT, COMBINED Bilateral 5/17/2016    Procedure: COMBINED CYSTOSCOPY, URETEROSCOPY, LASER HOLMIUM LITHOTRIPSY URETER(S), INSERT STENT;  Surgeon: Azael Melvin MD;  Location: [facility] OR       Prior to Admission Medications   Prior to Admission Medications   Prescriptions Last Dose Informant Patient Reported? Taking?   Adalimumab (HUMIRA PEN SC)  Self Yes No   Sig: Inject 40 mg Subcutaneous every 14 days    chlorthalidone (HYGROTON) 25 MG tablet 10/28/2023  No Yes   Sig: Take 1 tablet (25 mg) by mouth daily   escitalopram (LEXAPRO) 5 MG tablet 10/28/2023  No Yes   Sig: Take 1 tablet (5 mg) by mouth daily   mercaptopurine (PURINETHOL) 50 MG tablet 10/28/2023 Self Yes Yes   Sig: Take 100 mg by mouth daily    ondansetron (ZOFRAN-ODT) 4 MG ODT tab   No No   Sig: Take 1-2 tablets (4-8 mg) by mouth every 8 hours as needed for nausea   Patient not taking: Reported on 4/11/2023   oxyCODONE (ROXICODONE) 5 MG tablet 10/28/2023  No Yes   Sig: Take 1 tablet (5 mg) by mouth every 6 hours as needed for breakthrough pain (Kidney stone)   potassium citrate (UROCIT-K) 10 MEQ (1080 MG) CR tablet 10/28/2023  No Yes   Sig: Take 2 tablets (20 mEq) by mouth 3 times daily (with meals)   tamsulosin (FLOMAX) 0.4 MG capsule 10/28/2023  No Yes   Sig: Take 1 capsule (0.4 mg) by mouth daily      Facility-Administered Medications: None        Social History   I have reviewed this patient's social history and updated it with pertinent information if needed.  Social History     Tobacco Use    Smoking status: Never    Smokeless tobacco: Never   Vaping Use    Vaping Use: Never used   Substance Use Topics    Alcohol use: Yes     Alcohol/week: 0.0 standard drinks of alcohol     Comment: x1 a week    Drug use: No     Family History   I have reviewed this patient's family history and updated it with pertinent information if needed.  Family History   Problem  Relation Age of Onset    Cerebrovascular Disease Maternal Grandmother     Connective Tissue Disorder Sister     Nephrolithiasis Father      Allergies   No Known Allergies     Physical Exam   Vital Signs: Temp: 98.2  F (36.8  C) Temp src: Oral BP: 138/89 Pulse: 70   Resp: 16 SpO2: 98 % O2 Device: None (Room air)    Weight: 160 lbs 0 oz    Constitutional: awake, alert, appears comfortable, no apparent distress, and appears stated age  Eyes: sclera clear, conjunctiva normal  ENT: Normocephalic, atraumatic, moist mucous membranes  Respiratory: No increased work of breathing, good air exchange, clear to auscultation bilaterally, no crackles or wheezing  Cardiovascular: Regular rate and rhythm, normal S1 and S2, no murmur noted  GI: Abdomen soft, non-tender, non-distended. Large, well-healed left sided scar on abdomen, colostomy bag in place with no surrounding redness on skin. CVA tenderness present prior to IV pain medication, now unable to   Skin: No redness or rashes on back, abdomen or face. Warm, dry skin.  Musculoskeletal: no lower extremity pitting edema present  Neurologic: Awake, alert, oriented to name, place and situation. No focal deficits.  Neuropsychiatric: General: normal, calm, and normal eye contact  Level of consciousness: alert / normal  Affect: normal    Medical Decision Making   Please see A&P for additional details of medical decision making.      Data     I have personally reviewed the following data over the past 24 hrs:    12.8 (H)  \   15.2   / 305     139 98 21.3 (H) /  127 (H)   3.4 29 1.11 \     ALT: 42 AST: 28 AP: 52 TBILI: 1.4 (H)   ALB: 4.6 TOT PROTEIN: 7.4 LIPASE: N/A       Imaging results reviewed over the past 24 hrs:   Recent Results (from the past 24 hour(s))   CT Abdomen Pelvis w/o Contrast    Narrative    EXAM: CT ABDOMEN PELVIS W/O CONTRAST  LOCATION: [facility]  DATE: 10/29/2023    INDICATION: right flank pain  COMPARISON:  10/25/2023  TECHNIQUE: CT scan of the abdomen and pelvis was performed without IV contrast. Multiplanar reformats were obtained. Dose reduction techniques were used.  CONTRAST: None.    FINDINGS:   LOWER CHEST: Tiny 2 mm right middle lobe nodules stable.    HEPATOBILIARY: No significant mass or bile duct dilatation. No calcified gallstones.     PANCREAS: No significant mass, duct dilatation, or inflammatory change.    SPLEEN: Normal.    ADRENAL GLANDS: Normal.    KIDNEYS/BLADDER: Interval development of moderate right hydronephrosis secondary to 8.2 x 6.2 mm stone at the right ureterovesicular junction previously this was seen in the right lower pole collecting system. There is an additional 5.2 x 4.2 mm stones   seen in the distal right ureter at the same level as the prior examination. No other stones are seen along the courses of the ureters. Stable tiny left renal calculi. Tiny nonobstructive stone in the upper pole right kidney collecting system.    BOWEL: Nonobstructive nonspecific bowel gas pattern. Right lower quadrant ileostomy. Postoperative changes of cholectomy    LYMPH NODES: No lymphadenopathy.    VASCULATURE: No abdominal aortic aneurysm.    PELVIC ORGANS: No pelvic masses. No pelvic free fluid.    MUSCULOSKELETAL: No concerning osseous abnormalities.      Impression    IMPRESSION:   1.  Interval development of moderate right hydronephrosis secondary to 8.2 x 6.2 mm stone at the right ureterovesicular junction previously this was seen in the right lower pole collecting system. There is an additional 5.2 x 4.2 mm stones seen in the   distal right ureter at the same level as the prior examination. No other stones are seen along the courses of the ureters. Stable tiny left renal calculi. Tiny nonobstructive stone in the upper pole right kidney collecting system.    2.  Nonobstructive nonspecific bowel gas pattern. Right lower quadrant ileostomy. Postoperative changes of colectomy.

## 2023-10-29 NOTE — UTILIZATION REVIEW
"    Admission Status; Secondary Review Determination         Under the authority of the Utilization Management Committee, the utilization review process indicated a secondary review on the above patient.  The review outcome is based on review of the medical records, discussions with staff, and applying clinical experience noted on the date of the review.          (x) Observation Status Appropriate - This patient does not meet hospital inpatient criteria and is placed in observation status. If this patient's primary payer is Medicare and was admitted as an inpatient, Condition Code 44 should be used and patient status changed to \"observation\".     RATIONALE FOR DETERMINATION    43-year-old male patient with a past medical history of Crohn's disease, recurrent kidney stones, and ileostomy presented with right-sided flank pain. He was admitted for an obstructing right kidney stone measuring 8.2 x 6.2 mm at the uterovesicular junction. A secondary stone measuring 5.2 x 4.2 mm was found in the distal right ureter. CTAP shows moderate right hydronephrosis, worsening from mild hydronephrosis on a prior CT dated 10/25/23. Labs on admission revealed WBC of 12.8 and ANC elevated to 10.7, with UA showing LE but no systemic UTI symptoms. The patient's Crohn's has been well-controlled; he's on Humira and mercaptopurine. The patient prefers to avoid stent placement and may be discharged with pain management and urology follow-up.  The severity of illness, intensity of service provided, expected LOS and risk for adverse outcome make the care appropriate for further observation; however, doesn't meet criteria for hospital inpatient admission. Dr Albarran  notified of this determination.    This document was produced using voice recognition software.      The information on this document is developed by the utilization review team in order for the business office to ensure compliance.  This only denotes the appropriateness of proper " admission status and does not reflect the quality of care rendered.         The definitions of Inpatient Status and Observation Status used in making the determination above are those provided in the CMS Coverage Manual, Chapter 1 and Chapter 6, section 70.4.      Sincerely,     JOSE DANIEL GONZALEZ MD    System Medical Director  Utilization [facility].

## 2023-10-30 ENCOUNTER — TELEPHONE (OUTPATIENT)
Dept: UROLOGY | Facility: CLINIC | Age: 43
End: 2023-10-30
Payer: COMMERCIAL

## 2023-10-30 LAB
ATRIAL RATE - MUSE: 47 BPM
BACTERIA UR CULT: NO GROWTH
DIASTOLIC BLOOD PRESSURE - MUSE: NORMAL MMHG
INTERPRETATION ECG - MUSE: NORMAL
P AXIS - MUSE: 54 DEGREES
PR INTERVAL - MUSE: 152 MS
QRS DURATION - MUSE: 102 MS
QT - MUSE: 458 MS
QTC - MUSE: 405 MS
R AXIS - MUSE: 64 DEGREES
SYSTOLIC BLOOD PRESSURE - MUSE: NORMAL MMHG
T AXIS - MUSE: 33 DEGREES
VENTRICULAR RATE- MUSE: 47 BPM

## 2023-10-30 NOTE — TELEPHONE ENCOUNTER
Spoke with: Patient       Date of surgery: Friday November 17 2023       Location: [facility]      Informed patient they will need a adult : YES      Pre op with provider: Patient will schedule with the MARQUE[facility] Choi       H&P Scheduled in PAC- NA         Pre procedure covid : Not required       Additional imaging: NA        Surgery Packet : Mailed to patient       Additional comments: Please call for surgery teaching

## 2023-11-08 ENCOUNTER — MYC MEDICAL ADVICE (OUTPATIENT)
Dept: UROLOGY | Facility: CLINIC | Age: 43
End: 2023-11-08
Payer: COMMERCIAL

## 2023-11-08 ENCOUNTER — TELEPHONE (OUTPATIENT)
Dept: UROLOGY | Facility: CLINIC | Age: 43
End: 2023-11-08
Payer: COMMERCIAL

## 2023-11-08 DIAGNOSIS — N20.1 URETERAL STONE: ICD-10-CM

## 2023-11-08 NOTE — TELEPHONE ENCOUNTER
Patient has preop scheduled for 11/14  Spoke with patient and completed surgery teaching.  Reviewed time, date and address of procedure.    Length of procedure and what to expect after, phone number given for any questions.  Went over no eating drinking, may have clear liquids 3 hours prior to check in.  Shower in morning before procedure and evening before.  Patient will need a ride home and someone to stay overnight.  Carolina Lynn RN

## 2023-11-08 NOTE — TELEPHONE ENCOUNTER
Patient currently has a stent in and has surgery coming up on 11/17.  He is requesting a refill of pain medication to assist with stent symptoms.  He is taking protocol medications as recommended.  Carolina Lynn RN

## 2023-11-10 RX ORDER — OXYCODONE HYDROCHLORIDE 5 MG/1
5 TABLET ORAL EVERY 6 HOURS PRN
Qty: 12 TABLET | Refills: 0 | Status: SHIPPED | OUTPATIENT
Start: 2023-11-10 | End: 2023-11-17

## 2023-11-14 ENCOUNTER — OFFICE VISIT (OUTPATIENT)
Dept: FAMILY MEDICINE | Facility: CLINIC | Age: 43
End: 2023-11-14
Payer: COMMERCIAL

## 2023-11-14 VITALS
DIASTOLIC BLOOD PRESSURE: 72 MMHG | RESPIRATION RATE: 20 BRPM | WEIGHT: 167 LBS | BODY MASS INDEX: 26.21 KG/M2 | OXYGEN SATURATION: 99 % | TEMPERATURE: 98.4 F | HEART RATE: 59 BPM | SYSTOLIC BLOOD PRESSURE: 128 MMHG | HEIGHT: 67 IN

## 2023-11-14 DIAGNOSIS — Z01.818 PREOP GENERAL PHYSICAL EXAM: Primary | ICD-10-CM

## 2023-11-14 DIAGNOSIS — K50.918 CROHN'S DISEASE WITH OTHER COMPLICATION, UNSPECIFIED GASTROINTESTINAL TRACT LOCATION (H): ICD-10-CM

## 2023-11-14 DIAGNOSIS — N13.2 HYDRONEPHROSIS WITH RENAL AND URETERAL CALCULOUS OBSTRUCTION: ICD-10-CM

## 2023-11-14 PROCEDURE — 99214 OFFICE O/P EST MOD 30 MIN: CPT | Performed by: FAMILY MEDICINE

## 2023-11-14 ASSESSMENT — PAIN SCALES - GENERAL: PAINLEVEL: NO PAIN (0)

## 2023-11-14 NOTE — PROGRESS NOTES
[address]  [address]  [facility] [address]  Phone: [phone]  Fax: [phone]  Primary Provider: Micheal Worrell  Pre-op Performing Provider: MICHEAL WORRELL      PREOPERATIVE EVALUATION:  Today's date: 11/14/2023    Leodan is a 43 year old male who presents for a preoperative evaluation.      11/14/2023     1:14 PM   Additional Questions   Roomed by Marcie LAKE       Surgical Information:  Surgery/Procedure: Cystoscopy, bilateral Ureteroscopy, bilateral retrograde Pyelogram, bilateral Holmium Laser Lithotripsy, Possible bilateral Ureteral Stent Placement   Surgery Location: [facility]  Surgeon: Bob Felix MD  Surgery Date: 11/17/2023  Time of Surgery: 0745  Where patient plans to recover: At home with family  Fax number for surgical facility: Note does not need to be faxed, will be available electronically in Epic.    Assessment & Plan     The proposed surgical procedure is considered LOW risk.    Preop general physical exam  Ok for procedure    Hydronephrosis with renal and ureteral calculous obstruction  Reviewed cares            - No identified additional risk factors other than previously addressed        RECOMMENDATION:  APPROVAL GIVEN to proceed with proposed procedure, without further diagnostic evaluation.    Review of the result(s) of each unique test - UC/ CBC / BMP  12 minutes spent by me on the date of the encounter doing review of test results and interpretation of tests       Subjective       HPI related to upcoming procedure:   Encounter Diagnoses   Name Primary?    Preop general physical exam Yes    Hydronephrosis with renal and ureteral calculous obstruction     Crohn's disease with other complication, unspecified gastrointestinal tract location (H)              11/14/2023    12:48 PM   Preop Questions   1. Have you ever had a heart attack or stroke? No   2. Have you ever had surgery on your heart or blood vessels,  such as a stent placement, a coronary artery bypass, or surgery on an artery in your head, neck, heart, or legs? No   3. Do you have chest pain with activity? No   4. Do you have a history of  heart failure? No   5. Do you currently have a cold, bronchitis or symptoms of other infection? No   6. Do you have a cough, shortness of breath, or wheezing? No   7. Do you or anyone in your family have previous history of blood clots? No   8. Do you or does anyone in your family have a serious bleeding problem such as prolonged bleeding following surgeries or cuts? No   9. Have you ever had problems with anemia or been told to take iron pills? YES - stable now   10. Have you had any abnormal blood loss such as black, tarry or bloody stools? No   11. Have you ever had a blood transfusion? YES -    11a. Have you ever had a transfusion reaction? No   12. Are you willing to have a blood transfusion if it is medically needed before, during, or after your surgery? Yes   13. Have you or any of your relatives ever had problems with anesthesia? No   14. Do you have sleep apnea, excessive snoring or daytime drowsiness? No   15. Do you have any artifical heart valves or other implanted medical devices like a pacemaker, defibrillator, or continuous glucose monitor? No   16. Do you have artificial joints? No   17. Are you allergic to latex? No       Health Care Directive:  Patient does not have a Health Care Directive or Living Will:     Preoperative Review of :   reviewed - controlled substances reflected in medication list.      history of chrons with ileostomy bag    Review of Systems  Constitutional, neuro, ENT, endocrine, pulmonary, cardiac, gastrointestinal, genitourinary, musculoskeletal, integument and psychiatric systems are negative, except as otherwise noted.    Patient Active Problem List    Diagnosis Date Noted    Kidney stone 10/29/2023     Priority: Medium    Sinus bradycardia 10/29/2023     Priority: Medium     Ureteral stone 01/30/2020     Priority: Medium     Added automatically from request for surgery [id number]      Ureteral calculus 05/03/2016     Priority: Medium    Hydronephrosis with renal and ureteral calculous obstruction 04/16/2016     Priority: Medium    CARDIOVASCULAR SCREENING; LDL GOAL LESS THAN 160 02/08/2013     Priority: Medium    Kidney stones      Priority: Medium     STONE SURGERY HISTORY  6/30/13 Left ESWL.  Dr Je Melvin, [facility].  Stone analysis: No stones collected.  4/10/14 Right Ureteroscopy.  Dr Je Melvin, [facility].  Stone analysis: 90% calcium oxalate dihydrate, and 10% calcium phosphate  5/17/16 Bilateral ureteroscopy with laser lithotripsy. Dr Je Melvin, [facility].      Crohn's disease (H)      Priority: Medium    Anal fistula 08/31/2006     Priority: Medium     Overview:   LW Onset:  [id number]  ; Fistula Anal        Past Medical History:   Diagnosis Date    Asthma     child    Crohn's disease (H)     History of blood transfusion     Kidney stones      Past Surgical History:   Procedure Laterality Date    COLOSTOMY  2007    COMBINED CYSTOSCOPY, INSERT STENT URETER(S) Right 10/29/2023    Procedure: Cystoscopy,right retrograde pyelogram, Right Stent Ureter placement;  Surgeon: Renetta Jeff MD;  [facility]    COMBINED CYSTOSCOPY, RETROGRADES, URETEROSCOPY, INSERT STENT Right 5/3/2016    Procedure: COMBINED CYSTOSCOPY, RETROGRADES, URETEROSCOPY, INSERT STENT;  Surgeon: Azael Melvin MD;  [facility]    COMBINED CYSTOSCOPY, RETROGRADES, URETEROSCOPY, LASER HOLMIUM LITHOTRIPSY URETER(S), INSERT STENT Right 1/31/2020    Procedure: CYSTOURETEROSCOPY, WITH RETROGRADE PYELOGRAM, HOLMIUM LASER LITHOTRIPSY OF URETERAL CALCULUS, AND STENT placement;  Surgeon: Bob Felix MD;  [facility]    COMBINED CYSTOSCOPY, RETROGRADES, URETEROSCOPY, LASER HOLMIUM LITHOTRIPSY  URETER(S), INSERT STENT Left 7/1/2021    Procedure: CYSTOURETEROSCOPY, WITH RETROGRADE PYELOGRAM, HOLMIUM LASER LITHOTRIPSY, STENT INSERTION;  Surgeon: Bob Felix MD;  Location: [facility]    CYSTOSCOPY, RETROGRADES, INSERT STENT URETER(S), COMBINED Left 6/9/2021    Procedure: CYSTOSCOPY, WITH Left  RETROGRADE PYELOGRAM AND Left URETERAL STENT INSERTION;  Surgeon: Trenton Lozano MD;  [facility]    EXTRACORPOREAL SHOCK WAVE LITHOTRIPSY (ESWL)  6/27/2013    Procedure: EXTRACORPOREAL SHOCK WAVE LITHOTRIPSY (ESWL);  Left Extracorporeal Shock Wave Lithotripsy Kidney And Ureter;  Surgeon: Azael Melvin MD;  [facility]    ILEOSTOMY  2009    LAPAROTOMY EXPLORATORY  2009    extensive lysis of adhesions, revision of coloenteric fistula, repair of small bowel fistula, takedown of current colostomy, debridement of chronic abscess, resection of residual left colon, right hemicolectomy, Louise ileostomy    LASER HOLMIUM LITHOTRIPSY URETER(S), INSERT STENT, COMBINED  4/9/2014    Procedure: COMBINED CYSTOSCOPY, URETEROSCOPY, LASER HOLMIUM LITHOTRIPSY URETER(S), INSERT STENT;  Ureteroscopy, Cystoscopy, holmium laser,Right Uretral stent placement;  Surgeon: Azael Melvin MD;  [facility]    LASER HOLMIUM LITHOTRIPSY URETER(S), INSERT STENT, COMBINED Bilateral 5/17/2016    Procedure: COMBINED CYSTOSCOPY, URETEROSCOPY, LASER HOLMIUM LITHOTRIPSY URETER(S), INSERT STENT;  Surgeon: Azael Melvin MD;  [facility]     Current Outpatient Medications   Medication Sig Dispense Refill    adalimumab (HUMIRA) 40 MG/0.8ML prefilled syringe kit Inject 40 mg Subcutaneous every 14 days      chlorthalidone (HYGROTON) 25 MG tablet Take 1 tablet (25 mg) by mouth daily 90 tablet 3    escitalopram (LEXAPRO) 5 MG tablet Take 1 tablet (5 mg) by mouth daily 90 tablet 3    mercaptopurine (PURINETHOL) 50 MG tablet Take 100 mg by mouth daily       oxyBUTYnin (DITROPAN) 5 MG tablet Take 1 tablet (5 mg) by  "mouth every 8 hours as needed for bladder spasms 30 tablet 0    oxyCODONE (ROXICODONE) 5 MG tablet Take 1 tablet (5 mg) by mouth every 6 hours as needed for breakthrough pain (Kidney stone) 12 tablet 0    potassium citrate (UROCIT-K) 10 MEQ (1080 MG) CR tablet Take 2 tablets (20 mEq) by mouth 3 times daily (with meals) 180 tablet 11    tamsulosin (FLOMAX) 0.4 MG capsule Take 1 capsule (0.4 mg) by mouth daily 30 capsule 3    ondansetron (ZOFRAN ODT) 4 MG ODT tab Take 1 tablet (4 mg) by mouth every 6 hours as needed for nausea or vomiting (Patient not taking: Reported on 11/14/2023) 15 tablet 0       No Known Allergies     Social History     Tobacco Use    Smoking status: Never    Smokeless tobacco: Never   Substance Use Topics    Alcohol use: Yes     Alcohol/week: 0.0 standard drinks of alcohol     Comment: x1 a week       History   Drug Use No         Objective     /72 (BP Location: Left arm, Patient Position: Sitting, Cuff Size: Adult Regular)   Pulse 59   Temp 98.4  F (36.9  C) (Temporal)   Resp 20   Ht 1.705 m (5' 7.13\")   Wt 75.8 kg (167 lb)   SpO2 99%   BMI 26.06 kg/m      Physical Exam    GENERAL APPEARANCE: alert, mild distress, and cooperative     EYES: EOMI,  PERRL     HENT: ear canals and TM's normal and nose and mouth without ulcers or lesions     NECK: no adenopathy, no asymmetry, masses, or scars and thyroid normal to palpation     RESP: lungs clear to auscultation - no rales, rhonchi or wheezes     CV: regular rates and rhythm, normal S1 S2, no S3 or S4 and no murmur, click or rub     ABDOMEN: soft, nontender, without hepatosplenomegaly  and well-healed incisions / ileostomy bag     MS: extremities normal- no gross deformities noted, no evidence of inflammation in joints, FROM in all extremities.     SKIN: no suspicious lesions or rashes     NEURO: Normal strength and tone, sensory exam grossly normal, mentation intact and speech normal     PSYCH: mentation appears normal. and affect " normal/bright     LYMPHATICS: No cervical adenopathy    Recent Labs   Lab Test 10/29/23  0621 10/28/23  2335   HGB 13.6 15.2    305    139   POTASSIUM 3.6 3.4   CR 1.05 1.11        Diagnostics:  Recent Results (from the past 720 hour(s))   CT Abdomen Pelvis w/o Contrast    Collection Time: 10/25/23  2:27 PM   Result Value Ref Range    Radiologist flags Right ureteral stone with mild hydronephrosis. (Urgent)    Comprehensive metabolic panel    Collection Time: 10/28/23 11:35 PM   Result Value Ref Range    Sodium 139 135 - 145 mmol/L    Potassium 3.4 3.4 - 5.3 mmol/L    Carbon Dioxide (CO2) 29 22 - 29 mmol/L    Anion Gap 12 7 - 15 mmol/L    Urea Nitrogen 21.3 (H) 6.0 - 20.0 mg/dL    Creatinine 1.11 0.67 - 1.17 mg/dL    GFR Estimate 85 >60 mL/min/1.73m2    Calcium 9.7 8.6 - 10.0 mg/dL    Chloride 98 98 - 107 mmol/L    Glucose 127 (H) 70 - 99 mg/dL    Alkaline Phosphatase 52 40 - 129 U/L    AST 28 0 - 45 U/L    ALT 42 0 - 70 U/L    Protein Total 7.4 6.4 - 8.3 g/dL    Albumin 4.6 3.5 - 5.2 g/dL    Bilirubin Total 1.4 (H) <=1.2 mg/dL   CBC with platelets and differential    Collection Time: 10/28/23 11:35 PM   Result Value Ref Range    WBC Count 12.8 (H) 4.0 - 11.0 10e3/uL    RBC Count 4.95 4.40 - 5.90 10e6/uL    Hemoglobin 15.2 13.3 - 17.7 g/dL    Hematocrit 45.4 40.0 - 53.0 %    MCV 92 78 - 100 fL    MCH 30.7 26.5 - 33.0 pg    MCHC 33.5 31.5 - 36.5 g/dL    RDW 12.8 10.0 - 15.0 %    Platelet Count 305 150 - 450 10e3/uL    % Neutrophils 85 %    % Lymphocytes 9 %    % Monocytes 5 %    % Eosinophils 1 %    % Basophils 0 %    % Immature Granulocytes 0 %    NRBCs per 100 WBC 0 <1 /100    Absolute Neutrophils 10.7 (H) 1.6 - 8.3 10e3/uL    Absolute Lymphocytes 1.2 0.8 - 5.3 10e3/uL    Absolute Monocytes 0.7 0.0 - 1.3 10e3/uL    Absolute Eosinophils 0.2 0.0 - 0.7 10e3/uL    Absolute Basophils 0.1 0.0 - 0.2 10e3/uL    Absolute Immature Granulocytes 0.0 <=0.4 10e3/uL    Absolute NRBCs 0.0 10e3/uL   UA with Microscopic  reflex to Culture    Collection Time: 10/29/23  1:43 AM    Specimen: Urine, Midstream   Result Value Ref Range    Color Urine Yellow Colorless, Straw, Light Yellow, Yellow    Appearance Urine Clear Clear    Glucose Urine Negative Negative mg/dL    Bilirubin Urine Negative Negative    Ketones Urine Trace (A) Negative mg/dL    Specific Gravity Urine 1.026 1.003 - 1.035    Blood Urine Large (A) Negative    pH Urine 5.5 5.0 - 7.0    Protein Albumin Urine 50 (A) Negative mg/dL    Urobilinogen Urine Normal Normal, 2.0 mg/dL    Nitrite Urine Negative Negative    Leukocyte Esterase Urine Small (A) Negative    Mucus Urine Present (A) None Seen /LPF    Calcium Oxalate Crystals Urine Few (A) None Seen /HPF    RBC Urine >182 (H) <=2 /HPF    WBC Urine 31 (H) <=5 /HPF   Urine Culture    Collection Time: 10/29/23  1:43 AM    Specimen: Urine, Midstream   Result Value Ref Range    Culture No Growth    Parathyroid Hormone Intact    Collection Time: 10/29/23  6:21 AM   Result Value Ref Range    Parathyroid Hormone Intact 38 15 - 65 pg/mL   Basic metabolic panel    Collection Time: 10/29/23  6:21 AM   Result Value Ref Range    Sodium 139 135 - 145 mmol/L    Potassium 3.6 3.4 - 5.3 mmol/L    Chloride 103 98 - 107 mmol/L    Carbon Dioxide (CO2) 26 22 - 29 mmol/L    Anion Gap 10 7 - 15 mmol/L    Urea Nitrogen 20.3 (H) 6.0 - 20.0 mg/dL    Creatinine 1.05 0.67 - 1.17 mg/dL    GFR Estimate >90 >60 mL/min/1.73m2    Calcium 8.8 8.6 - 10.0 mg/dL    Glucose 115 (H) 70 - 99 mg/dL   CBC with platelets    Collection Time: 10/29/23  6:21 AM   Result Value Ref Range    WBC Count 6.8 4.0 - 11.0 10e3/uL    RBC Count 4.44 4.40 - 5.90 10e6/uL    Hemoglobin 13.6 13.3 - 17.7 g/dL    Hematocrit 41.0 40.0 - 53.0 %    MCV 92 78 - 100 fL    MCH 30.6 26.5 - 33.0 pg    MCHC 33.2 31.5 - 36.5 g/dL    RDW 12.8 10.0 - 15.0 %    Platelet Count 292 150 - 450 10e3/uL   Phosphorus    Collection Time: 10/29/23  6:21 AM   Result Value Ref Range    Phosphorus 3.7 2.5 - 4.5  mg/dL   EKG 12-lead, complete    Collection Time: 10/29/23 10:19 AM   Result Value Ref Range    Systolic Blood Pressure  mmHg    Diastolic Blood Pressure  mmHg    Ventricular Rate 47 BPM    Atrial Rate 47 BPM    GA Interval 152 ms    QRS Duration 102 ms     ms    QTc 405 ms    P Axis 54 degrees    R AXIS 64 degrees    T Axis 33 degrees    Interpretation ECG       Sinus bradycardia  Otherwise normal ECG  No previous ECGs available  Confirmed by MD DUFFY JANE ([id number]) on 10/30/2023 11:53:45 AM        No EKG required for low risk surgery (cataract, skin procedure, breast biopsy, etc).    Revised Cardiac Risk Index (RCRI):  The patient has the following serious cardiovascular risks for perioperative complications:   - No serious cardiac risks = 0 points     RCRI Interpretation: 0 points: Class I (very low risk - 0.4% complication rate)         Signed Electronically by: Blane Worrell MD  Copy of this evaluation report is provided to requesting physician.

## 2023-11-17 ENCOUNTER — HOSPITAL ENCOUNTER (OUTPATIENT)
Facility: AMBULATORY SURGERY CENTER | Age: 43
Discharge: HOME | End: 2023-11-17
Attending: UROLOGY
Payer: COMMERCIAL

## 2023-11-17 ENCOUNTER — ANESTHESIA (OUTPATIENT)
Dept: SURGERY | Facility: AMBULATORY SURGERY CENTER | Age: 43
End: 2023-11-17
Payer: COMMERCIAL

## 2023-11-17 ENCOUNTER — ANESTHESIA EVENT (OUTPATIENT)
Dept: SURGERY | Facility: AMBULATORY SURGERY CENTER | Age: 43
End: 2023-11-17
Payer: COMMERCIAL

## 2023-11-17 VITALS
SYSTOLIC BLOOD PRESSURE: 157 MMHG | WEIGHT: 167 LBS | TEMPERATURE: 96.9 F | HEART RATE: 79 BPM | BODY MASS INDEX: 26.21 KG/M2 | OXYGEN SATURATION: 97 % | DIASTOLIC BLOOD PRESSURE: 97 MMHG | HEIGHT: 67 IN | RESPIRATION RATE: 20 BRPM

## 2023-11-17 DIAGNOSIS — N20.1 URETERAL STONE: ICD-10-CM

## 2023-11-17 DIAGNOSIS — N20.0 KIDNEY STONE: ICD-10-CM

## 2023-11-17 DIAGNOSIS — N20.1 URETERAL CALCULUS: ICD-10-CM

## 2023-11-17 PROCEDURE — 52356 CYSTO/URETERO W/LITHOTRIPSY: CPT | Mod: LT | Performed by: UROLOGY

## 2023-11-17 PROCEDURE — 74420 UROGRAPHY RTRGR +-KUB: CPT | Mod: 26 | Performed by: UROLOGY

## 2023-11-17 PROCEDURE — 52353 CYSTOURETERO W/LITHOTRIPSY: CPT | Mod: RT | Performed by: UROLOGY

## 2023-11-17 PROCEDURE — 99000 SPECIMEN HANDLING OFFICE-LAB: CPT | Performed by: UROLOGY

## 2023-11-17 PROCEDURE — 82365 CALCULUS SPECTROSCOPY: CPT | Mod: 90 | Performed by: UROLOGY

## 2023-11-17 DEVICE — STENT, URETERAL, 6FR X 26CM, HYDROPLUS COATING, WITHOUT WIRE, PERCUFLEX PLUS: Type: IMPLANTABLE DEVICE | Site: URETER | Status: FUNCTIONAL

## 2023-11-17 RX ORDER — ONDANSETRON 4 MG/1
4 TABLET, ORALLY DISINTEGRATING ORAL EVERY 30 MIN PRN
Status: DISCONTINUED | OUTPATIENT
Start: 2023-11-17 | End: 2023-11-18 | Stop reason: HOSPADM

## 2023-11-17 RX ORDER — LIDOCAINE HYDROCHLORIDE 20 MG/ML
INJECTION, SOLUTION INFILTRATION; PERINEURAL PRN
Status: DISCONTINUED | OUTPATIENT
Start: 2023-11-17 | End: 2023-11-17

## 2023-11-17 RX ORDER — OXYBUTYNIN CHLORIDE 5 MG/1
5 TABLET ORAL EVERY 8 HOURS PRN
Qty: 14 TABLET | Refills: 0 | Status: SHIPPED | OUTPATIENT
Start: 2023-11-17

## 2023-11-17 RX ORDER — PROPOFOL 10 MG/ML
INJECTION, EMULSION INTRAVENOUS PRN
Status: DISCONTINUED | OUTPATIENT
Start: 2023-11-17 | End: 2023-11-17

## 2023-11-17 RX ORDER — ONDANSETRON 2 MG/ML
4 INJECTION INTRAMUSCULAR; INTRAVENOUS EVERY 30 MIN PRN
Status: DISCONTINUED | OUTPATIENT
Start: 2023-11-17 | End: 2023-11-18 | Stop reason: HOSPADM

## 2023-11-17 RX ORDER — CEFAZOLIN SODIUM 2 G/100ML
2 INJECTION, SOLUTION INTRAVENOUS SEE ADMIN INSTRUCTIONS
Status: DISCONTINUED | OUTPATIENT
Start: 2023-11-17 | End: 2023-11-18 | Stop reason: HOSPADM

## 2023-11-17 RX ORDER — KETOROLAC TROMETHAMINE 15 MG/ML
15 INJECTION, SOLUTION INTRAMUSCULAR; INTRAVENOUS ONCE
Status: DISCONTINUED | OUTPATIENT
Start: 2023-11-17 | End: 2023-11-18 | Stop reason: HOSPADM

## 2023-11-17 RX ORDER — ONDANSETRON 2 MG/ML
INJECTION INTRAMUSCULAR; INTRAVENOUS PRN
Status: DISCONTINUED | OUTPATIENT
Start: 2023-11-17 | End: 2023-11-17

## 2023-11-17 RX ORDER — SODIUM CHLORIDE, SODIUM LACTATE, POTASSIUM CHLORIDE, CALCIUM CHLORIDE 600; 310; 30; 20 MG/100ML; MG/100ML; MG/100ML; MG/100ML
INJECTION, SOLUTION INTRAVENOUS CONTINUOUS
Status: DISCONTINUED | OUTPATIENT
Start: 2023-11-17 | End: 2023-11-18 | Stop reason: HOSPADM

## 2023-11-17 RX ORDER — PROPOFOL 10 MG/ML
INJECTION, EMULSION INTRAVENOUS CONTINUOUS PRN
Status: DISCONTINUED | OUTPATIENT
Start: 2023-11-17 | End: 2023-11-17

## 2023-11-17 RX ORDER — LIDOCAINE 40 MG/G
CREAM TOPICAL
Status: DISCONTINUED | OUTPATIENT
Start: 2023-11-17 | End: 2023-11-18 | Stop reason: HOSPADM

## 2023-11-17 RX ORDER — DEXAMETHASONE SODIUM PHOSPHATE 4 MG/ML
INJECTION, SOLUTION INTRA-ARTICULAR; INTRALESIONAL; INTRAMUSCULAR; INTRAVENOUS; SOFT TISSUE PRN
Status: DISCONTINUED | OUTPATIENT
Start: 2023-11-17 | End: 2023-11-17

## 2023-11-17 RX ORDER — FENTANYL CITRATE 50 UG/ML
INJECTION, SOLUTION INTRAMUSCULAR; INTRAVENOUS PRN
Status: DISCONTINUED | OUTPATIENT
Start: 2023-11-17 | End: 2023-11-17

## 2023-11-17 RX ORDER — KETOROLAC TROMETHAMINE 30 MG/ML
INJECTION, SOLUTION INTRAMUSCULAR; INTRAVENOUS PRN
Status: DISCONTINUED | OUTPATIENT
Start: 2023-11-17 | End: 2023-11-17

## 2023-11-17 RX ORDER — OXYCODONE HYDROCHLORIDE 10 MG/1
10 TABLET ORAL
Status: DISCONTINUED | OUTPATIENT
Start: 2023-11-17 | End: 2023-11-18 | Stop reason: HOSPADM

## 2023-11-17 RX ORDER — GLYCOPYRROLATE 0.2 MG/ML
INJECTION, SOLUTION INTRAMUSCULAR; INTRAVENOUS PRN
Status: DISCONTINUED | OUTPATIENT
Start: 2023-11-17 | End: 2023-11-17

## 2023-11-17 RX ORDER — OXYCODONE HYDROCHLORIDE 5 MG/1
5 TABLET ORAL EVERY 6 HOURS PRN
Qty: 12 TABLET | Refills: 0 | Status: SHIPPED | OUTPATIENT
Start: 2023-11-17

## 2023-11-17 RX ORDER — ACETAMINOPHEN 325 MG/1
975 TABLET ORAL ONCE
Status: COMPLETED | OUTPATIENT
Start: 2023-11-17 | End: 2023-11-17

## 2023-11-17 RX ORDER — HYDROXYZINE HYDROCHLORIDE 25 MG/1
25 TABLET, FILM COATED ORAL 3 TIMES DAILY PRN
Status: DISCONTINUED | OUTPATIENT
Start: 2023-11-17 | End: 2023-11-18 | Stop reason: HOSPADM

## 2023-11-17 RX ORDER — FENTANYL CITRATE 0.05 MG/ML
25 INJECTION, SOLUTION INTRAMUSCULAR; INTRAVENOUS EVERY 5 MIN PRN
Status: DISCONTINUED | OUTPATIENT
Start: 2023-11-17 | End: 2023-11-18 | Stop reason: HOSPADM

## 2023-11-17 RX ORDER — TAMSULOSIN HYDROCHLORIDE 0.4 MG/1
0.4 CAPSULE ORAL DAILY
Qty: 30 CAPSULE | Refills: 3 | Status: SHIPPED | OUTPATIENT
Start: 2023-11-17

## 2023-11-17 RX ORDER — HYDROMORPHONE HCL IN WATER/PF 6 MG/30 ML
0.4 PATIENT CONTROLLED ANALGESIA SYRINGE INTRAVENOUS EVERY 5 MIN PRN
Status: DISCONTINUED | OUTPATIENT
Start: 2023-11-17 | End: 2023-11-18 | Stop reason: HOSPADM

## 2023-11-17 RX ORDER — FENTANYL CITRATE 0.05 MG/ML
50 INJECTION, SOLUTION INTRAMUSCULAR; INTRAVENOUS EVERY 5 MIN PRN
Status: DISCONTINUED | OUTPATIENT
Start: 2023-11-17 | End: 2023-11-18 | Stop reason: HOSPADM

## 2023-11-17 RX ORDER — OXYCODONE HYDROCHLORIDE 5 MG/1
5 TABLET ORAL
Status: COMPLETED | OUTPATIENT
Start: 2023-11-17 | End: 2023-11-17

## 2023-11-17 RX ORDER — CEFAZOLIN SODIUM 2 G/100ML
2 INJECTION, SOLUTION INTRAVENOUS
Status: COMPLETED | OUTPATIENT
Start: 2023-11-17 | End: 2023-11-17

## 2023-11-17 RX ORDER — HYDROMORPHONE HCL IN WATER/PF 6 MG/30 ML
0.2 PATIENT CONTROLLED ANALGESIA SYRINGE INTRAVENOUS EVERY 5 MIN PRN
Status: DISCONTINUED | OUTPATIENT
Start: 2023-11-17 | End: 2023-11-18 | Stop reason: HOSPADM

## 2023-11-17 RX ADMIN — FENTANYL CITRATE 25 MCG: 50 INJECTION, SOLUTION INTRAMUSCULAR; INTRAVENOUS at 09:07

## 2023-11-17 RX ADMIN — ONDANSETRON 4 MG: 2 INJECTION INTRAMUSCULAR; INTRAVENOUS at 10:07

## 2023-11-17 RX ADMIN — DEXAMETHASONE SODIUM PHOSPHATE 4 MG: 4 INJECTION, SOLUTION INTRA-ARTICULAR; INTRALESIONAL; INTRAMUSCULAR; INTRAVENOUS; SOFT TISSUE at 08:08

## 2023-11-17 RX ADMIN — OXYCODONE HYDROCHLORIDE 5 MG: 5 TABLET ORAL at 12:28

## 2023-11-17 RX ADMIN — FENTANYL CITRATE 25 MCG: 0.05 INJECTION, SOLUTION INTRAMUSCULAR; INTRAVENOUS at 10:34

## 2023-11-17 RX ADMIN — Medication 0.2 MG: at 10:59

## 2023-11-17 RX ADMIN — FENTANYL CITRATE 25 MCG: 0.05 INJECTION, SOLUTION INTRAMUSCULAR; INTRAVENOUS at 10:41

## 2023-11-17 RX ADMIN — SODIUM CHLORIDE, SODIUM LACTATE, POTASSIUM CHLORIDE, CALCIUM CHLORIDE: 600; 310; 30; 20 INJECTION, SOLUTION INTRAVENOUS at 07:43

## 2023-11-17 RX ADMIN — FENTANYL CITRATE 25 MCG: 0.05 INJECTION, SOLUTION INTRAMUSCULAR; INTRAVENOUS at 10:26

## 2023-11-17 RX ADMIN — FENTANYL CITRATE 50 MCG: 50 INJECTION, SOLUTION INTRAMUSCULAR; INTRAVENOUS at 08:17

## 2023-11-17 RX ADMIN — ACETAMINOPHEN 975 MG: 325 TABLET ORAL at 07:26

## 2023-11-17 RX ADMIN — LIDOCAINE HYDROCHLORIDE 50 MG: 20 INJECTION, SOLUTION INFILTRATION; PERINEURAL at 08:08

## 2023-11-17 RX ADMIN — PROPOFOL 180 MCG/KG/MIN: 10 INJECTION, EMULSION INTRAVENOUS at 08:08

## 2023-11-17 RX ADMIN — FENTANYL CITRATE 50 MCG: 50 INJECTION, SOLUTION INTRAMUSCULAR; INTRAVENOUS at 08:08

## 2023-11-17 RX ADMIN — FENTANYL CITRATE 25 MCG: 0.05 INJECTION, SOLUTION INTRAMUSCULAR; INTRAVENOUS at 10:11

## 2023-11-17 RX ADMIN — GLYCOPYRROLATE 0.2 MG: 0.2 INJECTION, SOLUTION INTRAMUSCULAR; INTRAVENOUS at 08:08

## 2023-11-17 RX ADMIN — ONDANSETRON 4 MG: 2 INJECTION INTRAMUSCULAR; INTRAVENOUS at 08:08

## 2023-11-17 RX ADMIN — KETOROLAC TROMETHAMINE 15 MG: 30 INJECTION, SOLUTION INTRAMUSCULAR; INTRAVENOUS at 09:24

## 2023-11-17 RX ADMIN — PROPOFOL 180 MG: 10 INJECTION, EMULSION INTRAVENOUS at 08:08

## 2023-11-17 RX ADMIN — FENTANYL CITRATE 25 MCG: 50 INJECTION, SOLUTION INTRAMUSCULAR; INTRAVENOUS at 09:01

## 2023-11-17 RX ADMIN — HYDROXYZINE HYDROCHLORIDE 25 MG: 25 TABLET, FILM COATED ORAL at 11:19

## 2023-11-17 RX ADMIN — Medication 0.2 MG: at 10:48

## 2023-11-17 RX ADMIN — CEFAZOLIN SODIUM 2 G: 2 INJECTION, SOLUTION INTRAVENOUS at 08:03

## 2023-11-17 RX ADMIN — SODIUM CHLORIDE, SODIUM LACTATE, POTASSIUM CHLORIDE, CALCIUM CHLORIDE: 600; 310; 30; 20 INJECTION, SOLUTION INTRAVENOUS at 09:20

## 2023-11-17 NOTE — ANESTHESIA PROCEDURE NOTES
Airway       Patient location during procedure: OR  Staff -        CRNA: Olga Reynaga APRN CRNA       Performed By: CRNA  Consent for Airway        Urgency: elective  Indications and Patient Condition       Indications for airway management: haresh-procedural       Induction type:intravenous       Mask difficulty assessment: 0 - not attempted    Final Airway Details       Final airway type: supraglottic airway    Supraglottic Airway Details        Type: LMA       LMA size: 4    Post intubation assessment        Placement verified by: capnometry, equal breath sounds and chest rise        Number of attempts at approach: 1       Number of other approaches attempted: 0       Secured with: tape       Ease of procedure: easy       Dentition: Intact

## 2023-11-17 NOTE — PROGRESS NOTES
I personally met with Leodan Collins and discussed their current medical situation.     We reviewed the nature of planned surgery, the risks benefits and complications and treatment alternatives.      Shared decision made to proceed with bilateral ureteroscopic stone treatment    We also reviewed the following financial disclosures potentially applicable to their surgery  I informed the patient that I do have a financial consulting relationship with [employer], a medical device company that makes products which could be used during the procedure  I presented an opportunity to ask questions  and informed them that they were capable of rescinding their consent or not proceeding without penalty if they desire to do so.

## 2023-11-17 NOTE — ANESTHESIA PREPROCEDURE EVALUATION
Anesthesia Pre-Procedure Evaluation    Patient: Leodan Collins   MRN: [id number] : 1980        Procedure : Procedure(s):  Cystoscopy, bilateral Ureteroscopy, bilateral retrograde Pyelogram, bilateral Holmium Laser Lithotripsy, Possible bilateral Ureteral Stent Placement          Past Medical History:   Diagnosis Date    Asthma     child    Crohn's disease (H)     History of blood transfusion     Kidney stones       Past Surgical History:   Procedure Laterality Date    COLOSTOMY  2007    COMBINED CYSTOSCOPY, INSERT STENT URETER(S) Right 10/29/2023    Procedure: Cystoscopy,right retrograde pyelogram, Right Stent Ureter placement;  Surgeon: Renetta Jeff MD;  [facility]    COMBINED CYSTOSCOPY, RETROGRADES, URETEROSCOPY, INSERT STENT Right 5/3/2016    Procedure: COMBINED CYSTOSCOPY, RETROGRADES, URETEROSCOPY, INSERT STENT;  Surgeon: Azael Melvin MD;  [facility]    COMBINED CYSTOSCOPY, RETROGRADES, URETEROSCOPY, LASER HOLMIUM LITHOTRIPSY URETER(S), INSERT STENT Right 2020    Procedure: CYSTOURETEROSCOPY, WITH RETROGRADE PYELOGRAM, HOLMIUM LASER LITHOTRIPSY OF URETERAL CALCULUS, AND STENT placement;  Surgeon: Bob Felix MD;  [facility]    COMBINED CYSTOSCOPY, RETROGRADES, URETEROSCOPY, LASER HOLMIUM LITHOTRIPSY URETER(S), INSERT STENT Left 2021    Procedure: CYSTOURETEROSCOPY, WITH RETROGRADE PYELOGRAM, HOLMIUM LASER LITHOTRIPSY, STENT INSERTION;  Surgeon: Bob Felix MD;  Location: [facility]    CYSTOSCOPY, RETROGRADES, INSERT STENT URETER(S), COMBINED Left 2021    Procedure: CYSTOSCOPY, WITH Left  RETROGRADE PYELOGRAM AND Left URETERAL STENT INSERTION;  Surgeon: Trenton Lozano MD;  [facility]    EXTRACORPOREAL SHOCK WAVE LITHOTRIPSY (ESWL)  2013    Procedure: EXTRACORPOREAL SHOCK WAVE LITHOTRIPSY (ESWL);  Left Extracorporeal Shock Wave Lithotripsy Kidney And Ureter;  Surgeon: Azael Melvin MD;  [facility]    ILEOSTOMY       LAPAROTOMY EXPLORATORY  2009    extensive lysis of adhesions, revision of coloenteric fistula, repair of small bowel fistula, takedown of current colostomy, debridement of chronic abscess, resection of residual left colon, right hemicolectomy, Louise ileostomy    LASER HOLMIUM LITHOTRIPSY URETER(S), INSERT STENT, COMBINED  4/9/2014    Procedure: COMBINED CYSTOSCOPY, URETEROSCOPY, LASER HOLMIUM LITHOTRIPSY URETER(S), INSERT STENT;  Ureteroscopy, Cystoscopy, holmium laser,Right Uretral stent placement;  Surgeon: Azael Melvin MD;  [facility]    LASER HOLMIUM LITHOTRIPSY URETER(S), INSERT STENT, COMBINED Bilateral 5/17/2016    Procedure: COMBINED CYSTOSCOPY, URETEROSCOPY, LASER HOLMIUM LITHOTRIPSY URETER(S), INSERT STENT;  Surgeon: Azael Melvin MD;  [facility]      No Known Allergies   Social History     Tobacco Use    Smoking status: Never    Smokeless tobacco: Never   Substance Use Topics    Alcohol use: Yes     Alcohol/week: 0.0 standard drinks of alcohol     Comment: x1 a week      Wt Readings from Last 1 Encounters:   11/14/23 75.8 kg (167 lb)        Anesthesia Evaluation   Pt has had prior anesthetic.         ROS/MED HX  ENT/Pulmonary:     (+)                    Intermittent, asthma                  Neurologic:  - neg neurologic ROS     Cardiovascular: Comment: EKG 10/29/23:  Sinus bradycardia  Otherwise normal ECG  No previous ECGs available  Confirmed by MD CLIVE, GALILEA ([id number]) on 10/30/2023 11:53:45 AM         METS/Exercise Tolerance: >4 METS    Hematologic:  - neg hematologic  ROS     Musculoskeletal:  - neg musculoskeletal ROS     GI/Hepatic: Comment: Ostomy in place      Renal/Genitourinary:     (+) renal disease,      Nephrolithiasis ,       Endo:  - neg endo ROS     Psychiatric/Substance Use:  - neg psychiatric ROS     Infectious Disease:  - neg infectious disease ROS     Malignancy:  - neg malignancy ROS     Other:  - neg other ROS          Physical Exam    Airway         Mallampati: II   TM distance: > 3 FB   Neck ROM: full   Mouth opening: > 3 cm    Respiratory Devices and Support         Dental  no notable dental history     (+) Completely normal teeth      Cardiovascular   cardiovascular exam normal          Pulmonary   pulmonary exam normal                OUTSIDE LABS:  CBC:   Lab Results   Component Value Date    WBC 6.8 10/29/2023    WBC 12.8 (H) 10/28/2023    HGB 13.6 10/29/2023    HGB 15.2 10/28/2023    HCT 41.0 10/29/2023    HCT 45.4 10/28/2023     10/29/2023     10/28/2023     BMP:   Lab Results   Component Value Date     10/29/2023     10/28/2023    POTASSIUM 3.6 10/29/2023    POTASSIUM 3.4 10/28/2023    CHLORIDE 103 10/29/2023    CHLORIDE 98 10/28/2023    CO2 26 10/29/2023    CO2 29 10/28/2023    BUN 20.3 (H) 10/29/2023    BUN 21.3 (H) 10/28/2023    CR 1.05 10/29/2023    CR 1.11 10/28/2023     (H) 10/29/2023     (H) 10/28/2023     COAGS:   Lab Results   Component Value Date    PTT 34 06/19/2011    INR 1.05 06/19/2011     POC:   Lab Results   Component Value Date    BGM 76 12/03/2009     HEPATIC:   Lab Results   Component Value Date    ALBUMIN 4.6 10/28/2023    PROTTOTAL 7.4 10/28/2023    ALT 42 10/28/2023    AST 28 10/28/2023    ALKPHOS 52 10/28/2023    BILITOTAL 1.4 (H) 10/28/2023     OTHER:   Lab Results   Component Value Date    PH 7.47 (H) 12/16/2009    LACT 1.9 01/24/2019    LAURA 8.8 10/29/2023    PHOS 3.7 10/29/2023    MAG 2.4 (H) 06/19/2011    LIPASE 125 01/24/2019    TSH 0.60 08/31/2023    CRP 20.1 (H) 06/19/2011    SED 3 07/01/2022       Anesthesia Plan    ASA Status:  2    NPO Status:  NPO Appropriate    Anesthesia Type: General.     - Airway: LMA   Induction: Intravenous, Propofol.   Maintenance: TIVA.        Consents    Anesthesia Plan(s) and associated risks, benefits, and realistic alternatives discussed. Questions answered and patient/representative(s) expressed understanding.     - Discussed: Risks, Benefits and  Alternatives for BOTH SEDATION and the PROCEDURE were discussed     - Discussed with:  Patient       - Patient is DNR/DNI Status: No          Postoperative Care    Pain management: IV analgesics, Oral pain medications.   PONV prophylaxis: Ondansetron (or other 5HT-3), Dexamethasone or Solumedrol     Comments:    Other Comments: GA LMA  Propofol TIVA  Decadron/Zofran for PONV            Brandon Knowles MD

## 2023-11-17 NOTE — ANESTHESIA POSTPROCEDURE EVALUATION
Patient: Leodan Collins    Procedure: Procedure(s):  Cystoscopy,Stent Removal Right, Bilateral Ureteroscopy, Bilateral Retrograde Pyelogram, Bilateral Holmium Laser Lithotripsy, Bilateral Stone Basket Extraction, Left Ureteral Stent Placement       Anesthesia Type:  General    Note:  Disposition: Outpatient   Postop Pain Control: Uneventful            Sign Out: Well controlled pain   PONV: No   Neuro/Psych: Uneventful            Sign Out: Acceptable/Baseline neuro status   Airway/Respiratory: Uneventful            Sign Out: Acceptable/Baseline resp. status   CV/Hemodynamics: Uneventful            Sign Out: Acceptable CV status; No obvious hypovolemia; No obvious fluid overload   Other NRE: NONE   DID A NON-ROUTINE EVENT OCCUR? No    Event details/Postop Comments:  Prolonged and severe pain postoperatively requiring prolonged time in Phase 1.           Last vitals:  Vitals Value Taken Time   /96 11/17/23 1000   Temp 96.9  F (36.1  C) 11/17/23 0947   Pulse 73 11/17/23 0959   Resp 20 11/17/23 0935   SpO2 100 % 11/17/23 0959   Vitals shown include unfiled device data.    Electronically Signed By: Brandon Knowles MD  November 17, 2023  10:02 AM

## 2023-11-17 NOTE — ANESTHESIA CARE TRANSFER NOTE
Patient: Leodan Collins    Procedure: Procedure(s):  Cystoscopy,Stent Removal Right, Bilateral Ureteroscopy, Bilateral Retrograde Pyelogram, Bilateral Holmium Laser Lithotripsy, Bilateral Stone Basket Extraction, Left Ureteral Stent Placement       Diagnosis: Ureteral stone [N20.1]  Diagnosis Additional Information: No value filed.    Anesthesia Type:   General     Note:    Oropharynx: oropharynx clear of all foreign objects  Level of Consciousness: drowsy  Oxygen Supplementation: face mask  Level of Supplemental Oxygen (L/min / FiO2): 6  Independent Airway: airway patency satisfactory and stable  Dentition: dentition unchanged  Vital Signs Stable: post-procedure vital signs reviewed and stable  Report to RN Given: handoff report given  Patient transferred to: PACU    Handoff Report: Identifed the Patient, Identified the Reponsible Provider, Reviewed the pertinent medical history, Discussed the surgical course, Reviewed Intra-OP anesthesia mangement and issues during anesthesia, Set expectations for post-procedure period and Allowed opportunity for questions and acknowledgement of understanding      Vitals:  Vitals Value Taken Time   /91 11/17/23 0935   Temp 35.6  C (96  F) 11/17/23 0935   Pulse 76 11/17/23 0935   Resp 20 11/17/23 0935   SpO2 99 % 11/17/23 0935       Electronically Signed By: ARNIE Johnson CRNA  November 17, 2023  9:37 AM

## 2023-11-17 NOTE — DISCHARGE INSTRUCTIONS
You have received 975 mg of Acetaminophen (Tylenol) at 0730am. Please do not take an additional dose of Tylenol until after 1:30pm.     Do not exceed 4,000 mg of acetaminophen during a 24 hour period and keep in mind that acetaminophen can also be found in many over-the-counter cold medications as well as narcotics that may be given for pain.      You received a medication called Toradol (a stronger IV ibuprofen) at 10:20. Do NOT take any Ibuprofen / Advil / Aleve / Naproxen or products containing Ibuprofen until 4:20 pm or later.     If you have any questions or concerns regarding your procedure, please contact Dr. Felix, his office number is [phone] or [phone].     [facility]   Adult Discharge Orders & Instructions     For 24 hours after surgery    Get plenty of rest.  A responsible adult must stay with you for at least 24 hours after you leave the hospital.   Do not drive or use heavy equipment.  If you have weakness or tingling, don't drive or use heavy equipment until this feeling goes away.  Do not drink alcohol.  Avoid strenuous or risky activities.  Ask for help when climbing stairs.   You may feel lightheaded.  IF so, sit for a few minutes before standing.  Have someone help you get up.   If you have nausea (feel sick to your stomach): Drink only clear liquids such as apple juice, ginger ale, broth or 7-Up.  Rest may also help.  Be sure to drink enough fluids.  Move to a regular diet as you feel able.  You may have a slight fever. Call the doctor if your fever is over 100 F (37.7 C) (taken under the tongue) or lasts longer than 24 hours.  You may have a dry mouth, a sore throat, muscle aches or trouble sleeping.  These should go away after 24 hours.  Do not make important or legal decisions.   Call your doctor for any of the followin.  Signs of infection (fever, growing tenderness at the surgery site, a large amount of drainage or bleeding, severe pain, foul-smelling  drainage, redness, swelling).    2. It has been over 8 to 10 hours since surgery and you are still not able to urinate (pass water).    3.  Headache for over 24 hours.

## 2023-11-17 NOTE — PROGRESS NOTES
Pt able to void x2. Pt continues to have c/o severe discomfort to right kidney/ flank area. Nausea on and off with pain spasms. Discharge instructions given to family. Wife stated that patient usually has severe pain and nausea after stent insertions/ these procedures. Stated pt would do better at home. Dr royal in to see patient and dr garcia in to see pt. Pt/ family aware to call dr if pain or nausea does not improve. Pt left unit in wheelchair at 1235. Wife with pt.

## 2023-11-20 LAB
APPEARANCE STONE: NORMAL
APPEARANCE STONE: NORMAL
COMPN STONE: NORMAL
COMPN STONE: NORMAL
SPECIMEN WT: 40 MG
SPECIMEN WT: 76 MG

## 2023-11-20 NOTE — OP NOTE
PREOPERATIVE DIAGNOSIS:  Right ureteral stone, bilateral renal stones   POSTOPERATIVE DIAGNOSIS: Same  PROCEDURES PERFORMED:    Cystoscopy   Right semirigid ureteroscopy with thulium laser lithotripsy of ureteral stone  Right flexible ureteroscopy with thulium laser lithotripsy of renal stone  Left flexible ureteroscopy with thulium laser lithotripsy of renal stones  Bilateral retrograde pyelogram with interpretation of imaging  Right ureteral stent removal  Left ureteral stent placement    STAFF SURGEON: Bob Felix MD  RESIDENT(S)None    ANESTHESIA: General  ESTIMATED BLOOD LOSS: 1 ml  COMPLICATIONS: None  SPECIMEN: Right and left renal stones for analysis  URETERAL STENT(S): Left 6 x 26 double-J ureteral stent    SIGNIFICANT FINDINGS: Calcium appearing kidney stones    BRIEF OPERATIVE INDICATIONS: Patient presented with right ureteral stone, previously stented, also large volume of bilateral renal stones.  After a discussion of all risks, benefits, and alternatives, the patient elected to proceed with definitive stone management. The patient understands the potential need for more than one procedure to eliminate all stone burden.      DESCRIPTION OF PROCEDURE:  After informed consent was obtained, the patient was transported to the operating room & placed supine on the table. After adequate anesthesia was induced, the patient was placed in lithotomy and prepped and draped in the usual sterile fashion. A timeout was taken to confirm correct patient, procedure and laterality. Pre-operative IV antibiotics were administered.     We started the procedure by performing cystoscopy.  The right ureteral stent was identified grasped and removed to the level of the meatus.  We cannulated the stent with a wire which we passed to the level of the kidney.  We then used the semirigid ureteroscope to identify the distal ureteral stone which were able to gently nudge free from its level of impaction.  We used the 200  m  thulium laser at a setting of 0.8 J and 1 Hz to fragment the stone into numerous small pieces which were then able to basket extract from the distal ureter.  Once the ureter was clear of stones we turned our attention to the renal stones.    We passed an 11 Serbian, 13 Serbian, 45 cm ureteral access sheath to the level of the kidney.  It passed with no resistance.  We performed flexible nephroscopy identifying several renal stones.  We basket translocated all the stones into a dependent upper pole calyx and then proceeded to perform thulium laser lithotripsy of the stones.  Once the stones were sufficiently small in size we performed basket extraction making best efforts to clear the kidney entirely of any remnant pieces.  Pullback ureteroscopy was unremarkable.  Retrograde pyelogram showed no contrast extravasation.  Given the fact that the patient was restented we elected not to replace ureteral stent.    We then turned our attention to the left side where we cannulated the left ureteral orifice.  We gently dilated the distal ureter with an 8 and 10 Serbian dilator.  The ureter was accommodating so we used an 11, 13 Serbian 45 cm sheath and placed the sheath directly over the wire to the proximal ureter.  We performed nephroscopy on the left side noting numerous papillary and calyceal stones.  We made best efforts to basket translocated the stones into the dependent upper pole.  Of note some of the lower pole stones were quite difficult to access with the scope due to the steep infundibular pelvic angle.  Ultimately we were able to basket relocate all the stones into an upper pole where we treated them with the thulium laser.  Once the stones were sufficiently small in size we performed basket extraction of the pieces until there were no pieces greater than 2 mm left over.  Pullback ureteroscopy was unremarkable.  Final retrograde pyelogram showed no contrast extravasation.  We replaced the wire and used this to  deploy a 6 Ivorian by 26 cm double-J ureteral stent which we confirmed to have a full curl in the kidney and bladder.  We did leave a string on the stent to facilitate removal in 1 week.    The bladder was drained and the patient was awoken from anesthesia and transported to the postanesthesia care unit in stable condition.     POSTOP PLAN:  -Stent removal in 1 week  -Follow-up 6 weeks with imaging

## 2023-12-20 ENCOUNTER — LAB (OUTPATIENT)
Dept: LAB | Facility: CLINIC | Age: 43
End: 2023-12-20
Payer: COMMERCIAL

## 2023-12-20 DIAGNOSIS — N20.0 KIDNEY STONE: ICD-10-CM

## 2023-12-20 LAB
ANION GAP SERPL CALCULATED.3IONS-SCNC: 9 MMOL/L (ref 7–15)
BUN SERPL-MCNC: 12.9 MG/DL (ref 6–20)
CALCIUM SERPL-MCNC: 10.3 MG/DL (ref 8.6–10)
CHLORIDE SERPL-SCNC: 98 MMOL/L (ref 98–107)
CREAT SERPL-MCNC: 0.82 MG/DL (ref 0.67–1.17)
DEPRECATED HCO3 PLAS-SCNC: 32 MMOL/L (ref 22–29)
EGFRCR SERPLBLD CKD-EPI 2021: >90 ML/MIN/1.73M2
GLUCOSE SERPL-MCNC: 117 MG/DL (ref 70–99)
POTASSIUM SERPL-SCNC: 3.1 MMOL/L (ref 3.4–5.3)
PTH-INTACT SERPL-MCNC: 19 PG/ML (ref 15–65)
SODIUM SERPL-SCNC: 139 MMOL/L (ref 135–145)

## 2023-12-20 PROCEDURE — 83970 ASSAY OF PARATHORMONE: CPT

## 2023-12-20 PROCEDURE — 80048 BASIC METABOLIC PNL TOTAL CA: CPT

## 2023-12-20 PROCEDURE — 36415 COLL VENOUS BLD VENIPUNCTURE: CPT

## 2024-01-09 ENCOUNTER — TELEPHONE (OUTPATIENT)
Dept: UROLOGY | Facility: CLINIC | Age: 44
End: 2024-01-09
Payer: COMMERCIAL

## 2024-01-09 NOTE — TELEPHONE ENCOUNTER
Called and spoke with Leodan.  He has not completed the 24-urine collection yet, as he was unclear if it was needed following his stone removal surgery.  Communicated that Maisha would like this completed and would like him to see him for follow-up after to review the results and to discuss ongoing stone prevention.  Leodan agreeable to this.  Provided number to scheduling as he will call to set up follow-up visit once he has completed the collection.  He understands that it takes a few weeks for us to see results once he mails his urine in for analysis.  Maisha Dowling, YAZMIN, updated accordingly.          Maisha Dowling, Yumiko Barth, RN  Brien Calderon,    I've followed with this patient for kidney stone prevention and he recently had surgery to remove a stone. I got some lab results back on him and I think he's due for a follow up visit with me. Are you able to give him a call and ask if he has been able to complete his 24-hour urine collection test? If so, should get a visit scheduled with me sometime in the next month or two to review these.    Thanks!    Maisha

## 2024-01-22 DIAGNOSIS — N20.0 KIDNEY STONE: ICD-10-CM

## 2024-01-25 NOTE — TELEPHONE ENCOUNTER
potassium citrate (UROCIT-K) 10 MEQ (1080 MG) CR tablet   180 tablet 11 8/11/2023     Last Office Visit : 8-  Future Office visit:  none

## 2024-01-30 RX ORDER — POTASSIUM CITRATE 10 MEQ/1
TABLET, EXTENDED RELEASE ORAL
Qty: 120 TABLET | Refills: 11 | Status: SHIPPED | OUTPATIENT
Start: 2024-01-30

## 2024-02-07 DIAGNOSIS — N20.0 KIDNEY STONE: ICD-10-CM

## 2024-02-13 RX ORDER — POTASSIUM CITRATE 10 MEQ/1
20 TABLET, EXTENDED RELEASE ORAL 2 TIMES DAILY
Qty: 360 TABLET | OUTPATIENT
Start: 2024-02-13

## 2024-07-29 ENCOUNTER — PRE VISIT (OUTPATIENT)
Dept: UROLOGY | Facility: CLINIC | Age: 44
End: 2024-07-29
Payer: COMMERCIAL

## 2024-07-29 NOTE — TELEPHONE ENCOUNTER
Reason for visit: follow-up     Relevant information: calculus of kidney    Records/imaging/labs/orders: all records available    Pt called: No need for a call    At Rooming: standard procedure    Iris Noriega  7/29/2024  4:24 PM   5

## 2024-08-01 ENCOUNTER — PATIENT OUTREACH (OUTPATIENT)
Dept: CARE COORDINATION | Facility: CLINIC | Age: 44
End: 2024-08-01
Payer: COMMERCIAL

## 2024-08-13 ENCOUNTER — VIRTUAL VISIT (OUTPATIENT)
Dept: UROLOGY | Facility: CLINIC | Age: 44
End: 2024-08-13
Payer: COMMERCIAL

## 2024-08-13 ENCOUNTER — TELEPHONE (OUTPATIENT)
Dept: UROLOGY | Facility: CLINIC | Age: 44
End: 2024-08-13

## 2024-08-13 VITALS — WEIGHT: 160 LBS | HEIGHT: 67 IN | BODY MASS INDEX: 25.11 KG/M2

## 2024-08-13 DIAGNOSIS — N20.0 KIDNEY STONE: Primary | ICD-10-CM

## 2024-08-13 PROCEDURE — 99213 OFFICE O/P EST LOW 20 MIN: CPT | Mod: 95 | Performed by: NURSE PRACTITIONER

## 2024-08-13 ASSESSMENT — PAIN SCALES - GENERAL: PAINLEVEL: NO PAIN (0)

## 2024-08-13 NOTE — TELEPHONE ENCOUNTER
Left Voicemail (1st Attempt) for the patient to call back and schedule the following:    Appointment type: Imaging  Provider: Josey  Return date: next available  Specialty phone number: [phone]  Additional appointment(s) needed: NA  Additonal Notes: NA

## 2024-08-13 NOTE — PROGRESS NOTES
Virtual Visit Details    Type of service:  Video Visit   Originating Location (pt. Location): Home  Distant Location (provider location):  On-site  Platform used for Video Visit: Topmission    Video start time: 8:57 AM  Video end time: 9:08 AM    CC: Kidney stones    Assessment/Plan:  44 year old male with a history of Crohn's with ileostomy and recurrent kidney stones, managed on potassium citrate, though his citrate level on Litholink has remained low. He also sees tablets pass almost whole in his stool in his ileostomy, so suspect poor absorption. Have tried to switch to the powder version of this in the past but it was denied by his insurance. He asks about simply crushing the tablets he has now. Will reach out to on-call pharmacist to see if this is a viable option. Will call him with their recommendations. If unable to do this, may attempt to represcribe the powder/liquid version of potassium citrate to see if coverage has changed. Would be happy to write a LMN if needed to help with this, as he needs improved citrate. He will ultimately need to repeat a Litholink once this is sorted out.     He is due for updated imaging, so will pursue a LUANN and KUB now.     Next steps TBD, per above.     Maisha Dowling, CNP  Department of Urology      Subjective:   44 year old male with a history of Crohn's with ileostomy and recurrent kidney stones who presents for follow up. Has been managed on combination of chlorthalidone 25 mg and potassium citrate 20 mg BID. Citrate has remained low on Litholink, so switching to a powder/liquid version has been considered in the past. This was prescribed previously but was denied coverage.     He developed flank pain in October,with CT showing an 8 mm obstructing stone in the distal ureter causing mild right hydro. Stent placed by Dr. Jeff, followed by bilateral URS with Dr. Felix on 11/17/23 to address his distal ureteral stone and also clear his renal stones. Stones composed of 90%  CaOx with a 10% CaP component.     No post-op follow up imaging obtained yet. He states that he has been doing well since surgery. No concerns to discuss today.     Objective:     Exam:   Patient is a 44 year old male   No vital signs obtained due to virtual visit.  General Appearance: Well groomed, hygenic  Respiratory: No cough, no respiratory distress or labored breathing  Musculoskeletal: Grossly normal with no gross deficits  Skin: No discoloration or apparent rashes  Neurologic: No tremors  Psychiatric: Alert and oriented  Further examination is deferred due to the nature of our visit.

## 2024-08-13 NOTE — NURSING NOTE
Current patient location: [address] [place] [id number]    Is the patient currently in the state of [place]? YES    Visit mode:VIDEO    If the visit is dropped, the patient can be reconnected by: VIDEO VISIT: Text to cell phone:   Telephone Information:   Mobile [phone]       Will anyone else be joining the visit? NO  (If patient encounters technical issues they should call [phone]    How would you like to obtain your AVS? MyChart    Are changes needed to the allergy or medication list? No    Are refills needed on medications prescribed by this physician? NO    Rooming Documentation:  Assigned questionnaire(s) completed      Reason for visit: RECHECK    Selena LOVINGF

## 2024-08-13 NOTE — LETTER
8/13/2024       RE: Leodan Collins  [address]  [facility] [id number]     Dear Colleague,    Thank you for referring your patient, Leodan Collins, to the [facility] [place] [facility]. Please see a copy of my visit note below.    Virtual Visit Details    Type of service:  Video Visit   Originating Location (pt. Location): Home  Distant Location (provider location):  On-site  Platform used for Video Visit: Tribe    Video start time: 8:57 AM  Video end time: 9:08 AM    CC: Kidney stones    Assessment/Plan:  44 year old male with a history of Crohn's with ileostomy and recurrent kidney stones, managed on potassium citrate, though his citrate level on Litholink has remained low. He also sees tablets pass almost whole in his stool in his ileostomy, so suspect poor absorption. Have tried to switch to the powder version of this in the past but it was denied by his insurance. He asks about simply crushing the tablets he has now. Will reach out to on-call pharmacist to see if this is a viable option. Will call him with their recommendations. If unable to do this, may attempt to represcribe the powder/liquid version of potassium citrate to see if coverage has changed. Would be happy to write a LMN if needed to help with this, as he needs improved citrate. He will ultimately need to repeat a Litholink once this is sorted out.     He is due for updated imaging, so will pursue a LUANN and KUB now.     Next steps TBD, per above.     Maisha Dowling, CNP  Department of Urology      Subjective:   44 year old male with a history of Crohn's with ileostomy and recurrent kidney stones who presents for follow up. Has been managed on combination of chlorthalidone 25 mg and potassium citrate 20 mg BID. Citrate has remained low on Litholink, so switching to a powder/liquid version has been considered in the past. This was prescribed previously but was  denied coverage.     He developed flank pain in October,with CT showing an 8 mm obstructing stone in the distal ureter causing mild right hydro. Stent placed by Dr. Jeff, followed by bilateral URS with Dr. Felix on 11/17/23 to address his distal ureteral stone and also clear his renal stones. Stones composed of 90% CaOx with a 10% CaP component.     No post-op follow up imaging obtained yet. He states that he has been doing well since surgery. No concerns to discuss today.     Objective:     Exam:   Patient is a 44 year old male   No vital signs obtained due to virtual visit.  General Appearance: Well groomed, hygenic  Respiratory: No cough, no respiratory distress or labored breathing  Musculoskeletal: Grossly normal with no gross deficits  Skin: No discoloration or apparent rashes  Neurologic: No tremors  Psychiatric: Alert and oriented  Further examination is deferred due to the nature of our visit.             Again, thank you for allowing me to participate in the care of your patient.      Sincerely,    Maisha Dowling, CNP

## 2024-08-13 NOTE — PATIENT INSTRUCTIONS
UROLOGY CLINIC VISIT PATIENT INSTRUCTIONS    -Will pursue some updated imaging, including a renal ultrasound and KUB x-ray.   -I will discuss alternative options for the potassium citrate with the pharmacy.     If you have any issues, questions or concerns in the meantime, do not hesitate to contact us at [phone] or via [employer].     Maisha Dowling, CNP  Department of Urology

## 2024-08-15 ENCOUNTER — PATIENT OUTREACH (OUTPATIENT)
Dept: CARE COORDINATION | Facility: CLINIC | Age: 44
End: 2024-08-15
Payer: COMMERCIAL

## 2024-10-12 ENCOUNTER — HEALTH MAINTENANCE LETTER (OUTPATIENT)
Age: 44
End: 2024-10-12

## 2024-10-21 DIAGNOSIS — F41.1 GAD (GENERALIZED ANXIETY DISORDER): ICD-10-CM

## 2024-10-22 RX ORDER — ESCITALOPRAM OXALATE 5 MG/1
5 TABLET ORAL DAILY
Qty: 90 TABLET | Refills: 3 | Status: SHIPPED | OUTPATIENT
Start: 2024-10-22

## 2025-01-25 ENCOUNTER — HOSPITAL ENCOUNTER (EMERGENCY)
Facility: CLINIC | Age: 45
Discharge: HOME | End: 2025-01-25
Attending: EMERGENCY MEDICINE | Admitting: EMERGENCY MEDICINE
Payer: COMMERCIAL

## 2025-01-25 VITALS
RESPIRATION RATE: 16 BRPM | HEART RATE: 54 BPM | TEMPERATURE: 97 F | OXYGEN SATURATION: 98 % | DIASTOLIC BLOOD PRESSURE: 93 MMHG | SYSTOLIC BLOOD PRESSURE: 126 MMHG

## 2025-01-25 DIAGNOSIS — N20.1 LEFT URETERAL STONE: ICD-10-CM

## 2025-01-25 LAB
ALBUMIN UR-MCNC: 20 MG/DL
ANION GAP SERPL CALCULATED.3IONS-SCNC: 12 MMOL/L (ref 7–15)
APPEARANCE UR: CLEAR
BASOPHILS # BLD AUTO: 0 10E3/UL (ref 0–0.2)
BASOPHILS NFR BLD AUTO: 0 %
BILIRUB UR QL STRIP: NEGATIVE
BUN SERPL-MCNC: 17.7 MG/DL (ref 6–20)
CALCIUM SERPL-MCNC: 9.6 MG/DL (ref 8.8–10.4)
CHLORIDE SERPL-SCNC: 100 MMOL/L (ref 98–107)
COLOR UR AUTO: YELLOW
CREAT SERPL-MCNC: 1.01 MG/DL (ref 0.67–1.17)
EGFRCR SERPLBLD CKD-EPI 2021: >90 ML/MIN/1.73M2
EOSINOPHIL # BLD AUTO: 0.1 10E3/UL (ref 0–0.7)
EOSINOPHIL NFR BLD AUTO: 1 %
ERYTHROCYTE [DISTWIDTH] IN BLOOD BY AUTOMATED COUNT: 12.8 % (ref 10–15)
GLUCOSE SERPL-MCNC: 109 MG/DL (ref 70–99)
GLUCOSE UR STRIP-MCNC: NEGATIVE MG/DL
HCO3 SERPL-SCNC: 25 MMOL/L (ref 22–29)
HCT VFR BLD AUTO: 47 % (ref 40–53)
HGB BLD-MCNC: 15.8 G/DL (ref 13.3–17.7)
HGB UR QL STRIP: ABNORMAL
IMM GRANULOCYTES # BLD: 0.1 10E3/UL
IMM GRANULOCYTES NFR BLD: 0 %
KETONES UR STRIP-MCNC: NEGATIVE MG/DL
LEUKOCYTE ESTERASE UR QL STRIP: ABNORMAL
LYMPHOCYTES # BLD AUTO: 1.1 10E3/UL (ref 0.8–5.3)
LYMPHOCYTES NFR BLD AUTO: 10 %
MCH RBC QN AUTO: 30.1 PG (ref 26.5–33)
MCHC RBC AUTO-ENTMCNC: 33.6 G/DL (ref 31.5–36.5)
MCV RBC AUTO: 90 FL (ref 78–100)
MONOCYTES # BLD AUTO: 0.5 10E3/UL (ref 0–1.3)
MONOCYTES NFR BLD AUTO: 5 %
MUCOUS THREADS #/AREA URNS LPF: PRESENT /LPF
NEUTROPHILS # BLD AUTO: 9.4 10E3/UL (ref 1.6–8.3)
NEUTROPHILS NFR BLD AUTO: 83 %
NITRATE UR QL: NEGATIVE
NRBC # BLD AUTO: 0 10E3/UL
NRBC BLD AUTO-RTO: 0 /100
PH UR STRIP: 5.5 [PH] (ref 5–7)
PLATELET # BLD AUTO: 342 10E3/UL (ref 150–450)
POTASSIUM SERPL-SCNC: 4.2 MMOL/L (ref 3.4–5.3)
RBC # BLD AUTO: 5.25 10E6/UL (ref 4.4–5.9)
RBC URINE: 140 /HPF
SODIUM SERPL-SCNC: 137 MMOL/L (ref 135–145)
SP GR UR STRIP: 1.02 (ref 1–1.03)
UROBILINOGEN UR STRIP-MCNC: NORMAL MG/DL
WBC # BLD AUTO: 11.3 10E3/UL (ref 4–11)
WBC URINE: 11 /HPF

## 2025-01-25 PROCEDURE — 99284 EMERGENCY DEPT VISIT MOD MDM: CPT | Performed by: EMERGENCY MEDICINE

## 2025-01-25 PROCEDURE — 82310 ASSAY OF CALCIUM: CPT | Performed by: EMERGENCY MEDICINE

## 2025-01-25 PROCEDURE — 85025 COMPLETE CBC W/AUTO DIFF WBC: CPT | Performed by: EMERGENCY MEDICINE

## 2025-01-25 PROCEDURE — 36415 COLL VENOUS BLD VENIPUNCTURE: CPT | Performed by: EMERGENCY MEDICINE

## 2025-01-25 PROCEDURE — 81003 URINALYSIS AUTO W/O SCOPE: CPT | Performed by: EMERGENCY MEDICINE

## 2025-01-25 PROCEDURE — 99283 EMERGENCY DEPT VISIT LOW MDM: CPT | Performed by: EMERGENCY MEDICINE

## 2025-01-25 PROCEDURE — 80048 BASIC METABOLIC PNL TOTAL CA: CPT | Performed by: EMERGENCY MEDICINE

## 2025-01-25 PROCEDURE — 87086 URINE CULTURE/COLONY COUNT: CPT | Performed by: EMERGENCY MEDICINE

## 2025-01-25 ASSESSMENT — COLUMBIA-SUICIDE SEVERITY RATING SCALE - C-SSRS
2. HAVE YOU ACTUALLY HAD ANY THOUGHTS OF KILLING YOURSELF IN THE PAST MONTH?: NO
1. IN THE PAST MONTH, HAVE YOU WISHED YOU WERE DEAD OR WISHED YOU COULD GO TO SLEEP AND NOT WAKE UP?: NO
6. HAVE YOU EVER DONE ANYTHING, STARTED TO DO ANYTHING, OR PREPARED TO DO ANYTHING TO END YOUR LIFE?: NO

## 2025-01-25 ASSESSMENT — ACTIVITIES OF DAILY LIVING (ADL): ADLS_ACUITY_SCORE: 54

## 2025-01-25 NOTE — ED TRIAGE NOTES
Patient reports sharp left abdominal pain with nausea and vomiting reports this feels like previous stone. However during triage pt reports pain resolved completely and believes it may have passed.      Triage Assessment (Adult)       Row Name 01/25/25 5190          Triage Assessment    Airway WDL WDL        Respiratory WDL    Respiratory WDL WDL        Skin Circulation/Temperature WDL    Skin Circulation/Temperature WDL WDL        Cardiac WDL    Cardiac WDL WDL        Peripheral/Neurovascular WDL    Peripheral Neurovascular WDL WDL        Cognitive/Neuro/Behavioral WDL    Cognitive/Neuro/Behavioral WDL WDL

## 2025-01-25 NOTE — ED PROVIDER NOTES
ED Provider Note  [facility]      History     Chief Complaint   Patient presents with    Abdominal Pain     HPI  Leodan Collins is a 44 year old male with a history of Crohn's disease, hydronephrosis, and kidney stones who presents to the emergency department for abdominal pain and vomiting.  Patient has a history of kidney stones, recognizes this is kidney stone pain.  Patient was experiencing severe left lower abdominal pain and groin pain which caused him to double over, he began vomiting.  Patient states pain came on suddenly, however in the past this pain was gradual.  Patient states that when he put on his gown in the ED, his pain went away, and he believes he has passed it.  Patient currently has no pain.  No blood in urine.  No fever.  Patient has Crohn's, states this feels different and like his previous kidney stones.    Past Medical History  Past Medical History:   Diagnosis Date    Asthma     child    Crohn's disease (H)     History of blood transfusion     Kidney stones      Past Surgical History:   Procedure Laterality Date    COLOSTOMY  2007    COMBINED CYSTOSCOPY, INSERT STENT URETER(S) Right 10/29/2023    Procedure: Cystoscopy,right retrograde pyelogram, Right Stent Ureter placement;  Surgeon: Renetta Jeff MD;  [facility]    COMBINED CYSTOSCOPY, RETROGRADES, URETEROSCOPY, INSERT STENT Right 5/3/2016    Procedure: COMBINED CYSTOSCOPY, RETROGRADES, URETEROSCOPY, INSERT STENT;  Surgeon: Azael Melvin MD;  [facility]    COMBINED CYSTOSCOPY, RETROGRADES, URETEROSCOPY, LASER HOLMIUM LITHOTRIPSY URETER(S), INSERT STENT Right 1/31/2020    Procedure: CYSTOURETEROSCOPY, WITH RETROGRADE PYELOGRAM, HOLMIUM LASER LITHOTRIPSY OF URETERAL CALCULUS, AND STENT placement;  Surgeon: Bob Felix MD;  [facility]    COMBINED CYSTOSCOPY, RETROGRADES, URETEROSCOPY, LASER HOLMIUM LITHOTRIPSY URETER(S), INSERT STENT Left 7/1/2021    Procedure:  CYSTOURETEROSCOPY, WITH RETROGRADE PYELOGRAM, HOLMIUM LASER LITHOTRIPSY, STENT INSERTION;  Surgeon: Bob Felix MD;  [facility]    COMBINED CYSTOSCOPY, RETROGRADES, URETEROSCOPY, LASER HOLMIUM LITHOTRIPSY URETER(S), INSERT STENT Bilateral 11/17/2023    Procedure: Cystoscopy,Stent Removal Right, Bilateral Ureteroscopy, Bilateral Retrograde Pyelogram, Bilateral Holmium Laser Lithotripsy, Bilateral Stone Basket Extraction, Left Ureteral Stent Placement;  Surgeon: Bob Felix MD;  Location: [facility]    CYSTOSCOPY, RETROGRADES, INSERT STENT URETER(S), COMBINED Left 6/9/2021    Procedure: CYSTOSCOPY, WITH Left  RETROGRADE PYELOGRAM AND Left URETERAL STENT INSERTION;  Surgeon: Trenton Lozano MD;  [facility]    EXTRACORPOREAL SHOCK WAVE LITHOTRIPSY (ESWL)  6/27/2013    Procedure: EXTRACORPOREAL SHOCK WAVE LITHOTRIPSY (ESWL);  Left Extracorporeal Shock Wave Lithotripsy Kidney And Ureter;  Surgeon: Azael Melvin MD;  [facility]    ILEOSTOMY  2009    LAPAROTOMY EXPLORATORY  2009    extensive lysis of adhesions, revision of coloenteric fistula, repair of small bowel fistula, takedown of current colostomy, debridement of chronic abscess, resection of residual left colon, right hemicolectomy, Louise ileostomy    LASER HOLMIUM LITHOTRIPSY URETER(S), INSERT STENT, COMBINED  4/9/2014    Procedure: COMBINED CYSTOSCOPY, URETEROSCOPY, LASER HOLMIUM LITHOTRIPSY URETER(S), INSERT STENT;  Ureteroscopy, Cystoscopy, holmium laser,Right Uretral stent placement;  Surgeon: Azael Melvin MD;  [facility]    LASER HOLMIUM LITHOTRIPSY URETER(S), INSERT STENT, COMBINED Bilateral 5/17/2016    Procedure: COMBINED CYSTOSCOPY, URETEROSCOPY, LASER HOLMIUM LITHOTRIPSY URETER(S), INSERT STENT;  Surgeon: Azael Melvin MD;  [facility]     chlorthalidone (HYGROTON) 25 MG tablet  escitalopram (LEXAPRO) 5 MG tablet  adalimumab (HUMIRA) 40 MG/0.8ML prefilled syringe  kit  mercaptopurine (PURINETHOL) 50 MG tablet  ondansetron (ZOFRAN ODT) 4 MG ODT tab  oxyBUTYnin (DITROPAN) 5 MG tablet  oxyCODONE (ROXICODONE) 5 MG tablet  potassium citrate (UROCIT-K) 10 MEQ (1080 MG) CR tablet  tamsulosin (FLOMAX) 0.4 MG capsule      No Known Allergies  Family History  Family History   Problem Relation Age of Onset    Cerebrovascular Disease Maternal Grandmother     Connective Tissue Disorder Sister     Nephrolithiasis Father      Social History   Social History     Tobacco Use    Smoking status: Never    Smokeless tobacco: Never   Vaping Use    Vaping status: Never Used   Substance Use Topics    Alcohol use: Yes     Alcohol/week: 0.0 standard drinks of alcohol     Comment: x1 a week    Drug use: No      Past medical history, past surgical history, medications, allergies, family history, and social history were reviewed with the patient. No additional pertinent items.   A complete review of systems was performed with pertinent positives and negatives noted in the HPI, and all other systems negative.    Physical Exam   BP: (!) 148/80  Pulse: 64  Temp: 97.5  F (36.4  C)  Resp: 16  SpO2: 98 %  Physical Exam  Constitutional:       General: He is not in acute distress.     Appearance: He is not ill-appearing, toxic-appearing or diaphoretic.   HENT:      Head: Normocephalic and atraumatic.   Cardiovascular:      Rate and Rhythm: Normal rate and regular rhythm.   Pulmonary:      Effort: Pulmonary effort is normal.      Breath sounds: Normal breath sounds.   Abdominal:      Tenderness: There is no abdominal tenderness. There is no guarding or rebound.      Comments: + ileostomy in right abdomen   Skin:     General: Skin is warm.   Neurological:      General: No focal deficit present.      Mental Status: He is oriented to person, place, and time.   Psychiatric:         Mood and Affect: Mood normal.         Behavior: Behavior normal.           ED Course, Procedures, & Data      Procedures         Results  for orders placed or performed during the hospital encounter of 01/25/25   Basic metabolic panel     Status: Abnormal   Result Value Ref Range    Sodium 137 135 - 145 mmol/L    Potassium 4.2 3.4 - 5.3 mmol/L    Chloride 100 98 - 107 mmol/L    Carbon Dioxide (CO2) 25 22 - 29 mmol/L    Anion Gap 12 7 - 15 mmol/L    Urea Nitrogen 17.7 6.0 - 20.0 mg/dL    Creatinine 1.01 0.67 - 1.17 mg/dL    GFR Estimate >90 >60 mL/min/1.73m2    Calcium 9.6 8.8 - 10.4 mg/dL    Glucose 109 (H) 70 - 99 mg/dL   UA with Microscopic reflex to Culture     Status: Abnormal    Specimen: Urine, Midstream   Result Value Ref Range    Color Urine Yellow Colorless, Straw, Light Yellow, Yellow    Appearance Urine Clear Clear    Glucose Urine Negative Negative mg/dL    Bilirubin Urine Negative Negative    Ketones Urine Negative Negative mg/dL    Specific Gravity Urine 1.023 1.003 - 1.035    Blood Urine Moderate (A) Negative    pH Urine 5.5 5.0 - 7.0    Protein Albumin Urine 20 (A) Negative mg/dL    Urobilinogen Urine Normal Normal, 2.0 mg/dL    Nitrite Urine Negative Negative    Leukocyte Esterase Urine Trace (A) Negative    Mucus Urine Present (A) None Seen /LPF    RBC Urine 140 (H) <=2 /HPF    WBC Urine 11 (H) <=5 /HPF    Narrative    Urine Culture ordered based on laboratory criteria   CBC with platelets and differential     Status: Abnormal   Result Value Ref Range    WBC Count 11.3 (H) 4.0 - 11.0 10e3/uL    RBC Count 5.25 4.40 - 5.90 10e6/uL    Hemoglobin 15.8 13.3 - 17.7 g/dL    Hematocrit 47.0 40.0 - 53.0 %    MCV 90 78 - 100 fL    MCH 30.1 26.5 - 33.0 pg    MCHC 33.6 31.5 - 36.5 g/dL    RDW 12.8 10.0 - 15.0 %    Platelet Count 342 150 - 450 10e3/uL    % Neutrophils 83 %    % Lymphocytes 10 %    % Monocytes 5 %    % Eosinophils 1 %    % Basophils 0 %    % Immature Granulocytes 0 %    NRBCs per 100 WBC 0 <1 /100    Absolute Neutrophils 9.4 (H) 1.6 - 8.3 10e3/uL    Absolute Lymphocytes 1.1 0.8 - 5.3 10e3/uL    Absolute Monocytes 0.5 0.0 - 1.3  10e3/uL    Absolute Eosinophils 0.1 0.0 - 0.7 10e3/uL    Absolute Basophils 0.0 0.0 - 0.2 10e3/uL    Absolute Immature Granulocytes 0.1 <=0.4 10e3/uL    Absolute NRBCs 0.0 10e3/uL   Urine Culture     Status: None    Specimen: Urine, Midstream   Result Value Ref Range    Culture No Growth    CBC with platelets differential     Status: Abnormal    Narrative    The following orders were created for panel order CBC with platelets differential.  Procedure                               Abnormality         Status                     ---------                               -----------         ------                     CBC with platelets and d...[[id number]]  Abnormal            Final result                 Please view results for these tests on the individual orders.     Medications - No data to display         No results found for any visits on 01/25/25.  Medications - No data to display  Labs Ordered and Resulted from Time of ED Arrival to Time of ED Departure - No data to display  No orders to display          Critical care was not performed.     Medical Decision Making  The patient's presentation was of moderate complexity (a chronic illness mild to moderate exacerbation, progression, or side effect of treatment).    The patient's evaluation involved:  review of external note(s) from 3+ sources (see separate area of note for details)  review of 3+ test result(s) ordered prior to this encounter (see separate area of note for details)  ordering and/or review of 3+ test(s) in this encounter (see separate area of note for details)    The patient's management necessitated only low risk treatment.    Assessment & Plan    Patient is a 44-year-old male with a known history of kidney stones who presents to the ER due to acute onset of pain.  Patient says the pain started abruptly and then stopped by the time he was put into a room in the ER.  Patient says this has felt exactly like his previous kidney stones.  Patient says that  now his pain is gone and he feels like the kidney stone has passed.  Previous imaging and stone CTs were reviewed.  Patient here is vitally stable.  We did obtain a creatinine that is normal at 1.01.  Also has a mild leukocytosis.  Patient's UA does show RBCs but no signs of any acute infection.  Symptoms are consistent likely with a passed stone.  Since patient has no further pain I feel comfortable with him being discharged home and following up with his primary urologist and returning if pain reoccurs.    I have reviewed the nursing notes. I have reviewed the findings, diagnosis, plan and need for follow up with the patient.    New Prescriptions    No medications on file       Final diagnoses:   Left ureteral stone   I, Joyb Abbasi, am serving as a trained medical scribe to document services personally performed by Dora Drake MD, based to the provider's statements to me.     IDora MD, was physically present and have reviewed and verified the accuracy of this note documented by Joby Abbasi.     Dora Drake MD  [facility] EMERGENCY DEPARTMENT  1/25/2025     Dora Drake MD  01/26/25 1958

## 2025-01-25 NOTE — DISCHARGE INSTRUCTIONS
Your kidney function is normal.     Your urine test does not show any signs of infection.     Please follow up with your primary care doctor and urology as needed.     Return to the ER if any other problems/concerns.

## 2025-01-26 LAB — BACTERIA UR CULT: NO GROWTH

## 2025-03-11 ENCOUNTER — LAB (OUTPATIENT)
Dept: LAB | Facility: CLINIC | Age: 45
End: 2025-03-11
Payer: COMMERCIAL

## 2025-03-11 DIAGNOSIS — K50.80 CROHN'S DISEASE OF BOTH SMALL AND LARGE INTESTINE (H): ICD-10-CM

## 2025-03-11 LAB
ALBUMIN SERPL BCG-MCNC: 4.6 G/DL (ref 3.5–5.2)
ALP SERPL-CCNC: 64 U/L (ref 40–150)
ALT SERPL W P-5'-P-CCNC: 25 U/L (ref 0–70)
AST SERPL W P-5'-P-CCNC: 21 U/L (ref 0–45)
BASOPHILS # BLD AUTO: 0.1 10E3/UL (ref 0–0.2)
BASOPHILS NFR BLD AUTO: 1 %
BILIRUB DIRECT SERPL-MCNC: 0.35 MG/DL (ref 0–0.3)
BILIRUB SERPL-MCNC: 1.9 MG/DL
EOSINOPHIL # BLD AUTO: 0.2 10E3/UL (ref 0–0.7)
EOSINOPHIL NFR BLD AUTO: 3 %
ERYTHROCYTE [DISTWIDTH] IN BLOOD BY AUTOMATED COUNT: 13 % (ref 10–15)
HCT VFR BLD AUTO: 48.2 % (ref 40–53)
HGB BLD-MCNC: 15.7 G/DL (ref 13.3–17.7)
IMM GRANULOCYTES # BLD: 0 10E3/UL
IMM GRANULOCYTES NFR BLD: 0 %
LYMPHOCYTES # BLD AUTO: 1.7 10E3/UL (ref 0.8–5.3)
LYMPHOCYTES NFR BLD AUTO: 22 %
MCH RBC QN AUTO: 29.7 PG (ref 26.5–33)
MCHC RBC AUTO-ENTMCNC: 32.6 G/DL (ref 31.5–36.5)
MCV RBC AUTO: 91 FL (ref 78–100)
MONOCYTES # BLD AUTO: 0.6 10E3/UL (ref 0–1.3)
MONOCYTES NFR BLD AUTO: 7 %
NEUTROPHILS # BLD AUTO: 5 10E3/UL (ref 1.6–8.3)
NEUTROPHILS NFR BLD AUTO: 67 %
NRBC # BLD AUTO: 0 10E3/UL
NRBC BLD AUTO-RTO: 0 /100
PLATELET # BLD AUTO: 313 10E3/UL (ref 150–450)
PROT SERPL-MCNC: 7.6 G/DL (ref 6.4–8.3)
RBC # BLD AUTO: 5.29 10E6/UL (ref 4.4–5.9)
WBC # BLD AUTO: 7.5 10E3/UL (ref 4–11)

## 2025-03-11 PROCEDURE — 36415 COLL VENOUS BLD VENIPUNCTURE: CPT

## 2025-03-11 PROCEDURE — 84075 ASSAY ALKALINE PHOSPHATASE: CPT

## 2025-03-11 PROCEDURE — 85025 COMPLETE CBC W/AUTO DIFF WBC: CPT

## 2025-03-11 PROCEDURE — 84155 ASSAY OF PROTEIN SERUM: CPT

## 2025-03-12 ENCOUNTER — TRANSFERRED RECORDS (OUTPATIENT)
Dept: HEALTH INFORMATION MANAGEMENT | Facility: CLINIC | Age: 45
End: 2025-03-12
Payer: COMMERCIAL

## 2025-03-27 ENCOUNTER — OFFICE VISIT (OUTPATIENT)
Dept: DERMATOLOGY | Facility: CLINIC | Age: 45
End: 2025-03-27
Payer: COMMERCIAL

## 2025-03-27 DIAGNOSIS — D22.9 MULTIPLE BENIGN NEVI: ICD-10-CM

## 2025-03-27 DIAGNOSIS — D48.5 NEOPLASM OF UNCERTAIN BEHAVIOR OF SCALP: Primary | ICD-10-CM

## 2025-03-27 DIAGNOSIS — L81.4 LENTIGINES: ICD-10-CM

## 2025-03-27 DIAGNOSIS — L57.8 ACTINIC SKIN DAMAGE: ICD-10-CM

## 2025-03-27 DIAGNOSIS — L82.0 INFLAMED SEBORRHEIC KERATOSIS: ICD-10-CM

## 2025-03-27 PROCEDURE — 88305 TISSUE EXAM BY PATHOLOGIST: CPT | Mod: TC | Performed by: STUDENT IN AN ORGANIZED HEALTH CARE EDUCATION/TRAINING PROGRAM

## 2025-03-27 PROCEDURE — 88305 TISSUE EXAM BY PATHOLOGIST: CPT | Mod: 26 | Performed by: DERMATOLOGY

## 2025-03-27 ASSESSMENT — PAIN SCALES - GENERAL: PAINLEVEL_OUTOF10: NO PAIN (0)

## 2025-03-27 NOTE — NURSING NOTE
Lidocaine-epinephrine 1-1:571528 % injection   1 mL once for one use, starting 3/27/2025 ending 3/27/2025,  2mL disp, R-0, injection  Injected by Dr. Yan De La Rosa

## 2025-03-27 NOTE — PROGRESS NOTES
[facility] Dermatology Note    Encounter Date: Mar 27, 2025    Dermatology Problem List:    ______________________________________    Impression/Plan:  Leodan was seen today for derm problem.    Diagnoses and all orders for this visit:    Neoplasm of uncertain behavior of scalp  -     SHAV SKIN LESION SCLP/NCK/HNDS/FEET/GEN 0.6-1.0 CM  - 7mm  -Painful, getting caught on clothing, occasionally bleeding  - Due to intolerable side effects, shave removal performed for palliation of symptoms  - See procedure note    Actinic skin damage  Multiple benign nevi  Lentigines  - Reviewed the compounding benefits of incremental changes to sun protective behaviors including increased frequency of sunscreen and sun protective clothing like broad brimmed hats and longsleeved UPF containing clothing    Inflamed seborrheic keratosis  -     DESTRUCT BENIGN LESION, UP TO 14  - x13 on temples  - see procedure note        Cryotherapy procedure note: After verbal consent and discussion of risks and benefits including but no limited to dyspigmentation/scar, blister, and pain, 13 ISKs on temples was(were) treated with 1-2mm freeze border for 2 cycles with liquid nitrogen. Post cryotherapy instructions were provided.     - Shave Removal PROCEDURE NOTE: After written informed consent was obtained, a time out was taken to identify the patient and the correct site for removal. The lesion on the scalp 7mm was cleansed with a 70% isopropyl alcohol wipe, and then injected with 1cc of lidocaine 1% with epinephrine 1:100,000. Once anesthesia was ensured, the visible surface of the lesion was removed using traction and hyfrecator on a setting of 10 in standard technique applied to the base of lesion. Hemostasis was obtained with pressure and aluminum chloride 20% solution. The wound was dressed with white petrolatum and an adhesive bandage. The patient tolerated the procedure well. Post-procedure instructions and recommendations  were provided both verbally and in writing.      Follow-up PRN.       Staff Involved:  Staff Only    Yan De La Rosa MD   of Dermatology  Department of Dermatology  [facility]      CC:   Chief Complaint   Patient presents with    Derm Problem     ISKs on face- h/o Liquid nitrogen treatment        History of Present Illness:  Mr. Leodan Collins is a 44 year old male who presents as a return patient.    Pt presents today for concerns about spots on trunk and extremities       Labs:      Physical exam:  Vitals: There were no vitals taken for this visit.  GEN: well developed, well-nourished, in no acute distress, in a pleasant mood.     SKIN: Pastor phototype 1  - Waist-up skin, which includes the head/face, neck, both arms, chest, back, abdomen, digits and/or nails was examined.  - Flat brown macules and patches in a sun exposed areas on face and extremities  - scattered brown papules on trunk and extremities   - pink vascular papules on trunk and extremities  - Stuck on brown papules on trunk and extremities   - No other lesions of concern on areas examined.     Past Medical History:   Past Medical History:   Diagnosis Date    Asthma     child    Crohn's disease (H)     History of blood transfusion     Kidney stones      Past Surgical History:   Procedure Laterality Date    COLOSTOMY  2007    COMBINED CYSTOSCOPY, INSERT STENT URETER(S) Right 10/29/2023    Procedure: Cystoscopy,right retrograde pyelogram, Right Stent Ureter placement;  Surgeon: Renetta Jeff MD;  [facility]    COMBINED CYSTOSCOPY, RETROGRADES, URETEROSCOPY, INSERT STENT Right 5/3/2016    Procedure: COMBINED CYSTOSCOPY, RETROGRADES, URETEROSCOPY, INSERT STENT;  Surgeon: Azael Melvin MD;  [facility]    COMBINED CYSTOSCOPY, RETROGRADES, URETEROSCOPY, LASER HOLMIUM LITHOTRIPSY URETER(S), INSERT STENT Right 1/31/2020    Procedure: CYSTOURETEROSCOPY, WITH RETROGRADE PYELOGRAM,  HOLMIUM LASER LITHOTRIPSY OF URETERAL CALCULUS, AND STENT placement;  Surgeon: Bob Felix MD;  [facility]    COMBINED CYSTOSCOPY, RETROGRADES, URETEROSCOPY, LASER HOLMIUM LITHOTRIPSY URETER(S), INSERT STENT Left 7/1/2021    Procedure: CYSTOURETEROSCOPY, WITH RETROGRADE PYELOGRAM, HOLMIUM LASER LITHOTRIPSY, STENT INSERTION;  Surgeon: Bob Felix MD;  [facility]    COMBINED CYSTOSCOPY, RETROGRADES, URETEROSCOPY, LASER HOLMIUM LITHOTRIPSY URETER(S), INSERT STENT Bilateral 11/17/2023    Procedure: Cystoscopy,Stent Removal Right, Bilateral Ureteroscopy, Bilateral Retrograde Pyelogram, Bilateral Holmium Laser Lithotripsy, Bilateral Stone Basket Extraction, Left Ureteral Stent Placement;  Surgeon: Bob Felix MD;  Location: [facility]    CYSTOSCOPY, RETROGRADES, INSERT STENT URETER(S), COMBINED Left 6/9/2021    Procedure: CYSTOSCOPY, WITH Left  RETROGRADE PYELOGRAM AND Left URETERAL STENT INSERTION;  Surgeon: Trenton Lozano MD;  [facility]    EXTRACORPOREAL SHOCK WAVE LITHOTRIPSY (ESWL)  6/27/2013    Procedure: EXTRACORPOREAL SHOCK WAVE LITHOTRIPSY (ESWL);  Left Extracorporeal Shock Wave Lithotripsy Kidney And Ureter;  Surgeon: Azael Melvin MD;  [facility]    ILEOSTOMY  2009    LAPAROTOMY EXPLORATORY  2009    extensive lysis of adhesions, revision of coloenteric fistula, repair of small bowel fistula, takedown of current colostomy, debridement of chronic abscess, resection of residual left colon, right hemicolectomy, Louise ileostomy    LASER HOLMIUM LITHOTRIPSY URETER(S), INSERT STENT, COMBINED  4/9/2014    Procedure: COMBINED CYSTOSCOPY, URETEROSCOPY, LASER HOLMIUM LITHOTRIPSY URETER(S), INSERT STENT;  Ureteroscopy, Cystoscopy, holmium laser,Right Uretral stent placement;  Surgeon: Azael Melvin MD;  [facility]    LASER HOLMIUM LITHOTRIPSY URETER(S), INSERT STENT, COMBINED Bilateral 5/17/2016    Procedure: COMBINED CYSTOSCOPY,  URETEROSCOPY, LASER HOLMIUM LITHOTRIPSY URETER(S), INSERT STENT;  Surgeon: Azael Melvin MD;  [facility]       Social History:   reports that he has never smoked. He has never used smokeless tobacco. He reports current alcohol use. He reports that he does not use drugs.    Family History:  Family History   Problem Relation Age of Onset    Cerebrovascular Disease Maternal Grandmother     Connective Tissue Disorder Sister     Nephrolithiasis Father        Medications:  Current Outpatient Medications   Medication Sig Dispense Refill    chlorthalidone (HYGROTON) 25 MG tablet Take 1 tablet (25 mg) by mouth daily 90 tablet 3    escitalopram (LEXAPRO) 5 MG tablet TAKE 1 TABLET(5 MG) BY MOUTH DAILY 90 tablet 3    adalimumab (HUMIRA) 40 MG/0.8ML prefilled syringe kit Inject 40 mg Subcutaneous every 14 days (Patient not taking: Reported on 3/27/2025)      mercaptopurine (PURINETHOL) 50 MG tablet Take 100 mg by mouth daily  (Patient not taking: Reported on 3/27/2025)      potassium citrate (UROCIT-K) 10 MEQ (1080 MG) CR tablet TAKE 2 TABLETS(20 MEQ) BY MOUTH 2TIMES DAILY (Patient not taking: Reported on 3/27/2025) 120 tablet 11     No Known Allergies

## 2025-03-27 NOTE — NURSING NOTE
Dermatology Rooming Note    Leodan Collins's goals for this visit include:   Chief Complaint   Patient presents with    Derm Problem     ISKs on face- h/o Liquid nitrogen treatment      Gen [facility] CMA - Dermatology

## 2025-03-27 NOTE — LETTER
3/27/2025       RE: Leodan Collins  [address]  [place] [place] [address]     Dear Colleague,    Thank you for referring your patient, Leodan Collins, to the [facility] [place] at [facility]. Please see a copy of my visit note below.    [facility] Dermatology Note    Encounter Date: Mar 27, 2025    Dermatology Problem List:    ______________________________________    Impression/Plan:  Leodan was seen today for derm problem.    Diagnoses and all orders for this visit:    Neoplasm of uncertain behavior of scalp  -     SHAV SKIN LESION SCLP/NCK/HNDS/FEET/GEN 0.6-1.0 CM  - 7mm  -Painful, getting caught on clothing, occasionally bleeding  - Due to intolerable side effects, shave removal performed for palliation of symptoms  - See procedure note    Actinic skin damage  Multiple benign nevi  Lentigines  - Reviewed the compounding benefits of incremental changes to sun protective behaviors including increased frequency of sunscreen and sun protective clothing like broad brimmed hats and longsleeved UPF containing clothing    Inflamed seborrheic keratosis  -     DESTRUCT BENIGN LESION, UP TO 14  - x13 on temples  - see procedure note        Cryotherapy procedure note: After verbal consent and discussion of risks and benefits including but no limited to dyspigmentation/scar, blister, and pain, 13 ISKs on temples was(were) treated with 1-2mm freeze border for 2 cycles with liquid nitrogen. Post cryotherapy instructions were provided.     - Shave Removal PROCEDURE NOTE: After written informed consent was obtained, a time out was taken to identify the patient and the correct site for removal. The lesion on the scalp 7mm was cleansed with a 70% isopropyl alcohol wipe, and then injected with 1cc of lidocaine 1% with epinephrine 1:100,000. Once anesthesia was ensured, the visible surface of the lesion was removed using traction  and hyfrecator on a setting of 10 in standard technique applied to the base of lesion. Hemostasis was obtained with pressure and aluminum chloride 20% solution. The wound was dressed with white petrolatum and an adhesive bandage. The patient tolerated the procedure well. Post-procedure instructions and recommendations were provided both verbally and in writing.      Follow-up PRN.       Staff Involved:  Staff Only    Yan De La Rosa MD   of Dermatology  Department of Dermatology  [facility]      CC:   Chief Complaint   Patient presents with     Derm Problem     ISKs on face- h/o Liquid nitrogen treatment        History of Present Illness:  Mr. Leodan Collins is a 44 year old male who presents as a return patient.    Pt presents today for concerns about spots on trunk and extremities       Labs:      Physical exam:  Vitals: There were no vitals taken for this visit.  GEN: well developed, well-nourished, in no acute distress, in a pleasant mood.     SKIN: Pastor phototype 1  - Waist-up skin, which includes the head/face, neck, both arms, chest, back, abdomen, digits and/or nails was examined.  - Flat brown macules and patches in a sun exposed areas on face and extremities  - scattered brown papules on trunk and extremities   - pink vascular papules on trunk and extremities  - Stuck on brown papules on trunk and extremities   - No other lesions of concern on areas examined.     Past Medical History:   Past Medical History:   Diagnosis Date     Asthma     child     Crohn's disease (H)      History of blood transfusion      Kidney stones      Past Surgical History:   Procedure Laterality Date     COLOSTOMY  2007     COMBINED CYSTOSCOPY, INSERT STENT URETER(S) Right 10/29/2023    Procedure: Cystoscopy,right retrograde pyelogram, Right Stent Ureter placement;  Surgeon: Renetta Jeff MD;  Location: [facility] OR     COMBINED CYSTOSCOPY, RETROGRADES, URETEROSCOPY,  INSERT STENT Right 5/3/2016    Procedure: COMBINED CYSTOSCOPY, RETROGRADES, URETEROSCOPY, INSERT STENT;  Surgeon: Azael Melvin MD;  Location: [facility] OR     COMBINED CYSTOSCOPY, RETROGRADES, URETEROSCOPY, LASER HOLMIUM LITHOTRIPSY URETER(S), INSERT STENT Right 1/31/2020    Procedure: CYSTOURETEROSCOPY, WITH RETROGRADE PYELOGRAM, HOLMIUM LASER LITHOTRIPSY OF URETERAL CALCULUS, AND STENT placement;  Surgeon: Bob Felix MD;  Location: [facility] OR     COMBINED CYSTOSCOPY, RETROGRADES, URETEROSCOPY, LASER HOLMIUM LITHOTRIPSY URETER(S), INSERT STENT Left 7/1/2021    Procedure: CYSTOURETEROSCOPY, WITH RETROGRADE PYELOGRAM, HOLMIUM LASER LITHOTRIPSY, STENT INSERTION;  Surgeon: Bob Felix MD;  [facility]     COMBINED CYSTOSCOPY, RETROGRADES, URETEROSCOPY, LASER HOLMIUM LITHOTRIPSY URETER(S), INSERT STENT Bilateral 11/17/2023    Procedure: Cystoscopy,Stent Removal Right, Bilateral Ureteroscopy, Bilateral Retrograde Pyelogram, Bilateral Holmium Laser Lithotripsy, Bilateral Stone Basket Extraction, Left Ureteral Stent Placement;  Surgeon: Bob Felix MD;  Location: [facility]     CYSTOSCOPY, RETROGRADES, INSERT STENT URETER(S), COMBINED Left 6/9/2021    Procedure: CYSTOSCOPY, WITH Left  RETROGRADE PYELOGRAM AND Left URETERAL STENT INSERTION;  Surgeon: Trenton Lozano MD;  Location: [facility]     EXTRACORPOREAL SHOCK WAVE LITHOTRIPSY (ESWL)  6/27/2013    Procedure: EXTRACORPOREAL SHOCK WAVE LITHOTRIPSY (ESWL);  Left Extracorporeal Shock Wave Lithotripsy Kidney And Ureter;  Surgeon: Azael Melvin MD;  Location: [facility] OR     ILEOSTOMY  2009     LAPAROTOMY EXPLORATORY  2009    extensive lysis of adhesions, revision of coloenteric fistula, repair of small bowel fistula, takedown of current colostomy, debridement of chronic abscess, resection of residual left colon, right hemicolectomy, Louise ileostomy     LASER HOLMIUM LITHOTRIPSY URETER(S), INSERT STENT, COMBINED  4/9/2014     Procedure: COMBINED CYSTOSCOPY, URETEROSCOPY, LASER HOLMIUM LITHOTRIPSY URETER(S), INSERT STENT;  Ureteroscopy, Cystoscopy, holmium laser,Right Uretral stent placement;  Surgeon: Azael Melvin MD;  Location: [facility]     LASER HOLMIUM LITHOTRIPSY URETER(S), INSERT STENT, COMBINED Bilateral 5/17/2016    Procedure: COMBINED CYSTOSCOPY, URETEROSCOPY, LASER HOLMIUM LITHOTRIPSY URETER(S), INSERT STENT;  Surgeon: Azael Melvin MD;  Location: [facility]       Social History:   reports that he has never smoked. He has never used smokeless tobacco. He reports current alcohol use. He reports that he does not use drugs.    Family History:  Family History   Problem Relation Age of Onset     Cerebrovascular Disease Maternal Grandmother      Connective Tissue Disorder Sister      Nephrolithiasis Father        Medications:  Current Outpatient Medications   Medication Sig Dispense Refill     chlorthalidone (HYGROTON) 25 MG tablet Take 1 tablet (25 mg) by mouth daily 90 tablet 3     escitalopram (LEXAPRO) 5 MG tablet TAKE 1 TABLET(5 MG) BY MOUTH DAILY 90 tablet 3     adalimumab (HUMIRA) 40 MG/0.8ML prefilled syringe kit Inject 40 mg Subcutaneous every 14 days (Patient not taking: Reported on 3/27/2025)       mercaptopurine (PURINETHOL) 50 MG tablet Take 100 mg by mouth daily  (Patient not taking: Reported on 3/27/2025)       potassium citrate (UROCIT-K) 10 MEQ (1080 MG) CR tablet TAKE 2 TABLETS(20 MEQ) BY MOUTH 2TIMES DAILY (Patient not taking: Reported on 3/27/2025) 120 tablet 11     No Known Allergies              Again, thank you for allowing me to participate in the care of your patient.      Sincerely,    Yan De La Rosa MD

## 2025-03-30 LAB
PATH REPORT.COMMENTS IMP SPEC: NORMAL
PATH REPORT.COMMENTS IMP SPEC: NORMAL
PATH REPORT.FINAL DX SPEC: NORMAL
PATH REPORT.GROSS SPEC: NORMAL
PATH REPORT.MICROSCOPIC SPEC OTHER STN: NORMAL
PATH REPORT.RELEVANT HX SPEC: NORMAL

## 2025-05-29 ENCOUNTER — APPOINTMENT (OUTPATIENT)
Dept: CT IMAGING | Facility: CLINIC | Age: 45
End: 2025-05-29
Attending: STUDENT IN AN ORGANIZED HEALTH CARE EDUCATION/TRAINING PROGRAM
Payer: COMMERCIAL

## 2025-05-29 ENCOUNTER — APPOINTMENT (OUTPATIENT)
Dept: ULTRASOUND IMAGING | Facility: CLINIC | Age: 45
End: 2025-05-29
Attending: STUDENT IN AN ORGANIZED HEALTH CARE EDUCATION/TRAINING PROGRAM
Payer: COMMERCIAL

## 2025-05-29 ENCOUNTER — HOSPITAL ENCOUNTER (EMERGENCY)
Facility: CLINIC | Age: 45
Discharge: HOME | End: 2025-05-29
Attending: STUDENT IN AN ORGANIZED HEALTH CARE EDUCATION/TRAINING PROGRAM
Payer: COMMERCIAL

## 2025-05-29 VITALS
HEIGHT: 67 IN | DIASTOLIC BLOOD PRESSURE: 75 MMHG | OXYGEN SATURATION: 98 % | TEMPERATURE: 98 F | BODY MASS INDEX: 25.74 KG/M2 | WEIGHT: 164 LBS | HEART RATE: 54 BPM | RESPIRATION RATE: 16 BRPM | SYSTOLIC BLOOD PRESSURE: 128 MMHG

## 2025-05-29 DIAGNOSIS — N13.2 HYDRONEPHROSIS WITH RENAL AND URETERAL CALCULUS OBSTRUCTION: ICD-10-CM

## 2025-05-29 DIAGNOSIS — N39.0 URINARY TRACT INFECTION WITH HEMATURIA, SITE UNSPECIFIED: ICD-10-CM

## 2025-05-29 DIAGNOSIS — N20.0 KIDNEY STONES: ICD-10-CM

## 2025-05-29 DIAGNOSIS — R31.9 URINARY TRACT INFECTION WITH HEMATURIA, SITE UNSPECIFIED: ICD-10-CM

## 2025-05-29 LAB
ALBUMIN UR-MCNC: 20 MG/DL
ANION GAP SERPL CALCULATED.3IONS-SCNC: 15 MMOL/L (ref 7–15)
APPEARANCE UR: CLEAR
BACTERIA #/AREA URNS HPF: ABNORMAL /HPF
BASOPHILS # BLD AUTO: 0 10E3/UL (ref 0–0.2)
BASOPHILS NFR BLD AUTO: 0 %
BILIRUB UR QL STRIP: NEGATIVE
BUN SERPL-MCNC: 14.7 MG/DL (ref 6–20)
CALCIUM SERPL-MCNC: 10.1 MG/DL (ref 8.8–10.4)
CAOX CRY #/AREA URNS HPF: ABNORMAL /HPF
CHLORIDE SERPL-SCNC: 101 MMOL/L (ref 98–107)
COLOR UR AUTO: YELLOW
CREAT SERPL-MCNC: 0.98 MG/DL (ref 0.67–1.17)
EGFRCR SERPLBLD CKD-EPI 2021: >90 ML/MIN/1.73M2
EOSINOPHIL # BLD AUTO: 0.2 10E3/UL (ref 0–0.7)
EOSINOPHIL NFR BLD AUTO: 2 %
ERYTHROCYTE [DISTWIDTH] IN BLOOD BY AUTOMATED COUNT: 12.9 % (ref 10–15)
GLUCOSE SERPL-MCNC: 107 MG/DL (ref 70–99)
GLUCOSE UR STRIP-MCNC: NEGATIVE MG/DL
HCO3 SERPL-SCNC: 23 MMOL/L (ref 22–29)
HCT VFR BLD AUTO: 47.4 % (ref 40–53)
HGB BLD-MCNC: 16 G/DL (ref 13.3–17.7)
HGB UR QL STRIP: ABNORMAL
IMM GRANULOCYTES # BLD: 0 10E3/UL
IMM GRANULOCYTES NFR BLD: 0 %
KETONES UR STRIP-MCNC: NEGATIVE MG/DL
LEUKOCYTE ESTERASE UR QL STRIP: ABNORMAL
LYMPHOCYTES # BLD AUTO: 2.1 10E3/UL (ref 0.8–5.3)
LYMPHOCYTES NFR BLD AUTO: 22 %
MCH RBC QN AUTO: 29.7 PG (ref 26.5–33)
MCHC RBC AUTO-ENTMCNC: 33.8 G/DL (ref 31.5–36.5)
MCV RBC AUTO: 88 FL (ref 78–100)
MONOCYTES # BLD AUTO: 0.7 10E3/UL (ref 0–1.3)
MONOCYTES NFR BLD AUTO: 7 %
MUCOUS THREADS #/AREA URNS LPF: PRESENT /LPF
NEUTROPHILS # BLD AUTO: 6.5 10E3/UL (ref 1.6–8.3)
NEUTROPHILS NFR BLD AUTO: 69 %
NITRATE UR QL: NEGATIVE
NRBC # BLD AUTO: 0 10E3/UL
NRBC BLD AUTO-RTO: 0 /100
PH UR STRIP: 5.5 [PH] (ref 5–7)
PLATELET # BLD AUTO: 337 10E3/UL (ref 150–450)
POTASSIUM SERPL-SCNC: 3.8 MMOL/L (ref 3.4–5.3)
RBC # BLD AUTO: 5.38 10E6/UL (ref 4.4–5.9)
RBC URINE: 102 /HPF
SODIUM SERPL-SCNC: 139 MMOL/L (ref 135–145)
SP GR UR STRIP: 1.03 (ref 1–1.03)
SQUAMOUS EPITHELIAL: <1 /HPF
UROBILINOGEN UR STRIP-MCNC: NORMAL MG/DL
WBC # BLD AUTO: 9.4 10E3/UL (ref 4–11)
WBC URINE: 22 /HPF

## 2025-05-29 PROCEDURE — 74176 CT ABD & PELVIS W/O CONTRAST: CPT

## 2025-05-29 PROCEDURE — 85025 COMPLETE CBC W/AUTO DIFF WBC: CPT | Performed by: STUDENT IN AN ORGANIZED HEALTH CARE EDUCATION/TRAINING PROGRAM

## 2025-05-29 PROCEDURE — 82947 ASSAY GLUCOSE BLOOD QUANT: CPT | Performed by: STUDENT IN AN ORGANIZED HEALTH CARE EDUCATION/TRAINING PROGRAM

## 2025-05-29 PROCEDURE — 76770 US EXAM ABDO BACK WALL COMP: CPT

## 2025-05-29 PROCEDURE — 96361 HYDRATE IV INFUSION ADD-ON: CPT | Performed by: STUDENT IN AN ORGANIZED HEALTH CARE EDUCATION/TRAINING PROGRAM

## 2025-05-29 PROCEDURE — 99285 EMERGENCY DEPT VISIT HI MDM: CPT | Performed by: STUDENT IN AN ORGANIZED HEALTH CARE EDUCATION/TRAINING PROGRAM

## 2025-05-29 PROCEDURE — 258N000003 HC RX IP 258 OP 636: Performed by: STUDENT IN AN ORGANIZED HEALTH CARE EDUCATION/TRAINING PROGRAM

## 2025-05-29 PROCEDURE — 250N000013 HC RX MED GY IP 250 OP 250 PS 637: Performed by: STUDENT IN AN ORGANIZED HEALTH CARE EDUCATION/TRAINING PROGRAM

## 2025-05-29 PROCEDURE — 87086 URINE CULTURE/COLONY COUNT: CPT | Performed by: STUDENT IN AN ORGANIZED HEALTH CARE EDUCATION/TRAINING PROGRAM

## 2025-05-29 PROCEDURE — 96374 THER/PROPH/DIAG INJ IV PUSH: CPT | Performed by: STUDENT IN AN ORGANIZED HEALTH CARE EDUCATION/TRAINING PROGRAM

## 2025-05-29 PROCEDURE — 36415 COLL VENOUS BLD VENIPUNCTURE: CPT | Performed by: STUDENT IN AN ORGANIZED HEALTH CARE EDUCATION/TRAINING PROGRAM

## 2025-05-29 PROCEDURE — 81001 URINALYSIS AUTO W/SCOPE: CPT | Performed by: STUDENT IN AN ORGANIZED HEALTH CARE EDUCATION/TRAINING PROGRAM

## 2025-05-29 PROCEDURE — 96375 TX/PRO/DX INJ NEW DRUG ADDON: CPT | Performed by: STUDENT IN AN ORGANIZED HEALTH CARE EDUCATION/TRAINING PROGRAM

## 2025-05-29 PROCEDURE — 250N000011 HC RX IP 250 OP 636: Mod: JZ | Performed by: STUDENT IN AN ORGANIZED HEALTH CARE EDUCATION/TRAINING PROGRAM

## 2025-05-29 PROCEDURE — 99285 EMERGENCY DEPT VISIT HI MDM: CPT | Mod: 25 | Performed by: STUDENT IN AN ORGANIZED HEALTH CARE EDUCATION/TRAINING PROGRAM

## 2025-05-29 RX ORDER — OXYCODONE HYDROCHLORIDE 5 MG/1
5 TABLET ORAL EVERY 6 HOURS PRN
Qty: 12 TABLET | Refills: 0 | Status: SHIPPED | OUTPATIENT
Start: 2025-05-29 | End: 2025-06-01

## 2025-05-29 RX ORDER — CEPHALEXIN 500 MG/1
500 CAPSULE ORAL 4 TIMES DAILY
Qty: 20 CAPSULE | Refills: 0 | Status: SHIPPED | OUTPATIENT
Start: 2025-05-29 | End: 2025-06-03

## 2025-05-29 RX ORDER — CEPHALEXIN 500 MG/1
500 CAPSULE ORAL ONCE
Status: COMPLETED | OUTPATIENT
Start: 2025-05-29 | End: 2025-05-29

## 2025-05-29 RX ORDER — TAMSULOSIN HYDROCHLORIDE 0.4 MG/1
0.4 CAPSULE ORAL DAILY
Qty: 10 CAPSULE | Refills: 0 | Status: SHIPPED | OUTPATIENT
Start: 2025-05-29 | End: 2025-06-08

## 2025-05-29 RX ORDER — KETOROLAC TROMETHAMINE 15 MG/ML
15 INJECTION, SOLUTION INTRAMUSCULAR; INTRAVENOUS ONCE
Status: COMPLETED | OUTPATIENT
Start: 2025-05-29 | End: 2025-05-29

## 2025-05-29 RX ORDER — ONDANSETRON 2 MG/ML
4 INJECTION INTRAMUSCULAR; INTRAVENOUS ONCE
Status: COMPLETED | OUTPATIENT
Start: 2025-05-29 | End: 2025-05-29

## 2025-05-29 RX ADMIN — SODIUM CHLORIDE 1000 ML: 0.9 INJECTION, SOLUTION INTRAVENOUS at 12:44

## 2025-05-29 RX ADMIN — HYDROMORPHONE HYDROCHLORIDE 1 MG: 1 INJECTION, SOLUTION INTRAMUSCULAR; INTRAVENOUS; SUBCUTANEOUS at 12:38

## 2025-05-29 RX ADMIN — ONDANSETRON 4 MG: 2 INJECTION INTRAMUSCULAR; INTRAVENOUS at 12:41

## 2025-05-29 RX ADMIN — CEPHALEXIN 500 MG: 500 CAPSULE ORAL at 15:47

## 2025-05-29 RX ADMIN — KETOROLAC TROMETHAMINE 15 MG: 15 INJECTION, SOLUTION INTRAMUSCULAR; INTRAVENOUS at 12:45

## 2025-05-29 ASSESSMENT — COLUMBIA-SUICIDE SEVERITY RATING SCALE - C-SSRS
1. IN THE PAST MONTH, HAVE YOU WISHED YOU WERE DEAD OR WISHED YOU COULD GO TO SLEEP AND NOT WAKE UP?: NO
2. HAVE YOU ACTUALLY HAD ANY THOUGHTS OF KILLING YOURSELF IN THE PAST MONTH?: NO
6. HAVE YOU EVER DONE ANYTHING, STARTED TO DO ANYTHING, OR PREPARED TO DO ANYTHING TO END YOUR LIFE?: NO

## 2025-05-29 ASSESSMENT — ACTIVITIES OF DAILY LIVING (ADL)
ADLS_ACUITY_SCORE: 54

## 2025-05-29 NOTE — DISCHARGE INSTRUCTIONS
You are seen today for kidney stone.  Follow-up with urology.  Continue taking ibuprofen 600 mg 3-4 times a day, Flomax once a day, drink plenty of fluids, use oxycodone for breakthrough pain as needed.  Return to the emergency department if you develop fever, worsening pain, difficulty urinating.  Take antibiotics as prescribed.    [facility] as prescribed your provider. If you don t hear from a representative within 2 business days, please call [phone].

## 2025-05-29 NOTE — ED TRIAGE NOTES
Pt here with extreme right sided flank and scrotum pain. Pt has a history of kidney stones and thinks that this is what's going on. Pt is having episodes of nausea and vomiting.      Triage Assessment (Adult)       Row Name 05/29/25 1210          Triage Assessment    Airway WDL WDL        Respiratory WDL    Respiratory WDL WDL        Skin Circulation/Temperature WDL    Skin Circulation/Temperature WDL WDL        Cardiac WDL    Cardiac WDL WDL        Peripheral/Neurovascular WDL    Peripheral Neurovascular WDL WDL        Cognitive/Neuro/Behavioral WDL    Cognitive/Neuro/Behavioral WDL WDL

## 2025-05-29 NOTE — ED PROVIDER NOTES
ED Provider Note  [facility] ER      History     Chief Complaint   Patient presents with    Flank Pain     Right sided flak pain     HPI  Leodan Collins is a 45 year old male who presents to the emergency department for right flank.  Patient reports pain and presentation are the same as his previous kidney stones, last kidney stone was in January of this year.  He has passed some stones spontaneously but has had to have urology intervene on other ones in the past including lithotripsy or surgery.  He denies any blood in his urine, pain started 2 hours before presentation, has not taken anything this morning.  He states he is not having any symptoms of his Crohn's disease, no changes to his stool in his ostomy bag, no abdominal pain before this morning.  He denies any blood in his emesis, has vomited a couple times this morning from the pain.  No other symptoms reported at this time.    No independent historian    Reviewed previous ED provider note from 1/25/2025, patient was seen in the emergency department with flank pain, diagnosed with a kidney stone.  Stone spontaneously passed while in the emergency department.  Also reviewed televisit note from 8/13/2024 with urology.  Reviewed history of recurrent kidney stones, patient has managed with potassium citrate.    Past Medical History  Past Medical History:   Diagnosis Date    Asthma     child    Crohn's disease (H)     History of blood transfusion     Kidney stones      Past Surgical History:   Procedure Laterality Date    COLOSTOMY  2007    COMBINED CYSTOSCOPY, INSERT STENT URETER(S) Right 10/29/2023    Procedure: Cystoscopy,right retrograde pyelogram, Right Stent Ureter placement;  Surgeon: Renetta Jeff MD;  Location: [facility] OR    COMBINED CYSTOSCOPY, RETROGRADES, URETEROSCOPY, INSERT STENT Right 5/3/2016    Procedure: COMBINED CYSTOSCOPY, RETROGRADES, URETEROSCOPY, INSERT STENT;  Surgeon: Azael Melvin MD;   Location: [facility] OR    COMBINED CYSTOSCOPY, RETROGRADES, URETEROSCOPY, LASER HOLMIUM LITHOTRIPSY URETER(S), INSERT STENT Right 1/31/2020    Procedure: CYSTOURETEROSCOPY, WITH RETROGRADE PYELOGRAM, HOLMIUM LASER LITHOTRIPSY OF URETERAL CALCULUS, AND STENT placement;  Surgeon: Bob Felix MD;  [facility]    COMBINED CYSTOSCOPY, RETROGRADES, URETEROSCOPY, LASER HOLMIUM LITHOTRIPSY URETER(S), INSERT STENT Left 7/1/2021    Procedure: CYSTOURETEROSCOPY, WITH RETROGRADE PYELOGRAM, HOLMIUM LASER LITHOTRIPSY, STENT INSERTION;  Surgeon: Bob Felix MD;  Location[facility]    COMBINED CYSTOSCOPY, RETROGRADES, URETEROSCOPY, LASER HOLMIUM LITHOTRIPSY URETER(S), INSERT STENT Bilateral 11/17/2023    Procedure: Cystoscopy,Stent Removal Right, Bilateral Ureteroscopy, Bilateral Retrograde Pyelogram, Bilateral Holmium Laser Lithotripsy, Bilateral Stone Basket Extraction, Left Ureteral Stent Placement;  Surgeon: Bob Felix MD;  Location: [facility]    CYSTOSCOPY, RETROGRADES, INSERT STENT URETER(S), COMBINED Left 6/9/2021    Procedure: CYSTOSCOPY, WITH Left  RETROGRADE PYELOGRAM AND Left URETERAL STENT INSERTION;  Surgeon: Trenton Lozano MD;  [facility]    EXTRACORPOREAL SHOCK WAVE LITHOTRIPSY (ESWL)  6/27/2013    Procedure: EXTRACORPOREAL SHOCK WAVE LITHOTRIPSY (ESWL);  Left Extracorporeal Shock Wave Lithotripsy Kidney And Ureter;  Surgeon: Azael Melvin MD;  [facility]    ILEOSTOMY  2009    LAPAROTOMY EXPLORATORY  2009    extensive lysis of adhesions, revision of coloenteric fistula, repair of small bowel fistula, takedown of current colostomy, debridement of chronic abscess, resection of residual left colon, right hemicolectomy, Louise ileostomy    LASER HOLMIUM LITHOTRIPSY URETER(S), INSERT STENT, COMBINED  4/9/2014    Procedure: COMBINED CYSTOSCOPY, URETEROSCOPY, LASER HOLMIUM LITHOTRIPSY URETER(S), INSERT STENT;  Ureteroscopy, Cystoscopy, holmium laser,Right Uretral stent  "placement;  Surgeon: Azael Melvin MD;  [facility]    LASER HOLMIUM LITHOTRIPSY URETER(S), INSERT STENT, COMBINED Bilateral 5/17/2016    Procedure: COMBINED CYSTOSCOPY, URETEROSCOPY, LASER HOLMIUM LITHOTRIPSY URETER(S), INSERT STENT;  Surgeon: Azael Melvin MD;  [facility]     cephALEXin (KEFLEX) 500 MG capsule  oxyCODONE (ROXICODONE) 5 MG tablet  tamsulosin (FLOMAX) 0.4 MG capsule  adalimumab (HUMIRA) 40 MG/0.8ML prefilled syringe kit  chlorthalidone (HYGROTON) 25 MG tablet  escitalopram (LEXAPRO) 5 MG tablet  mercaptopurine (PURINETHOL) 50 MG tablet  potassium citrate (UROCIT-K) 10 MEQ (1080 MG) CR tablet      No Known Allergies  Family History  Family History   Problem Relation Age of Onset    Cerebrovascular Disease Maternal Grandmother     Connective Tissue Disorder Sister     Nephrolithiasis Father      Social History   Social History     Tobacco Use    Smoking status: Never    Smokeless tobacco: Never   Vaping Use    Vaping status: Never Used   Substance Use Topics    Alcohol use: Yes     Alcohol/week: 0.0 standard drinks of alcohol     Comment: x1 a week    Drug use: No          Physical Exam   BP: (!) 163/120  Pulse: 61  Temp: 97.5  F (36.4  C)  Resp: 18  Height: 170.2 cm (5' 7\")  Weight: 74.4 kg (164 lb)  SpO2: 98 %  Physical Exam  Vitals and nursing note reviewed.   Constitutional:       General: He is not in acute distress.     Appearance: Normal appearance. He is not ill-appearing, toxic-appearing or diaphoretic.   HENT:      Nose: Nose normal.      Mouth/Throat:      Mouth: Mucous membranes are moist.      Pharynx: Oropharynx is clear.   Eyes:      Extraocular Movements: Extraocular movements intact.      Pupils: Pupils are equal, round, and reactive to light.   Cardiovascular:      Rate and Rhythm: Normal rate and regular rhythm.      Pulses: Normal pulses.      Heart sounds: Normal heart sounds.   Pulmonary:      Effort: Pulmonary effort is normal.      Breath sounds: Normal " breath sounds.   Abdominal:      General: Bowel sounds are normal.      Palpations: Abdomen is soft.      Tenderness: There is abdominal tenderness in the right lower quadrant. There is right CVA tenderness.   Musculoskeletal:         General: Normal range of motion.      Cervical back: Normal range of motion.      Right lower leg: No edema.      Left lower leg: No edema.   Skin:     General: Skin is warm and dry.   Neurological:      General: No focal deficit present.      Mental Status: He is alert and oriented to person, place, and time.   Psychiatric:         Mood and Affect: Mood normal.         Behavior: Behavior normal.           ED Course, Procedures, & Data      Procedures                 Results for orders placed or performed during the hospital encounter of 05/29/25   US Renal Complete Non-Vascular     Status: None    Narrative    EXAM: US RENAL COMPLETE NON-VASCULAR  LOCATION: [facility]  DATE: 5/29/2025    INDICATION: History of kidney stones, right flank pain radiating into the groin.  COMPARISON: CT 10/29/2023  TECHNIQUE: Routine Bilateral Renal and Bladder Ultrasound.    FINDINGS:    RIGHT KIDNEY: 11.6 x 5.7 x 4.9 cm. Normal cortical thickness and echogenicity. Mild to moderate pelvocaliectasis. No focal lesion or shadowing stone.     LEFT KIDNEY: 10.9 x 4.8 x 5.1 cm. Normal cortical thickness and echogenicity. Within the mid to lower pole there is a 12 mm hyperechoic structure and within the lower pole there is a 6 mm hyperechoic structure. No hydronephrosis.     BLADDER: There is a 7 mm shadowing stone at the right ureterovesical junction.      Impression    IMPRESSION:  1.  Obstructing 7 mm right ureterovesical junction stone with mild to moderate right-sided hydronephrosis.   2.  Suspected nonobstructing left renal calculi.   Abd/pelvis CT no contrast - Stone Protocol     Status: None    Narrative    EXAM: CT ABDOMEN PELVIS W/O CONTRAST  LOCATION:   [facility]  DATE: 5/29/2025    INDICATION: Right sided obstructing stone at the UVJ, CT requested by urology for follow up.  COMPARISON: 10/29/2023  TECHNIQUE: CT scan of the abdomen and pelvis was performed without IV contrast. Multiplanar reformats were obtained. Dose reduction techniques were used.  CONTRAST: None.      FINDINGS:   LOWER CHEST: Normal.    HEPATOBILIARY: Normal.    PANCREAS: Normal.    SPLEEN: Normal.    ADRENAL GLANDS: Normal.    KIDNEYS/BLADDER: Right hydronephrosis and hydroureter extending to the ureterovesical junction, where there is a 3 mm right 2 mm x 3 mm calculus. There our multiple nonobstructing left renal calculi. There is also a 5 mm x 3 mm x 5 mm calculus in the   left renal pelvis.    BOWEL: Colectomy with a right lower quadrant ostomy. No distended loops of bowel.    LYMPH NODES: Normal.    VASCULATURE: Normal.    PELVIC ORGANS: Normal.    MUSCULOSKELETAL: Normal.        Impression    IMPRESSION:  1.  Right hydronephrosis and hydroureter extending to the ureterovesical junction, where there is a 3 mm x 2 mm x 3 mm calculus. There is a small nonobstructing right renal calculus.  2.  Multiple nonobstructing left renal calculi. There is also a 5 mm x 3 mm x 5 mm calculus in the left renal pelvis.     Basic metabolic panel     Status: Abnormal   Result Value Ref Range    Sodium 139 135 - 145 mmol/L    Potassium 3.8 3.4 - 5.3 mmol/L    Chloride 101 98 - 107 mmol/L    Carbon Dioxide (CO2) 23 22 - 29 mmol/L    Anion Gap 15 7 - 15 mmol/L    Urea Nitrogen 14.7 6.0 - 20.0 mg/dL    Creatinine 0.98 0.67 - 1.17 mg/dL    GFR Estimate >90 >60 mL/min/1.73m2    Calcium 10.1 8.8 - 10.4 mg/dL    Glucose 107 (H) 70 - 99 mg/dL   UA with Microscopic reflex to Culture     Status: Abnormal    Specimen: Urethra; Urine   Result Value Ref Range    Color Urine Yellow Colorless, Straw, Light Yellow, Yellow    Appearance Urine Clear Clear    Glucose Urine Negative  Negative mg/dL    Bilirubin Urine Negative Negative    Ketones Urine Negative Negative mg/dL    Specific Gravity Urine 1.029 1.003 - 1.035    Blood Urine Large (A) Negative    pH Urine 5.5 5.0 - 7.0    Protein Albumin Urine 20 (A) Negative mg/dL    Urobilinogen Urine Normal Normal mg/dL    Nitrite Urine Negative Negative    Leukocyte Esterase Urine Small (A) Negative    Bacteria Urine Moderate (A) None Seen /HPF    Mucus Urine Present (A) None Seen /LPF    Calcium Oxalate Crystals Urine Few (A) None Seen /HPF    RBC Urine 102 (H) <=2 /HPF    WBC Urine 22 (H) <=5 /HPF    Squamous Epithelials Urine <1 <=1 /HPF    Narrative    Urine Culture ordered based on laboratory criteria   CBC with platelets and differential     Status: None   Result Value Ref Range    WBC Count 9.4 4.0 - 11.0 10e3/uL    RBC Count 5.38 4.40 - 5.90 10e6/uL    Hemoglobin 16.0 13.3 - 17.7 g/dL    Hematocrit 47.4 40.0 - 53.0 %    MCV 88 78 - 100 fL    MCH 29.7 26.5 - 33.0 pg    MCHC 33.8 31.5 - 36.5 g/dL    RDW 12.9 10.0 - 15.0 %    Platelet Count 337 150 - 450 10e3/uL    % Neutrophils 69 %    % Lymphocytes 22 %    % Monocytes 7 %    % Eosinophils 2 %    % Basophils 0 %    % Immature Granulocytes 0 %    NRBCs per 100 WBC 0 <1 /100    Absolute Neutrophils 6.5 1.6 - 8.3 10e3/uL    Absolute Lymphocytes 2.1 0.8 - 5.3 10e3/uL    Absolute Monocytes 0.7 0.0 - 1.3 10e3/uL    Absolute Eosinophils 0.2 0.0 - 0.7 10e3/uL    Absolute Basophils 0.0 0.0 - 0.2 10e3/uL    Absolute Immature Granulocytes 0.0 <=0.4 10e3/uL    Absolute NRBCs 0.0 10e3/uL   CBC with platelets differential     Status: None    Narrative    The following orders were created for panel order CBC with platelets differential.  Procedure                               Abnormality         Status                     ---------                               -----------         ------                     CBC with platelets and ...[[id number]]                      Final result                 Please view  results for these tests on the individual orders.     Medications   ketorolac (TORADOL) injection 15 mg (15 mg Intravenous $Given 5/29/25 1245)   HYDROmorphone (DILAUDID) injection 1 mg (1 mg Intravenous $Given 5/29/25 1238)   sodium chloride 0.9% BOLUS 1,000 mL (0 mLs Intravenous Stopped 5/29/25 1348)   ondansetron (ZOFRAN) injection 4 mg (4 mg Intravenous $Given 5/29/25 1241)   cephALEXin (KEFLEX) capsule 500 mg (500 mg Oral $Given 5/29/25 1077)     Labs Ordered and Resulted from Time of ED Arrival to Time of ED Departure   BASIC METABOLIC PANEL - Abnormal       Result Value    Sodium 139      Potassium 3.8      Chloride 101      Carbon Dioxide (CO2) 23      Anion Gap 15      Urea Nitrogen 14.7      Creatinine 0.98      GFR Estimate >90      Calcium 10.1      Glucose 107 (*)    ROUTINE UA WITH MICROSCOPIC REFLEX TO CULTURE - Abnormal    Color Urine Yellow      Appearance Urine Clear      Glucose Urine Negative      Bilirubin Urine Negative      Ketones Urine Negative      Specific Gravity Urine 1.029      Blood Urine Large (*)     pH Urine 5.5      Protein Albumin Urine 20 (*)     Urobilinogen Urine Normal      Nitrite Urine Negative      Leukocyte Esterase Urine Small (*)     Bacteria Urine Moderate (*)     Mucus Urine Present (*)     Calcium Oxalate Crystals Urine Few (*)     RBC Urine 102 (*)     WBC Urine 22 (*)     Squamous Epithelials Urine <1     CBC WITH PLATELETS AND DIFFERENTIAL    WBC Count 9.4      RBC Count 5.38      Hemoglobin 16.0      Hematocrit 47.4      MCV 88      MCH 29.7      MCHC 33.8      RDW 12.9      Platelet Count 337      % Neutrophils 69      % Lymphocytes 22      % Monocytes 7      % Eosinophils 2      % Basophils 0      % Immature Granulocytes 0      NRBCs per 100 WBC 0      Absolute Neutrophils 6.5      Absolute Lymphocytes 2.1      Absolute Monocytes 0.7      Absolute Eosinophils 0.2      Absolute Basophils 0.0      Absolute Immature Granulocytes 0.0      Absolute NRBCs 0.0      URINE CULTURE     Abd/pelvis CT no contrast - Stone Protocol   Final Result   IMPRESSION:   1.  Right hydronephrosis and hydroureter extending to the ureterovesical junction, where there is a 3 mm x 2 mm x 3 mm calculus. There is a small nonobstructing right renal calculus.   2.  Multiple nonobstructing left renal calculi. There is also a 5 mm x 3 mm x 5 mm calculus in the left renal pelvis.         US Renal Complete Non-Vascular   Final Result   IMPRESSION:   1.  Obstructing 7 mm right ureterovesical junction stone with mild to moderate right-sided hydronephrosis.    2.  Suspected nonobstructing left renal calculi.             Critical care was not performed.     Medical Decision Making  The patient's presentation was of high complexity (an acute health issue posing potential threat to life or bodily function).    The patient's evaluation involved:  review of external note(s) from 3+ sources (see separate area of note for details)  ordering and/or review of 3+ test(s) in this encounter (see separate area of note for details)  discussion of management or test interpretation with another health professional (see separate area of note for details)    The patient's management necessitated high risk (a decision regarding hospitalization).    Assessment & Plan    45-year-old male presents to the emergency department for evaluation of flank pain.  Upon presentation patient is alert, rolling around on the bed in obvious distress secondary to pain, skin warm pink and dry, afebrile, vital signs within normal limits.  Broad differential was considered including kidney stone, pyelonephritis, bowel obstruction including postop complications from previous ileostomy.    Patient's history and physical exam is consistent with a kidney stone, patient has had recurrent kidney stones and this is presenting like his previous ones.  No evidence of infection around his ostomy site.  Rest of his abdomen is nontender to palpation.  Given  the sudden onset and consistent with his previous kidney stones MO suspicious for another obstructing kidney stone.  Workup included a metabolic panel that showed no significant abnormalities, no elevation of his BUN and creatinine, CBC did not show any leukocytosis, no acute anemia, no abnormal platelet count.  Given that he is a young man has had multiple CTs previously although it has been a couple years since his last CT, want to avoid further radiation if possible and so an ultrasound of the renals were done,.  A 7 mm obstructing stone at the UVJ was found on the right side, multiple large stones in the left kidney nonobstructing.  Given the size of the stone and his previous need for interventions plan will be for the patient to follow-up with urology.  A urine analysis was done to further evaluate for possible infectious etiology, large amount of blood, small amount of leukocyte Estrace along with some WBCs and a moderate amount of bacteria were noted in the urine.  Do not suspect contamination as squamous cells were not detected.  This was discussed with oncology, oncology agrees with plan for outpatient antibiotics and close follow-up in clinic, patient had symptomatic relief with Toradol.  He will be given first dose of his antibiotics here in the emergency department.    Urology did suggest getting a CT to further evaluate the exact positioning and location of the stone in preparation for possible surgical intervention in clinic next week.  Patient was agreeable to this plan and a CT was obtained before he was discharged.  Patient will be discharged with oxycodone for breakthrough pain, Flomax, and plan to use ibuprofen for continued pain relief.  Patient is comfortable with discharge at this time, all questions were answered the best my ability, return precautions and follow-up instructions were given.    I have reviewed the nursing notes. I have reviewed the findings, diagnosis, plan and need for follow  up with the patient.    Discharge Medication List as of 5/29/2025  4:32 PM        START taking these medications    Details   cephALEXin (KEFLEX) 500 MG capsule Take 1 capsule (500 mg) by mouth 4 times daily for 5 days., Disp-20 capsule, R-0, E-Prescribe      oxyCODONE (ROXICODONE) 5 MG tablet Take 1 tablet (5 mg) by mouth every 6 hours as needed for pain., Disp-12 tablet, R-0, E-Prescribe      tamsulosin (FLOMAX) 0.4 MG capsule Take 1 capsule (0.4 mg) by mouth daily for 10 doses., Disp-10 capsule, R-0, E-Prescribe             Final diagnoses:   Hydronephrosis with renal and ureteral calculus obstruction   Kidney stones   Urinary tract infection with hematuria, site unspecified       Maged Borden PA-C    [facility] EMERGENCY DEPARTMENT  5/29/2025     Maged Borden PA-C  05/29/25 [id number]

## 2025-05-30 ENCOUNTER — RESULTS FOLLOW-UP (OUTPATIENT)
Dept: NURSING | Facility: CLINIC | Age: 45
End: 2025-05-30

## 2025-05-30 LAB — BACTERIA UR CULT: NO GROWTH

## 2025-06-02 ENCOUNTER — MYC MEDICAL ADVICE (OUTPATIENT)
Dept: UROLOGY | Facility: CLINIC | Age: 45
End: 2025-06-02
Payer: COMMERCIAL

## 2025-06-02 DIAGNOSIS — N20.0 KIDNEY STONE: Primary | ICD-10-CM

## 2025-07-28 ENCOUNTER — TELEPHONE (OUTPATIENT)
Dept: FAMILY MEDICINE | Facility: CLINIC | Age: 45
End: 2025-07-28
Payer: COMMERCIAL

## 2025-07-28 NOTE — TELEPHONE ENCOUNTER
Forms/Letter Request    Type of form/letter: Prescriber Response Form - Escitalopram Oxalate     Who is the form from? [facility]    Where did/will the form come from? Form was faxed in    How would you like the form/letter returned: Fax to [phone]    Where form is located: [facility] to sign basket

## 2025-08-05 ENCOUNTER — TRANSFERRED RECORDS (OUTPATIENT)
Dept: MULTI SPECIALTY CLINIC | Facility: CLINIC | Age: 45
End: 2025-08-05
Payer: COMMERCIAL

## 2025-08-05 ENCOUNTER — TRANSFERRED RECORDS (OUTPATIENT)
Dept: INTERNAL MEDICINE | Facility: CLINIC | Age: 45
End: 2025-08-05
Payer: COMMERCIAL

## 2025-08-05 LAB
ALT SERPL-CCNC: 37 IU/L (ref 0–44)
AST SERPL-CCNC: 26 IU/L (ref 0–40)

## 2025-08-06 ENCOUNTER — TRANSFERRED RECORDS (OUTPATIENT)
Dept: ADMINISTRATIVE | Facility: CLINIC | Age: 45
End: 2025-08-06
Payer: COMMERCIAL

## (undated) DEVICE — SOL NACL 0.9% IRRIG 3000ML BAG 2B7477

## (undated) DEVICE — WIPES FOLEY CARE SURESTEP PROVON DFC100

## (undated) DEVICE — GUIDEWIRE SENSOR DUAL FLEX STR 0.035"X150CM M0066703080

## (undated) DEVICE — JELLY LUBRICATING SURGILUBE 2OZ TUBE 0281-0205-02

## (undated) DEVICE — LINEN GOWN X4 5410

## (undated) DEVICE — GOWN XLG DISP 9545

## (undated) DEVICE — TUBING SET THERMEDX UROLOGY SGL USE LL0006

## (undated) DEVICE — RAD RX CONRAY 60% (50ML) CHARGE PER ML

## (undated) DEVICE — STRAP KNEE/BODY 31143004

## (undated) DEVICE — CATH URETERAL OPEN END 5FRX70CM M0064002010

## (undated) DEVICE — GLOVE PROTEXIS W/NEU-THERA 7.5  2D73TE75

## (undated) DEVICE — LASER FIBER HOLMIUM FLEXIVA 200UM M0068403910 840-391

## (undated) DEVICE — PACK CYSTO CUSTOM ASC

## (undated) DEVICE — KIT ENDO FIRST STEP DISINFECTANT 200ML W/POUCH EP-4

## (undated) DEVICE — SYR 10ML LL W/O NDL 302995

## (undated) DEVICE — LINEN TOWEL PACK X5 5464

## (undated) DEVICE — Device

## (undated) DEVICE — DRAPE C-ARM W/STRAPS 42X72" 07-CA104

## (undated) DEVICE — SUCTION MANIFOLD NEPTUNE 2 SYS 4 PORT 0702-020-000

## (undated) DEVICE — SOL WATER IRRIG 1000ML BOTTLE 2F7114

## (undated) DEVICE — SOL NACL 0.9% IRRIG 1000ML BOTTLE 2F7124

## (undated) DEVICE — PAD CHUX UNDERPAD 30X30"

## (undated) DEVICE — SPECIMEN CONTAINER 5OZ STERILE 2600SA

## (undated) DEVICE — PAD CHUX UNDERPAD 30X36" P3036C

## (undated) DEVICE — SYR 20ML LL W/O NDL 302830

## (undated) DEVICE — GLOVE BIOGEL PI ULTRATOUCH G SZ 6.5 42165

## (undated) DEVICE — SYR 30ML LL W/O NDL 302832

## (undated) RX ORDER — PROPOFOL 10 MG/ML
INJECTION, EMULSION INTRAVENOUS
Status: DISPENSED
Start: 2021-07-01

## (undated) RX ORDER — ONDANSETRON 2 MG/ML
INJECTION INTRAMUSCULAR; INTRAVENOUS
Status: DISPENSED
Start: 2020-01-31

## (undated) RX ORDER — CIPROFLOXACIN 500 MG/1
TABLET, FILM COATED ORAL
Status: DISPENSED
Start: 2020-02-11

## (undated) RX ORDER — ACETAMINOPHEN 325 MG/1
TABLET ORAL
Status: DISPENSED
Start: 2023-10-29

## (undated) RX ORDER — IOPAMIDOL 510 MG/ML
INJECTION, SOLUTION INTRAVASCULAR
Status: DISPENSED
Start: 2023-10-29

## (undated) RX ORDER — SODIUM CHLORIDE, SODIUM LACTATE, POTASSIUM CHLORIDE, CALCIUM CHLORIDE 600; 310; 30; 20 MG/100ML; MG/100ML; MG/100ML; MG/100ML
INJECTION, SOLUTION INTRAVENOUS
Status: DISPENSED
Start: 2023-10-29

## (undated) RX ORDER — DEXAMETHASONE SODIUM PHOSPHATE 4 MG/ML
INJECTION, SOLUTION INTRA-ARTICULAR; INTRALESIONAL; INTRAMUSCULAR; INTRAVENOUS; SOFT TISSUE
Status: DISPENSED
Start: 2021-06-09

## (undated) RX ORDER — ACETAMINOPHEN 325 MG/1
TABLET ORAL
Status: DISPENSED
Start: 2021-07-01

## (undated) RX ORDER — DEXAMETHASONE SODIUM PHOSPHATE 4 MG/ML
INJECTION, SOLUTION INTRA-ARTICULAR; INTRALESIONAL; INTRAMUSCULAR; INTRAVENOUS; SOFT TISSUE
Status: DISPENSED
Start: 2023-10-29

## (undated) RX ORDER — CEFAZOLIN SODIUM 1 G/3ML
INJECTION, POWDER, FOR SOLUTION INTRAMUSCULAR; INTRAVENOUS
Status: DISPENSED
Start: 2021-06-09

## (undated) RX ORDER — ONDANSETRON 2 MG/ML
INJECTION INTRAMUSCULAR; INTRAVENOUS
Status: DISPENSED
Start: 2023-10-29

## (undated) RX ORDER — LIDOCAINE HYDROCHLORIDE 20 MG/ML
INJECTION, SOLUTION EPIDURAL; INFILTRATION; INTRACAUDAL; PERINEURAL
Status: DISPENSED
Start: 2020-01-31

## (undated) RX ORDER — PROPOFOL 10 MG/ML
INJECTION, EMULSION INTRAVENOUS
Status: DISPENSED
Start: 2023-10-29

## (undated) RX ORDER — PROPOFOL 10 MG/ML
INJECTION, EMULSION INTRAVENOUS
Status: DISPENSED
Start: 2020-01-31

## (undated) RX ORDER — ONDANSETRON 2 MG/ML
INJECTION INTRAMUSCULAR; INTRAVENOUS
Status: DISPENSED
Start: 2021-06-09

## (undated) RX ORDER — CEFAZOLIN SODIUM 1 G/3ML
INJECTION, POWDER, FOR SOLUTION INTRAMUSCULAR; INTRAVENOUS
Status: DISPENSED
Start: 2020-01-31

## (undated) RX ORDER — FENTANYL CITRATE 50 UG/ML
INJECTION, SOLUTION INTRAMUSCULAR; INTRAVENOUS
Status: DISPENSED
Start: 2021-06-09

## (undated) RX ORDER — DEXAMETHASONE SODIUM PHOSPHATE 4 MG/ML
INJECTION, SOLUTION INTRA-ARTICULAR; INTRALESIONAL; INTRAMUSCULAR; INTRAVENOUS; SOFT TISSUE
Status: DISPENSED
Start: 2020-01-31

## (undated) RX ORDER — OXYCODONE HYDROCHLORIDE 5 MG/1
TABLET ORAL
Status: DISPENSED
Start: 2021-07-01

## (undated) RX ORDER — GLYCOPYRROLATE 0.2 MG/ML
INJECTION INTRAMUSCULAR; INTRAVENOUS
Status: DISPENSED
Start: 2021-07-01

## (undated) RX ORDER — FENTANYL CITRATE 50 UG/ML
INJECTION, SOLUTION INTRAMUSCULAR; INTRAVENOUS
Status: DISPENSED
Start: 2020-01-31

## (undated) RX ORDER — PROPOFOL 10 MG/ML
INJECTION, EMULSION INTRAVENOUS
Status: DISPENSED
Start: 2021-06-09

## (undated) RX ORDER — GABAPENTIN 300 MG/1
CAPSULE ORAL
Status: DISPENSED
Start: 2021-07-01

## (undated) RX ORDER — FENTANYL CITRATE 50 UG/ML
INJECTION, SOLUTION INTRAMUSCULAR; INTRAVENOUS
Status: DISPENSED
Start: 2023-10-29

## (undated) RX ORDER — KETOROLAC TROMETHAMINE 30 MG/ML
INJECTION, SOLUTION INTRAMUSCULAR; INTRAVENOUS
Status: DISPENSED
Start: 2023-10-29

## (undated) RX ORDER — LIDOCAINE HYDROCHLORIDE 20 MG/ML
JELLY TOPICAL
Status: DISPENSED
Start: 2020-02-11

## (undated) RX ORDER — CEFAZOLIN SODIUM 2 G/50ML
SOLUTION INTRAVENOUS
Status: DISPENSED
Start: 2021-07-01

## (undated) RX ORDER — ONDANSETRON 2 MG/ML
INJECTION INTRAMUSCULAR; INTRAVENOUS
Status: DISPENSED
Start: 2021-07-01

## (undated) RX ORDER — LIDOCAINE HYDROCHLORIDE 20 MG/ML
INJECTION, SOLUTION EPIDURAL; INFILTRATION; INTRACAUDAL; PERINEURAL
Status: DISPENSED
Start: 2021-06-09

## (undated) RX ORDER — LIDOCAINE HYDROCHLORIDE 20 MG/ML
INJECTION, SOLUTION EPIDURAL; INFILTRATION; INTRACAUDAL; PERINEURAL
Status: DISPENSED
Start: 2021-07-01

## (undated) RX ORDER — DEXAMETHASONE SODIUM PHOSPHATE 4 MG/ML
INJECTION, SOLUTION INTRA-ARTICULAR; INTRALESIONAL; INTRAMUSCULAR; INTRAVENOUS; SOFT TISSUE
Status: DISPENSED
Start: 2021-07-01

## (undated) RX ORDER — FENTANYL CITRATE 50 UG/ML
INJECTION, SOLUTION INTRAMUSCULAR; INTRAVENOUS
Status: DISPENSED
Start: 2021-07-01

## (undated) RX ORDER — KETOROLAC TROMETHAMINE 30 MG/ML
INJECTION, SOLUTION INTRAMUSCULAR; INTRAVENOUS
Status: DISPENSED
Start: 2021-07-01